# Patient Record
Sex: FEMALE | Race: WHITE | NOT HISPANIC OR LATINO | Employment: OTHER | URBAN - METROPOLITAN AREA
[De-identification: names, ages, dates, MRNs, and addresses within clinical notes are randomized per-mention and may not be internally consistent; named-entity substitution may affect disease eponyms.]

---

## 2017-01-13 ENCOUNTER — HOSPITAL ENCOUNTER (EMERGENCY)
Facility: HOSPITAL | Age: 67
Discharge: HOME | End: 2017-01-13
Attending: EMERGENCY MEDICINE
Payer: COMMERCIAL

## 2017-01-13 VITALS
HEART RATE: 64 BPM | BODY MASS INDEX: 24.5 KG/M2 | HEIGHT: 71 IN | RESPIRATION RATE: 18 BRPM | WEIGHT: 175 LBS | TEMPERATURE: 98 F | DIASTOLIC BLOOD PRESSURE: 73 MMHG | SYSTOLIC BLOOD PRESSURE: 129 MMHG | OXYGEN SATURATION: 97 %

## 2017-01-13 DIAGNOSIS — S09.90XA HEAD TRAUMA: ICD-10-CM
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
## 2017-01-13 PROCEDURE — 25000003 PHARM REV CODE 250: Performed by: PHYSICIAN ASSISTANT

## 2017-01-13 PROCEDURE — 12013 RPR F/E/E/N/L/M 2.6-5.0 CM: CPT

## 2017-01-13 PROCEDURE — 99284 EMERGENCY DEPT VISIT MOD MDM: CPT | Mod: 25

## 2017-01-13 RX ORDER — HYDROCODONE BITARTRATE AND ACETAMINOPHEN 5; 325 MG/1; MG/1
1 TABLET ORAL EVERY 4 HOURS PRN
Qty: 15 TABLET | Refills: 0 | Status: SHIPPED | OUTPATIENT
Start: 2017-01-13 | End: 2017-01-20 | Stop reason: ALTCHOICE

## 2017-01-13 RX ORDER — LIDOCAINE HYDROCHLORIDE 10 MG/ML
10 INJECTION INFILTRATION; PERINEURAL
Status: COMPLETED | OUTPATIENT
Start: 2017-01-13 | End: 2017-01-13

## 2017-01-13 RX ORDER — HYDROCODONE BITARTRATE AND ACETAMINOPHEN 5; 325 MG/1; MG/1
1 TABLET ORAL
Status: COMPLETED | OUTPATIENT
Start: 2017-01-13 | End: 2017-01-13

## 2017-01-13 RX ADMIN — HYDROCODONE BITARTRATE AND ACETAMINOPHEN 1 TABLET: 5; 325 TABLET ORAL at 11:01

## 2017-01-13 RX ADMIN — LIDOCAINE HYDROCHLORIDE 10 ML: 10 INJECTION, SOLUTION INFILTRATION; PERINEURAL at 11:01

## 2017-01-13 NOTE — ED NOTES
Pt states while taking out the trash and fell.  Pt states part of the trash can hit her forehead causing a lac.  Pt denies LOC.  Pt states she was seen per urgent care and was placed in c-collar and told to come to ED for possible CT scan.

## 2017-01-13 NOTE — ED PROVIDER NOTES
Encounter Date: 1/13/2017       History     Chief Complaint   Patient presents with    Fall     pt fell while taking out the trash this morning. Denies LOC. C/o pain to entire right side of her face. Presents with laceration to right forehead and to bridge of nose. Pt was seen at urgent care, and had c-collar placed. Laceration was not sutured. Was sent to the ER for CT scan     Review of patient's allergies indicates:  No Known Allergies  HPI Comments: Sunni Lowe, a 66 y.o. female that presents for laceration sustained over R eyebrow and right sided facial pain after a fall while trying to bring her garbage out this morning.  Was seen at an urgent care facility PTA and there was concern for a head/facial/neck fracture.  She was placed in a C-collar and referred to the ED.  Denies any LOC, confusion.  No medications given at urgent care.  Is UTD on tetanus.      Patient is a 66 y.o. female presenting with the following complaint: skin laceration. The history is provided by the patient.   Laceration    The incident occurred just prior to arrival. The laceration is located on the face. The laceration is 4 cm in size. The laceration mechanism was a a blunt object (edge of garbage can ). The pain is at a severity of 3/10. The pain has been constant since onset. Possible foreign bodies include plastic. Her tetanus status is UTD.     Past Medical History   Diagnosis Date    Allergic rhinitis     Asthma     Cortes's esophagus     Osteopenia     Varicose veins      sees Dr. Brigido Quinones     No past medical history pertinent negatives.  Past Surgical History   Procedure Laterality Date    Knee surgery Bilateral      meniscus repair    Peniculitis surgery       x 3    Breast biopsy Left      Family History   Problem Relation Age of Onset    Ovarian cancer Mother 68    Alzheimer's disease Mother      @ 62    Cancer Father 85     tongue    Diabetes Father     Alzheimer's disease Maternal Aunt      @ age 80      Social History   Substance Use Topics    Smoking status: Former Smoker    Smokeless tobacco: Former User    Alcohol use Yes     Review of Systems   Cardiovascular: Negative for chest pain.   Gastrointestinal: Negative for nausea and vomiting.   Musculoskeletal: Positive for neck pain.   Skin: Positive for wound.   Allergic/Immunologic: Negative for immunocompromised state.   Neurological: Negative for facial asymmetry, speech difficulty and weakness.   Hematological: Does not bruise/bleed easily.   Psychiatric/Behavioral: Negative for agitation and confusion.   All other systems reviewed and are negative.      Physical Exam   Initial Vitals   BP Pulse Resp Temp SpO2   01/13/17 1121 01/13/17 1121 01/13/17 1121 01/13/17 1121 01/13/17 1121   148/74 71 18 97.3 °F (36.3 °C) 100 %     Physical Exam    Nursing note and vitals reviewed.  Constitutional: She appears well-developed and well-nourished. No distress.   HENT:   Head: Normocephalic. Head is with laceration. Head is without Vasquez's sign and without contusion.       Right Ear: Tympanic membrane, external ear and ear canal normal.   Left Ear: Tympanic membrane, external ear and ear canal normal.   Nose: Nose lacerations present. No nasal deformity.       Mouth/Throat: Oropharynx is clear and moist.   4 cm linear laceration over R eyebrow   Eyes: Conjunctivae and EOM are normal.   Neck: Normal range of motion. No tracheal deviation present.   Cardiovascular: Normal rate and regular rhythm.   Pulmonary/Chest: Breath sounds normal. No respiratory distress.   Abdominal: Soft. Bowel sounds are normal. There is no tenderness. There is no rebound and no guarding.   Musculoskeletal: Normal range of motion. She exhibits no tenderness.   Neurological: She is alert and oriented to person, place, and time. She has normal strength. No cranial nerve deficit.   Skin: Skin is warm and dry. No rash noted. No erythema.   Psychiatric: She has a normal mood and affect. Thought  content normal.         ED Course   Lac Repair  Date/Time: 1/13/2017 1:06 PM  Performed by: GM MELENDEZ  Authorized by: BG LONGORIA   Consent Done: Yes  Patient identity confirmed: verbally with patient  Body area: head/neck  Location details: right eyelid  Laceration length: 4 cm  Foreign bodies: plastic  Tendon involvement: none  Nerve involvement: none  Vascular damage: no  Anesthesia: local infiltration    Anesthesia:  Anesthesia: local infiltration  Local Anesthetic: lidocaine 1% without epinephrine   Anesthetic total: 5 mL  Patient sedated: no  Irrigation solution: saline  Irrigation method: syringe  Amount of cleaning: standard  Debridement: none  Degree of undermining: none  Skin closure: 6-0 nylon  Number of sutures: 8  Technique: simple  Approximation: close  Approximation difficulty: simple  Dressing: non-stick sterile dressing  Patient tolerance: Patient tolerated the procedure well with no immediate complications        Labs Reviewed - No data to display     Imaging Results         CT Maxillofacial Without Contrast (Final result) Result time:  01/13/17 12:10:57    Final result by Lucho Dave MD (01/13/17 12:10:57)    Impression:      1.  No facial bone fracture.  2.  Sinus disease.          Electronically signed by: LUCHO DAVE MD  Date:     01/13/17  Time:    12:10     Narrative:    Facial bone CT    Technique: Helical CT scan of the facial bones performed without intravenous contrast.  2.5-mm contiguous axial sections with coronal and sagittal reformations provided for interpretation.    Comparison: None.    Findings:    Bony orbits, nasal bones, zygomatic arches, pterygoid plates, maxilla and mandible are intact.  No facial bone fracture.  Temporomandibular joints are aligned, noting degenerative changes.    There is patchy opacification of ethmoid air cells and mild mucoperiosteal thickening of the bilateral maxillary antra.  Visualized mastoid air cells are aerated.    Optic  globes, optic nerves, and extraocular muscles are unremarkable.  No infiltration of retrobulbar fat.    Salivary glands are unremarkable.    There is laceration over the right frontal bone.            CT Cervical Spine Without Contrast (Final result) Result time:  01/13/17 12:13:26    Final result by Yen Mcrae MD (01/13/17 12:13:26)    Impression:     There is no evidence acute bony injury of the      Electronically signed by: YEN MCRAE MD  Date:     01/13/17  Time:    12:13     Narrative:    CT cervical spine    History: Fall    There is no evidence fracture or malalignment.  There is no prevertebral soft tissue swelling.  The adjacent soft tissues are unremarkable.            CT Head Without Contrast (Final result) Result time:  01/13/17 12:08:24    Final result by Lucho Dave MD (01/13/17 12:08:24)    Impression:     No acute intracranial process.      Electronically signed by: LUCHO DAVE MD  Date:     01/13/17  Time:    12:08     Narrative:    Head CT    Technique: CT scan of the head was performed without contrast utilizing 5-mm contiguous axial sections.    Comparison: 9/3/15    Findings:   There is no intracranial hemorrhage, fluid collection, or mass effect.  Gray-white differentiation is preserved.  There is mucoperiosteal thickening in the left maxillary sinus and bilateral ethmoid air cells.  Mastoid air cells are aerated.                 Medical Decision Making:   Initial Assessment:   Sunni Lowe, a 66 y.o. Non-toxic/afebrile female that presents for laceration sustained over R eyebrow and right sided facial pain after a fall while trying to bring her garbage out this morning.  Was seen at an urgent care facility PTA and there was concern for a head/facial/neck fracture.  She was placed in a C-collar and referred to the ED.  Denies any LOC, confusion.  No medications given at urgent care.  Is UTD on tetanus.    Differential Diagnosis:   Laceration, fracture, contusion  Clinical  Tests:   Radiological Study: Ordered and Reviewed  ED Management:  Norco given with improvement of pain.  CT showed evidence of fracture or dislocation.  Laceration was repaired.  Patient tolerated procedure well.  Given instruction to f/u with PCP in 5 - 7 days for suture removal.  Given strict precautions for return to ED and verbalized understanding.    RX: Norco                   Attending Attestation:     Physician Attestation Statement for NP/PA:   I have conducted a face to face encounter with this patient in addition to the NP/PA, due to Medical Complexity    Other NP/PA Attestation Additions:    History of Present Illness: Agree; 65 y/o F presents to the ED c/o pain to right face, laceration to right forehead and abrasion to bridge of nose after tripping and falling while taking out the trash; Denies LOC, focal numbness or weakness, visual changes, N/V, CP, SOB, Abd pain   Physical Exam: Agree; Well developed, well nourished 65 y/o F in moderate discomfort; PERRL, EOMI, MMM; Large laceration to right forehead and abrasion to bridge of nose; No crepitus or deformity noted to facial bones; Lungs CTA B/L; CV NSR, 2+ radial pulse B/L; Abd S/NT/ND; MAEW   Medical Decision Making: Agree; Px tolerated lac repair well, CTs WNL, instructed on f/u and reasons to return to ED   Procedure Note: Agree; Laceration site irrigated thoroughly, laceration edges approximated and sutured, px tolerated procedure well                ED Course     Clinical Impression:   The primary encounter diagnosis was Laceration. Diagnoses of Head trauma and Fall were also pertinent to this visit.          Amanda Boyle PA-C  01/13/17 [id number]       Charlie Damon MD  01/17/17 3939

## 2017-01-13 NOTE — ED AVS SNAPSHOT
[facility] [address]  [place] [address]               Sunni Lowe   2017 11:24 AM   ED    Description:  Female : 1950   Department:  [facility]           Your Care was Coordinated By:     Provider Role From To    Charlie Damon MD Attending Provider 17 1142 --    Amanda Boyle PA-C Physician Assistant 17 1126 --      Reason for Visit     Fall           Diagnoses this Visit        Comments    Laceration    -  Primary     Head trauma         Fall           ED Disposition     None           To Do List           Follow-up Information     Follow up with Nelly Townsend NP.    Specialty:  Family Medicine    Why:  5 -7 days for suture removal     Contact information:    [address] [facility]  [address]  [place] [id number] [phone]         These Medications        Disp Refills Start End    hydrocodone-acetaminophen 5-325mg (NORCO) 5-325 mg per tablet 15 tablet 0 2017     Take 1 tablet by mouth every 4 (four) hours as needed for Pain. - Oral      [facility]     [facility] Nurse Care Line -  Assistance  Registered nurses in the [facility] provide clinical advisement, health education, appointment booking, and other advisory services.  Call for this free service at [phone].             Medications           Message regarding Medications     Verify the changes and/or additions to your medication regime listed below are the same as discussed with your clinician today.  If any of these changes or additions are incorrect, please notify your healthcare provider.        START taking these NEW medications        Refills    hydrocodone-acetaminophen 5-325mg (NORCO) 5-325 mg per tablet 0    Sig: Take 1 tablet by mouth every 4 (four) hours as needed for Pain.    Class: Print    Route: Oral      These medications were administered today        Dose Freq    lidocaine HCL 10 mg/ml (1%)  "injection 10 mL 10 mL ED 1 Time    Sig: 10 mLs by Infiltration route ED 1 Time.    Class: Normal    Route: Infiltration    Cosign for Ordering: Accepted by Cristopher Carlos MD on 1/13/2017 12:00 PM    hydrocodone-acetaminophen 5-325mg per tablet 1 tablet 1 tablet ED 1 Time    Sig: Take 1 tablet by mouth ED 1 Time.    Class: Normal    Route: Oral    Cosign for Ordering: Accepted by Cristopher Carlos MD on 1/13/2017 12:00 PM           Verify that the below list of medications is an accurate representation of the medications you are currently taking.  If none reported, the list may be blank. If incorrect, please contact your healthcare provider. Carry this list with you in case of emergency.           Current Medications     calcium carbonate (OS-ISABELLE) 600 mg (1,500 mg) Tab Take 1,200 mg by mouth once daily.    cetirizine (ZYRTEC) 10 MG tablet Take 10 mg by mouth once daily.    esomeprazole (NEXIUM) 40 MG capsule Take 1 capsule (40 mg total) by mouth once daily.    fish oil-omega-3 fatty acids 300-1,000 mg capsule Take 600 mg by mouth once daily.    fluticasone (FLOVENT HFA) 44 mcg/actuation inhaler Inhale 1 puff into the lungs 2 (two) times daily.    hydrocodone-acetaminophen 5-325mg (NORCO) 5-325 mg per tablet Take 1 tablet by mouth every 4 (four) hours as needed for Pain.    vitamin D 1000 units Tab Take 2,000 Units by mouth once daily.           Clinical Reference Information           Your Vitals Were     BP Pulse Temp Resp Height Weight    148/74 (BP Location: Right arm, Patient Position: Sitting) 71 97.3 °F (36.3 °C) (Oral) 18 5' 11" (1.803 m) 79.4 kg (175 lb)    SpO2 BMI             100% 24.41 kg/m2         Allergies as of 1/13/2017     No Known Allergies      Immunizations Administered on Date of Encounter - 1/13/2017     None      ED Micro, Lab, POCT     None      ED Imaging Orders     Start Ordered       Status Ordering Provider    01/13/17 1146 01/13/17 1145  CT Maxillofacial Without Contrast  1 time " imaging      Final result     01/13/17 1146 01/13/17 1145  CT Cervical Spine Without Contrast  1 time imaging      Final result     01/13/17 1145 01/13/17 1145  CT Head Without Contrast  1 time imaging      Final result     01/13/17 1140 01/13/17 1143    1 time imaging,   Status:  Canceled      Canceled       Discharge References/Attachments     CONCUSSION (ENGLISH)    LACERATION, FACE: SUTURE OR TAPE (ENGLISH)      [facility] Sign-Up     Activating your [facility] account is as easy as 1-2-3!     1) Visit [email or web address], select Sign Up Now, enter this activation code and your date of birth, then select Next.  [email or web address]  Expires: 2/27/2017  1:06 PM      2) Create a username and password to use when you visit [facility] in the future and select a security question in case you lose your password and select Next.    3) Enter your e-mail address and click Sign Up!    Additional Information  If you have questions, please e-mail [email or web address] or call [phone] to talk to our [facility] staff. Remember, [facility] is NOT to be used for urgent needs. For medical emergencies, dial 911.         Smoking Cessation     If you would like to quit smoking:   You may be eligible for free services if you are a [place] resident and started smoking cigarettes before September 1, 1988.  Call the Smoking Cessation Trust ([facility]) toll free at ([phone]) 139-0105 or (967) 704-3692.   Call 1-800-QUIT-NOW if you do not meet the above criteria.             [facility] complies with applicable Federal civil rights laws and does not discriminate on the basis of race, color, national origin, age, disability, or sex.        Language Assistance Services     ATTENTION: Language assistance services are available, free of charge. Please call [phone].      ATENCIÓN: Si habla español, tiene a lee disposición servicios gratuitos de asistencia lingüística. Llame al [phone].     CHÚ Ý: N?u b?n nói Ti?ng Vi?t, có  các d?ch v? h? tr? yessi ng? mi?n phí dành cho b?n. G?i s? [phone].

## 2017-01-17 ENCOUNTER — TELEPHONE (OUTPATIENT)
Dept: FAMILY MEDICINE | Facility: CLINIC | Age: 67
End: 2017-01-17

## 2017-01-17 NOTE — TELEPHONE ENCOUNTER
Called pt to change time of appointment which should be a 40 min, was schedule a 20 min, called  Pt no answer,ask pt to call office to see of 9 or 2 which is good.

## 2017-01-18 NOTE — TELEPHONE ENCOUNTER
----- Message from Eli Moise sent at 1/17/2017  4:59 PM CST -----  Contact: self/[phone]  Patient would like to take the 9:00 AM appointment on 1/20/17.  Please advise

## 2017-01-20 ENCOUNTER — OFFICE VISIT (OUTPATIENT)
Dept: FAMILY MEDICINE | Facility: CLINIC | Age: 67
End: 2017-01-20
Payer: COMMERCIAL

## 2017-01-20 VITALS
DIASTOLIC BLOOD PRESSURE: 74 MMHG | HEART RATE: 65 BPM | HEIGHT: 71 IN | TEMPERATURE: 97 F | BODY MASS INDEX: 24.41 KG/M2 | SYSTOLIC BLOOD PRESSURE: 114 MMHG | WEIGHT: 174.38 LBS | OXYGEN SATURATION: 99 %

## 2017-01-20 DIAGNOSIS — Z09 HOSPITAL DISCHARGE FOLLOW-UP: Primary | ICD-10-CM

## 2017-01-20 DIAGNOSIS — S01.111D EYEBROW LACERATION, RIGHT, SUBSEQUENT ENCOUNTER: ICD-10-CM

## 2017-01-20 DIAGNOSIS — Z48.02 VISIT FOR SUTURE REMOVAL: ICD-10-CM

## 2017-01-20 DIAGNOSIS — M54.9 UPPER BACK PAIN ON RIGHT SIDE: ICD-10-CM

## 2017-01-20 PROCEDURE — 99214 OFFICE O/P EST MOD 30 MIN: CPT | Mod: S$GLB,,, | Performed by: NURSE PRACTITIONER

## 2017-01-20 PROCEDURE — 1159F MED LIST DOCD IN RCRD: CPT | Mod: S$GLB,,, | Performed by: NURSE PRACTITIONER

## 2017-01-20 PROCEDURE — 99999 PR PBB SHADOW E&M-EST. PATIENT-LVL IV: CPT | Mod: PBBFAC,,, | Performed by: NURSE PRACTITIONER

## 2017-01-20 RX ORDER — SODIUM FLUORIDE 1.1 G/100G
GEL ORAL
Refills: 3 | COMMUNITY
Start: 2016-10-21 | End: 2017-10-02

## 2017-01-20 RX ORDER — NAPROXEN 500 MG/1
500 TABLET ORAL 2 TIMES DAILY
Qty: 60 TABLET | Refills: 0 | Status: SHIPPED | OUTPATIENT
Start: 2017-01-20 | End: 2017-04-07

## 2017-01-20 RX ORDER — METHOCARBAMOL 750 MG/1
750 TABLET, FILM COATED ORAL 4 TIMES DAILY PRN
Qty: 45 TABLET | Refills: 0 | Status: SHIPPED | OUTPATIENT
Start: 2017-01-20 | End: 2017-02-19

## 2017-01-20 NOTE — MR AVS SNAPSHOT
[address]  [place] [address]  Phone: [phone]  Fax: [phone]                  Sunni Lowe   2017 9:00 AM   Office Visit    Description:  Female : 1950   Provider:  Nelly Townsend NP   Department:  [facility]           Reason for Visit     Follow-up           Diagnoses this Visit        Comments    Hospital discharge follow-up    -  Primary     Eyebrow laceration, right, subsequent encounter         Visit for suture removal         Upper back pain on right side                To Do List           Future Appointments        Provider Department Dept Phone    2017 8:00 AM Chandan Middleton MD [facility] [phone]      Goals (5 Years of Data)     None      Follow-Up and Disposition     Return if symptoms worsen or fail to improve.       These Medications        Disp Refills Start End    naproxen (NAPROSYN) 500 MG tablet 60 tablet 0 2017     Take 1 tablet (500 mg total) by mouth 2 (two) times daily. - Oral    Pharmacy: [facility]/pharmacy #[id number] - [place] - [address] AT [facility] Ph #: [phone]       methocarbamol (ROBAXIN) 750 MG Tab 45 tablet 0 2017    Take 1 tablet (750 mg total) by mouth 4 (four) times daily as needed. - Oral    Pharmacy: [facility]/pharmacy #[id number] - [place] - [address] AT [facility] Ph #: [phone]         [facility]     [facility] Care [facility] -  Assistance  Registered nurses in the [facility] provide clinical advisement, health education, appointment booking, and other advisory services.  Call for this free service at [phone].             Medications           Message regarding Medications     Verify the changes and/or additions to your medication regime listed below are the same as discussed with your clinician today.  If any of these changes or additions are incorrect, please notify your  "healthcare provider.        START taking these NEW medications        Refills    naproxen (NAPROSYN) 500 MG tablet 0    Sig: Take 1 tablet (500 mg total) by mouth 2 (two) times daily.    Class: Normal    Route: Oral    methocarbamol (ROBAXIN) 750 MG Tab 0    Sig: Take 1 tablet (750 mg total) by mouth 4 (four) times daily as needed.    Class: Normal    Route: Oral      STOP taking these medications     hydrocodone-acetaminophen 5-325mg (NORCO) 5-325 mg per tablet Take 1 tablet by mouth every 4 (four) hours as needed for Pain.           Verify that the below list of medications is an accurate representation of the medications you are currently taking.  If none reported, the list may be blank. If incorrect, please contact your healthcare provider. Carry this list with you in case of emergency.           Current Medications     calcium carbonate (OS-ISABELLE) 600 mg (1,500 mg) Tab Take 1,200 mg by mouth once daily.    cetirizine (ZYRTEC) 10 MG tablet Take 10 mg by mouth once daily.    DENTAGEL 1.1 % Gel USE TO BRUSH TEETH TWICE A DAY (FOLLOW INSTRUCTIONS ON PACKAGE)    esomeprazole (NEXIUM) 40 MG capsule Take 1 capsule (40 mg total) by mouth once daily.    fish oil-omega-3 fatty acids 300-1,000 mg capsule Take 600 mg by mouth once daily.    fluticasone (FLOVENT HFA) 44 mcg/actuation inhaler Inhale 1 puff into the lungs 2 (two) times daily.    methocarbamol (ROBAXIN) 750 MG Tab Take 1 tablet (750 mg total) by mouth 4 (four) times daily as needed.    naproxen (NAPROSYN) 500 MG tablet Take 1 tablet (500 mg total) by mouth 2 (two) times daily.    vitamin D 1000 units Tab Take 2,000 Units by mouth once daily.           Clinical Reference Information           Vital Signs - Last Recorded  Most recent update: 1/20/2017  8:55 AM by Deepa Ryan MA    BP Pulse Temp Ht Wt SpO2    114/74 (BP Location: Right arm, Patient Position: Sitting, BP Method: Manual) 65 97.4 °F (36.3 °C) (Oral) 5' 11" (1.803 m) 79.1 kg (174 lb 6.1 oz) 99% "    BMI                24.32 kg/m2          Blood Pressure          Most Recent Value    BP  114/74      Allergies as of 1/20/2017     No Known Allergies      Immunizations Administered on Date of Encounter - 1/20/2017     None      [facility] Sign-Up     Activating your [facility] account is as easy as 1-2-3!     1) Visit [email or web address], select Sign Up Now, enter this activation code and your date of birth, then select Next.  [email or web address]  Expires: 2/27/2017  1:06 PM      2) Create a username and password to use when you visit [facility] in the future and select a security question in case you lose your password and select Next.    3) Enter your e-mail address and click Sign Up!    Additional Information  If you have questions, please e-mail [email or web address] or call [phone] to talk to our [facility] staff. Remember, [facility] is NOT to be used for urgent needs. For medical emergencies, dial 911.

## 2017-01-20 NOTE — LETTER
January 20, 2017    Sunni Lowe  [address] [facility] [address]         [address] [address]  Phone: [phone]  Fax: [phone] January 20, 2017     Patient: Sunni Lowe   YOB: 1950   Date of Visit: 1/20/2017       To Whom It May Concern:    It is my medical opinion that Sunni Lowe may return to work on 1/23/2017.    If you have any questions or concerns, please don't hesitate to call.    Sincerely,        Nelly Townsend, NP

## 2017-01-20 NOTE — PROGRESS NOTES
Subjective:       Patient ID: Sunni Lowe is a 66 y.o. female.    Chief Complaint: Follow-up (hospital )    HPI Comments: Patient is here today for Hospital Discharge follow up.    Patient presented to the ER on 1/13/2017 with a laceration sustained over the right eyebrow and right-sided facial pain after a fall while trying to bring her garbage out that morning. Laceration was repaired/sutured in the ER.  CT of Maxillofacial, Cervical and Head done - all with no acute abnormalities, no fractures present.  Patient was given Norco for pain medication and instructed to have sutures out in 5 to 7 days.  It has now been 7 days.  Sutures are intact - edges are wee-approximated and healing well.  Patient report the pain to face has improved and mostly resolved.  She does complain of pain to right upper back.  She reports she is no longer taking pain medication.  Just describes a muscle soreness to right upper back.          Previous Medications    CALCIUM CARBONATE (OS-ISABELLE) 600 MG (1,500 MG) TAB    Take 1,200 mg by mouth once daily.    CETIRIZINE (ZYRTEC) 10 MG TABLET    Take 10 mg by mouth once daily.    DENTAGEL 1.1 % GEL    USE TO BRUSH TEETH TWICE A DAY (FOLLOW INSTRUCTIONS ON PACKAGE)    ESOMEPRAZOLE (NEXIUM) 40 MG CAPSULE    Take 1 capsule (40 mg total) by mouth once daily.    FISH OIL-OMEGA-3 FATTY ACIDS 300-1,000 MG CAPSULE    Take 600 mg by mouth once daily.    FLUTICASONE (FLOVENT HFA) 44 MCG/ACTUATION INHALER    Inhale 1 puff into the lungs 2 (two) times daily.    VITAMIN D 1000 UNITS TAB    Take 2,000 Units by mouth once daily.       Past Medical History   Diagnosis Date    Allergic rhinitis     Asthma     Cortes's esophagus     Osteopenia     Varicose veins      sees Dr. Brigido Quinones       Past Surgical History   Procedure Laterality Date    Knee surgery Bilateral      meniscus repair    Peniculitis surgery       x 3    Breast biopsy Left        Family History   Problem Relation Age of Onset     Ovarian cancer Mother 68    Alzheimer's disease Mother      @ 62    Cancer Father 85     tongue    Diabetes Father     Alzheimer's disease Maternal Aunt      @ age 80    No Known Problems Sister     No Known Problems Brother     No Known Problems Sister        Social History     Social History    Marital status:      Spouse name: N/A    Number of children: N/A    Years of education: N/A     Social History Main Topics    Smoking status: Former Smoker    Smokeless tobacco: Former User    Alcohol use Yes    Drug use: No    Sexual activity: Not Currently     Other Topics Concern    None     Social History Narrative       Review of Systems   Constitutional: Negative for appetite change, chills, fatigue, fever and unexpected weight change.   HENT: Negative for congestion, ear pain, mouth sores, nosebleeds, postnasal drip, rhinorrhea, sinus pressure, sneezing, sore throat, trouble swallowing and voice change.    Eyes: Negative for photophobia, pain, discharge, redness, itching and visual disturbance.   Respiratory: Negative for cough, chest tightness and shortness of breath.    Cardiovascular: Negative for chest pain, palpitations and leg swelling.   Gastrointestinal: Negative for abdominal pain, blood in stool, constipation, diarrhea, nausea and vomiting.   Genitourinary: Negative for dysuria, frequency, hematuria and urgency.   Musculoskeletal: Positive for back pain. Negative for arthralgias, joint swelling and myalgias.   Skin: Negative for color change and rash.        Laceration over right eyebrow with sutures that need to be removed   Allergic/Immunologic: Negative for immunocompromised state.   Neurological: Negative for dizziness, seizures, syncope, weakness and headaches.   Hematological: Negative for adenopathy. Does not bruise/bleed easily.   Psychiatric/Behavioral: Negative for agitation, dysphoric mood, sleep disturbance and suicidal ideas. The patient is not nervous/anxious.       "    Objective:     Vitals:    01/20/17 0850   BP: 114/74   BP Location: Right arm   Patient Position: Sitting   BP Method: Manual   Pulse: 65   Temp: 97.4 °F (36.3 °C)   TempSrc: Oral   SpO2: 99%   Weight: 79.1 kg (174 lb 6.1 oz)   Height: 5' 11" (1.803 m)          Physical Exam   Constitutional: She is oriented to person, place, and time. She appears well-developed and well-nourished.   HENT:   Head: Normocephalic. Head is with contusion.       Right Ear: External ear normal.   Left Ear: External ear normal.   Nose: Nose normal.   Mouth/Throat: Oropharynx is clear and moist. No oropharyngeal exudate.   Laceration repair with 8 sutures intact.  Edges well-aproximated.  Minimal redness present and no swelling.  Bruising beneath the eye and at bridge of nose - see pictures below.   Eyes: EOM are normal. Pupils are equal, round, and reactive to light.   Neck: Normal range of motion. Neck supple. No tracheal deviation present. No thyromegaly present.   Cardiovascular: Normal rate, regular rhythm and normal heart sounds.    No murmur heard.  Pulmonary/Chest: Effort normal and breath sounds normal. No respiratory distress.   Abdominal: Soft. She exhibits no distension.   Musculoskeletal: Normal range of motion. She exhibits no edema.        Cervical back: She exhibits tenderness, pain and spasm. She exhibits no bony tenderness, no swelling and no laceration.        Back:    Lymphadenopathy:     She has no cervical adenopathy.   Neurological: She is alert and oriented to person, place, and time. No cranial nerve deficit. Coordination normal.   Skin: Skin is warm and dry.   Psychiatric: She has a normal mood and affect.       BEFORE AND AFTER SUTURE REMOVAL PICTURES:          Patient presents for suture removal. The wound is well healed without signs of infection.  The sutures are removed. Wound care and activity instructions given. Return prn.    Assessment:         ICD-10-CM ICD-9-CM   1. Hospital discharge follow-up Z09 " V67.59   2. Eyebrow laceration, right, subsequent encounter S01.111D V58.89     873.42   3. Visit for suture removal Z48.02 V58.32   4. Upper back pain on right side M54.9 724.5       Plan:       Hospital discharge follow-up    Eyebrow laceration, right, subsequent encounter    Visit for suture removal  Patient presents for suture removal. The wound is well healed without signs of infection.  The sutures are removed. Wound care and activity instructions given. Return prn.    Upper back pain on right side  -  Take Naprosyn twice daily.  Use Icy Hot rubs and moist heat.  May take Robaxin as needed for muscle relaxant.  -     naproxen (NAPROSYN) 500 MG tablet; Take 1 tablet (500 mg total) by mouth 2 (two) times daily.  Dispense: 60 tablet; Refill: 0  -     methocarbamol (ROBAXIN) 750 MG Tab; Take 1 tablet (750 mg total) by mouth 4 (four) times daily as needed.  Dispense: 45 tablet; Refill: 0    Return if symptoms worsen or fail to improve.     Patient's Medications   New Prescriptions    METHOCARBAMOL (ROBAXIN) 750 MG TAB    Take 1 tablet (750 mg total) by mouth 4 (four) times daily as needed.    NAPROXEN (NAPROSYN) 500 MG TABLET    Take 1 tablet (500 mg total) by mouth 2 (two) times daily.   Previous Medications    CALCIUM CARBONATE (OS-ISABELLE) 600 MG (1,500 MG) TAB    Take 1,200 mg by mouth once daily.    CETIRIZINE (ZYRTEC) 10 MG TABLET    Take 10 mg by mouth once daily.    DENTAGEL 1.1 % GEL    USE TO BRUSH TEETH TWICE A DAY (FOLLOW INSTRUCTIONS ON PACKAGE)    ESOMEPRAZOLE (NEXIUM) 40 MG CAPSULE    Take 1 capsule (40 mg total) by mouth once daily.    FISH OIL-OMEGA-3 FATTY ACIDS 300-1,000 MG CAPSULE    Take 600 mg by mouth once daily.    FLUTICASONE (FLOVENT HFA) 44 MCG/ACTUATION INHALER    Inhale 1 puff into the lungs 2 (two) times daily.    VITAMIN D 1000 UNITS TAB    Take 2,000 Units by mouth once daily.   Modified Medications    No medications on file   Discontinued Medications    HYDROCODONE-ACETAMINOPHEN 5-325MG  (NORCO) 5-325 MG PER TABLET    Take 1 tablet by mouth every 4 (four) hours as needed for Pain.

## 2017-01-27 ENCOUNTER — OFFICE VISIT (OUTPATIENT)
Dept: NEUROLOGY | Facility: CLINIC | Age: 67
End: 2017-01-27
Payer: COMMERCIAL

## 2017-01-27 VITALS
DIASTOLIC BLOOD PRESSURE: 76 MMHG | HEART RATE: 60 BPM | HEIGHT: 71 IN | WEIGHT: 174.38 LBS | BODY MASS INDEX: 24.41 KG/M2 | SYSTOLIC BLOOD PRESSURE: 108 MMHG

## 2017-01-27 DIAGNOSIS — R41.3 MEMORY CHANGES: Primary | ICD-10-CM

## 2017-01-27 PROCEDURE — 99213 OFFICE O/P EST LOW 20 MIN: CPT | Mod: S$GLB,,, | Performed by: PSYCHIATRY & NEUROLOGY

## 2017-01-27 PROCEDURE — 1159F MED LIST DOCD IN RCRD: CPT | Mod: S$GLB,,, | Performed by: PSYCHIATRY & NEUROLOGY

## 2017-01-27 PROCEDURE — 99999 PR PBB SHADOW E&M-EST. PATIENT-LVL III: CPT | Mod: PBBFAC,,, | Performed by: PSYCHIATRY & NEUROLOGY

## 2017-01-27 NOTE — MR AVS SNAPSHOT
[place] [facility]  [address] [place] [address] Ave  [place] [address]  Phone: [phone]  Fax: [phone]                  Sunni Lowe   2017 8:00 AM   Office Visit    Description:  Female : 1950   Provider:  Chandan Middleton MD   Department:  [facility]           Reason for Visit     Memory Loss                To Do List           Goals (5 Years of Data)     None      Follow-Up and Disposition     Return if symptoms worsen or fail to improve.      [facility]     [facility] -  Assistance  Registered nurses in the [facility] provide clinical advisement, health education, appointment booking, and other advisory services.  Call for this free service at [phone].             Medications           Message regarding Medications     Verify the changes and/or additions to your medication regime listed below are the same as discussed with your clinician today.  If any of these changes or additions are incorrect, please notify your healthcare provider.             Verify that the below list of medications is an accurate representation of the medications you are currently taking.  If none reported, the list may be blank. If incorrect, please contact your healthcare provider. Carry this list with you in case of emergency.           Current Medications     calcium carbonate (OS-ISABELLE) 600 mg (1,500 mg) Tab Take 1,200 mg by mouth once daily.    cetirizine (ZYRTEC) 10 MG tablet Take 10 mg by mouth once daily.    DENTAGEL 1.1 % Gel USE TO BRUSH TEETH TWICE A DAY (FOLLOW INSTRUCTIONS ON PACKAGE)    esomeprazole (NEXIUM) 40 MG capsule Take 1 capsule (40 mg total) by mouth once daily.    fish oil-omega-3 fatty acids 300-1,000 mg capsule Take 600 mg by mouth once daily.    fluticasone (FLOVENT HFA) 44 mcg/actuation inhaler Inhale 1 puff into the lungs 2 (two) times daily.    vitamin D 1000 units Tab Take 2,000 Units by mouth once daily.    methocarbamol  "(ROBAXIN) 750 MG Tab Take 1 tablet (750 mg total) by mouth 4 (four) times daily as needed.    naproxen (NAPROSYN) 500 MG tablet Take 1 tablet (500 mg total) by mouth 2 (two) times daily.           Clinical Reference Information           Vital Signs - Last Recorded  Most recent update: 1/27/2017  8:00 AM by Lobito Hodge MA    BP Pulse Ht Wt BMI    108/76 (BP Location: Left arm, Patient Position: Sitting, BP Method: Automatic) 60 5' 11" (1.803 m) 79.1 kg (174 lb 6.1 oz) 24.32 kg/m2      Blood Pressure          Most Recent Value    BP  108/76      Allergies as of 1/27/2017     No Known Allergies      Immunizations Administered on Date of Encounter - 1/27/2017     None      [facility] Sign-Up     Activating your [facility] account is as easy as 1-2-3!     1) Visit [email or web address], select Sign Up Now, enter this activation code and your date of birth, then select Next.  [email or web address]  Expires: 2/27/2017  1:06 PM      2) Create a username and password to use when you visit [facility] in the future and select a security question in case you lose your password and select Next.    3) Enter your e-mail address and click Sign Up!    Additional Information  If you have questions, please e-mail [email or web address] or call [phone] to talk to our [facility] staff. Remember, [facility] is NOT to be used for urgent needs. For medical emergencies, dial 911.         Instructions      Manage Stress with a Healthy Lifestyle  Managing stress is easier if you take good care of yourself. Make time for rest and recreation. Eat healthier meals. Take a walk now and then. And don't forget to treat yourself. A little down time can go a long way.    Get enough rest  When you dont get enough sleep, you may be too tired to cope with stress. Also, stress can prevent you from sleeping well or may keep you awake. If this happens to you, try reading or listening to soothing music before you go to sleep.  Make time for yourself  In todays " world, there is often too much to do in too little time. It may seem hard to make time for yourself. But try to spend just a few minutes each day doing something you enjoy. This can improve the quality of your life and your mental outlook. Also, youll be more productive when handling your day-to-day duties. And youll be in a better frame of mind to cope with stress.  Eat right  Its easy to react to stress by reaching for a bag of chips or a cup of coffee. This may give you a quick boost but may later drain your energy. To keep your energy level steady, eat healthy meals and snacks at home and at work. Try not to skip meals. Stick with a low-fat diet thats rich in whole grains and fresh fruits and vegetables.  Nourish your spirit  When life is hectic, its easy to forget what your values and goals are. To help prevent this from happening, find out what is most important in your life. Ask yourself, What would I miss most if I had to start a new life alone somewhere else? My work? My family or friends? Something I love doing? Then focus on embracing your values and what you want to achieve in your life.  Stay on the move  Exercise helps burn off the negative energy of stress. Doing something active that you enjoy also helps you get away from stressful situations. Try to walk, jog, skate, swim, dance, take a fitness class, or play a team sport on most days. Or, practice yoga or anastasia chi, which can help you relax.  Put some fun into your life  Some things you may enjoy doing are listed below. Check off the ones youll try. Then add your own.  · Try a new hobby  · Plan a fun trip  · Have lunch with a friend  · Learn a new sport or game  · Go see a movie  · Take a class on something you always wanted to learn   © 1859-9820 [employer]. [address] [place] [address], [place] [address]. All rights reserved. This information is not intended as a substitute for professional medical care. Always follow your  healthcare professional's instructions.

## 2017-01-27 NOTE — PROGRESS NOTES
"[facility] NEUROLOGY  [facility]    Date: January 27, 2017   Patient Name: Sunni Lowe   MRN: [id number]   PCP: Nelly Townsend  Referring Provider: Referral, Self    Assessment:      This is Sunni Lowe, 66 y.o. female with a history of subjective memory deficits who presents In follow-up today.  Patient has undergone routine screening exam for reversible causes of dementia as well as a formal neuropsychological testing battery which was within normal limits for age.  The patient is doing well with no further changes to suggest progression of her subjective memory deficits.  I obtained a baseline MOCA today with a score of 24/30.  Of note, the patient was noted to give up rapidly on tasks stating that she "hates test like this." Given lack of substantial progression, will continue to follow patient clinically.     Plan:      -- MOCA screen completed today 24/30  -- will continue to follow clinically      I spent a total of 25 minutes in face to face time with the patient, over half of which was spent on counseling and education about the patient's diagnosis and medications.     Chandan Middleton MD  [facility]   Department of Neurology    Patient note was created using Dragon Dictation.  Any errors in syntax or even information may not have been identified and edited on initial review prior to signing this note.  Subjective:        HPI:   Ms. Sunni Lowe is a 66 y.o. female who presents with a chief complaint of Subjective memory loss.  The patient was previously evaluated by Dr. Weeks and I have reviewed his notes regarding her initial presentation.  I have also reviewed the patient's report from her full neuropsychiatric testing which was ultimately within normal limits.  The patient states that she does not believe there has been any substantial change in her memory over the past year.  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  Similarly, she notes no deficits in her activities of daily living.  She does state that she occasionally has to collect her thoughts when trying to remember something on command, particularly if she has been engaged in a stressful activity such as managing multiple tasks at work.  She also recently confused two actresses as a play, which she found concerning. She denies any sleep disturbances, mood disturbances, or any other new neurologic deficit.  She has no other complaints today.    PAST MEDICAL HISTORY:  Past Medical History   Diagnosis Date    Allergic rhinitis     Asthma     Cortes's esophagus     Osteopenia     Varicose veins      sees Dr. Brigido Quniones     PAST SURGICAL HISTORY:  Past Surgical History   Procedure Laterality Date    Knee surgery Bilateral      meniscus repair    Peniculitis surgery       x 3    Breast biopsy Left      CURRENT MEDS:  Current Outpatient Prescriptions   Medication Sig Dispense Refill    calcium carbonate (OS-ISABELLE) 600 mg (1,500 mg) Tab Take 1,200 mg by mouth once daily.      cetirizine (ZYRTEC) 10 MG tablet Take 10 mg by mouth once daily.      DENTAGEL 1.1 % Gel USE TO BRUSH TEETH TWICE A DAY (FOLLOW INSTRUCTIONS ON PACKAGE)  3    esomeprazole (NEXIUM) 40 MG capsule Take 1 capsule (40 mg total) by mouth once daily. 90 capsule 3    fish oil-omega-3 fatty acids 300-1,000 mg capsule Take 600 mg by mouth once daily.      fluticasone (FLOVENT HFA) 44 mcg/actuation inhaler Inhale 1 puff into the lungs 2 (two) times daily.      vitamin D 1000 units Tab Take 2,000 Units by mouth once daily.      methocarbamol (ROBAXIN) 750 MG Tab Take 1 tablet (750 mg total) by mouth 4 (four) times daily as needed. 45 tablet 0    naproxen (NAPROSYN) 500 MG tablet Take 1 tablet (500 mg total) by mouth 2 (two) times daily. 60 tablet 0     Current Facility-Administered Medications   Medication Dose Route Frequency Provider Last Rate Last  "Dose    zoledronic acid-mannitol & water 5 mg/100 mL infusion 5 mg  5 mg Intravenous Once Joey Maldonado MD         ALLERGIES:  Review of patient's allergies indicates:  No Known Allergies    FAMILY HISTORY:  Family History   Problem Relation Age of Onset    Ovarian cancer Mother 68    Alzheimer's disease Mother      @ 62    Cancer Father 85     tongue    Diabetes Father     Alzheimer's disease Maternal Aunt      @ age 80    No Known Problems Sister     No Known Problems Brother     No Known Problems Sister        SOCIAL HISTORY:  Social History   Substance Use Topics    Smoking status: Former Smoker    Smokeless tobacco: Former User    Alcohol use Yes     Review of Systems:  12 review of systems is negative except for the symptoms mentioned in HPI.      Objective:     Vitals:    01/27/17 0758   BP: 108/76   BP Location: Left arm   Patient Position: Sitting   BP Method: Automatic   Pulse: 60   Weight: 79.1 kg (174 lb 6.1 oz)   Height: 5' 11" (1.803 m)     General: NAD, well nourished   Eyes: no tearing, discharge, no erythema   ENT: moist mucous membranes of the oral cavity, nares patent    Neck: Supple, full range of motion  Cardiovascular: Warm and well perfused, pulses equal and symmetrical  Lungs: Normal work of breathing, normal chest wall excursions  Skin: No rash, lesions, or breakdown on exposed skin  Psychiatry: Mood and affect are appropriate   Abdomen: soft, non tender, non distended  Extremeties: No cyanosis, clubbing or edema.    Neurological   MENTAL STATUS: Alert and oriented to person, place, and time. Attention and concentration within normal limits. Speech without dysarthria, able to name and repeat without difficulty. Recent and remote memory within normal limits   CRANIAL NERVES: Visual fields intact. PERRL. EOMI. Face symmetrical. Hearing grossly intact. Full shoulder shrug bilaterally. Tongue protrudes midline   SENSORY: Sensation is intact to light touch throughout.    MOTOR: " Normal bulk and tone.  5/5 deltoid, biceps, triceps, interosseous, hand  bilaterally. 5/5 iliopsoas, knee extension/flexion, foot dorsi/plantarflexion bilaterally.    REFLEXES: Symmetric and 2+ throughout.   CEREBELLAR/COORDINATION/GAIT: Gait steady with normal arm swing and stride length.  Normal rapid alternating movements.     MOCA Results:   Visuospatial/Executive: 5/5  Naming: 3/3  Attention: 5/6  Language: 3/3  Abstraction: 2/2  Delayed Recall: 0/5  Orientation: 6/6  Total:  24/30

## 2017-01-27 NOTE — PATIENT INSTRUCTIONS
Manage Stress with a Healthy Lifestyle  Managing stress is easier if you take good care of yourself. Make time for rest and recreation. Eat healthier meals. Take a walk now and then. And don't forget to treat yourself. A little down time can go a long way.    Get enough rest  When you dont get enough sleep, you may be too tired to cope with stress. Also, stress can prevent you from sleeping well or may keep you awake. If this happens to you, try reading or listening to soothing music before you go to sleep.  Make time for yourself  In todays world, there is often too much to do in too little time. It may seem hard to make time for yourself. But try to spend just a few minutes each day doing something you enjoy. This can improve the quality of your life and your mental outlook. Also, youll be more productive when handling your day-to-day duties. And youll be in a better frame of mind to cope with stress.  Eat right  Its easy to react to stress by reaching for a bag of chips or a cup of coffee. This may give you a quick boost but may later drain your energy. To keep your energy level steady, eat healthy meals and snacks at home and at work. Try not to skip meals. Stick with a low-fat diet thats rich in whole grains and fresh fruits and vegetables.  Nourish your spirit  When life is hectic, its easy to forget what your values and goals are. To help prevent this from happening, find out what is most important in your life. Ask yourself, What would I miss most if I had to start a new life alone somewhere else? My work? My family or friends? Something I love doing? Then focus on embracing your values and what you want to achieve in your life.  Stay on the move  Exercise helps burn off the negative energy of stress. Doing something active that you enjoy also helps you get away from stressful situations. Try to walk, jog, skate, swim, dance, take a fitness class, or play a team sport on most days. Or, practice  yoga or anastasia chi, which can help you relax.  Put some fun into your life  Some things you may enjoy doing are listed below. Check off the ones youll try. Then add your own.  · Try a new hobby  · Plan a fun trip  · Have lunch with a friend  · Learn a new sport or game  · Go see a movie  · Take a class on something you always wanted to learn   © 3792-8504 [employer]. [address] [place] [address], [place] [address]. All rights reserved. This information is not intended as a substitute for professional medical care. Always follow your healthcare professional's instructions.

## 2017-02-01 ENCOUNTER — TELEPHONE (OUTPATIENT)
Dept: FAMILY MEDICINE | Facility: CLINIC | Age: 67
End: 2017-02-01

## 2017-02-01 DIAGNOSIS — Z13.220 SCREENING CHOLESTEROL LEVEL: ICD-10-CM

## 2017-02-01 DIAGNOSIS — Z13.29 THYROID DISORDER SCREEN: ICD-10-CM

## 2017-02-01 DIAGNOSIS — Z13.0 SCREENING, ANEMIA, DEFICIENCY, IRON: Primary | ICD-10-CM

## 2017-02-01 DIAGNOSIS — Z13.1 DIABETES MELLITUS SCREENING: ICD-10-CM

## 2017-02-01 NOTE — TELEPHONE ENCOUNTER
----- Message from Katherine Hastings sent at 2/1/2017  3:05 PM CST -----  Contact: [phone]  Pt its requesting lab work schedule prior to her wellness on 02/17/17. Please advise

## 2017-02-02 NOTE — TELEPHONE ENCOUNTER
Wellness labs ordered - the hepatitis C screen was already in computer BUT make sure to link this order as well as wellness labs to lab order.

## 2017-02-07 ENCOUNTER — OFFICE VISIT (OUTPATIENT)
Dept: FAMILY MEDICINE | Facility: CLINIC | Age: 67
End: 2017-02-07
Payer: COMMERCIAL

## 2017-02-07 VITALS
BODY MASS INDEX: 24.45 KG/M2 | HEIGHT: 71 IN | SYSTOLIC BLOOD PRESSURE: 110 MMHG | WEIGHT: 174.63 LBS | HEART RATE: 65 BPM | TEMPERATURE: 98 F | OXYGEN SATURATION: 98 % | DIASTOLIC BLOOD PRESSURE: 72 MMHG

## 2017-02-07 DIAGNOSIS — M54.50 ACUTE MIDLINE LOW BACK PAIN WITHOUT SCIATICA: Primary | ICD-10-CM

## 2017-02-07 PROCEDURE — 99999 PR PBB SHADOW E&M-EST. PATIENT-LVL IV: CPT | Mod: PBBFAC,,, | Performed by: NURSE PRACTITIONER

## 2017-02-07 PROCEDURE — 99213 OFFICE O/P EST LOW 20 MIN: CPT | Mod: S$GLB,,, | Performed by: NURSE PRACTITIONER

## 2017-02-07 NOTE — PATIENT INSTRUCTIONS
Self-Care for Low Back Pain    Most people have low back pain now and then. In many cases, it isnt serious and self-care can help. Sometimes low back pain can be a sign of a bigger problem. Call your healthcare provider if your pain returns often or gets worse over time. For the long-term care of your back, get regular exercise, lose any excess weight and learn good posture.  Take a short rest  Lying down during the day may be beneficial for short periods of time if severe pain increases with sitting or standing. Long-term bed rest could be detrimental.  Reduce pain and swelling  Cold reduces swelling. Both cold and heat can reduce pain. Protect your skin by placing a towel between your body and the ice or heat source.  · For the first few days, apply an ice pack for 15 to 20 minutes .  · After the first few days, try heat for 15 minutes at a time to ease pain. Never sleep on a heating pad.  · Over-the-counter medicine can help control pain and swelling. Try aspirin or ibuprofen.  Exercise  Exercise can help your back heal. It also helps your back get stronger and more flexible, preventing any reinjury. Ask your healthcare provider about specific exercises for your back.  Use good posture to avoid reinjury  · When moving, bend at the hips and knees. Dont bend at the waist or twist around.  · When lifting, keep the object close to your body. Dont try to lift more than you can handle.  · When sitting, keep your lower back supported. Use a rolled-up towel as needed.  Seek immediate medical care if:  · Youre unable to stand or walk.  · You have a temperature over 100.4°F (38.0°C)  · You have frequent, painful, or bloody urination.  · You have severe abdominal pain.  · You have a sharp, stabbing pain.  · Your pain is constant.  · You have pain or numbness in your leg.  · You feel pain in a new area of your back.  · You notice that the pain isnt decreasing after more than a week.   Date Last Reviewed: 9/29/2015  ©  9428-4700 [employer]. [address] [place] [address], [place] [address]. All rights reserved. This information is not intended as a substitute for professional medical care. Always follow your healthcare professional's instructions.

## 2017-02-07 NOTE — PROGRESS NOTES
Subjective:       Patient ID: Sunni Lowe is a 66 y.o. female.    Chief Complaint: Back Pain (started sunday was siting at table and back started hurting.)    Back Pain   This is a new problem. Episode onset: started 2 days ago. The problem occurs constantly. The problem is unchanged. The pain is present in the lumbar spine. The quality of the pain is described as aching (constant soreness to middle lower back; intermittent sharp spasm). The pain does not radiate. Pain scale: 1/10 = very minimal when at rest; 3/10 with movement but when she has sharp spasm 7/10. The symptoms are aggravated by position, bending, sitting and twisting. Pertinent negatives include no bladder incontinence, bowel incontinence, dysuria, fever, leg pain, numbness, paresis, tingling or weakness. She has tried NSAIDs and muscle relaxant for the symptoms. The treatment provided mild relief.       Previous Medications    CALCIUM CARBONATE (OS-ISABELLE) 600 MG (1,500 MG) TAB    Take 1,200 mg by mouth once daily.    CETIRIZINE (ZYRTEC) 10 MG TABLET    Take 10 mg by mouth once daily.    DENTAGEL 1.1 % GEL    USE TO BRUSH TEETH TWICE A DAY (FOLLOW INSTRUCTIONS ON PACKAGE)    ESOMEPRAZOLE (NEXIUM) 40 MG CAPSULE    Take 1 capsule (40 mg total) by mouth once daily.    FISH OIL-OMEGA-3 FATTY ACIDS 300-1,000 MG CAPSULE    Take 600 mg by mouth once daily.    FLUTICASONE (FLOVENT HFA) 44 MCG/ACTUATION INHALER    Inhale 1 puff into the lungs 2 (two) times daily.    METHOCARBAMOL (ROBAXIN) 750 MG TAB    Take 1 tablet (750 mg total) by mouth 4 (four) times daily as needed.    NAPROXEN (NAPROSYN) 500 MG TABLET    Take 1 tablet (500 mg total) by mouth 2 (two) times daily.    VITAMIN D 1000 UNITS TAB    Take 2,000 Units by mouth once daily.       Past Medical History   Diagnosis Date    Allergic rhinitis     Asthma     Cortes's esophagus     Osteopenia     Varicose veins      sees Dr. Brigido Quinones       Past Surgical History   Procedure Laterality Date  "   Knee surgery Bilateral      meniscus repair    Peniculitis surgery       x 3    Breast biopsy Left        Family History   Problem Relation Age of Onset    Ovarian cancer Mother 68    Alzheimer's disease Mother      @ 62    Cancer Father 85     tongue    Diabetes Father     Alzheimer's disease Maternal Aunt      @ age 80    No Known Problems Sister     No Known Problems Brother     No Known Problems Sister        Social History     Social History    Marital status:      Spouse name: N/A    Number of children: N/A    Years of education: N/A     Social History Main Topics    Smoking status: Former Smoker    Smokeless tobacco: Former User    Alcohol use Yes    Drug use: No    Sexual activity: Not Currently     Other Topics Concern    None     Social History Narrative       Review of Systems   Constitutional: Negative for fever.   Gastrointestinal: Negative for bowel incontinence.   Genitourinary: Negative for bladder incontinence and dysuria.   Musculoskeletal: Positive for back pain.   Neurological: Negative for tingling, weakness and numbness.         Objective:     Vitals:    02/07/17 1440   BP: 110/72   BP Location: Left arm   Patient Position: Sitting   BP Method: Manual   Pulse: 65   Temp: 97.6 °F (36.4 °C)   TempSrc: Oral   SpO2: 98%   Weight: 79.2 kg (174 lb 9.7 oz)   Height: 5' 11" (1.803 m)          Physical Exam   Constitutional: She is oriented to person, place, and time. She appears well-developed and well-nourished.   HENT:   Head: Normocephalic and atraumatic.   Right Ear: External ear normal.   Left Ear: External ear normal.   Nose: Nose normal.   Mouth/Throat: Oropharynx is clear and moist. No oropharyngeal exudate.   Eyes: EOM are normal. Pupils are equal, round, and reactive to light.   Neck: Normal range of motion. Neck supple. No tracheal deviation present. No thyromegaly present.   Cardiovascular: Normal rate, regular rhythm and normal heart sounds.    No murmur " heard.  Pulmonary/Chest: Effort normal and breath sounds normal. No respiratory distress.   Abdominal: Soft. She exhibits no distension.   Musculoskeletal: Normal range of motion. She exhibits no edema.        Lumbar back: She exhibits pain and spasm.        Back:    + DTRs present.  Negative SLRs   Lymphadenopathy:     She has no cervical adenopathy.   Neurological: She is alert and oriented to person, place, and time. No cranial nerve deficit. Coordination normal.   Skin: Skin is warm and dry. No rash noted.   Psychiatric: She has a normal mood and affect.         Assessment:         ICD-10-CM ICD-9-CM   1. Acute midline low back pain without sciatica M54.5 724.2       Plan:       Acute midline low back pain without sciatica  -  Patient already has some Naproxen and Robaxin scheduled from some neck and shoulder pain earlier this month.  Advised patient to take the Naproxen twice daily scheduled for at least 2 weeks or once pain completely resolved - whichever is later.  Take the Robaxin up to 4 times daily for the acute sharp pains as needed.  Return to office if no improvement or worsening in 3 to 4 weeks.    Return if symptoms worsen or fail to improve.     Patient's Medications   New Prescriptions    No medications on file   Previous Medications    CALCIUM CARBONATE (OS-ISABELLE) 600 MG (1,500 MG) TAB    Take 1,200 mg by mouth once daily.    CETIRIZINE (ZYRTEC) 10 MG TABLET    Take 10 mg by mouth once daily.    DENTAGEL 1.1 % GEL    USE TO BRUSH TEETH TWICE A DAY (FOLLOW INSTRUCTIONS ON PACKAGE)    ESOMEPRAZOLE (NEXIUM) 40 MG CAPSULE    Take 1 capsule (40 mg total) by mouth once daily.    FISH OIL-OMEGA-3 FATTY ACIDS 300-1,000 MG CAPSULE    Take 600 mg by mouth once daily.    FLUTICASONE (FLOVENT HFA) 44 MCG/ACTUATION INHALER    Inhale 1 puff into the lungs 2 (two) times daily.    METHOCARBAMOL (ROBAXIN) 750 MG TAB    Take 1 tablet (750 mg total) by mouth 4 (four) times daily as needed.    NAPROXEN (NAPROSYN) 500 MG  TABLET    Take 1 tablet (500 mg total) by mouth 2 (two) times daily.    VITAMIN D 1000 UNITS TAB    Take 2,000 Units by mouth once daily.   Modified Medications    No medications on file   Discontinued Medications    No medications on file

## 2017-02-07 NOTE — MR AVS SNAPSHOT
[address]  [place] [address]  Phone: [phone]  Fax: [phone]                  Sunni Lowe   2017 2:40 PM   Office Visit    Description:  Female : 1950   Provider:  Nelly Townsend NP   Department:  [facility]           Reason for Visit     Back Pain           Diagnoses this Visit        Comments    Acute midline low back pain without sciatica    -  Primary            To Do List           Future Appointments        Provider Department Dept Phone    2017 8:20 AM Nelly Townsend NP [facility] [phone]      Goals (5 Years of Data)     None      Follow-Up and Disposition     Return if symptoms worsen or fail to improve.    Follow-up and Disposition History      [facility]     [facility] On [facility] Care [facility] -  Assistance  Registered nurses in the [facility] On Call Center provide clinical advisement, health education, appointment booking, and other advisory services.  Call for this free service at [phone].             Medications           Message regarding Medications     Verify the changes and/or additions to your medication regime listed below are the same as discussed with your clinician today.  If any of these changes or additions are incorrect, please notify your healthcare provider.             Verify that the below list of medications is an accurate representation of the medications you are currently taking.  If none reported, the list may be blank. If incorrect, please contact your healthcare provider. Carry this list with you in case of emergency.           Current Medications     calcium carbonate (OS-ISABELLE) 600 mg (1,500 mg) Tab Take 1,200 mg by mouth once daily.    cetirizine (ZYRTEC) 10 MG tablet Take 10 mg by mouth once daily.    DENTAGEL 1.1 % Gel USE TO BRUSH TEETH TWICE A DAY (FOLLOW INSTRUCTIONS ON PACKAGE)    esomeprazole (NEXIUM) 40 MG capsule Take 1 capsule (40 mg total) by mouth  "once daily.    fish oil-omega-3 fatty acids 300-1,000 mg capsule Take 600 mg by mouth once daily.    fluticasone (FLOVENT HFA) 44 mcg/actuation inhaler Inhale 1 puff into the lungs 2 (two) times daily.    methocarbamol (ROBAXIN) 750 MG Tab Take 1 tablet (750 mg total) by mouth 4 (four) times daily as needed.    naproxen (NAPROSYN) 500 MG tablet Take 1 tablet (500 mg total) by mouth 2 (two) times daily.    vitamin D 1000 units Tab Take 2,000 Units by mouth once daily.           Clinical Reference Information           Your Vitals Were     BP Pulse Temp Height Weight SpO2    110/72 (BP Location: Left arm, Patient Position: Sitting, BP Method: Manual) 65 97.6 °F (36.4 °C) (Oral) 5' 11" (1.803 m) 79.2 kg (174 lb 9.7 oz) 98%    BMI                24.35 kg/m2          Blood Pressure          Most Recent Value    BP  110/72      Allergies as of 2/7/2017     No Known Allergies      Immunizations Administered on Date of Encounter - 2/7/2017     None      [facility] Sign-Up     Activating your [facility] account is as easy as 1-2-3!     1) Visit [email or web address], select Sign Up Now, enter this activation code and your date of birth, then select Next.  [email or web address]  Expires: 2/27/2017  1:06 PM      2) Create a username and password to use when you visit [facility] in the future and select a security question in case you lose your password and select Next.    3) Enter your e-mail address and click Sign Up!    Additional Information  If you have questions, please e-mail [email or web address] or call [phone] to talk to our [facility] staff. Remember, [facility] is NOT to be used for urgent needs. For medical emergencies, dial 911.         Instructions      Self-Care for Low Back Pain    Most people have low back pain now and then. In many cases, it isnt serious and self-care can help. Sometimes low back pain can be a sign of a bigger problem. Call your healthcare provider if your pain returns often or gets worse over time. " For the long-term care of your back, get regular exercise, lose any excess weight and learn good posture.  Take a short rest  Lying down during the day may be beneficial for short periods of time if severe pain increases with sitting or standing. Long-term bed rest could be detrimental.  Reduce pain and swelling  Cold reduces swelling. Both cold and heat can reduce pain. Protect your skin by placing a towel between your body and the ice or heat source.  · For the first few days, apply an ice pack for 15 to 20 minutes .  · After the first few days, try heat for 15 minutes at a time to ease pain. Never sleep on a heating pad.  · Over-the-counter medicine can help control pain and swelling. Try aspirin or ibuprofen.  Exercise  Exercise can help your back heal. It also helps your back get stronger and more flexible, preventing any reinjury. Ask your healthcare provider about specific exercises for your back.  Use good posture to avoid reinjury  · When moving, bend at the hips and knees. Dont bend at the waist or twist around.  · When lifting, keep the object close to your body. Dont try to lift more than you can handle.  · When sitting, keep your lower back supported. Use a rolled-up towel as needed.  Seek immediate medical care if:  · Youre unable to stand or walk.  · You have a temperature over 100.4°F (38.0°C)  · You have frequent, painful, or bloody urination.  · You have severe abdominal pain.  · You have a sharp, stabbing pain.  · Your pain is constant.  · You have pain or numbness in your leg.  · You feel pain in a new area of your back.  · You notice that the pain isnt decreasing after more than a week.   Date Last Reviewed: 9/29/2015 © 2000-2016 [employer]. [address] [place] [address], [place] [address]. All rights reserved. This information is not intended as a substitute for professional medical care. Always follow your healthcare professional's instructions.             Language Assistance  Services     ATTENTION: Language assistance services are available, free of charge. Please call [phone].      ATENCIÓN: Si habla carlito, tiene a lee disposición servicios gratuitos de asistencia lingüística. Llame al [phone].     CHÚ Ý: N?u b?n nói Ti?ng Vi?t, có các d?ch v? h? tr? ngôn ng? mi?n phí dành cho b?n. G?i s? [phone].         [facility] complies with applicable Federal civil rights laws and does not discriminate on the basis of race, color, national origin, age, disability, or sex.

## 2017-02-17 ENCOUNTER — OFFICE VISIT (OUTPATIENT)
Dept: FAMILY MEDICINE | Facility: CLINIC | Age: 67
End: 2017-02-17
Payer: COMMERCIAL

## 2017-02-17 VITALS
BODY MASS INDEX: 25.63 KG/M2 | HEART RATE: 57 BPM | TEMPERATURE: 97 F | DIASTOLIC BLOOD PRESSURE: 80 MMHG | OXYGEN SATURATION: 98 % | HEIGHT: 69 IN | WEIGHT: 173.06 LBS | SYSTOLIC BLOOD PRESSURE: 120 MMHG

## 2017-02-17 DIAGNOSIS — Z23 NEED FOR SHINGLES VACCINE: ICD-10-CM

## 2017-02-17 DIAGNOSIS — Z00.00 ANNUAL PHYSICAL EXAM: Primary | ICD-10-CM

## 2017-02-17 PROCEDURE — 99396 PREV VISIT EST AGE 40-64: CPT | Mod: S$GLB,,, | Performed by: NURSE PRACTITIONER

## 2017-02-17 PROCEDURE — 99999 PR PBB SHADOW E&M-EST. PATIENT-LVL IV: CPT | Mod: PBBFAC,,, | Performed by: NURSE PRACTITIONER

## 2017-02-17 NOTE — MR AVS SNAPSHOT
[address]  [place] [address]  Phone: [phone]  Fax: [phone]                  Sunni Lowe   2017 8:20 AM   Office Visit    Description:  Female : 1950   Provider:  Nelly Townsend NP   Department:  [facility]           Reason for Visit     Annual Exam           Diagnoses this Visit        Comments    Annual physical exam    -  Primary     Need for shingles vaccine                To Do List           Goals (5 Years of Data)     None      Follow-Up and Disposition     Return in about 1 year (around 2018).      [facility] On Call     [facility] On Call Nurse Care Line -  Assistance  Registered nurses in the [facility] On Call Center provide clinical advisement, health education, appointment booking, and other advisory services.  Call for this free service at [phone].             Medications           Message regarding Medications     Verify the changes and/or additions to your medication regime listed below are the same as discussed with your clinician today.  If any of these changes or additions are incorrect, please notify your healthcare provider.             Verify that the below list of medications is an accurate representation of the medications you are currently taking.  If none reported, the list may be blank. If incorrect, please contact your healthcare provider. Carry this list with you in case of emergency.           Current Medications     calcium carbonate (OS-ISABELLE) 600 mg (1,500 mg) Tab Take 1,200 mg by mouth once daily.    cetirizine (ZYRTEC) 10 MG tablet Take 10 mg by mouth once daily.    DENTAGEL 1.1 % Gel USE TO BRUSH TEETH TWICE A DAY (FOLLOW INSTRUCTIONS ON PACKAGE)    esomeprazole (NEXIUM) 40 MG capsule Take 1 capsule (40 mg total) by mouth once daily.    fish oil-omega-3 fatty acids 300-1,000 mg capsule Take 600 mg by mouth once daily.    fluticasone (FLOVENT HFA) 44 mcg/actuation inhaler Inhale 1 puff into  "the lungs 2 (two) times daily.    methocarbamol (ROBAXIN) 750 MG Tab Take 1 tablet (750 mg total) by mouth 4 (four) times daily as needed.    naproxen (NAPROSYN) 500 MG tablet Take 1 tablet (500 mg total) by mouth 2 (two) times daily.    polycarbophil (FIBERCON) 625 mg tablet Take 625 mg by mouth once daily.    vitamin D 1000 units Tab Take 2,000 Units by mouth once daily.           Clinical Reference Information           Your Vitals Were     BP Pulse Temp Height Weight SpO2    120/80 (BP Location: Left arm, Patient Position: Sitting, BP Method: Manual) 57 97.4 °F (36.3 °C) (Oral) 5' 9" (1.753 m) 78.5 kg (173 lb 1 oz) 98%    BMI                25.56 kg/m2          Blood Pressure          Most Recent Value    BP  120/80      Allergies as of 2/17/2017     No Known Allergies      Immunizations Administered on Date of Encounter - 2/17/2017     None      [facility] Sign-Up     Activating your [facility] account is as easy as 1-2-3!     1) Visit [email or web address], select Sign Up Now, enter this activation code and your date of birth, then select Next.  [email or web address]  Expires: 2/27/2017  1:06 PM      2) Create a username and password to use when you visit [facility] in the future and select a security question in case you lose your password and select Next.    3) Enter your e-mail address and click Sign Up!    Additional Information  If you have questions, please e-mail [email or web address] or call [phone] to talk to our [facility] staff. Remember, [facility] is NOT to be used for urgent needs. For medical emergencies, dial 911.         Language Assistance Services     ATTENTION: Language assistance services are available, free of charge. Please call [phone].      ATENCIÓN: Si habla español, tiene a lee disposición servicios gratuitos de asistencia lingüística. Llame al [phone].     CHÚ Ý: N?u b?n nói Ti?ng Vi?t, có các d?ch v? h? tr? ngôn ng? mi?n phí dành cho b?n. G?i s? [phone].         [place] [facility] complies with applicable Federal civil rights laws and does not discriminate on the basis of race, color, national origin, age, disability, or sex.

## 2017-02-17 NOTE — PROGRESS NOTES
Subjective:       Patient ID: Sunni Lowe is a 66 y.o. female.    Chief Complaint: Annual Exam (wellness)    HPI Comments: Patient is a 66 year old white male here today for wellness exam with fasting lab results.    Patient voices no complaints.  See past medical, surgical and family history below.    Wellness labs:  All within an acceptable range - see below.    Health Maintenance:  -  Needs shingles vaccine  -  Up to date on all other health maintenance        Lab Visit on 02/10/2017   Component Date Value Ref Range Status    Hepatitis C Ab 02/10/2017 Negative   Final    WBC 02/10/2017 4.15  3.90 - 12.70 K/uL Final    RBC 02/10/2017 4.77  4.00 - 5.40 M/uL Final    Hemoglobin 02/10/2017 13.8  12.0 - 16.0 g/dL Final    Hematocrit 02/10/2017 41.4  37.0 - 48.5 % Final    MCV 02/10/2017 87  82 - 98 fL Final    MCH 02/10/2017 28.9  27.0 - 31.0 pg Final    MCHC 02/10/2017 33.3  32.0 - 36.0 % Final    RDW 02/10/2017 12.7  11.5 - 14.5 % Final    Platelets 02/10/2017 183  150 - 350 K/uL Final    MPV 02/10/2017 11.3  9.2 - 12.9 fL Final    Gran # 02/10/2017 2.4  1.8 - 7.7 K/uL Final    Lymph # 02/10/2017 1.1  1.0 - 4.8 K/uL Final    Mono # 02/10/2017 0.3  0.3 - 1.0 K/uL Final    Eos # 02/10/2017 0.4  0.0 - 0.5 K/uL Final    Baso # 02/10/2017 0.02  0.00 - 0.20 K/uL Final    Gran% 02/10/2017 56.6  38.0 - 73.0 % Final    Lymph% 02/10/2017 27.5  18.0 - 48.0 % Final    Mono% 02/10/2017 7.0  4.0 - 15.0 % Final    Eosinophil% 02/10/2017 8.4* 0.0 - 8.0 % Final    Basophil% 02/10/2017 0.5  0.0 - 1.9 % Final    Differential Method 02/10/2017 Automated   Final    Sodium 02/10/2017 143  136 - 145 mmol/L Final    Potassium 02/10/2017 5.0  3.5 - 5.1 mmol/L Final    Chloride 02/10/2017 102  95 - 110 mmol/L Final    CO2 02/10/2017 30* 23 - 29 mmol/L Final    Glucose 02/10/2017 94  70 - 110 mg/dL Final    BUN, Bld 02/10/2017 27* 7 - 17 mg/dL Final    Creatinine 02/10/2017 0.94  0.50 - 1.40 mg/dL Final     Calcium 02/10/2017 9.5  8.7 - 10.5 mg/dL Final    Total Protein 02/10/2017 7.3  6.0 - 8.4 g/dL Final    Albumin 02/10/2017 4.3  3.5 - 5.2 g/dL Final    Total Bilirubin 02/10/2017 0.8  0.1 - 1.0 mg/dL Final    Comment: For infants and newborns, interpretation of results should be based  on gestational age, weight and in agreement with clinical  observations.  Premature Infant recommended reference ranges:  Up to 24 hours.............<8.0 mg/dL  Up to 48 hours............<12.0 mg/dL  3-5 days..................<15.0 mg/dL  6-29 days.................<15.0 mg/dL      Alkaline Phosphatase 02/10/2017 52  38 - 126 U/L Final    AST 02/10/2017 20  15 - 46 U/L Final    ALT 02/10/2017 20  10 - 44 U/L Final    Anion Gap 02/10/2017 11  8 - 16 mmol/L Final    eGFR if African American 02/10/2017 >60.0  >60 mL/min/1.73 m^2 Final    eGFR if non African American 02/10/2017 >60.0  >60 mL/min/1.73 m^2 Final    Comment: Calculation used to obtain the estimated glomerular filtration  rate (eGFR) is the CKD-EPI equation. Since race is unknown   in our information system, the eGFR values for   -American and Non--American patients are given   for each creatinine result.      Cholesterol 02/10/2017 166  120 - 199 mg/dL Final    Comment: The National Cholesterol Education Program (NCEP) has set the  following guidelines (reference ranges) for Cholesterol:  Optimal.....................<200 mg/dL  Borderline High.............200-239 mg/dL  High........................> or = 240 mg/dL      Triglycerides 02/10/2017 75  30 - 150 mg/dL Final    Comment: The National Cholesterol Education Program (NCEP) has set the  following guidelines (reference values) for triglycerides:  Normal......................<150 mg/dL  Borderline High.............150-199 mg/dL  High........................200-499 mg/dL      HDL 02/10/2017 42  40 - 75 mg/dL Final    Comment: The National Cholesterol Education Program (NCEP) has set the  following  guidelines (reference values) for HDL Cholesterol:  Low...............<40 mg/dL  Optimal...........>60 mg/dL      LDL Cholesterol 02/10/2017 109.0  63.0 - 159.0 mg/dL Final    Comment: The National Cholesterol Education Program (NCEP) has set the  following guidelines (reference values) for LDL Cholesterol:  Optimal.......................<130 mg/dL  Borderline High...............130-159 mg/dL  High..........................160-189 mg/dL  Very High.....................>190 mg/dL      HDL/Chol Ratio 02/10/2017 25.3  20.0 - 50.0 % Final    Total Cholesterol/HDL Ratio 02/10/2017 4.0  2.0 - 5.0 Final    Non-HDL Cholesterol 02/10/2017 124  mg/dL Final    Comment: Risk category and Non-HDL cholesterol goals:  Coronary heart disease (CHD)or equivalent (10-year risk of CHD >20%):  Non-HDL cholesterol goal     <130 mg/dL  Two or more CHD risk factors and 10-year risk of CHD <= 20%:  Non-HDL cholesterol goal     <160 mg/dL  0 to 1 CHD risk factor:  Non-HDL cholesterol goal     <190 mg/dL      TSH 02/10/2017 1.910  0.400 - 4.000 uIU/mL Final    Comment: Warning:  Heterophilic antibodies in serum or plasma of   certain individuals are known to cause interference with   immunoassays. These antibodies may be present in blood samples   from individuals regularly exposed to animal or who have been   treated with animal products.          Previous Medications    CALCIUM CARBONATE (OS-ISABELLE) 600 MG (1,500 MG) TAB    Take 1,200 mg by mouth once daily.    CETIRIZINE (ZYRTEC) 10 MG TABLET    Take 10 mg by mouth once daily.    DENTAGEL 1.1 % GEL    USE TO BRUSH TEETH TWICE A DAY (FOLLOW INSTRUCTIONS ON PACKAGE)    ESOMEPRAZOLE (NEXIUM) 40 MG CAPSULE    Take 1 capsule (40 mg total) by mouth once daily.    FISH OIL-OMEGA-3 FATTY ACIDS 300-1,000 MG CAPSULE    Take 600 mg by mouth once daily.    FLUTICASONE (FLOVENT HFA) 44 MCG/ACTUATION INHALER    Inhale 1 puff into the lungs 2 (two) times daily.    METHOCARBAMOL (ROBAXIN) 750 MG TAB     Take 1 tablet (750 mg total) by mouth 4 (four) times daily as needed.    NAPROXEN (NAPROSYN) 500 MG TABLET    Take 1 tablet (500 mg total) by mouth 2 (two) times daily.    POLYCARBOPHIL (FIBERCON) 625 MG TABLET    Take 625 mg by mouth once daily.    VITAMIN D 1000 UNITS TAB    Take 2,000 Units by mouth once daily.       Past Medical History   Diagnosis Date    Allergic rhinitis     Asthma     Cortes's esophagus     Osteopenia     Varicose veins      sees Dr. Brigido Quinones       Past Surgical History   Procedure Laterality Date    Knee surgery Bilateral      meniscus repair    Peniculitis surgery       x 3    Breast biopsy Left     Colonoscopy  08/01/2014     Dr. Canas - repeat in 5 to 10 years       Family History   Problem Relation Age of Onset    Ovarian cancer Mother 68    Alzheimer's disease Mother      @ 62    Cancer Father 85     tongue    Diabetes Father     Alzheimer's disease Maternal Aunt      @ age 80    No Known Problems Sister     No Known Problems Brother     No Known Problems Sister        Social History     Social History    Marital status:      Spouse name: N/A    Number of children: N/A    Years of education: N/A     Social History Main Topics    Smoking status: Former Smoker    Smokeless tobacco: Former User    Alcohol use Yes    Drug use: No    Sexual activity: Not Currently     Other Topics Concern    None     Social History Narrative       Review of Systems   Constitutional: Negative for appetite change, chills, fatigue, fever and unexpected weight change.   HENT: Negative for congestion, ear pain, mouth sores, nosebleeds, postnasal drip, rhinorrhea, sinus pressure, sneezing, sore throat, trouble swallowing and voice change.    Eyes: Negative for photophobia, pain, discharge, redness, itching and visual disturbance.   Respiratory: Negative for cough, chest tightness and shortness of breath.    Cardiovascular: Negative for chest pain, palpitations and leg  "swelling.   Gastrointestinal: Negative for abdominal pain, blood in stool, constipation, diarrhea, nausea and vomiting.   Genitourinary: Negative for dysuria, frequency, hematuria and urgency.   Musculoskeletal: Negative for arthralgias, back pain, joint swelling and myalgias.   Skin: Negative for color change and rash.   Allergic/Immunologic: Negative for immunocompromised state.   Neurological: Negative for dizziness, seizures, syncope, weakness and headaches.   Hematological: Negative for adenopathy. Does not bruise/bleed easily.   Psychiatric/Behavioral: Negative for agitation, dysphoric mood, sleep disturbance and suicidal ideas. The patient is not nervous/anxious.          Objective:     Vitals:    02/17/17 0817   BP: 120/80   BP Location: Left arm   Patient Position: Sitting   BP Method: Manual   Pulse: (!) 57   Temp: 97.4 °F (36.3 °C)   TempSrc: Oral   SpO2: 98%   Weight: 78.5 kg (173 lb 1 oz)   Height: 5' 9" (1.753 m)          Physical Exam   Constitutional: She is oriented to person, place, and time. She appears well-developed and well-nourished. No distress.   Body mass index is 25.56 kg/(m^2).     HENT:   Head: Normocephalic and atraumatic.   Right Ear: External ear normal.   Left Ear: External ear normal.   Nose: Nose normal.   Mouth/Throat: Oropharynx is clear and moist. No oropharyngeal exudate.   Eyes: EOM are normal. Pupils are equal, round, and reactive to light.   Neck: Normal range of motion. Neck supple. No tracheal deviation present. No thyromegaly present.   Cardiovascular: Normal rate, regular rhythm and normal heart sounds.    No murmur heard.  Pulmonary/Chest: Effort normal and breath sounds normal. No respiratory distress.   Abdominal: Soft. Bowel sounds are normal. She exhibits no distension. There is no rebound and no guarding.   Musculoskeletal: Normal range of motion. She exhibits no edema.   Lymphadenopathy:     She has no cervical adenopathy.   Neurological: She is alert and oriented " to person, place, and time. No cranial nerve deficit. Coordination normal.   Skin: Skin is warm and dry. No rash noted. She is not diaphoretic.   Psychiatric: She has a normal mood and affect.         Assessment:         ICD-10-CM ICD-9-CM   1. Annual physical exam Z00.00 V70.0   2. Need for shingles vaccine Z23 V04.89       Plan:       Annual physical exam  -  Normal physical exam  -  Sent to pharmacy next door for shingles vaccine  Health Maintenance Summary        Zoster Vaccine Overdue 12/20/2012        Mammogram Next Due 4/19/2018         Done 4/19/2016 MAMMO DIGITAL DIAGNOSTIC BILAT WITH TOMOSYNTHESIS_CAD        Done 10/2/2015 MAMMO DIGITAL DIAGNOSTIC BILAT WITH CAD        Done 4/7/2015 MAMMO DIGITAL SCREENING BILAT WITH CAD       Pap Smear Next Due 4/4/2019         Done 4/4/2016 LIQUID-BASED PAP SMEAR, SCREENING        Done 4/1/2015 LIQUID-BASED PAP SMEAR, SCREENING (A)       Lipid Panel Next Due 2/10/2022         Done 2/10/2017 LIPID PANEL       Colonoscopy Next Due 8/1/2024         Done 8/1/2014 Dr. Canas - repeat in 10 years       TETANUS VACCINE Next Due 3/2/2026         Done 3/2/2016 Imm Admin: Tdap       Influenza Vaccine Completed         Done 11/10/2016 Imm Admin: influenza - Quadrivalent - PF (ADULT)        Done 11/28/2015 Imm Admin: influenza - Quadrivalent - PF (ADULT)        Done 10/23/2014 Imm Admin: Influenza       Hepatitis C Screening Completed         Done 2/10/2017 HEPATITIS C ANTIBODY               Need for shingles vaccine      Return in about 1 year (around 2/17/2018).     Patient's Medications   New Prescriptions    No medications on file   Previous Medications    CALCIUM CARBONATE (OS-ISABELLE) 600 MG (1,500 MG) TAB    Take 1,200 mg by mouth once daily.    CETIRIZINE (ZYRTEC) 10 MG TABLET    Take 10 mg by mouth once daily.    DENTAGEL 1.1 % GEL    USE TO BRUSH TEETH TWICE A DAY (FOLLOW INSTRUCTIONS ON PACKAGE)    ESOMEPRAZOLE (NEXIUM) 40 MG CAPSULE    Take 1 capsule (40 mg total) by  mouth once daily.    FISH OIL-OMEGA-3 FATTY ACIDS 300-1,000 MG CAPSULE    Take 600 mg by mouth once daily.    FLUTICASONE (FLOVENT HFA) 44 MCG/ACTUATION INHALER    Inhale 1 puff into the lungs 2 (two) times daily.    METHOCARBAMOL (ROBAXIN) 750 MG TAB    Take 1 tablet (750 mg total) by mouth 4 (four) times daily as needed.    NAPROXEN (NAPROSYN) 500 MG TABLET    Take 1 tablet (500 mg total) by mouth 2 (two) times daily.    POLYCARBOPHIL (FIBERCON) 625 MG TABLET    Take 625 mg by mouth once daily.    VITAMIN D 1000 UNITS TAB    Take 2,000 Units by mouth once daily.   Modified Medications    No medications on file   Discontinued Medications    No medications on file

## 2017-02-22 ENCOUNTER — TELEPHONE (OUTPATIENT)
Dept: OBSTETRICS AND GYNECOLOGY | Facility: CLINIC | Age: 67
End: 2017-02-22

## 2017-02-22 NOTE — TELEPHONE ENCOUNTER
----- Message from Eliazar Pitt sent at 2/20/2017  1:14 PM CST -----  Contact: self  Pt has to have an infusion every other year ( she can't take bonvia ) pt wants to know if it's time for her to have her infusion. Please call pt to discuss [phone].  She already has an apt to see Dr Maldonado on April 7th

## 2017-03-03 ENCOUNTER — TELEPHONE (OUTPATIENT)
Dept: OBSTETRICS AND GYNECOLOGY | Facility: CLINIC | Age: 67
End: 2017-03-03

## 2017-03-03 NOTE — TELEPHONE ENCOUNTER
Spoke to pt regarding treatment failure in the past for osteopenia to qualify for reclast. Pt states that she has taken boniva in the past but when diagnosed with Barritt's Esophagus and was put on Nexium for treatment, she was notified there is a contraindication with boniva and nexium and has been on reclast since.

## 2017-03-03 NOTE — TELEPHONE ENCOUNTER
----- Message from Poornima Ribeiro sent at 3/3/2017 11:53 AM CST -----  Contact: [phone]  Patient is returning your call

## 2017-04-07 ENCOUNTER — OFFICE VISIT (OUTPATIENT)
Dept: OBSTETRICS AND GYNECOLOGY | Facility: CLINIC | Age: 67
End: 2017-04-07
Payer: COMMERCIAL

## 2017-04-07 VITALS
SYSTOLIC BLOOD PRESSURE: 120 MMHG | HEIGHT: 71 IN | BODY MASS INDEX: 23.52 KG/M2 | DIASTOLIC BLOOD PRESSURE: 74 MMHG | WEIGHT: 168 LBS

## 2017-04-07 DIAGNOSIS — N60.19 FIBROCYSTIC BREAST CHANGES, UNSPECIFIED LATERALITY: ICD-10-CM

## 2017-04-07 DIAGNOSIS — Z01.419 WELL WOMAN EXAM WITH ROUTINE GYNECOLOGICAL EXAM: Primary | ICD-10-CM

## 2017-04-07 DIAGNOSIS — M85.88 OSTEOPENIA OF SPINE: ICD-10-CM

## 2017-04-07 DIAGNOSIS — Z87.898 HISTORY OF ABNORMAL MAMMOGRAM: ICD-10-CM

## 2017-04-07 PROCEDURE — 88175 CYTOPATH C/V AUTO FLUID REDO: CPT

## 2017-04-07 PROCEDURE — 99396 PREV VISIT EST AGE 40-64: CPT | Mod: S$GLB,,, | Performed by: OBSTETRICS & GYNECOLOGY

## 2017-04-07 PROCEDURE — 99999 PR PBB SHADOW E&M-EST. PATIENT-LVL III: CPT | Mod: PBBFAC,,, | Performed by: OBSTETRICS & GYNECOLOGY

## 2017-04-07 NOTE — PROGRESS NOTES
CC: Annual check-up    SUBJECTIVE:   66 y.o. female   for annual routine Pap and checkup. No LMP recorded. Patient is postmenopausal..  She has no unusual complaints.        Past Medical History:   Diagnosis Date    Allergic rhinitis     Asthma     Cortes's esophagus     Osteopenia     Varicose veins     sees Dr. Brigido Quinones     Past Surgical History:   Procedure Laterality Date    BREAST BIOPSY Left     COLONOSCOPY  2014    Dr. Canas - repeat in 5 to 10 years    KNEE SURGERY Bilateral     meniscus repair    peniculitis surgery      x 3     Social History     Social History    Marital status:      Spouse name: N/A    Number of children: N/A    Years of education: N/A     Occupational History    Not on file.     Social History Main Topics    Smoking status: Former Smoker    Smokeless tobacco: Former User    Alcohol use Yes    Drug use: No    Sexual activity: Not Currently     Other Topics Concern    Not on file     Social History Narrative     Family History   Problem Relation Age of Onset    Ovarian cancer Mother 68    Alzheimer's disease Mother      @ 62    Cancer Father 85     tongue    Diabetes Father     Alzheimer's disease Maternal Aunt      @ age 80    No Known Problems Sister     No Known Problems Brother     No Known Problems Sister      OB History    Para Term  AB SAB TAB Ectopic Multiple Living   2               # Outcome Date GA Lbr Silvestre/2nd Weight Sex Delivery Anes PTL Lv   2             1                      Current Outpatient Prescriptions   Medication Sig Dispense Refill    calcium carbonate (OS-ISABELLE) 600 mg (1,500 mg) Tab Take 1,200 mg by mouth once daily.      cetirizine (ZYRTEC) 10 MG tablet Take 10 mg by mouth once daily.      DENTAGEL 1.1 % Gel USE TO BRUSH TEETH TWICE A DAY (FOLLOW INSTRUCTIONS ON PACKAGE)  3    esomeprazole (NEXIUM) 40 MG capsule Take 1 capsule (40 mg total) by mouth once daily. 90 capsule 3     fish oil-omega-3 fatty acids 300-1,000 mg capsule Take 600 mg by mouth once daily.      fluticasone (FLOVENT HFA) 44 mcg/actuation inhaler Inhale 1 puff into the lungs 2 (two) times daily.      naproxen (NAPROSYN) 500 MG tablet Take 1 tablet (500 mg total) by mouth 2 (two) times daily. 60 tablet 0    polycarbophil (FIBERCON) 625 mg tablet Take 625 mg by mouth once daily.      vitamin D 1000 units Tab Take 2,000 Units by mouth once daily.       Current Facility-Administered Medications   Medication Dose Route Frequency Provider Last Rate Last Dose    zoledronic acid-mannitol & water 5 mg/100 mL infusion 5 mg  5 mg Intravenous Once Joye Maldonado MD         Allergies: Review of patient's allergies indicates no known allergies.     ROS:  Constitutional: no weight loss, weight gain, fever, fatigue  Eyes:  No vision changes, glasses/contacts  ENT/Mouth: No ulcers, sinus problems, ears ringing, headache  Cardiovascular: No inability to lie flat, chest pain, exercise intolerance, swelling, heart palpitations  Respiratory: No wheezing, coughing blood, shortness of breath, or cough  Gastrointestinal: No diarrhea, bloody stool, nausea/vomiting, constipation, gas, hemorrhoids  Genitourinary: No blood in urine, painful urination, urgency of urination, frequency of urination, incomplete emptying, incontinence, abnormal bleeding, painful periods, heavy periods, vaginal discharge, vaginal odor, painful intercourse, sexual problems, bleeding after intercourse.  Musculoskeletal: No muscle weakness  Skin/Breast: No painful breasts, nipple discharge, masses, rash, ulcers  Neurological: No passing out, seizures, numbness, headache  Endocrine: No diabetes, hypothyroid, hyperthyroid, hot flashes, hair loss, abnormal hair growth, ance  Psychiatric: No depression, crying  Hematologic: No bruises, bleeding, swollen lymph nodes, anemia.      OBJECTIVE:   The patient appears well, alert, oriented x 3, in no distress.  There were no  vitals taken for this visit.  NECK: no thyromegaly, trachea midline  SKIN: no acne, striae, hirsutism  BREAST EXAM: breasts appear normal, no suspicious masses, no skin or nipple changes or axillary nodes, symmetric fibrous changes in both upper outer quadrants  ABDOMEN: no hernias, masses, or hepatosplenomegaly  GENITALIA: normal external genitalia, no erythema, no discharge  URETHRA: normal urethra, normal urethral meatus  VAGINA: mucosal atrophy  CERVIX: no lesions or cervical motion tenderness  UTERUS: normal  ADNEXA: normal adnexa      ASSESSMENT:   well woman  1. Well woman exam with routine gynecological exam    2. Osteopenia of spine        PLAN:    Dx mammogram and u/s  pap smear  return annually or prn  Due for reclast will oder it and infuse when ready

## 2017-04-13 ENCOUNTER — TELEPHONE (OUTPATIENT)
Dept: OBSTETRICS AND GYNECOLOGY | Facility: CLINIC | Age: 67
End: 2017-04-13

## 2017-04-13 RX ORDER — ZOLEDRONIC ACID 5 MG/100ML
5 INJECTION, SOLUTION INTRAVENOUS ONCE
Qty: 100 ML | Refills: 0 | Status: SHIPPED | OUTPATIENT
Start: 2017-04-13 | End: 2017-04-13

## 2017-05-26 ENCOUNTER — PATIENT MESSAGE (OUTPATIENT)
Dept: OBSTETRICS AND GYNECOLOGY | Facility: CLINIC | Age: 67
End: 2017-05-26

## 2017-06-13 ENCOUNTER — TELEPHONE (OUTPATIENT)
Dept: OBSTETRICS AND GYNECOLOGY | Facility: CLINIC | Age: 67
End: 2017-06-13

## 2017-06-14 ENCOUNTER — TELEPHONE (OUTPATIENT)
Dept: INFUSION THERAPY | Facility: HOSPITAL | Age: 67
End: 2017-06-14

## 2017-06-19 ENCOUNTER — TELEPHONE (OUTPATIENT)
Dept: OBSTETRICS AND GYNECOLOGY | Facility: CLINIC | Age: 67
End: 2017-06-19

## 2017-06-19 DIAGNOSIS — Z51.81 VISIT FOR MONITORING RECLAST THERAPY: Primary | ICD-10-CM

## 2017-06-19 DIAGNOSIS — Z79.83 VISIT FOR MONITORING RECLAST THERAPY: Primary | ICD-10-CM

## 2017-06-19 RX ORDER — SODIUM CHLORIDE 0.9 % (FLUSH) 0.9 %
10 SYRINGE (ML) INJECTION
Status: CANCELLED | OUTPATIENT
Start: 2017-07-03

## 2017-06-19 RX ORDER — HEPARIN 100 UNIT/ML
500 SYRINGE INTRAVENOUS
Status: CANCELLED | OUTPATIENT
Start: 2017-07-03

## 2017-06-19 RX ORDER — ZOLEDRONIC ACID 5 MG/100ML
5 INJECTION, SOLUTION INTRAVENOUS
Status: CANCELLED | OUTPATIENT
Start: 2017-07-03

## 2017-06-19 RX ORDER — ESOMEPRAZOLE MAGNESIUM 40 MG/1
CAPSULE, DELAYED RELEASE ORAL
Refills: 3 | COMMUNITY
Start: 2017-05-17 | End: 2018-08-29

## 2017-07-14 ENCOUNTER — INFUSION (OUTPATIENT)
Dept: INFUSION THERAPY | Facility: HOSPITAL | Age: 67
End: 2017-07-14
Attending: NURSE PRACTITIONER
Payer: COMMERCIAL

## 2017-07-14 VITALS
TEMPERATURE: 98 F | HEIGHT: 71 IN | DIASTOLIC BLOOD PRESSURE: 58 MMHG | BODY MASS INDEX: 24.08 KG/M2 | WEIGHT: 172 LBS | SYSTOLIC BLOOD PRESSURE: 127 MMHG | HEART RATE: 62 BPM | RESPIRATION RATE: 18 BRPM

## 2017-07-14 DIAGNOSIS — M85.88 OSTEOPENIA OF SPINE: Primary | ICD-10-CM

## 2017-07-14 PROCEDURE — 96365 THER/PROPH/DIAG IV INF INIT: CPT

## 2017-07-14 PROCEDURE — 25000003 PHARM REV CODE 250: Performed by: OBSTETRICS & GYNECOLOGY

## 2017-07-14 PROCEDURE — 63600175 PHARM REV CODE 636 W HCPCS: Performed by: OBSTETRICS & GYNECOLOGY

## 2017-07-14 RX ORDER — ZOLEDRONIC ACID 5 MG/100ML
5 INJECTION, SOLUTION INTRAVENOUS
Status: COMPLETED | OUTPATIENT
Start: 2017-07-14 | End: 2017-07-14

## 2017-07-14 RX ORDER — SODIUM CHLORIDE 0.9 % (FLUSH) 0.9 %
10 SYRINGE (ML) INJECTION
Status: CANCELLED | OUTPATIENT
Start: 2017-07-14

## 2017-07-14 RX ORDER — HEPARIN 100 UNIT/ML
500 SYRINGE INTRAVENOUS
Status: CANCELLED | OUTPATIENT
Start: 2017-07-14

## 2017-07-14 RX ORDER — SODIUM CHLORIDE 0.9 % (FLUSH) 0.9 %
10 SYRINGE (ML) INJECTION
Status: DISCONTINUED | OUTPATIENT
Start: 2017-07-14 | End: 2017-07-14 | Stop reason: HOSPADM

## 2017-07-14 RX ORDER — ZOLEDRONIC ACID 5 MG/100ML
5 INJECTION, SOLUTION INTRAVENOUS
Status: CANCELLED | OUTPATIENT
Start: 2017-07-14

## 2017-07-14 RX ADMIN — ZOLEDRONIC ACID 5 MG: 5 INJECTION, SOLUTION INTRAVENOUS at 11:07

## 2017-07-14 RX ADMIN — SODIUM CHLORIDE: 0.9 INJECTION, SOLUTION INTRAVENOUS at 11:07

## 2017-07-14 NOTE — LETTER
July 14, 2017    Sunni Lowe  1950              [facility]  [address]  [address]  [place]  [address]  Phone: [phone]  Fax: [phone]   Dear Dr. Maldonado,    Thank you for referring your patient Sunni Lowe to the [facility]. She was here on 7/14/2017 and received Reclast. Ms. Lowe tolerated the treatment without difficulty.    It is our pleasure to take care of your patients. If you have any questions, please contact us.        Sincerely,    [facility]

## 2017-07-14 NOTE — PLAN OF CARE
Problem: Patient Care Overview  Goal: Plan of Care Review  Outcome: Ongoing (interventions implemented as appropriate)  Oriented to Infusion Center and to Reclast protocol.. Instructed pt to continue her Ca and Vit D as prescribed and to increase ehr PO fluids over next 48 hours. Verbalized understanding. Pt has received Reclast before with no adverse effects noted.

## 2017-07-14 NOTE — NURSING
Pt tolerated infusion well. No adverse reaction noted. Pt education reinforced on _Reclast__ , side effects, what to expect, and when to call _[facility]_. Pt verbalized understanding. I reviewed pt calendar w/ pt and understanding verbalized. IV flushed w/ NS and D/C per protocol.

## 2017-10-02 ENCOUNTER — OFFICE VISIT (OUTPATIENT)
Dept: FAMILY MEDICINE | Facility: CLINIC | Age: 67
End: 2017-10-02
Payer: COMMERCIAL

## 2017-10-02 VITALS
RESPIRATION RATE: 16 BRPM | WEIGHT: 168.81 LBS | HEART RATE: 60 BPM | BODY MASS INDEX: 23.63 KG/M2 | OXYGEN SATURATION: 98 % | SYSTOLIC BLOOD PRESSURE: 124 MMHG | HEIGHT: 71 IN | DIASTOLIC BLOOD PRESSURE: 78 MMHG

## 2017-10-02 DIAGNOSIS — R19.8 ABNORMAL BOWEL MOVEMENT: ICD-10-CM

## 2017-10-02 DIAGNOSIS — J30.89 CHRONIC NONSEASONAL ALLERGIC RHINITIS DUE TO OTHER ALLERGEN: Primary | ICD-10-CM

## 2017-10-02 DIAGNOSIS — J45.20 MILD INTERMITTENT ASTHMA, UNSPECIFIED WHETHER COMPLICATED: ICD-10-CM

## 2017-10-02 PROCEDURE — 99999 PR PBB SHADOW E&M-EST. PATIENT-LVL III: CPT | Mod: PBBFAC,,, | Performed by: FAMILY MEDICINE

## 2017-10-02 PROCEDURE — 99213 OFFICE O/P EST LOW 20 MIN: CPT | Mod: S$GLB,,, | Performed by: FAMILY MEDICINE

## 2017-10-02 PROCEDURE — 3008F BODY MASS INDEX DOCD: CPT | Mod: S$GLB,,, | Performed by: FAMILY MEDICINE

## 2017-10-02 RX ORDER — LEVOCETIRIZINE DIHYDROCHLORIDE 5 MG/1
5 TABLET, FILM COATED ORAL NIGHTLY
Qty: 30 TABLET | Refills: 5 | Status: SHIPPED | OUTPATIENT
Start: 2017-10-02 | End: 2023-02-16

## 2017-10-02 RX ORDER — ALBUTEROL SULFATE 90 UG/1
2 AEROSOL, METERED RESPIRATORY (INHALATION) EVERY 6 HOURS PRN
Qty: 18 G | Refills: 0 | Status: SHIPPED | OUTPATIENT
Start: 2017-10-02 | End: 2017-11-24 | Stop reason: SDUPTHER

## 2017-10-02 RX ORDER — MONTELUKAST SODIUM 10 MG/1
10 TABLET ORAL NIGHTLY
Qty: 30 TABLET | Refills: 5 | Status: SHIPPED | OUTPATIENT
Start: 2017-10-02 | End: 2017-11-01

## 2017-10-02 NOTE — PROGRESS NOTES
"Subjective:       Patient ID: Sunni Lowe is a 66 y.o. female.    Chief Complaint: Establish Care and Cough    HPI 66 year old female here to establish care. She has chronic allergic rhinitis with asthma. She states she has productive cough in the morning with clear/white sputum. She has SOB and chest tightness. Her symptoms have been present on and off for four months.she has a post nasal drip and allergic rhinitis. She was diagnosed in the late 1970s. She was evaluated by an Allergist and used to take allergy "shots".   Patient is followed by  for annual pap/mammograms.   Patient states she has had "flare ups" of abdominal cramping and loose stools for approximately one year. She is unsure if there is relation to any specific food product. She states these happen 1-2 times monthly. She was supposed to f/u with GI but states her appointment was cancelled.   Patient has had colonoscopies done by . She states results were normal.   Patient exercises at the gym and power walks. Patient states she eats a well balanced diet.   Review of Systems   Constitutional: Negative for chills and fever.   HENT: Positive for postnasal drip and rhinorrhea. Negative for ear pain and sore throat.    Respiratory: Positive for cough, shortness of breath and wheezing.    Cardiovascular: Negative for chest pain.   Gastrointestinal: Negative for abdominal pain, diarrhea, nausea and vomiting.   Musculoskeletal: Negative for myalgias.   Skin: Negative for rash.   Allergic/Immunologic: Positive for environmental allergies.   Neurological: Positive for headaches.       Objective:      Vitals:    10/02/17 1110   BP: 124/78   Pulse: 60   Resp: 16     Physical Exam   Constitutional: She is oriented to person, place, and time. She appears well-developed and well-nourished. No distress.   HENT:   Mouth/Throat: Oropharynx is clear and moist. No oropharyngeal exudate.   Eyes: EOM are normal. Right eye exhibits no " discharge. Left eye exhibits no discharge.   Neck: Normal range of motion.   Cardiovascular: Normal rate and regular rhythm.    Pulmonary/Chest: Effort normal. She has no wheezes.   Abdominal: Soft. There is no tenderness. There is no guarding.   Lymphadenopathy:     She has no cervical adenopathy.   Neurological: She is alert and oriented to person, place, and time.   Skin: She is not diaphoretic.   Psychiatric: She has a normal mood and affect. Her behavior is normal. Judgment and thought content normal.   Vitals reviewed.      Assessment:       1. Chronic nonseasonal allergic rhinitis due to other allergen    2. Mild intermittent asthma, unspecified whether complicated        Plan:         1. Chronic allergic rhinits with asthma symptoms: will start singulair, daily xyzal and albuterol prn. Patient to message in a few weeks if there are no relief in symptoms. She takes daily flonase  2. Dyspepsia, abnormal bowel movements: I have advised on possible dairy relation and to try one week without dairy products. She will keep me updated in about 4 weeks when she returns from her cruise. She was advised on pepto bismol and imodium prn in the meantime. If symptoms do not improve I will refer to GI.   3. Screening: mammogram/pap utd, per . Colonoscopy done at the age of 62 via . Will request records at next visit. Immunizations: flu shot to be done today at [facility]    Chronic nonseasonal allergic rhinitis due to other allergen    Mild intermittent asthma, unspecified whether complicated    Other orders  -     albuterol 90 mcg/actuation inhaler; Inhale 2 puffs into the lungs every 6 (six) hours as needed for Wheezing. Rescue  Dispense: 18 g; Refill: 0  -     montelukast (SINGULAIR) 10 mg tablet; Take 1 tablet (10 mg total) by mouth every evening.  Dispense: 30 tablet; Refill: 5  -     levocetirizine (XYZAL) 5 MG tablet; Take 1 tablet (5 mg total) by mouth every evening.  Dispense: 30 tablet;  Refill: 5      Return if symptoms worsen or fail to improve.

## 2017-10-31 ENCOUNTER — OFFICE VISIT (OUTPATIENT)
Dept: FAMILY MEDICINE | Facility: CLINIC | Age: 67
End: 2017-10-31
Payer: COMMERCIAL

## 2017-10-31 ENCOUNTER — PATIENT MESSAGE (OUTPATIENT)
Dept: FAMILY MEDICINE | Facility: CLINIC | Age: 67
End: 2017-10-31

## 2017-10-31 VITALS
SYSTOLIC BLOOD PRESSURE: 124 MMHG | DIASTOLIC BLOOD PRESSURE: 78 MMHG | BODY MASS INDEX: 23.85 KG/M2 | WEIGHT: 170.38 LBS | OXYGEN SATURATION: 95 % | HEART RATE: 65 BPM | HEIGHT: 71 IN

## 2017-10-31 DIAGNOSIS — J45.21 MILD INTERMITTENT ASTHMA WITH ACUTE EXACERBATION: Primary | ICD-10-CM

## 2017-10-31 PROCEDURE — 99214 OFFICE O/P EST MOD 30 MIN: CPT | Mod: S$GLB,,, | Performed by: FAMILY MEDICINE

## 2017-10-31 PROCEDURE — 99999 PR PBB SHADOW E&M-EST. PATIENT-LVL III: CPT | Mod: PBBFAC,,, | Performed by: FAMILY MEDICINE

## 2017-10-31 RX ORDER — PREDNISONE 20 MG/1
20 TABLET ORAL DAILY
Qty: 5 TABLET | Refills: 0 | Status: SHIPPED | OUTPATIENT
Start: 2017-10-31 | End: 2017-11-05

## 2017-10-31 RX ORDER — FLUTICASONE FUROATE AND VILANTEROL 200; 25 UG/1; UG/1
1 POWDER RESPIRATORY (INHALATION) DAILY
Qty: 30 EACH | Refills: 1 | Status: SHIPPED | OUTPATIENT
Start: 2017-10-31 | End: 2017-11-24 | Stop reason: SDUPTHER

## 2017-10-31 RX ORDER — BENZONATATE 200 MG/1
200 CAPSULE ORAL 3 TIMES DAILY PRN
Qty: 30 CAPSULE | Refills: 0 | Status: SHIPPED | OUTPATIENT
Start: 2017-10-31 | End: 2017-11-10

## 2017-10-31 NOTE — PROGRESS NOTES
Subjective:       Patient ID: Sunni Lowe is a 66 y.o. female.    Chief Complaint: Cough and Chest Congestion    HPI 66 year old female here for persistent cough and chest congestion. She was diagnosed with asthma seven years ago.she has tried advair and albuterol in the past but recently her medications have been outdated. Patient had seen me three weeks ago and was given albuterol, xyzal, and singulair. She states she feels her chest has gotten less tight and wheezing has improved. Her cough is productive with clear sputum. She has not had fevers.  Review of Systems   Constitutional: Negative for chills and fever.   HENT: Positive for postnasal drip and sore throat. Negative for ear pain and rhinorrhea.    Respiratory: Positive for cough, shortness of breath and wheezing.    Cardiovascular: Negative for chest pain.   Gastrointestinal: Negative for abdominal pain, nausea and vomiting.   Musculoskeletal: Negative for myalgias.   Skin: Negative for rash.   Allergic/Immunologic: Positive for environmental allergies.   Neurological: Positive for headaches.       Objective:      Vitals:    10/31/17 0821   BP: 124/78   Pulse: 65     Physical Exam   Constitutional: She is oriented to person, place, and time. She appears well-developed and well-nourished. No distress.   HENT:   Mouth/Throat: Oropharynx is clear and moist. No oropharyngeal exudate.   Eyes: EOM are normal. Right eye exhibits no discharge. Left eye exhibits no discharge.   Neck: Normal range of motion.   Cardiovascular: Normal rate and regular rhythm.    Pulmonary/Chest: Effort normal. She has wheezes.   Expiratory wheezing in all lung fields.   Lymphadenopathy:     She has no cervical adenopathy.   Neurological: She is alert and oriented to person, place, and time.   Skin: She is not diaphoretic.   Psychiatric: She has a normal mood and affect. Her behavior is normal. Judgment and thought content normal.   Vitals reviewed.      Assessment:       1. Mild  intermittent asthma with acute exacerbation        Plan:         1. Mild asthma with exacerbation: start prednisone, breo and tessalon prn. Patient advised to use albuterol prn. If no relief in on week, patient advised on making appointment.     Mild intermittent asthma with acute exacerbation    Other orders  -     fluticasone-vilanterol (BREO) 200-25 mcg/dose DsDv diskus inhaler; Inhale 1 puff into the lungs once daily. Controller  Dispense: 30 each; Refill: 1  -     benzonatate (TESSALON) 200 MG capsule; Take 1 capsule (200 mg total) by mouth 3 (three) times daily as needed.  Dispense: 30 capsule; Refill: 0  -     predniSONE (DELTASONE) 20 MG tablet; Take 1 tablet (20 mg total) by mouth once daily.  Dispense: 5 tablet; Refill: 0      Return in about 4 weeks (around 11/28/2017).

## 2017-11-14 ENCOUNTER — TELEPHONE (OUTPATIENT)
Dept: NEUROLOGY | Facility: CLINIC | Age: 67
End: 2017-11-14

## 2017-11-14 NOTE — TELEPHONE ENCOUNTER
I called the patient and left a voice mail for a returned call to reschedule the appointment for Friday.

## 2017-11-24 ENCOUNTER — OFFICE VISIT (OUTPATIENT)
Dept: FAMILY MEDICINE | Facility: CLINIC | Age: 67
End: 2017-11-24
Payer: COMMERCIAL

## 2017-11-24 VITALS
HEART RATE: 82 BPM | BODY MASS INDEX: 23.91 KG/M2 | DIASTOLIC BLOOD PRESSURE: 80 MMHG | WEIGHT: 170.81 LBS | HEIGHT: 71 IN | SYSTOLIC BLOOD PRESSURE: 116 MMHG | OXYGEN SATURATION: 95 %

## 2017-11-24 DIAGNOSIS — J45.20 MILD INTERMITTENT ASTHMA WITHOUT COMPLICATION: Primary | ICD-10-CM

## 2017-11-24 PROCEDURE — 99999 PR PBB SHADOW E&M-EST. PATIENT-LVL III: CPT | Mod: PBBFAC,,, | Performed by: FAMILY MEDICINE

## 2017-11-24 PROCEDURE — 99213 OFFICE O/P EST LOW 20 MIN: CPT | Mod: S$GLB,,, | Performed by: FAMILY MEDICINE

## 2017-11-24 RX ORDER — FLUTICASONE FUROATE AND VILANTEROL 200; 25 UG/1; UG/1
1 POWDER RESPIRATORY (INHALATION) DAILY
Qty: 30 EACH | Refills: 3 | Status: SHIPPED | OUTPATIENT
Start: 2017-11-24 | End: 2018-01-02 | Stop reason: SDUPTHER

## 2017-11-24 RX ORDER — IBUPROFEN 800 MG/1
TABLET ORAL
COMMUNITY
Start: 2017-11-20 | End: 2018-04-12

## 2017-11-24 RX ORDER — ALBUTEROL SULFATE 90 UG/1
2 AEROSOL, METERED RESPIRATORY (INHALATION) EVERY 6 HOURS PRN
Qty: 18 G | Refills: 3 | Status: SHIPPED | OUTPATIENT
Start: 2017-11-24 | End: 2019-07-18

## 2017-11-24 NOTE — PROGRESS NOTES
Subjective:       Patient ID: Sunni Lowe is a 66 y.o. female.    Chief Complaint: Follow-up    HPI 66 year old female here for follow up since initiating breo. Patient states her breathing has improved significantly. She is tolerating daily breo. She has not used albuterol.     Review of Systems   Constitutional: Negative for activity change and unexpected weight change.   HENT: Negative for hearing loss, rhinorrhea and trouble swallowing.    Eyes: Negative for discharge and visual disturbance.   Respiratory: Negative for chest tightness, shortness of breath and wheezing.    Cardiovascular: Negative for chest pain and palpitations.   Gastrointestinal: Negative for blood in stool, constipation, diarrhea and vomiting.   Endocrine: Negative for polydipsia and polyuria.   Genitourinary: Negative for difficulty urinating, dysuria, hematuria and menstrual problem.   Musculoskeletal: Negative for arthralgias, joint swelling and neck pain.   Neurological: Negative for weakness and headaches.   Psychiatric/Behavioral: Negative for confusion and dysphoric mood.       Objective:      Vitals:    11/24/17 0752   BP: 116/80   Pulse: 82     Physical Exam   Constitutional: She is oriented to person, place, and time. She appears well-developed and well-nourished. No distress.   Eyes: EOM are normal. Right eye exhibits no discharge. Left eye exhibits no discharge.   Cardiovascular: Normal rate, regular rhythm and normal heart sounds.    No murmur heard.  Pulmonary/Chest: Effort normal. No respiratory distress. She has no wheezes. She has no rales.   Neurological: She is alert and oriented to person, place, and time.   Skin: She is not diaphoretic.   Psychiatric: She has a normal mood and affect. Her behavior is normal. Judgment and thought content normal.   Vitals reviewed.      Assessment:       1. Mild intermittent asthma without complication        Plan:         1. Mild intermittent asthma: continue daily breo. She was  advised she can stop breo for a couple days to see if wheezing returns. She was advised to keep albuterol on her at all times.   rtc if symptoms worsen or re-occur.     Mild intermittent asthma without complication    Other orders  -     fluticasone-vilanterol (BREO) 200-25 mcg/dose DsDv diskus inhaler; Inhale 1 puff into the lungs once daily. Controller  Dispense: 30 each; Refill: 3  -     albuterol 90 mcg/actuation inhaler; Inhale 2 puffs into the lungs every 6 (six) hours as needed for Wheezing. Rescue  Dispense: 18 g; Refill: 3      Return if symptoms worsen or fail to improve.

## 2017-12-22 ENCOUNTER — OFFICE VISIT (OUTPATIENT)
Dept: OBSTETRICS AND GYNECOLOGY | Facility: CLINIC | Age: 67
End: 2017-12-22
Payer: COMMERCIAL

## 2017-12-22 VITALS
WEIGHT: 170.63 LBS | BODY MASS INDEX: 23.89 KG/M2 | DIASTOLIC BLOOD PRESSURE: 66 MMHG | SYSTOLIC BLOOD PRESSURE: 104 MMHG | HEIGHT: 71 IN

## 2017-12-22 DIAGNOSIS — N60.19 FIBROCYSTIC BREAST CHANGES, UNSPECIFIED LATERALITY: Primary | ICD-10-CM

## 2017-12-22 DIAGNOSIS — Z87.898 HISTORY OF ABNORMAL MAMMOGRAM: ICD-10-CM

## 2017-12-22 PROCEDURE — 99999 PR PBB SHADOW E&M-EST. PATIENT-LVL II: CPT | Mod: PBBFAC,,, | Performed by: OBSTETRICS & GYNECOLOGY

## 2017-12-22 PROCEDURE — 99213 OFFICE O/P EST LOW 20 MIN: CPT | Mod: S$GLB,,, | Performed by: OBSTETRICS & GYNECOLOGY

## 2017-12-22 RX ORDER — MONTELUKAST SODIUM 10 MG/1
TABLET ORAL
COMMUNITY
Start: 2017-11-27 | End: 2018-10-16

## 2017-12-22 NOTE — PROGRESS NOTES
CC:breast soreness  Chief Complaint   Patient presents with    Breast Pain       HPI:    67 y.o.   OB History      Para Term  AB Living    2 2 2     2    SAB TAB Ectopic Multiple Live Births                     Complaining of: above for a few weeks, hasn't felt any new lumps, has h/o FBC breast changes. soreness around lat left areola      (Not in a hospital admission)    Review of patient's allergies indicates:  No Known Allergies     Past Medical History:   Diagnosis Date    Allergic rhinitis     Asthma     Cortes's esophagus     Osteopenia     Varicose veins     sees Dr. Brigido Quinones     Past Surgical History:   Procedure Laterality Date    BREAST BIOPSY Left     COLONOSCOPY  2014    Dr. Canas - repeat in 5 to 10 years    KNEE SURGERY Bilateral     meniscus repair    peniculitis surgery      x 3     Family History   Problem Relation Age of Onset    Ovarian cancer Mother 68    Alzheimer's disease Mother      @ 62    Cancer Father 85     tongue    Diabetes Father     Alzheimer's disease Maternal Aunt      @ age 80    No Known Problems Sister     No Known Problems Brother     No Known Problems Sister      Social History   Substance Use Topics    Smoking status: Former Smoker     Packs/day: 1.00     Years: 10.00     Quit date:     Smokeless tobacco: Former User    Alcohol use Yes      Comment: one glass of wine daily     ROS:  GENERAL: Feeling well overall. Denies fever or chills.   SKIN: Denies rash or lesions.   HEAD: Denies head injury or headache.   NODES: Denies enlarged lymph nodes.   CHEST: Denies chest pain or shortness of breath.   CARDIOVASCULAR: Denies palpitations or left sided chest pain.    ABDOMEN: Denies diarrhea, nausea, vomiting or rectal bleeding.   URINARY: No dysuria, hematuria, or burning on urination.  REPRODUCTIVE: See HPI.   BREASTS: Denies pain, lumps, or nipple discharge.   HEMATOLOGIC: No easy bruisability or excessive bleeding.  "  MUSCULOSKELETAL: Denies joint pain or swelling.   NEUROLOGIC: Denies syncope or weakness.   PSYCHIATRIC: Denies depression, anxiety or mood swings.      PE: /66   Ht 5' 11" (1.803 m)   Wt 77.4 kg (170 lb 10.2 oz)   BMI 23.80 kg/m²      APPEARANCE: Well nourished, well developed, in no acute distress.  SKIN: Normal skin turgor, no lesions.  NECK: Neck symmetric without masses or thyromegaly.  NODES: No inguinal, cervical, axillary or femoral lymph node enlargement.  CARDIOVASCULAR: Normal S1, S2. No rubs, murmurs or gallops.  NEUROLOGIC: Normal mood and affect. No depression or anxiety.   ABDOMEN: Soft. No tenderness or masses. No hepatosplenomegaly. No hernias.  RESPIRATORY: Normal respiratory effort with no retractions or use of accessory muscles.  BREASTS: Symmetrical, no skin changes or visible lesions. No , nipple discharge, or adenopathy bilaterally. Has Fibrocystic changes throuout left braes and less on rt side, no suspicious masses    The present examination has been compared to prior imaging studies dated  04/28/2016 and 04/19/2016.    BILATERAL MAMMOGRAM FINDINGS:  The breast tissue is heterogeneously dense.  This may lower the sensitivity of  mammography.    No significant abnormalities are seen.    These images  were processed to produce digital images analyzed for potential  abnormalities.   Impression       There is no mammographic evidence of malignancy.    Mammogram in 1 year is recommended.    ACR BI-RADS Category 1: Negative      ABDIAS REICH M.D.  05/12/2017       ASSESSMENT/ PLAN    Sunni was seen today for breast pain.    Diagnoses and all orders for this visit:    Fibrocystic breast changes, unspecified laterality    History of abnormal mammogram      Cont SBE  Offered dx mmg and u/s  Pt elects to monitor and do scheduled mmg in April  Will call for problems  EPO johnson Maldonado MD  "

## 2018-01-02 RX ORDER — FLUTICASONE FUROATE AND VILANTEROL 200; 25 UG/1; UG/1
1 POWDER RESPIRATORY (INHALATION) DAILY
Qty: 180 EACH | Refills: 0 | Status: SHIPPED | OUTPATIENT
Start: 2018-01-02 | End: 2018-01-15 | Stop reason: SDUPTHER

## 2018-01-02 NOTE — TELEPHONE ENCOUNTER
----- Message from Lisa Hurd sent at 1/2/2018  8:20 AM CST -----  Contact: [phone]/ self   Dr. Morrell wrote a rx for fluticasone-vilanterol (BREO) 200-25 mcg/dose DsDv diskus inhaler for this patient. A 30 day supply was sent it but patient needs a 90 day supply. Please advise.

## 2018-01-05 ENCOUNTER — OFFICE VISIT (OUTPATIENT)
Dept: NEUROLOGY | Facility: CLINIC | Age: 68
End: 2018-01-05
Payer: COMMERCIAL

## 2018-01-05 VITALS
BODY MASS INDEX: 24.23 KG/M2 | HEART RATE: 67 BPM | SYSTOLIC BLOOD PRESSURE: 124 MMHG | WEIGHT: 173.06 LBS | DIASTOLIC BLOOD PRESSURE: 72 MMHG | HEIGHT: 71 IN

## 2018-01-05 DIAGNOSIS — R41.89 SUBJECTIVE MEMORY COMPLAINTS: ICD-10-CM

## 2018-01-05 PROCEDURE — 99214 OFFICE O/P EST MOD 30 MIN: CPT | Mod: S$GLB,,, | Performed by: PSYCHIATRY & NEUROLOGY

## 2018-01-05 PROCEDURE — 99999 PR PBB SHADOW E&M-EST. PATIENT-LVL IV: CPT | Mod: PBBFAC,,, | Performed by: PSYCHIATRY & NEUROLOGY

## 2018-01-05 NOTE — PROGRESS NOTES
[facility] NEUROLOGY  [facility] [place]    Date: January 5, 2018   Patient Name: Sunni Lowe   MRN: [id number]   PCP: Nelly Townsend  Referring Provider: Self, Aaareferral    Assessment:      This is Sunni Lowe, 67 y.o. female with a history of subjective memory deficits who presents in follow-up.  The patient's MOCA remained stable at 24/30.  Her therapy at this time, given mild nature of patient's complaints.  We will continue to follow clinically.     Plan:             Problem List Items Addressed This Visit        Neuro    Subjective memory complaints    Overview     MOCA 1/5/18 24/30             I spent a total of 30 minutes in face to face time with the patient, over half of which was spent on counseling and education about the patient's diagnosis and medications.     Chandan Middleton MD  [facility]   Department of Neurology    Patient note was created using Dragon Dictation.  Any errors in syntax or even information may not have been identified and edited on initial review prior to signing this note.  Subjective:        HPI:   Ms. Sunni Lowe is a 67 y.o. female who presents with a chief complaint of subjective memory loss.  Since the patient's last visit, she reports that she has been doing well.  She states that her  occasionally gets frustrated with her when she intermittently does not remember thinking.  She said is made worse by stress or fatigue.  She states that sometimes he accuses her of not remembering things.  She states that he is not being clear about what he is asking her.  She denies any difficulty completing her activities of daily living, managing her household.  She denies any dangerous behaviors.  She has no new complaints today.    PAST MEDICAL HISTORY:  Past Medical History:   Diagnosis Date    Allergic rhinitis     Asthma     Cortes's esophagus     Osteopenia     Varicose veins     sees Dr. Brigido Quinones     PAST SURGICAL  HISTORY:  Past Surgical History:   Procedure Laterality Date    BREAST BIOPSY Left     COLONOSCOPY  08/01/2014    Dr. Canas - repeat in 5 to 10 years    KNEE SURGERY Bilateral     meniscus repair    peniculitis surgery      x 3     CURRENT MEDS:  Current Outpatient Prescriptions   Medication Sig Dispense Refill    albuterol 90 mcg/actuation inhaler Inhale 2 puffs into the lungs every 6 (six) hours as needed for Wheezing. Rescue 18 g 3    calcium carbonate (OS-ISABELLE) 600 mg (1,500 mg) Tab Take 1,200 mg by mouth once daily.      CHROM HERMAN/BRINDAL BERRY (GARCINIA CAMBOGIA ORAL) Take by mouth.      esomeprazole (NEXIUM) 40 MG capsule TAKE 1 CAPSULE (40 MG TOTAL) BY MOUTH ONCE DAILY.  3    fish oil-omega-3 fatty acids 300-1,000 mg capsule Take 600 mg by mouth once daily.      fluticasone-vilanterol (BREO) 200-25 mcg/dose DsDv diskus inhaler Inhale 1 puff into the lungs once daily. Controller 180 each 0    CYANOCOBALAMIN, VITAMIN B-12, (VITAMIN B-12 ORAL) Take by mouth.      ibuprofen (ADVIL,MOTRIN) 800 MG tablet       levocetirizine (XYZAL) 5 MG tablet Take 1 tablet (5 mg total) by mouth every evening. 30 tablet 5    montelukast (SINGULAIR) 10 mg tablet       polycarbophil (FIBERCON) 625 mg tablet Take 625 mg by mouth once daily.      vitamin D 1000 units Tab Take 2,000 Units by mouth once daily.       No current facility-administered medications for this visit.      ALLERGIES:  Review of patient's allergies indicates:  No Known Allergies    FAMILY HISTORY:  Family History   Problem Relation Age of Onset    Ovarian cancer Mother 68    Alzheimer's disease Mother      @ 62    Cancer Father 85     tongue    Diabetes Father     Alzheimer's disease Maternal Aunt      @ age 80    No Known Problems Sister     No Known Problems Brother     No Known Problems Sister        SOCIAL HISTORY:  Social History   Substance Use Topics    Smoking status: Former Smoker     Packs/day: 1.00     Years: 10.00      "Quit date: 1981    Smokeless tobacco: Former User    Alcohol use Yes      Comment: one glass of wine daily     Review of Systems:  12 review of systems is negative except for the symptoms mentioned in HPI.      Objective:     Vitals:    01/05/18 0805   BP: 124/72   Pulse: 67   Weight: 78.5 kg (173 lb 1 oz)   Height: 5' 11" (1.803 m)     General: NAD, well nourished   Eyes: no tearing, discharge, no erythema   ENT: moist mucous membranes of the oral cavity, nares patent    Neck: Supple, full range of motion  Cardiovascular: Warm and well perfused, pulses equal and symmetrical  Lungs: Normal work of breathing, normal chest wall excursions  Skin: No rash, lesions, or breakdown on exposed skin  Psychiatry: Mood and affect are appropriate   Abdomen: soft, non tender, non distended  Extremeties: No cyanosis, clubbing or edema.    Neurological   MENTAL STATUS: Alert and oriented to person, place, and time. Attention and concentration within normal limits. Speech without dysarthria, able to name and repeat without difficulty. Recent and remote memory within normal limits   CRANIAL NERVES: Visual fields intact. PERRL. EOMI. Face symmetrical. Hearing grossly intact. Full shoulder shrug bilaterally. Tongue protrudes midline   SENSORY: Sensation is intact to light touch throughout.    MOTOR: Normal bulk and tone.  5/5 deltoid, biceps, triceps, interosseous, hand  bilaterally. 5/5 iliopsoas, knee extension/flexion, foot dorsi/plantarflexion bilaterally.    REFLEXES: Symmetric and 2+ throughout.   CEREBELLAR/COORDINATION/GAIT: Gait steady with normal arm swing and stride length.  Normal rapid alternating movements.     MOCA Results 01/05/2018 :   Visuospatial/Executive: 5/5  Naming: 3/3  Attention: 5/6  Language: 2/3  Abstraction: 2/2  Delayed Recall: 1/5  Orientation: 6/6  Total:  24/30    "

## 2018-01-05 NOTE — PATIENT INSTRUCTIONS
Manage Stress with a Healthy Lifestyle  Managing stress is easier if you take good care of yourself. Make time for rest and recreation. Eat healthier meals. Take a walk now and then. And dont forget to treat yourself. A little down time can go a long way.    Get enough rest  When you dont get enough sleep, you may be too tired to cope with stress. Also, stress can prevent you from sleeping well or may keep you awake. If this happens to you, try reading or listening to soothing music before you go to sleep.  Make time for yourself  In todays world, there is often too much to do in too little time. It may seem hard to make time for yourself. But try to spend just a few minutes each day doing something you enjoy. This can improve the quality of your life and your mental outlook. Also, youll be more productive when handling your day-to-day duties. And youll be in a better frame of mind to cope with stress.  Eat right  Its easy to react to stress by reaching for a bag of chips or a cup of coffee. This may give you a quick boost but may later drain your energy. To keep your energy level steady, eat healthy meals and snacks at home and at work. Try not to skip meals. Stick with a low-fat diet thats rich in whole grains and fresh fruits and vegetables.  Nourish your spirit  When life is hectic, its easy to forget what your values and goals are. To help prevent this from happening, find out what is most important in your life. Ask yourself, What would I miss most if I had to start a new life alone somewhere else? My work? My family or friends? Something I love doing? Then focus on embracing your values and what you want to achieve in your life.  Stay on the move  Exercise helps burn off the negative energy of stress. Doing something active that you enjoy also helps you get away from stressful situations. Try to walk, jog, skate, swim, dance, take a fitness class, or play a team sport on most days. Or practice  yoga or anastasia chi, which can help you relax.  Put some fun into your life  Some things you may enjoy doing may be listed below. If not, try some of these. Then add your own.  · Go see a movie  · Have lunch with a friend  · Learn a new sport or game  · Plan a fun trip  · Take a class on something you always wanted to learn  · Try a new hobby   Date Last Reviewed: 1/18/2017  © 2258-4365 [employer]. [address] [place] [address], [place] [address]. All rights reserved. This information is not intended as a substitute for professional medical care. Always follow your healthcare professional's instructions.

## 2018-01-16 RX ORDER — FLUTICASONE FUROATE AND VILANTEROL 200; 25 UG/1; UG/1
1 POWDER RESPIRATORY (INHALATION) DAILY
Qty: 180 EACH | Refills: 0 | Status: SHIPPED | OUTPATIENT
Start: 2018-01-16 | End: 2019-03-13 | Stop reason: SDUPTHER

## 2018-04-12 ENCOUNTER — OFFICE VISIT (OUTPATIENT)
Dept: OBSTETRICS AND GYNECOLOGY | Facility: CLINIC | Age: 68
End: 2018-04-12
Payer: MEDICARE

## 2018-04-12 VITALS
HEIGHT: 71 IN | WEIGHT: 172.19 LBS | BODY MASS INDEX: 24.1 KG/M2 | DIASTOLIC BLOOD PRESSURE: 62 MMHG | SYSTOLIC BLOOD PRESSURE: 118 MMHG

## 2018-04-12 DIAGNOSIS — Z12.31 VISIT FOR SCREENING MAMMOGRAM: ICD-10-CM

## 2018-04-12 DIAGNOSIS — Z01.419 WELL WOMAN EXAM WITH ROUTINE GYNECOLOGICAL EXAM: Primary | ICD-10-CM

## 2018-04-12 DIAGNOSIS — Z12.4 ROUTINE PAPANICOLAOU SMEAR: ICD-10-CM

## 2018-04-12 PROCEDURE — 99999 PR PBB SHADOW E&M-EST. PATIENT-LVL III: CPT | Mod: PBBFAC,,, | Performed by: OBSTETRICS & GYNECOLOGY

## 2018-04-12 PROCEDURE — G0101 CA SCREEN;PELVIC/BREAST EXAM: HCPCS | Mod: S$GLB,,, | Performed by: OBSTETRICS & GYNECOLOGY

## 2018-04-12 PROCEDURE — 88175 CYTOPATH C/V AUTO FLUID REDO: CPT

## 2018-04-12 RX ORDER — CLOTRIMAZOLE AND BETAMETHASONE DIPROPIONATE 10; .64 MG/G; MG/G
CREAM TOPICAL 2 TIMES DAILY
Qty: 15 G | Refills: 3 | Status: SHIPPED | OUTPATIENT
Start: 2018-04-12 | End: 2018-04-19

## 2018-04-12 NOTE — PROGRESS NOTES
CC: Annual check-up    SUBJECTIVE:   67 y.o. female   for annual routine Pap and checkup. No LMP recorded. Patient is postmenopausal..  She has no unusual complaints.        Past Medical History:   Diagnosis Date    Allergic rhinitis     Asthma     Cortes's esophagus     Osteopenia     Varicose veins     sees Dr. Brigido Quinones     Past Surgical History:   Procedure Laterality Date    BREAST BIOPSY Left     COLONOSCOPY  2014    Dr. Canas - repeat in 5 to 10 years    KNEE SURGERY Bilateral     meniscus repair    peniculitis surgery      x 3     Social History     Social History    Marital status:      Spouse name: N/A    Number of children: 2    Years of education: N/A     Occupational History    Not on file.     Social History Main Topics    Smoking status: Former Smoker     Packs/day: 1.00     Years: 10.00     Quit date:     Smokeless tobacco: Former User    Alcohol use Yes      Comment: one glass of wine daily    Drug use: No    Sexual activity: Not Currently     Partners: Male      Comment:      Other Topics Concern    Not on file     Social History Narrative    No narrative on file     Family History   Problem Relation Age of Onset    Ovarian cancer Mother 68    Alzheimer's disease Mother      @ 62    Cancer Father 85     tongue    Diabetes Father     Alzheimer's disease Maternal Aunt      @ age 80    No Known Problems Sister     No Known Problems Brother     No Known Problems Sister      OB History    Para Term  AB Living   2 2 2     2   SAB TAB Ectopic Multiple Live Births                  # Outcome Date GA Lbr Silvestre/2nd Weight Sex Delivery Anes PTL Lv   2 Term      Vag-Spont      1 Term      Vag-Spont               Current Outpatient Prescriptions   Medication Sig Dispense Refill    calcium carbonate (OS-ISABELLE) 600 mg (1,500 mg) Tab Take 1,200 mg by mouth once daily.      esomeprazole (NEXIUM) 40 MG capsule TAKE 1 CAPSULE (40 MG  TOTAL) BY MOUTH ONCE DAILY.  3    fish oil-omega-3 fatty acids 300-1,000 mg capsule Take 600 mg by mouth once daily.      fluticasone-vilanterol (BREO) 200-25 mcg/dose DsDv diskus inhaler Inhale 1 puff into the lungs once daily. Controller 180 each 0    levocetirizine (XYZAL) 5 MG tablet Take 1 tablet (5 mg total) by mouth every evening. 30 tablet 5    montelukast (SINGULAIR) 10 mg tablet       polycarbophil (FIBERCON) 625 mg tablet Take 625 mg by mouth once daily.      vitamin D 1000 units Tab Take 2,000 Units by mouth once daily.      albuterol 90 mcg/actuation inhaler Inhale 2 puffs into the lungs every 6 (six) hours as needed for Wheezing. Rescue 18 g 3    CHROM HERMAN/BRINDAL BERRY (GARCINIA CAMBOGIA ORAL) Take by mouth.      CYANOCOBALAMIN, VITAMIN B-12, (VITAMIN B-12 ORAL) Take by mouth.       No current facility-administered medications for this visit.      Allergies: Patient has no known allergies.     ROS:  Constitutional: no weight loss, weight gain, fever, fatigue  Eyes:  No vision changes, glasses/contacts  ENT/Mouth: No ulcers, sinus problems, ears ringing, headache  Cardiovascular: No inability to lie flat, chest pain, exercise intolerance, swelling, heart palpitations  Respiratory: No wheezing, coughing blood, shortness of breath, or cough  Gastrointestinal: No diarrhea, bloody stool, nausea/vomiting, constipation, gas, hemorrhoids  Genitourinary: No blood in urine, painful urination, urgency of urination, frequency of urination, incomplete emptying, incontinence, abnormal bleeding, painful periods, heavy periods, vaginal discharge, vaginal odor, painful intercourse, sexual problems, bleeding after intercourse.  Musculoskeletal: No muscle weakness  Skin/Breast: No painful breasts, nipple discharge, masses, rash, ulcers  Neurological: No passing out, seizures, numbness, headache  Endocrine: No diabetes, hypothyroid, hyperthyroid, hot flashes, hair loss, abnormal hair growth, ance  Psychiatric:  "No depression, crying  Hematologic: No bruises, bleeding, swollen lymph nodes, anemia.      OBJECTIVE:   The patient appears well, alert, oriented x 3, in no distress.  /62   Ht 5' 11" (1.803 m)   Wt 78.1 kg (172 lb 2.9 oz)   BMI 24.01 kg/m²   NECK: no thyromegaly, trachea midline  SKIN: no acne, striae, hirsutism, heat rash btw breasts  BREAST EXAM: breasts appear normal, no suspicious masses, no skin or nipple changes or axillary nodes, symmetric fibrous changes in both upper outer quadrants  ABDOMEN: no hernias, masses, or hepatosplenomegaly  GENITALIA: normal external genitalia, no erythema, no discharge  URETHRA: normal urethra, normal urethral meatus  VAGINA: mucosal atrophy  CERVIX: no lesions or cervical motion tenderness  UTERUS: normal  ADNEXA: normal adnexa      ASSESSMENT:   well woman  1. Well woman exam with routine gynecological exam    2. Routine Papanicolaou smear    3. Visit for screening mammogram        PLAN:   mammogram  pap smear  return annually or prn  Orders Placed This Encounter    Mammo Digital Screening Bilat with Tomosynthesis CAD    Liquid-based pap smear, screening     "

## 2018-05-15 ENCOUNTER — HOSPITAL ENCOUNTER (OUTPATIENT)
Dept: RADIOLOGY | Facility: HOSPITAL | Age: 68
Discharge: HOME | End: 2018-05-15
Attending: OBSTETRICS & GYNECOLOGY
Payer: MEDICARE

## 2018-05-15 VITALS — WEIGHT: 172 LBS | HEIGHT: 71 IN | BODY MASS INDEX: 24.08 KG/M2

## 2018-05-15 DIAGNOSIS — Z12.31 VISIT FOR SCREENING MAMMOGRAM: ICD-10-CM

## 2018-05-15 PROCEDURE — 77067 SCR MAMMO BI INCL CAD: CPT | Mod: TC,PO

## 2018-05-25 ENCOUNTER — PATIENT MESSAGE (OUTPATIENT)
Dept: ORTHOPEDICS | Facility: CLINIC | Age: 68
End: 2018-05-25

## 2018-05-30 ENCOUNTER — HOSPITAL ENCOUNTER (OUTPATIENT)
Dept: RADIOLOGY | Facility: HOSPITAL | Age: 68
Discharge: HOME | End: 2018-05-30
Attending: ORTHOPAEDIC SURGERY
Payer: MEDICARE

## 2018-05-30 ENCOUNTER — OFFICE VISIT (OUTPATIENT)
Dept: ORTHOPEDICS | Facility: CLINIC | Age: 68
End: 2018-05-30
Payer: MEDICARE

## 2018-05-30 DIAGNOSIS — M25.561 RIGHT KNEE PAIN, UNSPECIFIED CHRONICITY: Primary | ICD-10-CM

## 2018-05-30 DIAGNOSIS — M25.561 RIGHT KNEE PAIN, UNSPECIFIED CHRONICITY: ICD-10-CM

## 2018-05-30 DIAGNOSIS — M17.11 PRIMARY OSTEOARTHRITIS OF RIGHT KNEE: Primary | ICD-10-CM

## 2018-05-30 PROCEDURE — 99202 OFFICE O/P NEW SF 15 MIN: CPT | Mod: S$GLB,,, | Performed by: ORTHOPAEDIC SURGERY

## 2018-05-30 PROCEDURE — 73560 X-RAY EXAM OF KNEE 1 OR 2: CPT | Mod: 26,XS,LT, | Performed by: STUDENT IN AN ORGANIZED HEALTH CARE EDUCATION/TRAINING PROGRAM

## 2018-05-30 PROCEDURE — 99999 PR PBB SHADOW E&M-EST. PATIENT-LVL II: CPT | Mod: PBBFAC,,, | Performed by: ORTHOPAEDIC SURGERY

## 2018-05-30 PROCEDURE — 73560 X-RAY EXAM OF KNEE 1 OR 2: CPT | Mod: TC,FY,LT

## 2018-05-30 PROCEDURE — 73562 X-RAY EXAM OF KNEE 3: CPT | Mod: 26,RT,, | Performed by: STUDENT IN AN ORGANIZED HEALTH CARE EDUCATION/TRAINING PROGRAM

## 2018-05-30 PROCEDURE — 73562 X-RAY EXAM OF KNEE 3: CPT | Mod: TC,FY,RT

## 2018-05-30 RX ORDER — NAPROXEN 500 MG/1
500 TABLET ORAL 2 TIMES DAILY WITH MEALS
Qty: 60 TABLET | Refills: 1 | Status: SHIPPED | OUTPATIENT
Start: 2018-05-30 | End: 2018-10-16

## 2018-05-30 NOTE — PROGRESS NOTES
Subjective:      Patient ID: Sunni Lowe is a 67 y.o. female.    Chief Complaint: Pain of the Right Knee    HPI     They have experienced problems with their right knee over the past 1 year. The patient denies relevant history of injury/aggravation.  The patient reports having an arthroscopic partial meniscectomy years ago for degenerative tear. Pain is located medially Associated symptoms include NA.  Symptoms are aggravated by exercise. They have been treated with NA.   Symptoms have recently stayed the same. Ambulation reportedly has not been impaired. Self care ADLs are not painful.     Review of Systems   Constitution: Negative for fever and weight loss.   HENT: Negative for congestion.    Eyes: Negative for visual disturbance.   Cardiovascular: Negative for chest pain.   Respiratory: Negative for shortness of breath.    Hematologic/Lymphatic: Negative for bleeding problem. Does not bruise/bleed easily.   Skin: Negative for poor wound healing.   Gastrointestinal: Negative for abdominal pain.   Genitourinary: Negative for dysuria.   Neurological: Negative for seizures.   Psychiatric/Behavioral: Negative for altered mental status.   Allergic/Immunologic: Negative for persistent infections.         Objective:            Ortho/SPM Exam    Right Knee    There were no vitals filed for this visit.    The patient is not in acute distress.   Body habitus is normal.   The patient walks without a limp.  Resisted SLR negative.   The skin over the knee is intact.  Knee effusion 0  Tendernes is located absent  Range of motion- Flexion 145 deg, Extension 0 deg,   Ligament exam:   MCL trace   Lachman intact              Post sag intact    LCL intact  Patellar apprehension negative.  Popliteal cyst present  Patellar crepitation absent.  Flexion/pinch negative.  Pulses DP present, PT present.  Motor normal 5/5 strength in all tested muscle groups.   Sensory normal.    Left Knee      There were no vitals filed for this  visit.    Radiographic impression today's radiographs of the right knee show mild joint space narrowing and small osteophytes          Assessment:       No diagnosis found.       The patient's functional impairment and objective changes are mild  Plan:       There are no diagnoses linked to this encounter.  I explained my diagnostic impression and the reasoning behind it in detail, using layman's terms.  Models and/or pictures were used to help in the explanation.    Naprosyn with usual precautions    Modification of exercise explained    Icing after exercise recommended

## 2018-07-11 ENCOUNTER — TELEPHONE (OUTPATIENT)
Dept: GASTROENTEROLOGY | Facility: CLINIC | Age: 68
End: 2018-07-11

## 2018-07-11 NOTE — TELEPHONE ENCOUNTER
----- Message from Albania Martinez sent at 7/11/2018  3:16 PM CDT -----  Contact: [phone]/self  Patient would like a refill of esomeprazole (NEXIUM) 40 MG capsule   sent to [facility] in [place].  Please advise.

## 2018-08-20 ENCOUNTER — PATIENT MESSAGE (OUTPATIENT)
Dept: GASTROENTEROLOGY | Facility: CLINIC | Age: 68
End: 2018-08-20

## 2018-08-22 ENCOUNTER — PATIENT MESSAGE (OUTPATIENT)
Dept: GASTROENTEROLOGY | Facility: CLINIC | Age: 68
End: 2018-08-22

## 2018-08-29 ENCOUNTER — OFFICE VISIT (OUTPATIENT)
Dept: GASTROENTEROLOGY | Facility: CLINIC | Age: 68
End: 2018-08-29
Payer: MEDICARE

## 2018-08-29 VITALS
HEIGHT: 71 IN | BODY MASS INDEX: 24.22 KG/M2 | HEART RATE: 60 BPM | WEIGHT: 173 LBS | DIASTOLIC BLOOD PRESSURE: 71 MMHG | SYSTOLIC BLOOD PRESSURE: 118 MMHG

## 2018-08-29 DIAGNOSIS — K22.70 BARRETT'S ESOPHAGUS WITHOUT DYSPLASIA: Primary | ICD-10-CM

## 2018-08-29 PROCEDURE — 99999 PR PBB SHADOW E&M-EST. PATIENT-LVL III: CPT | Mod: PBBFAC,,, | Performed by: INTERNAL MEDICINE

## 2018-08-29 PROCEDURE — 99213 OFFICE O/P EST LOW 20 MIN: CPT | Mod: S$GLB,,, | Performed by: INTERNAL MEDICINE

## 2018-08-29 RX ORDER — HYDROGEN PEROXIDE 3 %
20 SOLUTION, NON-ORAL MISCELLANEOUS
Qty: 30 CAPSULE | Refills: 11 | Status: SHIPPED | OUTPATIENT
Start: 2018-08-29 | End: 2019-09-12 | Stop reason: SDUPTHER

## 2018-08-29 NOTE — PATIENT INSTRUCTIONS
EGD Prep Instructions    [facility]  [address] in [facility] Office [phone]  Endoscopy Lab [phone]    You are scheduled for an EGD with Dr. Huber on TBD at [facility].  You will enter through the [facility] Entrance and check in at Same Day Surgery.    Nothing to eat of drink after midnight before the procedure.  You MAY brush your teeth.    You MAY take your blood pressure, heart, and seizure medication on the morning of the procedure, with a SIP of water.  Hold ALL other medications until after the procedure.    If you are on blood thinners THAT YOU HAVE BEEN INSTRUCTED TO HOLD BY YOUR DOCTOR FOR THIS PROCEDURE, then do NOT take this the morning of your EGD.  Do NOT stop these medications on your own, they must be approved to be held by your doctor.  Your EGD can NOT be done if you are on these medications.  Examples of blood thinners include: Coumadin, Aggrenox, Plavix, Pradaxa, Reapro, Pletal, Xarelto, Ticagrelor, Brilinta, Eliquis, and high dose aspirin (325 mg).  You do not have to stop baby aspirin 81 mg.    You will receive a call 2-3 days before your EGD to tell you the time to arrive.  If you have not received a call by the day before your procedure, call the Endoscopy Lab at [phone].

## 2018-08-29 NOTE — PROGRESS NOTES
Subjective:       Patient ID: Sunni Lowe is a 67 y.o. female.    Chief Complaint: Cortes's    66 yo F here for f/u Cortes's esophagus.  She denies complaints.  She received letter in mail stating she was due for Cortes's surveillance EGD.  She was previously seen by Dr. Canas.  In 2009, [place] epigastric [place], had bx that showed inflammation and intestinal metaplasia therefore dx with Cortes's.  Second EGD with 1 cm of Cortes's and bx negative.  She takes the Nexium 3-6 times per week with good results.  She has osteopenia.      Review of Systems   Constitutional: Negative for appetite change.   Respiratory: Negative for chest tightness, shortness of breath and wheezing.    Cardiovascular: Negative for chest pain, palpitations and leg swelling.       Objective:      Physical Exam   Constitutional: She is oriented to person, place, and time. She appears well-developed and well-nourished.   Cardiovascular: Normal rate and regular rhythm.   Pulmonary/Chest: Effort normal and breath sounds normal.   Abdominal: Soft. Bowel sounds are normal. She exhibits no distension and no mass. There is no tenderness. There is no rebound and no guarding.   Musculoskeletal: Normal range of motion. She exhibits no edema.   Neurological: She is alert and oriented to person, place, and time.   Psychiatric: She has a normal mood and affect. Her behavior is normal. Judgment and thought content normal.       Assessment:       1. 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  Dispense: 30 capsule; Refill: 11  -     Stop Nexium 40 mg.  Pt needs to be on lowest dose possible due to osteopenia.  Proven risks of long term PPI use, including pneumonia, c.diff infection, and bone loss, as well as theoretical risks including dementia and CKD, were  discussed with the patient at length and the patient understands risks and benefits of therapy.  Option of tapering/weaning PPI away was also discussed, including the need for possible long term therapy to treat symptoms if they recur after cessation of medication, as well as to mitigate the risk of developing complications of reflux such as Cortes's esophagus and/or esophageal cancer.  Patient understands the risks and benefits of treatment with drug versus cessation.  Daily supplementation with MVI with calcium and vitamin D were recommended as was follow up with PCP for bone scan when appropriate.  Labs including B12, folate, CMP, CBC, calcium, and magnesium should be checked at least annually.  -     Case request GI: EGD (ESOPHAGOGASTRODUODENOSCOPY), pt will call to schedule later in the year due to her daughter having a baby in the near future.  -     Given that there is a discrepancy between the 2 EGDs, it is possible that she does not have Cortes's and that the first biopsy was taken from the cardia.  Further recs after repeat EGD complete.

## 2018-09-28 ENCOUNTER — PATIENT MESSAGE (OUTPATIENT)
Dept: GASTROENTEROLOGY | Facility: CLINIC | Age: 68
End: 2018-09-28

## 2018-10-26 ENCOUNTER — PATIENT MESSAGE (OUTPATIENT)
Dept: GASTROENTEROLOGY | Facility: CLINIC | Age: 68
End: 2018-10-26

## 2018-10-29 ENCOUNTER — PATIENT MESSAGE (OUTPATIENT)
Dept: GASTROENTEROLOGY | Facility: CLINIC | Age: 68
End: 2018-10-29

## 2019-01-28 ENCOUNTER — OFFICE VISIT (OUTPATIENT)
Dept: FAMILY MEDICINE | Facility: CLINIC | Age: 69
End: 2019-01-28
Payer: MEDICARE

## 2019-01-28 VITALS
TEMPERATURE: 98 F | RESPIRATION RATE: 16 BRPM | WEIGHT: 158.31 LBS | HEIGHT: 71 IN | DIASTOLIC BLOOD PRESSURE: 76 MMHG | OXYGEN SATURATION: 96 % | HEART RATE: 72 BPM | BODY MASS INDEX: 22.16 KG/M2 | SYSTOLIC BLOOD PRESSURE: 118 MMHG

## 2019-01-28 DIAGNOSIS — J45.20 MILD INTERMITTENT ASTHMA WITHOUT COMPLICATION: ICD-10-CM

## 2019-01-28 DIAGNOSIS — Z13.6 ENCOUNTER FOR SCREENING FOR CARDIOVASCULAR DISORDERS: ICD-10-CM

## 2019-01-28 DIAGNOSIS — J30.89 NON-SEASONAL ALLERGIC RHINITIS DUE TO OTHER ALLERGIC TRIGGER: ICD-10-CM

## 2019-01-28 DIAGNOSIS — R05.9 COUGH: Primary | ICD-10-CM

## 2019-01-28 DIAGNOSIS — K21.9 GASTROESOPHAGEAL REFLUX DISEASE, ESOPHAGITIS PRESENCE NOT SPECIFIED: ICD-10-CM

## 2019-01-28 PROCEDURE — 99214 OFFICE O/P EST MOD 30 MIN: CPT | Mod: 25,S$GLB,, | Performed by: INTERNAL MEDICINE

## 2019-01-28 PROCEDURE — 96372 THER/PROPH/DIAG INJ SC/IM: CPT | Mod: S$GLB,,, | Performed by: INTERNAL MEDICINE

## 2019-01-28 PROCEDURE — 99999 PR PBB SHADOW E&M-EST. PATIENT-LVL IV: CPT | Mod: PBBFAC,,, | Performed by: INTERNAL MEDICINE

## 2019-01-28 PROCEDURE — 96372 PR INJECTION,THERAP/PROPH/DIAG2ST, IM OR SUBCUT: ICD-10-PCS | Mod: S$GLB,,, | Performed by: INTERNAL MEDICINE

## 2019-01-28 PROCEDURE — 1101F PT FALLS ASSESS-DOCD LE1/YR: CPT | Mod: CPTII,S$GLB,, | Performed by: INTERNAL MEDICINE

## 2019-01-28 PROCEDURE — 99999 PR PBB SHADOW E&M-EST. PATIENT-LVL IV: ICD-10-PCS | Mod: PBBFAC,,, | Performed by: INTERNAL MEDICINE

## 2019-01-28 PROCEDURE — 1101F PR PT FALLS ASSESS DOC 0-1 FALLS W/OUT INJ PAST YR: ICD-10-PCS | Mod: CPTII,S$GLB,, | Performed by: INTERNAL MEDICINE

## 2019-01-28 PROCEDURE — 99214 PR OFFICE/OUTPT VISIT, EST, LEVL IV, 30-39 MIN: ICD-10-PCS | Mod: 25,S$GLB,, | Performed by: INTERNAL MEDICINE

## 2019-01-28 RX ORDER — TRIAMCINOLONE ACETONIDE 40 MG/ML
40 INJECTION, SUSPENSION INTRA-ARTICULAR; INTRAMUSCULAR
Status: COMPLETED | OUTPATIENT
Start: 2019-01-28 | End: 2019-01-28

## 2019-01-28 RX ADMIN — TRIAMCINOLONE ACETONIDE 40 MG: 40 INJECTION, SUSPENSION INTRA-ARTICULAR; INTRAMUSCULAR at 01:01

## 2019-01-29 NOTE — PROGRESS NOTES
NEW PATIENT VISIT INTERNAL MEDICINE    Patient Active Problem List   Diagnosis    Varicose veins    Allergic rhinitis    Asthma    Cortes's esophagus    Osteopenia    Subjective memory complaints    Abnormal bowel movement    Gastroesophageal reflux disease    BMI 22.0-22.9, adult       CC:   Chief Complaint   Patient presents with    \[facility] [place] [facility]\"" Care       HPI: Sunni Lowe   is a 68 y.o. female   I have reviewed the PMH,  and FH for this patient. This is a new patient to me. She is a former patient of Dr Morrell. She has been coughing for about a month. She thought it was allergies, but it never got better. She has some mucus . It was dark, but now it is light yellow. She is not having fever. She has a prescription for breo, but she has been using it as needed. She rarely uses the albuterol. We talked about the risks of using breo as needed.     She also has a history of Martin's esophagus. She has had EGD by Dr Huber and she did biopsies. She recommended that the patient stop her nexium. She says that she does not appear to have Martin's esophagus. The pathology report showed reactive changes consistent with GERD.       Cough   This is a recurrent problem. The current episode started 1 to 4 weeks ago. The problem has been waxing and waning. The problem occurs hourly. The cough is non-productive. Associated symptoms include headaches, nasal congestion, postnasal drip, rhinorrhea, a sore throat and wheezing. Pertinent negatives include no chest pain, chills, ear congestion, ear pain, eye redness, fever, heartburn, hemoptysis, myalgias, rash, shortness of breath, sweats or weight loss. The symptoms are aggravated by other. She has tried OTC cough suppressant for the symptoms. The treatment provided mild relief. Her past medical history is significant for asthma, bronchitis and pneumonia.         ROS: Review of Systems   Constitutional: Negative for appetite change, chills, fatigue, fever,  unexpected weight change and weight loss.   HENT: Positive for postnasal drip, rhinorrhea and sore throat. Negative for congestion, ear discharge, ear pain, mouth sores, sinus pressure and sinus pain.    Eyes: Negative for discharge, redness, itching and visual disturbance.   Respiratory: Positive for cough and wheezing. Negative for hemoptysis, chest tightness and shortness of breath.    Cardiovascular: Negative for chest pain, palpitations and leg swelling.   Gastrointestinal: Negative for abdominal pain, blood in stool, constipation, diarrhea, heartburn, nausea and vomiting.   Endocrine: Negative for cold intolerance, heat intolerance and polyuria.   Genitourinary: Negative for difficulty urinating, dysuria, flank pain, frequency, hematuria, pelvic pain, urgency, vaginal bleeding and vaginal discharge.   Musculoskeletal: Negative for arthralgias, back pain, joint swelling, myalgias, neck pain and neck stiffness.   Skin: Negative for rash and wound.   Neurological: Positive for headaches. Negative for dizziness, tremors, syncope, speech difficulty, weakness, light-headedness and numbness.   Hematological: Negative for adenopathy. Does not bruise/bleed easily.   Psychiatric/Behavioral: Negative for agitation, decreased concentration, dysphoric mood and sleep disturbance. The patient is not nervous/anxious.        PMHX:   Past Medical History:   Diagnosis Date    Allergic rhinitis     Asthma     Cortes's esophagus     Fibrocystic breast     Osteopenia     Varicose veins     sees Dr. Brigido Quinones       PSHX:   Past Surgical History:   Procedure Laterality Date    BREAST BIOPSY Left     COLONOSCOPY  08/01/2014    Dr. Canas - repeat in 5 to 10 years    EGD (ESOPHAGOGASTRODUODENOSCOPY) N/A 10/19/2018    [facility]    KNEE SURGERY Bilateral     meniscus repair    peniculitis surgery      x 3       FAMHX:   Family History   Problem Relation Age of Onset    Ovarian  cancer Mother 68    Alzheimer's disease Mother         @ 62    Cancer Father 85        tongue, and Lung    Diabetes Father     Alzheimer's disease Maternal Aunt         @ age 80    No Known Problems Sister     No Known Problems Brother     No Known Problems Sister        SOCHX:   Social History     Socioeconomic History    Marital status:      Spouse name: None    Number of children: 2    Years of education: None    Highest education level: None   Social Needs    Financial resource strain: None    Food insecurity - worry: None    Food insecurity - inability: None    Transportation needs - medical: None    Transportation needs - non-medical: None   Occupational History    None   Tobacco Use    Smoking status: Former Smoker     Packs/day: 1.00     Years: 10.00     Pack years: 10.00     Last attempt to quit:      Years since quittin.0    Smokeless tobacco: Never Used   Substance and Sexual Activity    Alcohol use: Yes     Comment: one glass of wine daily    Drug use: No    Sexual activity: Not Currently     Partners: Male     Comment:    Other Topics Concern    None   Social History Narrative    None       ALLERGIES: Review of patient's allergies indicates:  No Known Allergies    MEDS:   Current Outpatient Medications:     albuterol 90 mcg/actuation inhaler, Inhale 2 puffs into the lungs every 6 (six) hours as needed for Wheezing. Rescue, Disp: 18 g, Rfl: 3    calcium carbonate (OS-ISABELLE) 600 mg (1,500 mg) Tab, Take 1,200 mg by mouth once daily., Disp: , Rfl:     coenzyme Q10 (CO Q-10) 10 mg capsule, Take 10 mg by mouth once daily., Disp: , Rfl:     CYANOCOBALAMIN, VITAMIN B-12, (VITAMIN B-12 ORAL), Take by mouth., Disp: , Rfl:     esomeprazole (NEXIUM) 20 MG capsule, Take 1 capsule (20 mg total) by mouth before breakfast., Disp: 30 capsule, Rfl: 11    fish oil-omega-3 fatty acids 300-1,000 mg capsule, Take 600 mg by mouth once daily., Disp: , Rfl:      "fluticasone-vilanterol (BREO) 200-25 mcg/dose DsDv diskus inhaler, Inhale 1 puff into the lungs once daily. Controller (Patient taking differently: Inhale 1 puff into the lungs as needed. Controller), Disp: 180 each, Rfl: 0    FLUZONE HIGH-DOSE 2018-19, PF, 180 mcg/0.5 mL vaccine, , Disp: , Rfl:     levocetirizine (XYZAL) 5 MG tablet, Take 1 tablet (5 mg total) by mouth every evening., Disp: 30 tablet, Rfl: 5    vitamin D 1000 units Tab, Take 2,000 Units by mouth once daily., Disp: , Rfl:   No current facility-administered medications for this visit.     Facility-Administered Medications Ordered in Other Visits:     0.9%  NaCl infusion, , Intravenous, Continuous, Cassie Huber MD, Stopped at 10/19/18 0839    sodium chloride 0.9% flush 3 mL, 3 mL, Intravenous, PRN, Cassie Huber MD    OBJECTIVE:   Vitals:    01/28/19 1315   BP: 118/76   BP Location: Left arm   Patient Position: Sitting   BP Method: X-Large (Manual)   Pulse: 72   Resp: 16   Temp: 97.6 °F (36.4 °C)   TempSrc: Oral   SpO2: 96%   Weight: 71.8 kg (158 lb 4.8 oz)   Height: 5' 11" (1.803 m)     Body mass index is 22.08 kg/m².    Physical Exam   Constitutional: She appears well-developed and well-nourished.   HENT:   Head: Normocephalic and atraumatic.   Right Ear: External ear normal. Tympanic membrane is not erythematous. No middle ear effusion.   Left Ear: External ear normal. Tympanic membrane is not erythematous.  No middle ear effusion.   Mouth/Throat: No posterior oropharyngeal edema or posterior oropharyngeal erythema. No tonsillar exudate.   Eyes: Conjunctivae and EOM are normal. Pupils are equal, round, and reactive to light.   Neck: No thyromegaly present.   Cardiovascular: Normal rate, regular rhythm, normal heart sounds and intact distal pulses.   No murmur heard.  Pulmonary/Chest: Effort normal and breath sounds normal. She has no wheezes.   Abdominal: She exhibits no distension. There is no tenderness.   Musculoskeletal: She " exhibits no edema or tenderness.   Lymphadenopathy:     She has no cervical adenopathy.   Neurological: She displays normal reflexes. No cranial nerve deficit.   Skin: Skin is warm and dry. No rash noted.   Psychiatric: She has a normal mood and affect. Her behavior is normal.         Depression Patient Health Questionnaire 1/28/2019 1/28/2019   In the last two weeks how often have you had little interest or pleasure in doing things 0 0   In the last two weeks how often have you felt down, depressed or hopeless 0 0   PHQ-2 Total Score 0 0       PERTINENT RESULTS:   None    ASSESSMENT:  Problem List Items Addressed This Visit        Pulmonary    Asthma - I encouraged her to use the Breo daily and the albuterol as needed for rescue.        Endocrine    BMI 22.0-22.9, adult - she is a little thin.        GI    Gastroesophageal reflux disease - we will leave her off nexium for now because she is not having symptoms except for cough. If cough does not resolve, try taking the nexium again.     Relevant Orders    Comprehensive metabolic panel    Lipid panel       Other    Allergic rhinitis - continue xyzal. Chronic.       Other Visit Diagnoses     Cough    -  Primary - bronchitis with asthma, versus GERD. Check CBC. Treat with a steroid shot.     Relevant Orders    CBC auto differential    Encounter for screening for cardiovascular disorders     - We will check lipids.     Relevant Orders    Lipid panel          PLAN:   Orders Placed This Encounter    CBC auto differential    Comprehensive metabolic panel    Lipid panel    triamcinolone acetonide injection 40 mg           Follow-up with me in 1 month.

## 2019-02-08 ENCOUNTER — OFFICE VISIT (OUTPATIENT)
Dept: NEUROLOGY | Facility: CLINIC | Age: 69
End: 2019-02-08
Payer: MEDICARE

## 2019-02-08 VITALS
DIASTOLIC BLOOD PRESSURE: 65 MMHG | WEIGHT: 153.25 LBS | SYSTOLIC BLOOD PRESSURE: 106 MMHG | HEIGHT: 71 IN | HEART RATE: 80 BPM | BODY MASS INDEX: 21.45 KG/M2

## 2019-02-08 DIAGNOSIS — R41.89 SUBJECTIVE MEMORY COMPLAINTS: ICD-10-CM

## 2019-02-08 PROCEDURE — 1101F PT FALLS ASSESS-DOCD LE1/YR: CPT | Mod: CPTII,S$GLB,, | Performed by: PSYCHIATRY & NEUROLOGY

## 2019-02-08 PROCEDURE — 99999 PR PBB SHADOW E&M-EST. PATIENT-LVL III: ICD-10-PCS | Mod: PBBFAC,,, | Performed by: PSYCHIATRY & NEUROLOGY

## 2019-02-08 PROCEDURE — 1101F PR PT FALLS ASSESS DOC 0-1 FALLS W/OUT INJ PAST YR: ICD-10-PCS | Mod: CPTII,S$GLB,, | Performed by: PSYCHIATRY & NEUROLOGY

## 2019-02-08 PROCEDURE — 99214 OFFICE O/P EST MOD 30 MIN: CPT | Mod: S$GLB,,, | Performed by: PSYCHIATRY & NEUROLOGY

## 2019-02-08 PROCEDURE — 99214 PR OFFICE/OUTPT VISIT, EST, LEVL IV, 30-39 MIN: ICD-10-PCS | Mod: S$GLB,,, | Performed by: PSYCHIATRY & NEUROLOGY

## 2019-02-08 PROCEDURE — 99999 PR PBB SHADOW E&M-EST. PATIENT-LVL III: CPT | Mod: PBBFAC,,, | Performed by: PSYCHIATRY & NEUROLOGY

## 2019-02-08 NOTE — PROGRESS NOTES
[facility] NEUROLOGY  [facility] [place]    Date: February 8, 2019   Patient Name: Sunni Lowe   MRN: [id number]   PCP: Yaneli Slaughter  Referring Provider: No ref. provider found    Assessment:      This is Sunni Lowe, 68 y.o. female with a history of subjective memory deficits who presents in follow-up.  The patient reports substantial subjective improvement following jail and reduction in her overall stress levels.  Discussed neuroprotective activities with patient at length.  Patient may follow up as needed.     Plan:         Problem List Items Addressed This Visit        Neuro    Subjective memory complaints    Overview     MOCA 1/5/18 24/30         Current Assessment & Plan     -- patient may follow clinically as needed             I spent a total of 26 minutes in face to face time with the patient, over half of which was spent on counseling and education about the patient's diagnosis and medications.     Chandan Middleton MD  [facility]   Department of Neurology    Patient note was created using Dragon Dictation.  Any errors in syntax or even information may not have been identified and edited on initial review prior to signing this note.  Subjective:        HPI:   Ms. Sunni Lowe is a 68 y.o. female presenting in follow-up for subjective memory complaints.  The patient reports that she is doing extremely well since her last visit.  She is very upbeat stating that she has retired since her last visit.  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  She has very limited stress and has become active in her grandson's grandparents club at school, the [employer], and baby-sitting for her grandchildren, and working out at the [employer]. she states that the only time she notes mild short-term memory lapses she is when she is tired though states that this is much less  frequent noting that she now sleeps better given her lower stress levels.    PAST MEDICAL HISTORY:  Past Medical History:   Diagnosis Date    Allergic rhinitis     Asthma     Cortes's esophagus     Fibrocystic breast     Osteopenia     Varicose veins     sees Dr. Brigido Quinnoes     PAST SURGICAL HISTORY:  Past Surgical History:   Procedure Laterality Date    BREAST BIOPSY Left     COLONOSCOPY  08/01/2014    Dr. Canas - repeat in 5 to 10 years    EGD (ESOPHAGOGASTRODUODENOSCOPY) N/A 10/19/2018    [facility] ENDO    KNEE SURGERY Bilateral     meniscus repair    peniculitis surgery      x 3     CURRENT MEDS:  Current Outpatient Medications   Medication Sig Dispense Refill    calcium carbonate (OS-ISABELLE) 600 mg (1,500 mg) Tab Take 1,200 mg by mouth once daily.      coenzyme Q10 (CO Q-10) 10 mg capsule Take 10 mg by mouth once daily.      CYANOCOBALAMIN, VITAMIN B-12, (VITAMIN B-12 ORAL) Take by mouth.      esomeprazole (NEXIUM) 20 MG capsule Take 1 capsule (20 mg total) by mouth before breakfast. 30 capsule 11    fish oil-omega-3 fatty acids 300-1,000 mg capsule Take 600 mg by mouth once daily.      fluticasone-vilanterol (BREO) 200-25 mcg/dose DsDv diskus inhaler Inhale 1 puff into the lungs once daily. Controller (Patient taking differently: Inhale 1 puff into the lungs as needed. Controller) 180 each 0    FLUZONE HIGH-DOSE 2018-19, PF, 180 mcg/0.5 mL vaccine       vitamin D 1000 units Tab Take 2,000 Units by mouth once daily.      albuterol 90 mcg/actuation inhaler Inhale 2 puffs into the lungs every 6 (six) hours as needed for Wheezing. Rescue 18 g 3    levocetirizine (XYZAL) 5 MG tablet Take 1 tablet (5 mg total) by mouth every evening. 30 tablet 5     No current facility-administered medications for this visit.      Facility-Administered Medications Ordered in Other Visits   Medication Dose Route Frequency Provider Last Rate Last Dose    0.9%  NaCl infusion  "  Intravenous Continuous Cassie Huber MD   Stopped at 10/19/18 0839    sodium chloride 0.9% flush 3 mL  3 mL Intravenous PRN Cassie Huber MD         ALLERGIES:  Review of patient's allergies indicates:  No Known Allergies    FAMILY HISTORY:  Family History   Problem Relation Age of Onset    Ovarian cancer Mother 68    Alzheimer's disease Mother         @ 62    Cancer Father 85        tongue, and Lung    Diabetes Father     Alzheimer's disease Maternal Aunt         @ age 80    No Known Problems Sister     No Known Problems Brother     No Known Problems Sister        SOCIAL HISTORY:  Social History     Tobacco Use    Smoking status: Former Smoker     Packs/day: 1.00     Years: 10.00     Pack years: 10.00     Last attempt to quit:      Years since quittin.1    Smokeless tobacco: Never Used   Substance Use Topics    Alcohol use: Yes     Comment: one glass of wine daily    Drug use: No     Review of Systems:  12 review of systems is negative except for the symptoms mentioned in HPI.      Objective:     Vitals:    19 1043   BP: 106/65   Pulse: 80   Weight: 69.5 kg (153 lb 3.5 oz)   Height: 5' 11" (1.803 m)     General: NAD, well nourished   Eyes: no tearing, discharge, no erythema   ENT: moist mucous membranes of the oral cavity, nares patent    Neck: Supple, full range of motion  Cardiovascular: Warm and well perfused, pulses equal and symmetrical  Lungs: Normal work of breathing, normal chest wall excursions  Skin: No rash, lesions, or breakdown on exposed skin  Psychiatry: Mood and affect are appropriate   Abdomen: soft, non tender, non distended  Extremeties: No cyanosis, clubbing or edema.    Neurological   MENTAL STATUS: Alert and oriented to person, place, and time. Attention and concentration within normal limits. Speech without dysarthria, able to name and repeat without difficulty. Recent and remote memory within normal limits   CRANIAL NERVES: Visual fields intact. " PERRL. EOMI. Face symmetrical. Hearing grossly intact. Full shoulder shrug bilaterally. Tongue protrudes midline   SENSORY: Sensation is intact to light touch throughout.    MOTOR: Normal bulk and tone.  5/5 deltoid, biceps, triceps, interosseous, hand  bilaterally. 5/5 iliopsoas, knee extension/flexion, foot dorsi/plantarflexion bilaterally.    REFLEXES: Symmetric and 2+ throughout.   CEREBELLAR/COORDINATION/GAIT: Gait steady with normal arm swing and stride length.      MOCA Results 01/05/2018 :   Visuospatial/Executive: 5/5  Naming: 3/3  Attention: 5/6  Language: 2/3  Abstraction: 2/2  Delayed Recall: 1/5  Orientation: 6/6  Total:  24/30

## 2019-03-13 ENCOUNTER — PATIENT MESSAGE (OUTPATIENT)
Dept: FAMILY MEDICINE | Facility: CLINIC | Age: 69
End: 2019-03-13

## 2019-03-13 RX ORDER — FLUTICASONE FUROATE AND VILANTEROL 200; 25 UG/1; UG/1
1 POWDER RESPIRATORY (INHALATION) DAILY
Qty: 180 EACH | Refills: 1 | Status: SHIPPED | OUTPATIENT
Start: 2019-03-13 | End: 2019-03-14 | Stop reason: SDUPTHER

## 2019-03-14 RX ORDER — FLUTICASONE FUROATE AND VILANTEROL 200; 25 UG/1; UG/1
1 POWDER RESPIRATORY (INHALATION) DAILY
Qty: 180 EACH | Refills: 1 | Status: SHIPPED | OUTPATIENT
Start: 2019-03-14 | End: 2019-03-15 | Stop reason: SDUPTHER

## 2019-03-15 ENCOUNTER — PATIENT MESSAGE (OUTPATIENT)
Dept: FAMILY MEDICINE | Facility: CLINIC | Age: 69
End: 2019-03-15

## 2019-03-15 RX ORDER — FLUTICASONE FUROATE AND VILANTEROL 200; 25 UG/1; UG/1
1 POWDER RESPIRATORY (INHALATION) DAILY
Qty: 180 EACH | Refills: 1 | Status: SHIPPED | OUTPATIENT
Start: 2019-03-15 | End: 2020-04-16

## 2019-04-26 ENCOUNTER — OFFICE VISIT (OUTPATIENT)
Dept: OBSTETRICS AND GYNECOLOGY | Facility: CLINIC | Age: 69
End: 2019-04-26
Payer: MEDICARE

## 2019-04-26 VITALS
WEIGHT: 152.25 LBS | DIASTOLIC BLOOD PRESSURE: 66 MMHG | SYSTOLIC BLOOD PRESSURE: 118 MMHG | BODY MASS INDEX: 21.23 KG/M2

## 2019-04-26 DIAGNOSIS — Z12.31 ENCOUNTER FOR SCREENING MAMMOGRAM FOR MALIGNANT NEOPLASM OF BREAST: ICD-10-CM

## 2019-04-26 DIAGNOSIS — Z01.419 WELL WOMAN EXAM WITH ROUTINE GYNECOLOGICAL EXAM: ICD-10-CM

## 2019-04-26 DIAGNOSIS — M85.88 OSTEOPENIA OF LUMBAR SPINE: Primary | ICD-10-CM

## 2019-04-26 PROCEDURE — G0101 CA SCREEN;PELVIC/BREAST EXAM: HCPCS | Mod: S$GLB,,, | Performed by: OBSTETRICS & GYNECOLOGY

## 2019-04-26 PROCEDURE — 99999 PR PBB SHADOW E&M-EST. PATIENT-LVL III: ICD-10-PCS | Mod: PBBFAC,,, | Performed by: OBSTETRICS & GYNECOLOGY

## 2019-04-26 PROCEDURE — 88175 CYTOPATH C/V AUTO FLUID REDO: CPT

## 2019-04-26 PROCEDURE — 99999 PR PBB SHADOW E&M-EST. PATIENT-LVL III: CPT | Mod: PBBFAC,,, | Performed by: OBSTETRICS & GYNECOLOGY

## 2019-04-26 PROCEDURE — G0101 PR CA SCREEN;PELVIC/BREAST EXAM: ICD-10-PCS | Mod: S$GLB,,, | Performed by: OBSTETRICS & GYNECOLOGY

## 2019-04-26 NOTE — PROGRESS NOTES
CC: Annual check-up    SUBJECTIVE:   68 y.o. female   for annual routine Pap and checkup. No LMP recorded. Patient is postmenopausal..  She has no unusual complaints.        Past Medical History:   Diagnosis Date    Allergic rhinitis     Asthma     Cortes's esophagus     Fibrocystic breast     Osteopenia     Varicose veins     sees Dr. Brigido Quinones     Past Surgical History:   Procedure Laterality Date    BREAST BIOPSY Left     COLONOSCOPY  2014    Dr. Canas - repeat in 5 to 10 years    EGD (ESOPHAGOGASTRODUODENOSCOPY) N/A 10/19/2018    Performed by Cassie Huber MD at [facility]    KNEE SURGERY Bilateral     meniscus repair    peniculitis surgery      x 3     Social History     Socioeconomic History    Marital status:      Spouse name: Not on file    Number of children: 2    Years of education: Not on file    Highest education level: Not on file   Occupational History    Not on file   Social Needs    Financial resource strain: Not on file    Food insecurity:     Worry: Not on file     Inability: Not on file    Transportation needs:     Medical: Not on file     Non-medical: Not on file   Tobacco Use    Smoking status: Former Smoker     Packs/day: 1.00     Years: 10.00     Pack years: 10.00     Last attempt to quit: 1981     Years since quittin.3    Smokeless tobacco: Never Used   Substance and Sexual Activity    Alcohol use: Yes     Comment: one glass of wine daily    Drug use: No    Sexual activity: Not Currently     Partners: Male     Comment:    Lifestyle    Physical activity:     Days per week: Not on file     Minutes per session: Not on file    Stress: Not on file   Relationships    Social connections:     Talks on phone: Not on file     Gets together: Not on file     Attends [employer] service: Not on file     Active member of club or organization: Not on file     Attends meetings of clubs or organizations: Not on file     Relationship  status: Not on file   Other Topics Concern    Not on file   Social History Narrative    Not on file     Family History   Problem Relation Age of Onset    Ovarian cancer Mother 68    Alzheimer's disease Mother         @ 62    Cancer Father 85        tongue, and Lung    Diabetes Father     Alzheimer's disease Maternal Aunt         @ age 80    No Known Problems Sister     No Known Problems Brother     No Known Problems Sister      OB History    Para Term  AB Living   2 2 2     2   SAB TAB Ectopic Multiple Live Births           2      # Outcome Date GA Lbr Silvestre/2nd Weight Sex Delivery Anes PTL Lv   2 Term     F Vag-Spont   HAYDEE   1 Term     M Vag-Spont   HAYDEE         Current Outpatient Medications   Medication Sig Dispense Refill    calcium carbonate (OS-ISABELLE) 600 mg (1,500 mg) Tab Take 1,200 mg by mouth once daily.      cholecalciferol, vitamin D3, (VITAMIN D3) 10,000 unit Cap Take 10,000 Units by mouth once daily.       coenzyme Q10 (CO Q-10) 10 mg capsule Take 10 mg by mouth once daily.      CYANOCOBALAMIN, VITAMIN B-12, (VITAMIN B-12 ORAL) Take by mouth.      esomeprazole (NEXIUM) 20 MG capsule Take 1 capsule (20 mg total) by mouth before breakfast. 30 capsule 11    fish oil-omega-3 fatty acids 300-1,000 mg capsule Take 600 mg by mouth once daily.      fluticasone-vilanterol (BREO) 200-25 mcg/dose DsDv diskus inhaler Inhale 1 puff into the lungs once daily. Controller 180 each 1    FLUZONE HIGH-DOSE -, PF, 180 mcg/0.5 mL vaccine       levocetirizine (XYZAL) 5 MG tablet Take 1 tablet (5 mg total) by mouth every evening. 30 tablet 5    UNABLE TO FIND medication name:Lectin Shield      albuterol 90 mcg/actuation inhaler Inhale 2 puffs into the lungs every 6 (six) hours as needed for Wheezing. Rescue 18 g 3     No current facility-administered medications for this visit.      Facility-Administered Medications Ordered in Other Visits   Medication Dose Route Frequency Provider  Last Rate Last Dose    0.9%  NaCl infusion   Intravenous Continuous Cassie Huber MD   Stopped at 10/19/18 0839    sodium chloride 0.9% flush 3 mL  3 mL Intravenous PRN Cassie Huber MD         Allergies: Patient has no known allergies.     ROS:  Constitutional: no weight loss, weight gain, fever, fatigue  Eyes:  No vision changes, glasses/contacts  ENT/Mouth: No ulcers, sinus problems, ears ringing, headache  Cardiovascular: No inability to lie flat, chest pain, exercise intolerance, swelling, heart palpitations  Respiratory: No wheezing, coughing blood, shortness of breath, or cough  Gastrointestinal: No diarrhea, bloody stool, nausea/vomiting, constipation, gas, hemorrhoids  Genitourinary: No blood in urine, painful urination, urgency of urination, frequency of urination, incomplete emptying, incontinence, abnormal bleeding, painful periods, heavy periods, vaginal discharge, vaginal odor, painful intercourse, sexual problems, bleeding after intercourse.  Musculoskeletal: No muscle weakness  Skin/Breast: No painful breasts, nipple discharge, masses, rash, ulcers  Neurological: No passing out, seizures, numbness, headache  Endocrine: No diabetes, hypothyroid, hyperthyroid, hot flashes, hair loss, abnormal hair growth, ance  Psychiatric: No depression, crying  Hematologic: No bruises, bleeding, swollen lymph nodes, anemia.      OBJECTIVE:   The patient appears well, alert, oriented x 3, in no distress.  /66   Wt 69 kg (152 lb 3.6 oz)   BMI 21.23 kg/m²   NECK: no thyromegaly, trachea midline  SKIN: no acne, striae, hirsutism  BREAST EXAM: breasts appear normal, no suspicious masses, no skin or nipple changes or axillary nodes, symmetric fibrous changes in both upper outer quadrants  ABDOMEN: no hernias, masses, or hepatosplenomegaly  GENITALIA: normal external genitalia, no erythema, no discharge  URETHRA: normal urethra, normal urethral meatus  VAGINA: mucosal atrophy  CERVIX: no lesions or  cervical motion tenderness  UTERUS: normal  ADNEXA: normal adnexa      ASSESSMENT:   well woman  1. Osteopenia of lumbar spine    2. Well woman exam with routine gynecological exam    3. Encounter for screening mammogram for malignant neoplasm of breast         PLAN:   mammogram  pap smear  return annually or prn  Orders Placed This Encounter    DXA Bone Density Spine And Hip    Mammo Digital Screening Bilat w/ Yovani    Liquid-based pap smear, screening

## 2019-07-18 ENCOUNTER — OFFICE VISIT (OUTPATIENT)
Dept: FAMILY MEDICINE | Facility: CLINIC | Age: 69
End: 2019-07-18
Payer: MEDICARE

## 2019-07-18 VITALS
BODY MASS INDEX: 21.18 KG/M2 | HEART RATE: 76 BPM | WEIGHT: 151.31 LBS | HEIGHT: 71 IN | OXYGEN SATURATION: 98 % | TEMPERATURE: 98 F | DIASTOLIC BLOOD PRESSURE: 72 MMHG | SYSTOLIC BLOOD PRESSURE: 110 MMHG

## 2019-07-18 DIAGNOSIS — K21.9 GASTROESOPHAGEAL REFLUX DISEASE, ESOPHAGITIS PRESENCE NOT SPECIFIED: ICD-10-CM

## 2019-07-18 DIAGNOSIS — J30.1 NON-SEASONAL ALLERGIC RHINITIS DUE TO POLLEN: ICD-10-CM

## 2019-07-18 DIAGNOSIS — M85.88 OSTEOPENIA OF SPINE: Primary | ICD-10-CM

## 2019-07-18 DIAGNOSIS — B35.1 ONYCHOMYCOSIS: ICD-10-CM

## 2019-07-18 DIAGNOSIS — J45.40 MODERATE PERSISTENT ASTHMA WITHOUT COMPLICATION: ICD-10-CM

## 2019-07-18 DIAGNOSIS — T14.8XXA BRUISING: ICD-10-CM

## 2019-07-18 PROCEDURE — 99999 PR PBB SHADOW E&M-EST. PATIENT-LVL IV: ICD-10-PCS | Mod: PBBFAC,,, | Performed by: INTERNAL MEDICINE

## 2019-07-18 PROCEDURE — 1101F PR PT FALLS ASSESS DOC 0-1 FALLS W/OUT INJ PAST YR: ICD-10-PCS | Mod: CPTII,S$GLB,, | Performed by: INTERNAL MEDICINE

## 2019-07-18 PROCEDURE — 99214 OFFICE O/P EST MOD 30 MIN: CPT | Mod: S$GLB,,, | Performed by: INTERNAL MEDICINE

## 2019-07-18 PROCEDURE — 99999 PR PBB SHADOW E&M-EST. PATIENT-LVL IV: CPT | Mod: PBBFAC,,, | Performed by: INTERNAL MEDICINE

## 2019-07-18 PROCEDURE — 99214 PR OFFICE/OUTPT VISIT, EST, LEVL IV, 30-39 MIN: ICD-10-PCS | Mod: S$GLB,,, | Performed by: INTERNAL MEDICINE

## 2019-07-18 PROCEDURE — 1101F PT FALLS ASSESS-DOCD LE1/YR: CPT | Mod: CPTII,S$GLB,, | Performed by: INTERNAL MEDICINE

## 2019-07-18 RX ORDER — HEPARIN 100 UNIT/ML
500 SYRINGE INTRAVENOUS
OUTPATIENT
Start: 2019-07-18

## 2019-07-18 RX ORDER — ZOLEDRONIC ACID 5 MG/100ML
5 INJECTION, SOLUTION INTRAVENOUS
Status: CANCELLED | OUTPATIENT
Start: 2019-07-18

## 2019-07-18 RX ORDER — ACETAMINOPHEN 500 MG
500 TABLET ORAL
Status: CANCELLED | OUTPATIENT
Start: 2019-07-18

## 2019-07-18 RX ORDER — TERBINAFINE HYDROCHLORIDE 250 MG/1
250 TABLET ORAL DAILY
Qty: 90 TABLET | Refills: 1 | Status: SHIPPED | OUTPATIENT
Start: 2019-07-18 | End: 2019-10-21 | Stop reason: SDUPTHER

## 2019-07-18 RX ORDER — SODIUM CHLORIDE 0.9 % (FLUSH) 0.9 %
10 SYRINGE (ML) INJECTION
Status: CANCELLED | OUTPATIENT
Start: 2019-07-18

## 2019-07-18 RX ORDER — HEPARIN 100 UNIT/ML
500 SYRINGE INTRAVENOUS
Status: CANCELLED | OUTPATIENT
Start: 2019-07-18

## 2019-07-18 NOTE — PATIENT INSTRUCTIONS
We have reviewed your prescription medications. I have ordered your reclast infusion. Someone should be contacting you from infusion center to schedule. I have ordered terbinafine for toenail fungus. Please get labs checked in about a month. Continue breo and nexium and xyzal. Your mammogram and dexa have been done. T-score was -1.1. BP looked good.

## 2019-07-23 NOTE — PROGRESS NOTES
Subjective:       Patient ID: Sunni Lowe is a 68 y.o. female.    Chief Complaint: Nail Problem and Shoulder Pain     I have reviewed the PMH,  and  for this patient. He  has a past medical history of Allergic rhinitis, Asthma, Cortes's esophagus, Fibrocystic breast, Osteopenia, and Varicose veins.   The patient presents today for follow-up of chronic conditions. She has been using breo daily and it is working great. Sh eis not having cough or shortness of breath. She has also been taking the nexium. She has a history of osteopenia and she has been taking reclast. She needs her reclast ordered. She is concerned about the age spots on her arms. She has mild toenail fungus on both her big toes. She also has pain in her right shoulder. She had her mammogram in May this year.     Active Ambulatory Problems     Diagnosis Date Noted    Varicose veins     Allergic rhinitis     Asthma     Cortes's esophagus     Osteopenia     Subjective memory complaints 08/21/2015    Abnormal bowel movement 10/02/2017    Gastroesophageal reflux disease 01/28/2019    BMI 22.0-22.9, adult 01/28/2019    Onychomycosis 07/18/2019     Resolved Ambulatory Problems     Diagnosis Date Noted    Osteoporosis 12/02/2014     Past Medical History:   Diagnosis Date    Allergic rhinitis     Asthma     Cortes's esophagus     Fibrocystic breast     Osteopenia     Varicose veins          MEDICATIONS:  Current Outpatient Medications:     calcium carbonate (OS-ISABELLE) 600 mg (1,500 mg) Tab, Take 1,200 mg by mouth once daily., Disp: , Rfl:     cholecalciferol, vitamin D3, (VITAMIN D3) 10,000 unit Cap, Take 10,000 Units by mouth once daily. , Disp: , Rfl:     coenzyme Q10 (CO Q-10) 10 mg capsule, Take 10 mg by mouth once daily., Disp: , Rfl:     CYANOCOBALAMIN, VITAMIN B-12, (VITAMIN B-12 ORAL), Take by mouth., Disp: , Rfl:     esomeprazole (NEXIUM) 20 MG capsule, Take 1 capsule (20 mg total) by mouth before breakfast., Disp: 30  capsule, Rfl: 11    fish oil-omega-3 fatty acids 300-1,000 mg capsule, Take 600 mg by mouth once daily., Disp: , Rfl:     fluticasone-vilanterol (BREO) 200-25 mcg/dose DsDv diskus inhaler, Inhale 1 puff into the lungs once daily. Controller, Disp: 180 each, Rfl: 1    UNABLE TO FIND, medication name:Rajiv Shield, Disp: , Rfl:     levocetirizine (XYZAL) 5 MG tablet, Take 1 tablet (5 mg total) by mouth every evening., Disp: 30 tablet, Rfl: 5    terbinafine HCl (LAMISIL) 250 mg tablet, Take 1 tablet (250 mg total) by mouth once daily., Disp: 90 tablet, Rfl: 1  No current facility-administered medications for this visit.     Facility-Administered Medications Ordered in Other Visits:     0.9%  NaCl infusion, , Intravenous, Continuous, Cassie Huber MD, Stopped at 10/19/18 0839    sodium chloride 0.9% flush 3 mL, 3 mL, Intravenous, PRN, Cassie Huber MD      HEALTH MAINTENANCE:   Health Maintenance   Topic Date Due    Influenza Vaccine  08/01/2019    Pneumococcal Vaccine (65+ Low/Medium Risk) (2 of 2 - PPSV23) 04/29/2020    Mammogram  05/20/2021    Pap Smear  04/26/2022    DEXA SCAN  05/21/2022    Lipid Panel  01/30/2024    Colonoscopy  08/01/2024    TETANUS VACCINE  03/02/2026    Hepatitis C Screening  Completed       Review of Systems   Constitutional: Negative for activity change, chills, fatigue, fever and unexpected weight change.   HENT: Positive for hearing loss. Negative for congestion, ear discharge, ear pain, rhinorrhea, sore throat and trouble swallowing.    Eyes: Negative for discharge, redness, itching and visual disturbance.   Respiratory: Negative for cough, chest tightness, shortness of breath and wheezing.    Cardiovascular: Negative for chest pain, palpitations and leg swelling.   Gastrointestinal: Positive for constipation and diarrhea. Negative for abdominal pain, blood in stool, nausea and vomiting.   Endocrine: Negative for cold intolerance, heat intolerance, polydipsia  and polyuria.   Genitourinary: Negative for difficulty urinating, dysuria, flank pain, frequency, hematuria and menstrual problem.   Musculoskeletal: Positive for neck pain. Negative for arthralgias, back pain, joint swelling and myalgias.   Skin: Negative for color change and rash.   Neurological: Negative for dizziness, tremors, weakness, numbness and headaches.   Psychiatric/Behavioral: Negative for confusion, dysphoric mood and sleep disturbance. The patient is not nervous/anxious.        Objective:          A1C:      CBC:  Recent Labs   Lab 02/10/17  0616 01/30/19  0737   WBC 4.15 5.31   RBC 4.77 4.93   Hemoglobin 13.8 14.5   Hematocrit 41.4 44.0   Platelets 183 210   Mean Corpuscular Volume 87 89   Mean Corpuscular Hemoglobin 28.9 29.4   Mean Corpuscular Hemoglobin Conc 33.3 33.0     CMP:  Recent Labs   Lab 02/10/17  0616 06/30/17  1147 01/30/19  0737   Glucose 94 90 96   Calcium 9.5 9.3 9.7   Albumin 4.3 4.1 4.7   Total Protein 7.3 7.0 7.9   Sodium 143 141 141   Potassium 5.0 4.3 4.3   CO2 30 H 28 28   Chloride 102 102 104   BUN, Bld 27 H 20 H 18 H   Creatinine 0.94 0.89 0.90   Alkaline Phosphatase 52 53 46   ALT 20 29 15   AST 20 21 22   Total Bilirubin 0.8 0.7 0.7     LIPIDS:  Recent Labs   Lab 02/10/17  0616 01/30/19  0737   TSH 1.910  --    HDL 42 61   Cholesterol 166 196   Triglycerides 75 58   LDL Cholesterol 109.0 123.4   Hdl/Cholesterol Ratio 25.3 31.1   Non-HDL Cholesterol 124 135   Total Cholesterol/HDL Ratio 4.0 3.2     TSH:  Recent Labs   Lab 02/10/17  0616   TSH 1.910           Physical Exam   Constitutional: She appears well-developed and well-nourished. She does not have a sickly appearance.   HENT:   Head: Normocephalic and atraumatic.   Right Ear: External ear normal. Tympanic membrane is not erythematous. No middle ear effusion.   Left Ear: External ear normal. Tympanic membrane is not erythematous.  No middle ear effusion.   Nose: No rhinorrhea. Right sinus exhibits no maxillary sinus  tenderness and no frontal sinus tenderness. Left sinus exhibits no maxillary sinus tenderness and no frontal sinus tenderness.   Mouth/Throat: No posterior oropharyngeal edema or posterior oropharyngeal erythema. No tonsillar exudate.   Eyes: Pupils are equal, round, and reactive to light. Conjunctivae and EOM are normal. Right eye exhibits no discharge. Left eye exhibits no discharge. Right conjunctiva is not injected. Left conjunctiva is not injected.   Neck: No thyromegaly present.   Cardiovascular: Normal rate, regular rhythm, normal heart sounds and intact distal pulses.   No murmur heard.  Pulmonary/Chest: Effort normal and breath sounds normal. She has no wheezes. She has no rhonchi. She has no rales.   Abdominal: Bowel sounds are normal. She exhibits no distension. There is no tenderness.   Musculoskeletal: She exhibits no edema or tenderness.   Lymphadenopathy:     She has no cervical adenopathy.   Neurological: She displays normal reflexes. No cranial nerve deficit.   Skin: Skin is warm and dry. No lesion and no rash noted.   Psychiatric: She has a normal mood and affect. Her behavior is normal. Her mood appears not anxious. Her speech is not rapid and/or pressured. She is not agitated and not aggressive. She does not exhibit a depressed mood.             Assessment and Plan:     Problem List Items Addressed This Visit        Derm    Onychomycosis- begin terbinafine and recheck lft'sin 1 month       Pulmonary    Asthma - continue breo.        GI    Gastroesophageal reflux disease - continue nexium.     Relevant Orders    Comprehensive metabolic panel       Orthopedic    Osteopenia - Primary - reclast ordered.        Other    Allergic rhinitis - continue xyzal.       Other Visit Diagnoses     Bruising    - we will check cbc.     Relevant Orders    CBC auto differential          Orders Placed This Encounter    Comprehensive metabolic panel    CBC auto differential    terbinafine HCl (LAMISIL) 250 mg  tablet         Follow-up with me in 6 months.

## 2019-08-14 ENCOUNTER — TELEPHONE (OUTPATIENT)
Dept: FAMILY MEDICINE | Facility: CLINIC | Age: 69
End: 2019-08-14

## 2019-08-14 ENCOUNTER — PATIENT MESSAGE (OUTPATIENT)
Dept: FAMILY MEDICINE | Facility: CLINIC | Age: 69
End: 2019-08-14

## 2019-08-14 RX ORDER — CICLOPIROX 80 MG/ML
SOLUTION TOPICAL NIGHTLY
Qty: 1 BOTTLE | Refills: 1 | Status: SHIPPED | OUTPATIENT
Start: 2019-08-14 | End: 2019-11-05

## 2019-08-14 NOTE — TELEPHONE ENCOUNTER
Alternative for terbinafine HCL requested, not covered by insurance. Insurance only pays for 90 tabs per year.

## 2019-09-12 ENCOUNTER — PATIENT MESSAGE (OUTPATIENT)
Dept: FAMILY MEDICINE | Facility: CLINIC | Age: 69
End: 2019-09-12

## 2019-09-12 DIAGNOSIS — K22.70 BARRETT'S ESOPHAGUS WITHOUT DYSPLASIA: ICD-10-CM

## 2019-09-12 RX ORDER — HYDROGEN PEROXIDE 3 %
20 SOLUTION, NON-ORAL MISCELLANEOUS
Qty: 90 CAPSULE | Refills: 3 | Status: SHIPPED | OUTPATIENT
Start: 2019-09-12 | End: 2020-12-21

## 2019-09-12 RX ORDER — HYDROGEN PEROXIDE 3 %
20 SOLUTION, NON-ORAL MISCELLANEOUS
Qty: 30 CAPSULE | Refills: 4 | Status: SHIPPED | OUTPATIENT
Start: 2019-09-12 | End: 2019-11-18

## 2019-10-21 RX ORDER — TERBINAFINE HYDROCHLORIDE 250 MG/1
250 TABLET ORAL DAILY
Qty: 90 TABLET | Refills: 0 | Status: SHIPPED | OUTPATIENT
Start: 2019-10-21 | End: 2019-11-18

## 2019-10-24 ENCOUNTER — OFFICE VISIT (OUTPATIENT)
Dept: OBSTETRICS AND GYNECOLOGY | Facility: CLINIC | Age: 69
End: 2019-10-24
Payer: MEDICARE

## 2019-10-24 ENCOUNTER — PATIENT MESSAGE (OUTPATIENT)
Dept: FAMILY MEDICINE | Facility: CLINIC | Age: 69
End: 2019-10-24

## 2019-10-24 VITALS
BODY MASS INDEX: 21.37 KG/M2 | DIASTOLIC BLOOD PRESSURE: 82 MMHG | WEIGHT: 153.19 LBS | SYSTOLIC BLOOD PRESSURE: 106 MMHG

## 2019-10-24 DIAGNOSIS — R10.2 PELVIC PAIN: Primary | ICD-10-CM

## 2019-10-24 PROCEDURE — 99213 PR OFFICE/OUTPT VISIT, EST, LEVL III, 20-29 MIN: ICD-10-PCS | Mod: S$GLB,,, | Performed by: OBSTETRICS & GYNECOLOGY

## 2019-10-24 PROCEDURE — 1100F PR PT FALLS ASSESS DOC 2+ FALLS/FALL W/INJURY/YR: ICD-10-PCS | Mod: CPTII,S$GLB,, | Performed by: OBSTETRICS & GYNECOLOGY

## 2019-10-24 PROCEDURE — 99999 PR PBB SHADOW E&M-EST. PATIENT-LVL III: CPT | Mod: PBBFAC,,, | Performed by: OBSTETRICS & GYNECOLOGY

## 2019-10-24 PROCEDURE — 99999 PR PBB SHADOW E&M-EST. PATIENT-LVL III: ICD-10-PCS | Mod: PBBFAC,,, | Performed by: OBSTETRICS & GYNECOLOGY

## 2019-10-24 PROCEDURE — 3288F FALL RISK ASSESSMENT DOCD: CPT | Mod: CPTII,S$GLB,, | Performed by: OBSTETRICS & GYNECOLOGY

## 2019-10-24 PROCEDURE — 99213 OFFICE O/P EST LOW 20 MIN: CPT | Mod: S$GLB,,, | Performed by: OBSTETRICS & GYNECOLOGY

## 2019-10-24 PROCEDURE — 3288F PR FALLS RISK ASSESSMENT DOCUMENTED: ICD-10-PCS | Mod: CPTII,S$GLB,, | Performed by: OBSTETRICS & GYNECOLOGY

## 2019-10-24 PROCEDURE — 1100F PTFALLS ASSESS-DOCD GE2>/YR: CPT | Mod: CPTII,S$GLB,, | Performed by: OBSTETRICS & GYNECOLOGY

## 2019-10-24 NOTE — PROGRESS NOTES
CC:  Chief Complaint   Patient presents with    Pelvic Pain       HPI:    68 y.o. OB History        2    Para   2    Term   2            AB        Living   2       SAB        TAB        Ectopic        Multiple        Live Births   2               Complaining of: rlq discomfort, anxious about ov ca, had a pulled muscle in that area 2 months ago which got better      (Not in a hospital admission)    Review of patient's allergies indicates:  No Known Allergies     Past Medical History:   Diagnosis Date    Allergic rhinitis     Asthma     Cortes's esophagus     Fibrocystic breast     Osteopenia     Varicose veins     sees Dr. Brigido Quinones     Past Surgical History:   Procedure Laterality Date    BREAST BIOPSY Left     COLONOSCOPY  2014    Dr. Canas - repeat in 5 to 10 years    ESOPHAGOGASTRODUODENOSCOPY N/A 10/19/2018    Procedure: EGD (ESOPHAGOGASTRODUODENOSCOPY);  Surgeon: Cassie Huber MD;  [facility];  Service: Endoscopy;  Laterality: N/A;    KNEE SURGERY Bilateral     meniscus repair    peniculitis surgery      x 3     Family History   Problem Relation Age of Onset    Ovarian cancer Mother 68    Alzheimer's disease Mother         @ 62    Cancer Father 85        tongue, and Lung    Diabetes Father     Alzheimer's disease Maternal Aunt         @ age 80    No Known Problems Sister     No Known Problems Brother     No Known Problems Sister      Social History     Tobacco Use    Smoking status: Former Smoker     Packs/day: 1.00     Years: 10.00     Pack years: 10.00     Last attempt to quit:      Years since quittin.8    Smokeless tobacco: Never Used   Substance Use Topics    Alcohol use: Yes     Frequency: 4 or more times a week     Drinks per session: 1 or 2     Binge frequency: Never     Comment: one glass of wine daily    Drug use: No     ROS:  GENERAL: Feeling well overall. Denies fever or chills.   SKIN: Denies rash or lesions.   HEAD:  Denies head injury or headache.   NODES: Denies enlarged lymph nodes.   CHEST: Denies chest pain or shortness of breath.   CARDIOVASCULAR: Denies palpitations or left sided chest pain.    ABDOMEN: Denies diarrhea, nausea, vomiting or rectal bleeding.   URINARY: No dysuria, hematuria, or burning on urination.  REPRODUCTIVE: See HPI.   BREASTS: Denies pain, lumps, or nipple discharge.   HEMATOLOGIC: No easy bruisability or excessive bleeding.   MUSCULOSKELETAL: Denies joint pain or swelling.   NEUROLOGIC: Denies syncope or weakness.   PSYCHIATRIC: Denies depression, anxiety or mood swings.      PE: /82   Wt 69.5 kg (153 lb 3.2 oz)   BMI 21.37 kg/m²      APPEARANCE: Well nourished, well developed, in no acute distress.  SKIN: Normal skin turgor, no lesions.  NECK: Neck symmetric without masses or thyromegaly.  NODES: No inguinal, cervical, axillary or femoral lymph node enlargement.  CARDIOVASCULAR: Normal S1, S2. No rubs, murmurs or gallops.  NEUROLOGIC: Normal mood and affect. No depression or anxiety.   ABDOMEN: Soft. No tenderness or masses. No hepatosplenomegaly. No hernias.  RESPIRATORY: Normal respiratory effort with no retractions or use of accessory muscles.  BREASTS: Symmetrical, no skin changes or visible lesions. No palpable masses, nipple discharge, or adenopathy bilaterally.   BLADDER: Non-tender  GENITALIA: normal external genitalia, no erythema, no discharge  URETHRA: normal urethra, normal urethral meatus  VAGINA: Normal  CERVIX: no lesions or cervical motion tenderness  UTERUS: normal  ADNEXA: normal adnexa and no mass, fullness, tenderness    ASSESSMENT/ PLAN    Sunni was seen today for pelvic pain.    Diagnoses and all orders for this visit:    Pelvic pain  -     US Pelvis Comp with Transvag NON-OB (xpd; Future      Pt reassured      Joey Maldonado MD

## 2019-10-30 ENCOUNTER — PATIENT MESSAGE (OUTPATIENT)
Dept: FAMILY MEDICINE | Facility: CLINIC | Age: 69
End: 2019-10-30

## 2019-10-31 ENCOUNTER — TELEPHONE (OUTPATIENT)
Dept: FAMILY MEDICINE | Facility: CLINIC | Age: 69
End: 2019-10-31

## 2019-10-31 DIAGNOSIS — M79.676 PAIN OF TOE, UNSPECIFIED LATERALITY: Primary | ICD-10-CM

## 2019-10-31 NOTE — TELEPHONE ENCOUNTER
----- Message from Raina Ryan sent at 10/31/2019 10:57 AM CDT -----  Can you please place referral in system for patient to get an appointment with Podiatry.

## 2019-11-01 ENCOUNTER — TELEPHONE (OUTPATIENT)
Dept: FAMILY MEDICINE | Facility: CLINIC | Age: 69
End: 2019-11-01

## 2019-11-01 NOTE — TELEPHONE ENCOUNTER
----- Message from Marva Valles sent at 11/1/2019  1:27 PM CDT -----  Contact: Self [phone]  Patient Returning Your Phone Call

## 2019-11-05 ENCOUNTER — OFFICE VISIT (OUTPATIENT)
Dept: PODIATRY | Facility: CLINIC | Age: 69
End: 2019-11-05
Payer: MEDICARE

## 2019-11-05 VITALS
SYSTOLIC BLOOD PRESSURE: 119 MMHG | HEART RATE: 63 BPM | HEIGHT: 71 IN | DIASTOLIC BLOOD PRESSURE: 67 MMHG | WEIGHT: 152.5 LBS | BODY MASS INDEX: 21.35 KG/M2 | OXYGEN SATURATION: 95 %

## 2019-11-05 DIAGNOSIS — B35.1 ONYCHOMYCOSIS DUE TO DERMATOPHYTE: Primary | ICD-10-CM

## 2019-11-05 DIAGNOSIS — L60.1 ONYCHOLYSIS: ICD-10-CM

## 2019-11-05 PROCEDURE — 99203 PR OFFICE/OUTPT VISIT, NEW, LEVL III, 30-44 MIN: ICD-10-PCS | Mod: S$GLB,,, | Performed by: PODIATRIST

## 2019-11-05 PROCEDURE — 1101F PR PT FALLS ASSESS DOC 0-1 FALLS W/OUT INJ PAST YR: ICD-10-PCS | Mod: CPTII,S$GLB,, | Performed by: PODIATRIST

## 2019-11-05 PROCEDURE — 99999 PR PBB SHADOW E&M-EST. PATIENT-LVL III: ICD-10-PCS | Mod: PBBFAC,,, | Performed by: PODIATRIST

## 2019-11-05 PROCEDURE — 99999 PR PBB SHADOW E&M-EST. PATIENT-LVL III: CPT | Mod: PBBFAC,,, | Performed by: PODIATRIST

## 2019-11-05 PROCEDURE — 1101F PT FALLS ASSESS-DOCD LE1/YR: CPT | Mod: CPTII,S$GLB,, | Performed by: PODIATRIST

## 2019-11-05 PROCEDURE — 99203 OFFICE O/P NEW LOW 30 MIN: CPT | Mod: S$GLB,,, | Performed by: PODIATRIST

## 2019-11-05 RX ORDER — TRIAMCINOLONE ACETONIDE 1 MG/G
1 CREAM TOPICAL DAILY PRN
Refills: 1 | COMMUNITY
Start: 2019-09-12 | End: 2020-02-18

## 2019-11-05 RX ORDER — CICLOPIROX 80 MG/ML
SOLUTION TOPICAL NIGHTLY
Qty: 6.6 ML | Refills: 3 | Status: SHIPPED | OUTPATIENT
Start: 2019-11-05 | End: 2019-11-18

## 2019-11-05 NOTE — LETTER
November 5, 2019      Yaneli Slaughter MD  [address]  [address]   [place] [address]           [facility]  [address]  [facility] [address]  Phone: [phone]  Fax: [phone]          Patient: Sunni Lowe   MR Number: [id number]   YOB: 1950   Date of Visit: 11/5/2019       Dear Dr. Yaneli Slaughter:    Thank you for referring Sunni Lowe to me for evaluation. Attached you will find relevant portions of my assessment and plan of care.    If you have questions, please do not hesitate to call me. I look forward to following Sunni Lowe along with you.    Sincerely,    Olga Lidia Robins, GOPI    Enclosure  CC:  No Recipients    If you would like to receive this communication electronically, please contact [email or web address] or [phone] to request more information on [employer] Link access.    For providers and/or their staff who would like to refer a patient to [facility], please contact us through our one-stop-shop provider referral line, [facility], at [phone].    If you feel you have received this communication in error or would no longer like to receive these types of communications, please e-mail [email or web address]

## 2019-11-05 NOTE — PROGRESS NOTES
Subjective:      Patient ID: Sunni Lowe is a 68 y.o. female.    Chief Complaint: Nail Problem (pt c/o having a problem with both toenails; very sensitive to touch, second toenail is growing under rt one)    68 y.o. female presenting with bilateral hallux nail pain. Known to Dr. Slaughter who prescribed Lamisil.  Patient has been taking Lamisil for 3 months.  Tells me discoloration of the nail somewhat resolved.  She is not currently taking Lamisil.  Complains of pain of the right hallux nail with partial detachment.  Ambulating in open toe shoe.  Denies trauma.  Enquiring different treatment options for discoloration of the nail.       Review of Systems   Constitution: Negative for chills, decreased appetite, fever and malaise/fatigue.   HENT: Negative for congestion, ear discharge and sore throat.    Eyes: Negative for discharge and pain.   Cardiovascular: Negative for chest pain, claudication and leg swelling.   Respiratory: Negative for cough and shortness of breath.    Skin: Positive for color change. Negative for nail changes and rash.   Musculoskeletal: Negative for arthritis, joint pain, joint swelling and muscle weakness.        Bilateral hallux nail pain   Gastrointestinal: Negative for bloating, abdominal pain, diarrhea, nausea and vomiting.   Genitourinary: Negative for flank pain and hematuria.   Neurological: Negative for headaches, numbness and weakness.   Psychiatric/Behavioral: Negative for altered mental status.             Past Medical History:   Diagnosis Date    Allergic rhinitis     Asthma     Cortes's esophagus     Fibrocystic breast     Osteopenia     Varicose veins     sees Dr. Brigido Quinones       Past Surgical History:   Procedure Laterality Date    BREAST BIOPSY Left     COLONOSCOPY  08/01/2014    Dr. Canas - repeat in 5 to 10 years    ESOPHAGOGASTRODUODENOSCOPY N/A 10/19/2018    Procedure: EGD (ESOPHAGOGASTRODUODENOSCOPY);  Surgeon: Cassie Huber MD;  Location: [facility]  ENDO;  Service: Endoscopy;  Laterality: N/A;    KNEE SURGERY Bilateral     meniscus repair    peniculitis surgery      x 3       Family History   Problem Relation Age of Onset    Ovarian cancer Mother 68    Alzheimer's disease Mother         @ 62    Cancer Father 85        tongue, and Lung    Diabetes Father     Alzheimer's disease Maternal Aunt         @ age 80    No Known Problems Sister     No Known Problems Brother     No Known Problems Sister        Social History     Socioeconomic History    Marital status:      Spouse name: Not on file    Number of children: 2    Years of education: Not on file    Highest education level: Not on file   Occupational History    Not on file   Social Needs    Financial resource strain: Not hard at all    Food insecurity:     Worry: Never true     Inability: Never true    Transportation needs:     Medical: No     Non-medical: No   Tobacco Use    Smoking status: Former Smoker     Packs/day: 1.00     Years: 10.00     Pack years: 10.00     Last attempt to quit:      Years since quittin.8    Smokeless tobacco: Never Used   Substance and Sexual Activity    Alcohol use: Yes     Frequency: 4 or more times a week     Drinks per session: 1 or 2     Binge frequency: Never     Comment: one glass of wine daily    Drug use: No    Sexual activity: Not Currently     Partners: Male     Comment:    Lifestyle    Physical activity:     Days per week: 4 days     Minutes per session: 60 min    Stress: Only a little   Relationships    Social connections:     Talks on phone: More than three times a week     Gets together: Twice a week     Attends [employer] service: Not on file     Active member of club or organization: Yes     Attends meetings of clubs or organizations: More than 4 times per year     Relationship status:    Other Topics Concern    Not on file   Social History Narrative    Not on file       Current Outpatient Medications  "  Medication Sig Dispense Refill    calcium carbonate (OS-ISABELLE) 600 mg (1,500 mg) Tab Take 1,200 mg by mouth once daily.      cholecalciferol, vitamin D3, (VITAMIN D3) 10,000 unit Cap Take 10,000 Units by mouth once daily.       CYANOCOBALAMIN, VITAMIN B-12, (VITAMIN B-12 ORAL) Take by mouth.      esomeprazole (NEXIUM) 20 MG capsule TAKE 1 CAPSULE (20 MG TOTAL) BY MOUTH BEFORE BREAKFAST. 90 capsule 3    fish oil-omega-3 fatty acids 300-1,000 mg capsule Take 600 mg by mouth once daily.      fluticasone-vilanterol (BREO) 200-25 mcg/dose DsDv diskus inhaler Inhale 1 puff into the lungs once daily. Controller 180 each 1    triamcinolone acetonide 0.1% (KENALOG) 0.1 % cream Apply 1 application topically daily as needed.  1    ciclopirox (PENLAC) 8 % Soln Apply topically nightly. 6.6 mL 3    coenzyme Q10 (CO Q-10) 10 mg capsule Take 10 mg by mouth once daily.      esomeprazole (NEXIUM) 20 MG capsule Take 1 capsule (20 mg total) by mouth before breakfast. (Patient not taking: Reported on 11/5/2019) 30 capsule 4    levocetirizine (XYZAL) 5 MG tablet Take 1 tablet (5 mg total) by mouth every evening. 30 tablet 5    terbinafine HCl (LAMISIL) 250 mg tablet TAKE 1 TABLET (250 MG TOTAL) BY MOUTH ONCE DAILY. (Patient not taking: Reported on 11/5/2019) 90 tablet 0     No current facility-administered medications for this visit.      Facility-Administered Medications Ordered in Other Visits   Medication Dose Route Frequency Provider Last Rate Last Dose    0.9%  NaCl infusion   Intravenous Continuous Cassie Huber MD   Stopped at 10/19/18 0839    sodium chloride 0.9% flush 3 mL  3 mL Intravenous PRN Cassie Huber MD           Review of patient's allergies indicates:  No Known Allergies    Vitals:    11/05/19 1001   BP: 119/67   Pulse: 63   SpO2: 95%   Weight: 69.2 kg (152 lb 8 oz)   Height: 5' 11" (1.803 m)   PainSc: 0-No pain       Objective:      Physical Exam   Constitutional: She is oriented to person, " place, and time. She appears well-developed and well-nourished. No distress.   HENT:   Nose: Nose normal.   Eyes: Conjunctivae are normal.   Neck: Normal range of motion.   Pulmonary/Chest: Effort normal. She exhibits no tenderness.   Abdominal: There is no tenderness.   Neurological: She is alert and oriented to person, place, and time.   Psychiatric: She has a normal mood and affect. Her behavior is normal.       Vascular: Distal DP/PT pulses palpable 2/4. CRT < 3 sec to tips of toes. No vericosities noted to LEs. Hair growth present LE, warm to touch LE, No edema noted to LE.    Dermatologic: No open lesions, lacerations or wounds. Interdigital spaces clean, dry and intact.   R hallux: partially detached nail plate from the nail bed with discoloration and thickened, no erythema, no rubor, no signs of infection/paronychia.  L hallux: discolored and thickened hallux nail.     Musculoskeletal: MMT 5/5 in DF/PF/Inv/Ev resistance with no reproduction of pain in any direction. Passive range of motion of ankle and pedal joints is painless. No calf tenderness LE, Compartments soft/compressible.     Neurological: Light touch, proprioception, and sharp/dull sensation are all intact. Protective threshold with the [place]ienstein monofilament is intact. Vibratory sensation intact.         Assessment:       Encounter Diagnoses   Name Primary?    Onychomycosis due to dermatophyte Yes    Onycholysis          Plan:       Sunni was seen today for nail problem.    Diagnoses and all orders for this visit:    Onychomycosis due to dermatophyte    Onycholysis    Other orders  -     ciclopirox (PENLAC) 8 % Soln; Apply topically nightly.      I counseled the patient on her conditions, their implications and medical management.    68 y.o. female with onychomycosis, onycholysis of R hallux    -rx. penlac  -right hallux nail plate was taken out completely. Pt tolerated well.   -Instructed patient on the importance of keeping feet dry.  Patient instructed to use absorbent cotton socks and change them if they become sweaty; or wear an open-toe shoe or sandal. Wash the feet at least once a day with soap and water. Patient instructed to use lysol or over-the-counter antifungal powders or sprays to shoes daily and allow them to air dry, switching shoes from every other day would be optimal. Patient is to avoid barefoot walking in high-risk environments (public showers, gyms and locker rooms) may prevent future infections.   -The nature of the condition, options for management, as well as potential risks and complications were discussed in detail with patient. Patient was amenable to my recommendations and left my office fully informed and will follow up as instructed or sooner if necessary.    -f/u prn      Note dictated with voice recognition software, please excuse any grammatical errors.

## 2019-11-15 ENCOUNTER — TELEPHONE (OUTPATIENT)
Dept: ORTHOPEDICS | Facility: CLINIC | Age: 69
End: 2019-11-15

## 2019-11-15 DIAGNOSIS — M25.562 LEFT KNEE PAIN, UNSPECIFIED CHRONICITY: Primary | ICD-10-CM

## 2019-11-18 ENCOUNTER — OFFICE VISIT (OUTPATIENT)
Dept: ORTHOPEDICS | Facility: CLINIC | Age: 69
End: 2019-11-18
Payer: MEDICARE

## 2019-11-18 ENCOUNTER — PATIENT MESSAGE (OUTPATIENT)
Dept: ORTHOPEDICS | Facility: CLINIC | Age: 69
End: 2019-11-18

## 2019-11-18 VITALS — WEIGHT: 152 LBS | HEIGHT: 71 IN | BODY MASS INDEX: 21.28 KG/M2

## 2019-11-18 DIAGNOSIS — M17.12 PRIMARY OSTEOARTHRITIS OF LEFT KNEE: Primary | ICD-10-CM

## 2019-11-18 PROCEDURE — 1101F PT FALLS ASSESS-DOCD LE1/YR: CPT | Mod: CPTII,S$GLB,, | Performed by: ORTHOPAEDIC SURGERY

## 2019-11-18 PROCEDURE — 20610 PR DRAIN/INJECT LARGE JOINT/BURSA: ICD-10-PCS | Mod: LT,S$GLB,, | Performed by: ORTHOPAEDIC SURGERY

## 2019-11-18 PROCEDURE — 99213 OFFICE O/P EST LOW 20 MIN: CPT | Mod: 25,S$GLB,, | Performed by: ORTHOPAEDIC SURGERY

## 2019-11-18 PROCEDURE — 99999 PR PBB SHADOW E&M-EST. PATIENT-LVL III: ICD-10-PCS | Mod: PBBFAC,,, | Performed by: ORTHOPAEDIC SURGERY

## 2019-11-18 PROCEDURE — 20610 DRAIN/INJ JOINT/BURSA W/O US: CPT | Mod: LT,S$GLB,, | Performed by: ORTHOPAEDIC SURGERY

## 2019-11-18 PROCEDURE — 1101F PR PT FALLS ASSESS DOC 0-1 FALLS W/OUT INJ PAST YR: ICD-10-PCS | Mod: CPTII,S$GLB,, | Performed by: ORTHOPAEDIC SURGERY

## 2019-11-18 PROCEDURE — 99999 PR PBB SHADOW E&M-EST. PATIENT-LVL III: CPT | Mod: PBBFAC,,, | Performed by: ORTHOPAEDIC SURGERY

## 2019-11-18 PROCEDURE — 99213 PR OFFICE/OUTPT VISIT, EST, LEVL III, 20-29 MIN: ICD-10-PCS | Mod: 25,S$GLB,, | Performed by: ORTHOPAEDIC SURGERY

## 2019-11-18 RX ORDER — TRIAMCINOLONE ACETONIDE 40 MG/ML
40 INJECTION, SUSPENSION INTRA-ARTICULAR; INTRAMUSCULAR
Status: COMPLETED | OUTPATIENT
Start: 2019-11-18 | End: 2019-11-18

## 2019-11-18 RX ORDER — NAPROXEN 500 MG/1
500 TABLET ORAL 2 TIMES DAILY WITH MEALS
Qty: 60 TABLET | Refills: 1 | Status: SHIPPED | OUTPATIENT
Start: 2019-11-18 | End: 2020-01-20

## 2019-11-18 RX ADMIN — TRIAMCINOLONE ACETONIDE 40 MG: 40 INJECTION, SUSPENSION INTRA-ARTICULAR; INTRAMUSCULAR at 02:11

## 2019-11-18 NOTE — PROGRESS NOTES
Subjective:      Patient ID: Sunni Lowe is a 68 y.o. female.    Chief Complaint: Pain of the Left Knee    HPI    Follow-up for osteoarthritis.  Patient indicates he has recent worsening of her left knee. She has pain in exercise class with lateral movement and gelling.  Walking is not been limited.  Aleve has been minimally beneficial.          Review of Systems   Constitution: Negative for fever and weight loss.   HENT: Negative for congestion.    Eyes: Negative for visual disturbance.   Cardiovascular: Negative for chest pain.   Respiratory: Negative for shortness of breath.    Hematologic/Lymphatic: Negative for bleeding problem. Does not bruise/bleed easily.   Skin: Negative for poor wound healing.   Musculoskeletal: Positive for joint pain.   Gastrointestinal: Negative for abdominal pain.   Genitourinary: Negative for dysuria.   Neurological: Negative for seizures.   Psychiatric/Behavioral: Negative for altered mental status.   Allergic/Immunologic: Negative for persistent infections.         Objective:      Ortho/SPM Exam      Left knee    The patient is not in acute distress.   Body habitus is normal.   Sclera appear normal  No respiratory distress  The patient walks without a limp.  Resisted SLR negative.   The skin over the knee is intact.  Knee effusion 1+  Tendernes is located medially  Range of motion- Flexion full, Extension full.   Ligament exam:   MCL 2+ laxity   Lachman intact              Post sag intact    LCL intact  Patellar apprehension negative.  Popliteal cyst present  Patellar crepitation absent.  Flexion/pinch negative.  Pulses DP present, PT present.  Motor normal 5/5 strength in all tested muscle groups.   Sensory normal.    I reviewed the relevant radiographic images for the patient's condition:  Left knee films show complete loss of medial joint space on flexion view with osteophytes and secondary degenerative changes. Right knee noted to have mild degenerative changes           Assessment:       Encounter Diagnosis   Name Primary?    Primary osteoarthritis of left knee Yes        The condition is structurally advanced.  Patient's level of symptomatology is lower than 1 would predict based on her objective findings.          Plan:       Sunni was seen today for pain.    Diagnoses and all orders for this visit:    Primary osteoarthritis of left knee          I explained my diagnostic impression and the reasoning behind it in detail, using layman's terms.  Models and/or pictures were used to help in the explanation.    Injection recommended and consent given    Naprosyn    I explained to the patient that I am providing a prescription for anti-inflammatory medication.  I warned the patient that it should not be taken with other anti-inflammatory medications including over-the-counter products containing ibuprofen and naproxen.  I advised them to consult their primary physician or pharmacist if there is any question about this in the future.  I discussed the possible side effects including cardiovascular issues, renal issues and gastrointestinal problems.  I advised the patient to discontinue the medication should they develop persistent symptoms of dyspepsia.    I also explained that should they elect to continue this medication long-term, that is beyond the prescription that I provide at this time, they should contact their primary physician for refills and appropriate monitoring.    Home exercise program and exercise modification explained in detail    I explained the potential role of surgery in the treatment of this condition to the patient.  They understand that if nonsurgical measures do not adequately control symptoms, surgery will be considered in the future.

## 2019-11-29 ENCOUNTER — OFFICE VISIT (OUTPATIENT)
Dept: FAMILY MEDICINE | Facility: CLINIC | Age: 69
End: 2019-11-29
Payer: MEDICARE

## 2019-11-29 VITALS
WEIGHT: 150.81 LBS | HEART RATE: 74 BPM | TEMPERATURE: 98 F | SYSTOLIC BLOOD PRESSURE: 116 MMHG | BODY MASS INDEX: 21.11 KG/M2 | OXYGEN SATURATION: 96 % | DIASTOLIC BLOOD PRESSURE: 82 MMHG | HEIGHT: 71 IN

## 2019-11-29 DIAGNOSIS — R63.4 WEIGHT LOSS: ICD-10-CM

## 2019-11-29 DIAGNOSIS — R23.3 EASY BRUISING: ICD-10-CM

## 2019-11-29 DIAGNOSIS — R00.2 PALPITATIONS: Primary | ICD-10-CM

## 2019-11-29 DIAGNOSIS — F43.0 ACUTE STRESS REACTION: ICD-10-CM

## 2019-11-29 PROCEDURE — 93005 ELECTROCARDIOGRAM TRACING: CPT | Mod: S$GLB,,, | Performed by: INTERNAL MEDICINE

## 2019-11-29 PROCEDURE — 93010 EKG 12-LEAD: ICD-10-PCS | Mod: S$GLB,,, | Performed by: INTERNAL MEDICINE

## 2019-11-29 PROCEDURE — 99999 PR PBB SHADOW E&M-EST. PATIENT-LVL IV: ICD-10-PCS | Mod: PBBFAC,,, | Performed by: INTERNAL MEDICINE

## 2019-11-29 PROCEDURE — 99214 PR OFFICE/OUTPT VISIT, EST, LEVL IV, 30-39 MIN: ICD-10-PCS | Mod: S$GLB,,, | Performed by: INTERNAL MEDICINE

## 2019-11-29 PROCEDURE — 99214 OFFICE O/P EST MOD 30 MIN: CPT | Mod: S$GLB,,, | Performed by: INTERNAL MEDICINE

## 2019-11-29 PROCEDURE — 1101F PT FALLS ASSESS-DOCD LE1/YR: CPT | Mod: CPTII,S$GLB,, | Performed by: INTERNAL MEDICINE

## 2019-11-29 PROCEDURE — 1101F PR PT FALLS ASSESS DOC 0-1 FALLS W/OUT INJ PAST YR: ICD-10-PCS | Mod: CPTII,S$GLB,, | Performed by: INTERNAL MEDICINE

## 2019-11-29 PROCEDURE — 93005 EKG 12-LEAD: ICD-10-PCS | Mod: S$GLB,,, | Performed by: INTERNAL MEDICINE

## 2019-11-29 PROCEDURE — 1126F PR PAIN SEVERITY QUANTIFIED, NO PAIN PRESENT: ICD-10-PCS | Mod: S$GLB,,, | Performed by: INTERNAL MEDICINE

## 2019-11-29 PROCEDURE — 93010 ELECTROCARDIOGRAM REPORT: CPT | Mod: S$GLB,,, | Performed by: INTERNAL MEDICINE

## 2019-11-29 PROCEDURE — 1159F MED LIST DOCD IN RCRD: CPT | Mod: S$GLB,,, | Performed by: INTERNAL MEDICINE

## 2019-11-29 PROCEDURE — 1159F PR MEDICATION LIST DOCUMENTED IN MEDICAL RECORD: ICD-10-PCS | Mod: S$GLB,,, | Performed by: INTERNAL MEDICINE

## 2019-11-29 PROCEDURE — 1126F AMNT PAIN NOTED NONE PRSNT: CPT | Mod: S$GLB,,, | Performed by: INTERNAL MEDICINE

## 2019-11-29 PROCEDURE — 99999 PR PBB SHADOW E&M-EST. PATIENT-LVL IV: CPT | Mod: PBBFAC,,, | Performed by: INTERNAL MEDICINE

## 2019-11-29 RX ORDER — CITALOPRAM 10 MG/1
10 TABLET ORAL DAILY
Qty: 30 TABLET | Refills: 3 | Status: SHIPPED | OUTPATIENT
Start: 2019-11-29 | End: 2020-04-13

## 2019-11-29 NOTE — PATIENT INSTRUCTIONS
We have reviewed your prescription medications.   We will update your flu shot today.   EKG looks ok.   We will try celexa 10 mg a day.  We will check labs.

## 2019-12-04 NOTE — PROGRESS NOTES
"Subjective:       Patient ID: Sunni Lowe is a 68 y.o. female.    Chief Complaint: Palpitations and Anxiety     I have reviewed the PMH, SH and FH for this patient    She  has a past medical history of Allergic rhinitis, Asthma, Cortes's esophagus, Fibrocystic breast, Osteopenia, and Varicose veins.     The patient presents today for follow-up of chronic conditions. She has been feeling nervous and having palpitations. She has been having some shortness of breath on exertion. She has had 2 episodes of pain yesterday. She has been "feeling her heart beat", especially at night. Her body feels shaky inside. She is going on a cruise out of [place] for 9 days. They leave 12/13. She does yoga at the library in [place]. She has been experiencing stress in her relationship. She has been  to her  for 11 years , but suddenly, everything he does irritates her. She feel sthat they have grown apart and they live on different schedules. She has been helping her daughter with her kids. She spends a lot of time with the grandkids.     Active Ambulatory Problems     Diagnosis Date Noted    Varicose veins     Allergic rhinitis     Asthma     Cortes's esophagus     Osteopenia     Subjective memory complaints 08/21/2015    Abnormal bowel movement 10/02/2017    Gastroesophageal reflux disease 01/28/2019    BMI 22.0-22.9, adult 01/28/2019    Onychomycosis 07/18/2019     Resolved Ambulatory Problems     Diagnosis Date Noted    Osteoporosis 12/02/2014     Past Medical History:   Diagnosis Date    Fibrocystic breast          MEDICATIONS:  Current Outpatient Medications:     calcium carbonate (OS-ISABELLE) 600 mg (1,500 mg) Tab, Take 1,200 mg by mouth once daily., Disp: , Rfl:     cholecalciferol, vitamin D3, (VITAMIN D3) 10,000 unit Cap, Take 10,000 Units by mouth once daily. , Disp: , Rfl:     CYANOCOBALAMIN, VITAMIN B-12, (VITAMIN B-12 ORAL), Take by mouth., Disp: , Rfl:     esomeprazole (NEXIUM) 20 MG " capsule, TAKE 1 CAPSULE (20 MG TOTAL) BY MOUTH BEFORE BREAKFAST., Disp: 90 capsule, Rfl: 3    fish oil-omega-3 fatty acids 300-1,000 mg capsule, Take 600 mg by mouth once daily., Disp: , Rfl:     fluticasone-vilanterol (BREO) 200-25 mcg/dose DsDv diskus inhaler, Inhale 1 puff into the lungs once daily. Controller, Disp: 180 each, Rfl: 1    glucosam/msm/chond/bosw/hyalur (GLUC-MSM-SHASHA-ANTONIETA-HYAL ORAL), , Disp: , Rfl:     naproxen (NAPROSYN) 500 MG tablet, Take 1 tablet (500 mg total) by mouth 2 (two) times daily with meals., Disp: 60 tablet, Rfl: 1    triamcinolone acetonide 0.1% (KENALOG) 0.1 % cream, Apply 1 application topically daily as needed., Disp: , Rfl: 1    citalopram (CELEXA) 10 MG tablet, Take 1 tablet (10 mg total) by mouth once daily., Disp: 30 tablet, Rfl: 3    levocetirizine (XYZAL) 5 MG tablet, Take 1 tablet (5 mg total) by mouth every evening., Disp: 30 tablet, Rfl: 5  No current facility-administered medications for this visit.     Facility-Administered Medications Ordered in Other Visits:     0.9%  NaCl infusion, , Intravenous, Continuous, Cassie Huber MD, Stopped at 10/19/18 0839    sodium chloride 0.9% flush 3 mL, 3 mL, Intravenous, PRN, Cassie Huber MD      HEALTH MAINTENANCE:   Health Maintenance   Topic Date Due    Pneumococcal Vaccine (65+ Low/Medium Risk) (2 of 2 - PPSV23) 04/29/2020    Mammogram  05/20/2021    Pap Smear  04/26/2022    DEXA SCAN  05/21/2022    Lipid Panel  01/30/2024    Colonoscopy  08/01/2024    TETANUS VACCINE  03/02/2026    Hepatitis C Screening  Completed       Review of Systems   Constitutional: Positive for unexpected weight change. Negative for activity change, chills, fatigue and fever.   HENT: Positive for rhinorrhea. Negative for congestion, ear discharge, ear pain, hearing loss, sore throat and trouble swallowing.    Eyes: Negative for discharge, redness, itching and visual disturbance.   Respiratory: Positive for chest tightness.  Negative for cough, shortness of breath and wheezing.    Cardiovascular: Positive for chest pain and palpitations. Negative for leg swelling.   Gastrointestinal: Negative for abdominal pain, blood in stool, constipation, diarrhea, nausea and vomiting.   Endocrine: Negative for cold intolerance, heat intolerance, polydipsia and polyuria.   Genitourinary: Negative for difficulty urinating, dysuria, flank pain, frequency, hematuria and menstrual problem.   Musculoskeletal: Positive for arthralgias. Negative for back pain, joint swelling, myalgias and neck pain.   Skin: Negative for color change and rash.   Neurological: Negative for dizziness, tremors, weakness, numbness and headaches.   Psychiatric/Behavioral: Positive for dysphoric mood. Negative for confusion and sleep disturbance. The patient is not nervous/anxious.        Objective:          A1C:      CBC:  Recent Labs   Lab 02/10/17  0616 01/30/19  0737 08/19/19  0645 11/29/19  1603   WBC 4.15 5.31 5.91 6.26   RBC 4.77 4.93 4.81 4.52   Hemoglobin 13.8 14.5 14.0 13.4   Hematocrit 41.4 44.0 43.2 40.6   Platelets 183 210 180 195   Mean Corpuscular Volume 87 89 90 90   Mean Corpuscular Hemoglobin 28.9 29.4 29.1 29.6   Mean Corpuscular Hemoglobin Conc 33.3 33.0 32.4 33.0     CMP:  Recent Labs   Lab 02/10/17  0616 06/30/17  1147 01/30/19  0737 08/19/19  0645 11/29/19  1603   Glucose 94 90 96 119 H 112 H   Calcium 9.5 9.3 9.7 9.1 9.7   Albumin 4.3 4.1 4.7 4.5 4.4   Total Protein 7.3 7.0 7.9 7.4 7.4   Sodium 143 141 141 142 143   Potassium 5.0 4.3 4.3 4.1 4.3   CO2 30 H 28 28 30 H 30 H   Chloride 102 102 104 104 104   BUN, Bld 27 H 20 H 18 H 14 19 H   Creatinine 0.94 0.89 0.90 0.77 0.98   Alkaline Phosphatase 52 53 46 55 50   ALT 20 29 15 17 20   AST 20 21 22 20 26   Total Bilirubin 0.8 0.7 0.7 0.9 0.4     LIPIDS:  Recent Labs   Lab 02/10/17  0616 01/30/19  0737 11/29/19  1603   TSH 1.910  --  1.310   HDL 42 61  --    Cholesterol 166 196  --    Triglycerides 75 58  --     LDL Cholesterol 109.0 123.4  --    Hdl/Cholesterol Ratio 25.3 31.1  --    Non-HDL Cholesterol 124 135  --    Total Cholesterol/HDL Ratio 4.0 3.2  --      TSH:  Recent Labs   Lab 02/10/17  0616 11/29/19  1603   TSH 1.910 1.310           Physical Exam   Constitutional: She appears well-developed and well-nourished. She does not have a sickly appearance.   HENT:   Head: Normocephalic and atraumatic.   Right Ear: External ear normal. Tympanic membrane is not erythematous. No middle ear effusion.   Left Ear: External ear normal. Tympanic membrane is not erythematous.  No middle ear effusion.   Nose: No rhinorrhea. Right sinus exhibits no maxillary sinus tenderness and no frontal sinus tenderness. Left sinus exhibits no maxillary sinus tenderness and no frontal sinus tenderness.   Mouth/Throat: No posterior oropharyngeal edema or posterior oropharyngeal erythema. No tonsillar exudate.   Eyes: Pupils are equal, round, and reactive to light. Conjunctivae and EOM are normal. Right eye exhibits no discharge. Left eye exhibits no discharge. Right conjunctiva is not injected. Left conjunctiva is not injected.   Neck: No thyromegaly present.   Cardiovascular: Normal rate, regular rhythm, normal heart sounds and intact distal pulses.   No murmur heard.  Pulmonary/Chest: Effort normal and breath sounds normal. She has no wheezes. She has no rhonchi. She has no rales.   Abdominal: Bowel sounds are normal. She exhibits no distension. There is no tenderness.   Musculoskeletal: She exhibits no edema or tenderness.   Lymphadenopathy:     She has no cervical adenopathy.   Neurological: She displays normal reflexes. No cranial nerve deficit.   Skin: Skin is warm and dry. No lesion and no rash noted.   Psychiatric: She has a normal mood and affect. Her behavior is normal. Her mood appears not anxious. Her speech is not rapid and/or pressured. She is not agitated and not aggressive. She does not exhibit a depressed mood.              Assessment and Plan:     Problem List Items Addressed This Visit     None      Visit Diagnoses     Palpitations    -  Primary - we will check labs. EKG shows possible left atrial enlargement.     Relevant Orders    IN OFFICE EKG 12-LEAD (to Muse) (Completed)    Comprehensive metabolic panel (Completed)    TSH (Completed)    Weight loss     - check labs. Probably because she is busy with the grandkids.       Easy bruising    - check labs. Probably just do to thinning skin.     Relevant Orders    CBC auto differential (Completed)    Acute stress reaction     - try celexa to help with anxiety.           Orders Placed This Encounter    CBC auto differential    Comprehensive metabolic panel    TSH    IN OFFICE EKG 12-LEAD (to Muse)    citalopram (CELEXA) 10 MG tablet         Follow-up with me in 6 weeks.       Samy Slaughter MD,  FACP  Internal Medicine

## 2019-12-20 ENCOUNTER — TELEPHONE (OUTPATIENT)
Dept: ORTHOPEDICS | Facility: CLINIC | Age: 69
End: 2019-12-20

## 2019-12-20 DIAGNOSIS — M79.642 LEFT HAND PAIN: Primary | ICD-10-CM

## 2019-12-23 ENCOUNTER — OFFICE VISIT (OUTPATIENT)
Dept: FAMILY MEDICINE | Facility: CLINIC | Age: 69
End: 2019-12-23
Payer: MEDICARE

## 2019-12-23 VITALS
TEMPERATURE: 98 F | OXYGEN SATURATION: 96 % | SYSTOLIC BLOOD PRESSURE: 118 MMHG | DIASTOLIC BLOOD PRESSURE: 80 MMHG | HEART RATE: 71 BPM | BODY MASS INDEX: 20.98 KG/M2 | HEIGHT: 71 IN | WEIGHT: 149.88 LBS

## 2019-12-23 DIAGNOSIS — J01.10 ACUTE FRONTAL SINUSITIS, RECURRENCE NOT SPECIFIED: Primary | ICD-10-CM

## 2019-12-23 DIAGNOSIS — R63.4 WEIGHT LOSS: ICD-10-CM

## 2019-12-23 DIAGNOSIS — J01.00 ACUTE MAXILLARY SINUSITIS, RECURRENCE NOT SPECIFIED: ICD-10-CM

## 2019-12-23 DIAGNOSIS — J45.40 MODERATE PERSISTENT ASTHMA WITHOUT COMPLICATION: ICD-10-CM

## 2019-12-23 DIAGNOSIS — F43.0 ACUTE STRESS REACTION: ICD-10-CM

## 2019-12-23 PROCEDURE — 1159F PR MEDICATION LIST DOCUMENTED IN MEDICAL RECORD: ICD-10-PCS | Mod: S$GLB,,, | Performed by: INTERNAL MEDICINE

## 2019-12-23 PROCEDURE — 99214 PR OFFICE/OUTPT VISIT, EST, LEVL IV, 30-39 MIN: ICD-10-PCS | Mod: S$GLB,,, | Performed by: INTERNAL MEDICINE

## 2019-12-23 PROCEDURE — 1126F AMNT PAIN NOTED NONE PRSNT: CPT | Mod: S$GLB,,, | Performed by: INTERNAL MEDICINE

## 2019-12-23 PROCEDURE — 99999 PR PBB SHADOW E&M-EST. PATIENT-LVL III: ICD-10-PCS | Mod: PBBFAC,,, | Performed by: INTERNAL MEDICINE

## 2019-12-23 PROCEDURE — 1101F PR PT FALLS ASSESS DOC 0-1 FALLS W/OUT INJ PAST YR: ICD-10-PCS | Mod: CPTII,S$GLB,, | Performed by: INTERNAL MEDICINE

## 2019-12-23 PROCEDURE — 99214 OFFICE O/P EST MOD 30 MIN: CPT | Mod: S$GLB,,, | Performed by: INTERNAL MEDICINE

## 2019-12-23 PROCEDURE — 99999 PR PBB SHADOW E&M-EST. PATIENT-LVL III: CPT | Mod: PBBFAC,,, | Performed by: INTERNAL MEDICINE

## 2019-12-23 PROCEDURE — 1101F PT FALLS ASSESS-DOCD LE1/YR: CPT | Mod: CPTII,S$GLB,, | Performed by: INTERNAL MEDICINE

## 2019-12-23 PROCEDURE — 1126F PR PAIN SEVERITY QUANTIFIED, NO PAIN PRESENT: ICD-10-PCS | Mod: S$GLB,,, | Performed by: INTERNAL MEDICINE

## 2019-12-23 PROCEDURE — 1159F MED LIST DOCD IN RCRD: CPT | Mod: S$GLB,,, | Performed by: INTERNAL MEDICINE

## 2019-12-23 RX ORDER — CEFDINIR 300 MG/1
300 CAPSULE ORAL 2 TIMES DAILY
Qty: 20 CAPSULE | Refills: 0 | Status: SHIPPED | OUTPATIENT
Start: 2019-12-23 | End: 2020-01-02

## 2019-12-23 NOTE — PATIENT INSTRUCTIONS
We have reviewed your prescription medications.   You already had your flu shot this year.   We will treat your sinus infection with cefdinir.   You do not have to start the celexa if you feel like things are better.     Have a Robyn Wilsno!

## 2019-12-23 NOTE — PROGRESS NOTES
Subjective:       Patient ID: Sunni Lowe is a 69 y.o. female.    Chief Complaint: No chief complaint on file.     I have reviewed the PMH,  and  for this patient    She  has a past medical history of Allergic rhinitis, Asthma, Cortes's esophagus, Fibrocystic breast, Osteopenia, and Varicose veins.    She has just gotten back from a 9 day cruise that she took with her .  She thinks that she has a sinus infection.  She reports that her symptoms started while she was on the cruise.  The last day of the cruise, she did not feel like doing anything and had to stay in her room.  Her illness started with a sore throat about 4 days ago.  Since then she has been having headaches and achiness in her face and green mucus.  She also has a mild cough.  She has been taking Mucinex and it helped some.  She has not had fever.    The last time she was here, she was having some dizziness and palpitations.  We thought it might have been due to stress.  She had been taking care of her grandchildren every day and did not have time for herself for her .  I prescribed Celexa which she has not started yet.  She reports that she does feel better after being with her  on the cruise, but she is still having symptoms especially at night.  She reports that when she gets up to go to the restroom she feels short of breath when she comes back.  She does have a history of asthma.  She takes Breo daily.  She has been taking her Breo in the morning so maybe she needs to take it at night.  She had blood work done in November which all looked good.  Her blood counts and blood chemistries were normal.  Her liver tests were also normal.  She has had her flu shot this year.  She is also concerned about weight loss.  Her thyroid tests were normal in November.  She has always been a heavier person and has had to watch her weight.  Lately, she does not seem to have any trouble losing weight.  She is up-to-date on her  mammogram, Pap smear, and colonoscopy.  We will keep a watch on this for now.    Active Ambulatory Problems     Diagnosis Date Noted    Varicose veins     Allergic rhinitis     Asthma     Cortes's esophagus     Osteopenia     Subjective memory complaints 08/21/2015    Abnormal bowel movement 10/02/2017    Gastroesophageal reflux disease 01/28/2019    BMI 22.0-22.9, adult 01/28/2019    Onychomycosis 07/18/2019     Resolved Ambulatory Problems     Diagnosis Date Noted    Osteoporosis 12/02/2014     Past Medical History:   Diagnosis Date    Fibrocystic breast          MEDICATIONS:  Current Outpatient Medications:     calcium carbonate (OS-ISABELLE) 600 mg (1,500 mg) Tab, Take 1,200 mg by mouth once daily., Disp: , Rfl:     cholecalciferol, vitamin D3, (VITAMIN D3) 10,000 unit Cap, Take 10,000 Units by mouth once daily. , Disp: , Rfl:     CYANOCOBALAMIN, VITAMIN B-12, (VITAMIN B-12 ORAL), Take by mouth., Disp: , Rfl:     esomeprazole (NEXIUM) 20 MG capsule, TAKE 1 CAPSULE (20 MG TOTAL) BY MOUTH BEFORE BREAKFAST., Disp: 90 capsule, Rfl: 3    fish oil-omega-3 fatty acids 300-1,000 mg capsule, Take 600 mg by mouth once daily., Disp: , Rfl:     fluticasone-vilanterol (BREO) 200-25 mcg/dose DsDv diskus inhaler, Inhale 1 puff into the lungs once daily. Controller, Disp: 180 each, Rfl: 1    glucosam/msm/chond/bosw/hyalur (GLUC-MSM-SHASHA-ANTONIETA-HYAL ORAL), , Disp: , Rfl:     naproxen (NAPROSYN) 500 MG tablet, Take 1 tablet (500 mg total) by mouth 2 (two) times daily with meals., Disp: 60 tablet, Rfl: 1    triamcinolone acetonide 0.1% (KENALOG) 0.1 % cream, Apply 1 application topically daily as needed., Disp: , Rfl: 1    cefdinir (OMNICEF) 300 MG capsule, Take 1 capsule (300 mg total) by mouth 2 (two) times daily. for 10 days, Disp: 20 capsule, Rfl: 0    citalopram (CELEXA) 10 MG tablet, Take 1 tablet (10 mg total) by mouth once daily. (Patient not taking: Reported on 12/23/2019), Disp: 30 tablet, Rfl: 3     levocetirizine (XYZAL) 5 MG tablet, Take 1 tablet (5 mg total) by mouth every evening., Disp: 30 tablet, Rfl: 5  No current facility-administered medications for this visit.     Facility-Administered Medications Ordered in Other Visits:     0.9%  NaCl infusion, , Intravenous, Continuous, Cassie Huber MD, Stopped at 10/19/18 0839    sodium chloride 0.9% flush 3 mL, 3 mL, Intravenous, PRN, Cassie Huber MD      HEALTH MAINTENANCE:   Health Maintenance   Topic Date Due    Pneumococcal Vaccine (65+ Low/Medium Risk) (2 of 2 - PPSV23) 04/29/2020    Mammogram  05/20/2021    Pap Smear  04/26/2022    DEXA SCAN  05/21/2022    Lipid Panel  01/30/2024    Colonoscopy  08/01/2024    TETANUS VACCINE  03/02/2026    Hepatitis C Screening  Completed       Review of Systems   Constitutional: Negative for chills, fatigue and fever.   HENT: Positive for rhinorrhea and sore throat. Negative for congestion, ear discharge and ear pain.    Eyes: Negative for discharge, redness and itching.   Respiratory: Positive for shortness of breath. Negative for cough, chest tightness and wheezing.    Cardiovascular: Negative for chest pain, palpitations and leg swelling.   Gastrointestinal: Negative for abdominal pain, constipation, diarrhea, nausea and vomiting.   Endocrine: Negative for cold intolerance and heat intolerance.   Genitourinary: Negative for dysuria, flank pain, frequency and hematuria.   Musculoskeletal: Negative for arthralgias, back pain, joint swelling and myalgias.   Skin: Negative for color change and rash.   Neurological: Positive for headaches. Negative for dizziness, tremors and numbness.   Psychiatric/Behavioral: Negative for dysphoric mood and sleep disturbance. The patient is not nervous/anxious.        Objective:          A1C:      CBC:  Recent Labs   Lab 02/10/17  0616 01/30/19  0737 08/19/19  0645 11/29/19  1603   WBC 4.15 5.31 5.91 6.26   RBC 4.77 4.93 4.81 4.52   Hemoglobin 13.8 14.5 14.0 13.4    Hematocrit 41.4 44.0 43.2 40.6   Platelets 183 210 180 195   Mean Corpuscular Volume 87 89 90 90   Mean Corpuscular Hemoglobin 28.9 29.4 29.1 29.6   Mean Corpuscular Hemoglobin Conc 33.3 33.0 32.4 33.0     CMP:  Recent Labs   Lab 02/10/17  0616 06/30/17  1147 01/30/19  0737 08/19/19  0645 11/29/19  1603   Glucose 94 90 96 119 H 112 H   Calcium 9.5 9.3 9.7 9.1 9.7   Albumin 4.3 4.1 4.7 4.5 4.4   Total Protein 7.3 7.0 7.9 7.4 7.4   Sodium 143 141 141 142 143   Potassium 5.0 4.3 4.3 4.1 4.3   CO2 30 H 28 28 30 H 30 H   Chloride 102 102 104 104 104   BUN, Bld 27 H 20 H 18 H 14 19 H   Creatinine 0.94 0.89 0.90 0.77 0.98   Alkaline Phosphatase 52 53 46 55 50   ALT 20 29 15 17 20   AST 20 21 22 20 26   Total Bilirubin 0.8 0.7 0.7 0.9 0.4     LIPIDS:  Recent Labs   Lab 02/10/17  0616 01/30/19  0737 11/29/19  1603   TSH 1.910  --  1.310   HDL 42 61  --    Cholesterol 166 196  --    Triglycerides 75 58  --    LDL Cholesterol 109.0 123.4  --    Hdl/Cholesterol Ratio 25.3 31.1  --    Non-HDL Cholesterol 124 135  --    Total Cholesterol/HDL Ratio 4.0 3.2  --      TSH:  Recent Labs   Lab 02/10/17  0616 11/29/19  1603   TSH 1.910 1.310           Physical Exam   Constitutional: She appears well-developed and well-nourished. She does not have a sickly appearance.   HENT:   Head: Normocephalic and atraumatic.   Right Ear: External ear normal. Tympanic membrane is not erythematous. No middle ear effusion.   Left Ear: External ear normal. Tympanic membrane is not erythematous.  No middle ear effusion.   Nose: No rhinorrhea. Right sinus exhibits maxillary sinus tenderness and frontal sinus tenderness. Left sinus exhibits no maxillary sinus tenderness and no frontal sinus tenderness.   Mouth/Throat: No posterior oropharyngeal edema or posterior oropharyngeal erythema. No tonsillar exudate.   Eyes: Pupils are equal, round, and reactive to light. Conjunctivae and EOM are normal. Right eye exhibits no discharge. Left eye exhibits no  discharge. Right conjunctiva is not injected. Left conjunctiva is not injected.   Neck: No thyromegaly present.   Cardiovascular: Normal rate, regular rhythm, normal heart sounds and intact distal pulses.   No murmur heard.  Pulmonary/Chest: Effort normal and breath sounds normal. She has no wheezes. She has no rhonchi. She has no rales.   Abdominal: Bowel sounds are normal. She exhibits no distension. There is no tenderness.   Musculoskeletal: She exhibits no edema or tenderness.   Lymphadenopathy:     She has cervical adenopathy.   Neurological: She displays normal reflexes. No cranial nerve deficit.   Skin: Skin is warm and dry. No lesion and no rash noted.   Psychiatric: She has a normal mood and affect. Her behavior is normal. Her mood appears not anxious. Her speech is not rapid and/or pressured. She is not agitated and not aggressive. She does not exhibit a depressed mood.             Assessment and Plan:     Problem List Items Addressed This Visit        Pulmonary    Asthma - continue Breo.  Try taking it at night.  We will recheck in a month or so.      Other Visit Diagnoses     Acute frontal sinusitis, recurrence not specified    -  Primary - treat with cefdinir.  Continue Mucinex.      Acute maxillary sinusitis, recurrence not specified    - treat with cefdinir.  Continue Mucinex.      Acute stress reaction    - this may get better as her son-in-law is taking more responsibility for the kids.  She can decide whether not to take the Celexa.  I would like to recheck her in a month.      Weight loss    - she is up-to-date on cancer screenings.  Her thyroid test was normal.  I feel like this may be due to stress.  We will recheck her in about a month.          Orders Placed This Encounter    cefdinir (OMNICEF) 300 MG capsule         Follow-up with me in  1month.       Samy Slaughter MD,  FACP  Internal Medicine

## 2020-01-02 ENCOUNTER — OFFICE VISIT (OUTPATIENT)
Dept: ORTHOPEDICS | Facility: CLINIC | Age: 70
End: 2020-01-02
Payer: MEDICARE

## 2020-01-02 VITALS — WEIGHT: 149.94 LBS | BODY MASS INDEX: 20.99 KG/M2 | HEIGHT: 71 IN

## 2020-01-02 DIAGNOSIS — M79.642 LEFT HAND PAIN: Primary | ICD-10-CM

## 2020-01-02 PROCEDURE — 1125F AMNT PAIN NOTED PAIN PRSNT: CPT | Mod: S$GLB,,, | Performed by: ORTHOPAEDIC SURGERY

## 2020-01-02 PROCEDURE — 1125F PR PAIN SEVERITY QUANTIFIED, PAIN PRESENT: ICD-10-PCS | Mod: S$GLB,,, | Performed by: ORTHOPAEDIC SURGERY

## 2020-01-02 PROCEDURE — 99203 OFFICE O/P NEW LOW 30 MIN: CPT | Mod: S$GLB,,, | Performed by: ORTHOPAEDIC SURGERY

## 2020-01-02 PROCEDURE — 1101F PR PT FALLS ASSESS DOC 0-1 FALLS W/OUT INJ PAST YR: ICD-10-PCS | Mod: CPTII,S$GLB,, | Performed by: ORTHOPAEDIC SURGERY

## 2020-01-02 PROCEDURE — 1159F MED LIST DOCD IN RCRD: CPT | Mod: S$GLB,,, | Performed by: ORTHOPAEDIC SURGERY

## 2020-01-02 PROCEDURE — 99999 PR PBB SHADOW E&M-EST. PATIENT-LVL III: ICD-10-PCS | Mod: PBBFAC,,, | Performed by: ORTHOPAEDIC SURGERY

## 2020-01-02 PROCEDURE — 99999 PR PBB SHADOW E&M-EST. PATIENT-LVL III: CPT | Mod: PBBFAC,,, | Performed by: ORTHOPAEDIC SURGERY

## 2020-01-02 PROCEDURE — 99203 PR OFFICE/OUTPT VISIT, NEW, LEVL III, 30-44 MIN: ICD-10-PCS | Mod: S$GLB,,, | Performed by: ORTHOPAEDIC SURGERY

## 2020-01-02 PROCEDURE — 1101F PT FALLS ASSESS-DOCD LE1/YR: CPT | Mod: CPTII,S$GLB,, | Performed by: ORTHOPAEDIC SURGERY

## 2020-01-02 PROCEDURE — 1159F PR MEDICATION LIST DOCUMENTED IN MEDICAL RECORD: ICD-10-PCS | Mod: S$GLB,,, | Performed by: ORTHOPAEDIC SURGERY

## 2020-01-02 NOTE — PROGRESS NOTES
[facility]  History & Physical  Orthopedics    SUBJECTIVE:      Chief Complaint:  Left thumb pain    Referring Provider: Self, Aaareferral     History of Present Illness:  Patient is a 69 y.o. right hand dominant female who presents today with complaints of left thumb pain. The patient notes that beginning approximately 9 months ago she became essentially primary caregiver for a  grandchild.  Around that time she had onset of left radial sided thumb and wrist pain which was significant.  She is now no longer taking primary care of her grandchild and her pain has now been resolving and subsiding substantially.  She is also taking naproxen for knee osteoarthritis which has also helped her thumb symptoms as well. She rates her pain 7/10 at its worst but on average is far less than that.  Of note she reports history of bilateral wrist fractures and a left thumb what sounds like Maldonado fracture as a child, none of which required surgical intervention. She presents today for initial evaluation and has no complaints of numbness tingling or other issues.     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    Onset of symptoms/DOI was 9 months ago.    Symptoms are aggravated by activity and movement.    Symptoms are alleviated by rest, medication and Activity modifications.    Symptoms consist of pain and swelling.    The patient rates their pain as a 7/10.    Attempted treatment(s) and/or interventions include rest, activity modification and anti-inflammatory medications.     The patient denies any fevers, chills, N/V, D/C and presents for evaluation.       Past Medical History:   Diagnosis Date    Allergic rhinitis     Asthma     Cortes's esophagus     Fibrocystic breast     Osteopenia     Varicose veins     sees Dr. Brigido Quinones     Past Surgical History:   Procedure Laterality Date    BREAST BIOPSY Left     COLONOSCOPY  2014    Dr. Canas - repeat in 5 to  10 years    ESOPHAGOGASTRODUODENOSCOPY N/A 10/19/2018    Procedure: EGD (ESOPHAGOGASTRODUODENOSCOPY);  Surgeon: Cassie Huber MD;  Location: [facility];  Service: Endoscopy;  Laterality: N/A;    KNEE SURGERY Bilateral     meniscus repair    peniculitis surgery      x 3     Review of patient's allergies indicates:  No Known Allergies  Social History     Social History Narrative    Not on file     Family History   Problem Relation Age of Onset    Ovarian cancer Mother 68    Alzheimer's disease Mother         @ 62    Cancer Father 85        tongue, and Lung    Diabetes Father     Alzheimer's disease Maternal Aunt         @ age 80    No Known Problems Sister     No Known Problems Brother     No Known Problems Sister          Current Outpatient Medications:     calcium carbonate (OS-ISABELLE) 600 mg (1,500 mg) Tab, Take 1,200 mg by mouth once daily., Disp: , Rfl:     cefdinir (OMNICEF) 300 MG capsule, Take 1 capsule (300 mg total) by mouth 2 (two) times daily. for 10 days, Disp: 20 capsule, Rfl: 0    cholecalciferol, vitamin D3, (VITAMIN D3) 10,000 unit Cap, Take 10,000 Units by mouth once daily. , Disp: , Rfl:     citalopram (CELEXA) 10 MG tablet, Take 1 tablet (10 mg total) by mouth once daily. (Patient not taking: Reported on 12/23/2019), Disp: 30 tablet, Rfl: 3    CYANOCOBALAMIN, VITAMIN B-12, (VITAMIN B-12 ORAL), Take by mouth., Disp: , Rfl:     esomeprazole (NEXIUM) 20 MG capsule, TAKE 1 CAPSULE (20 MG TOTAL) BY MOUTH BEFORE BREAKFAST., Disp: 90 capsule, Rfl: 3    fish oil-omega-3 fatty acids 300-1,000 mg capsule, Take 600 mg by mouth once daily., Disp: , Rfl:     fluticasone-vilanterol (BREO) 200-25 mcg/dose DsDv diskus inhaler, Inhale 1 puff into the lungs once daily. Controller, Disp: 180 each, Rfl: 1    glucosam/msm/chond/bosw/hyalur (GLUC-MSM-SHASHA-ANTONIETA-HYAL ORAL), , Disp: , Rfl:     levocetirizine (XYZAL) 5 MG tablet, Take 1 tablet (5 mg total) by mouth every evening., Disp: 30 tablet,  Rfl: 5    naproxen (NAPROSYN) 500 MG tablet, Take 1 tablet (500 mg total) by mouth 2 (two) times daily with meals., Disp: 60 tablet, Rfl: 1    triamcinolone acetonide 0.1% (KENALOG) 0.1 % cream, Apply 1 application topically daily as needed., Disp: , Rfl: 1  No current facility-administered medications for this visit.     Facility-Administered Medications Ordered in Other Visits:     0.9%  NaCl infusion, , Intravenous, Continuous, Cassie Huber MD, Stopped at 10/19/18 0839    sodium chloride 0.9% flush 3 mL, 3 mL, Intravenous, PRN, Cassie Huber MD      Review of Systems:  Constitutional: no fever or chills  Eyes: no visual changes  ENT: no nasal congestion or sore throat  Respiratory: no cough or shortness of breath  Cardiovascular: no chest pain  Gastrointestinal: no nausea or vomiting, tolerating diet  Musculoskeletal: pain    OBJECTIVE:      Vital Signs (Most Recent):  There were no vitals filed for this visit.  There is no height or weight on file to calculate BMI.      Physical Exam:  Constitutional: The patient appears well-developed and well-nourished. No distress.   Head: Normocephalic and atraumatic.   Nose: Nose normal.   Eyes: Conjunctivae and EOM are normal.   Neck: No tracheal deviation present.   Cardiovascular: Normal rate and intact distal pulses.    Pulmonary/Chest: Effort normal. No respiratory distress.   Abdominal: There is no guarding.   Neurological: The patient is alert.   Psychiatric: The patient has a normal mood and affect.     Left Hand/Wrist Examination:    Observation/Inspection:  Swelling  none    Deformity  none  Discoloration  none     Scars   none    Atrophy  none    HAND/WRIST EXAMINATION:  Finkelstein's Test   mildly positive  WHAT Test    mildly positive  Snuff box tenderness   Neg  Han's Test    Neg  Hook of Hamate Tenderness  Neg  CMC grind    moderately positive  Circumduction test   moderately positive    Neurovascular Exam:  Digits WWP, brisk CR < 3s  throughout  NVI motor/LTS to M/R/U nerves, radial pulse 2+  Tinel's Test - Carpal Tunnel  Neg  Tinel's Test - Cubital Tunnel  Neg  Phalen's Test    Neg  Median Nerve Compression Test Neg    ROM hand/wrist/elbow full, painless    Diagnostic Results:     Xray -  x-rays of the patient's left hand demonstrate moderate degenerative changes at the left thumb CMC joint with what appears to be a chronic Maldonado fracture fragment with no acute fractures or dislocations  EMG - none    ASSESSMENT/PLAN:      69 y.o. yo female with left thumb de Quervain tenosynovitis and left thumb CMC arthritis  Plan:  Treatment options discussed.  The patient notes that with activity modifications, namely decreasing her caretaking of a grandchild, her symptoms have largely resolved and improved substantially.  We discussed activity modifications, NSAIDs, occupational therapy, bracing, injections, and surgical options.  She would like to continue with activity modifications as well as a trial of a thumb spica brace which we will provide her today.  She is to continue her naproxen which she may also supplement with Tylenol arthritis as needed.  Should her thumb pain return, worsen, persist, or fail to improve I would like to re-evaluate her.  She may return any time.          Noe Chauhan M.D.     Please be aware that this note has been generated with the assistance of [employer] voice-to-text.  Please excuse any spelling or grammatical errors.

## 2020-01-03 ENCOUNTER — PATIENT OUTREACH (OUTPATIENT)
Dept: ADMINISTRATIVE | Facility: HOSPITAL | Age: 70
End: 2020-01-03

## 2020-01-18 DIAGNOSIS — M17.12 PRIMARY OSTEOARTHRITIS OF LEFT KNEE: ICD-10-CM

## 2020-01-20 ENCOUNTER — OFFICE VISIT (OUTPATIENT)
Dept: FAMILY MEDICINE | Facility: CLINIC | Age: 70
End: 2020-01-20
Payer: MEDICARE

## 2020-01-20 VITALS
DIASTOLIC BLOOD PRESSURE: 64 MMHG | BODY MASS INDEX: 21.65 KG/M2 | SYSTOLIC BLOOD PRESSURE: 98 MMHG | OXYGEN SATURATION: 100 % | WEIGHT: 154.63 LBS | TEMPERATURE: 98 F | RESPIRATION RATE: 18 BRPM | HEART RATE: 65 BPM | HEIGHT: 71 IN

## 2020-01-20 DIAGNOSIS — J45.40 MODERATE PERSISTENT ASTHMA WITHOUT COMPLICATION: ICD-10-CM

## 2020-01-20 DIAGNOSIS — K22.70 BARRETT'S ESOPHAGUS WITHOUT DYSPLASIA: ICD-10-CM

## 2020-01-20 DIAGNOSIS — F43.0 ACUTE STRESS REACTION: Primary | ICD-10-CM

## 2020-01-20 DIAGNOSIS — R00.2 PALPITATIONS: ICD-10-CM

## 2020-01-20 PROCEDURE — 99213 OFFICE O/P EST LOW 20 MIN: CPT | Mod: S$GLB,,, | Performed by: INTERNAL MEDICINE

## 2020-01-20 PROCEDURE — 1101F PR PT FALLS ASSESS DOC 0-1 FALLS W/OUT INJ PAST YR: ICD-10-PCS | Mod: CPTII,S$GLB,, | Performed by: INTERNAL MEDICINE

## 2020-01-20 PROCEDURE — 99213 PR OFFICE/OUTPT VISIT, EST, LEVL III, 20-29 MIN: ICD-10-PCS | Mod: S$GLB,,, | Performed by: INTERNAL MEDICINE

## 2020-01-20 PROCEDURE — 99999 PR PBB SHADOW E&M-EST. PATIENT-LVL IV: ICD-10-PCS | Mod: PBBFAC,,, | Performed by: INTERNAL MEDICINE

## 2020-01-20 PROCEDURE — 1126F AMNT PAIN NOTED NONE PRSNT: CPT | Mod: S$GLB,,, | Performed by: INTERNAL MEDICINE

## 2020-01-20 PROCEDURE — 1101F PT FALLS ASSESS-DOCD LE1/YR: CPT | Mod: CPTII,S$GLB,, | Performed by: INTERNAL MEDICINE

## 2020-01-20 PROCEDURE — 1159F MED LIST DOCD IN RCRD: CPT | Mod: S$GLB,,, | Performed by: INTERNAL MEDICINE

## 2020-01-20 PROCEDURE — 99999 PR PBB SHADOW E&M-EST. PATIENT-LVL IV: CPT | Mod: PBBFAC,,, | Performed by: INTERNAL MEDICINE

## 2020-01-20 PROCEDURE — 1159F PR MEDICATION LIST DOCUMENTED IN MEDICAL RECORD: ICD-10-PCS | Mod: S$GLB,,, | Performed by: INTERNAL MEDICINE

## 2020-01-20 PROCEDURE — 1126F PR PAIN SEVERITY QUANTIFIED, NO PAIN PRESENT: ICD-10-PCS | Mod: S$GLB,,, | Performed by: INTERNAL MEDICINE

## 2020-01-20 RX ORDER — NAPROXEN 500 MG/1
TABLET ORAL
Qty: 60 TABLET | Refills: 1 | Status: SHIPPED | OUTPATIENT
Start: 2020-01-20 | End: 2020-07-09

## 2020-01-20 NOTE — PROGRESS NOTES
Subjective:       Patient ID: Sunni Lowe is a 69 y.o. female.    Chief Complaint: Follow-up     I have reviewed the PMH,  and  for this patient    She  has a past medical history of Allergic rhinitis, Asthma, Cortes's esophagus, Fibrocystic breast, Osteopenia, and Varicose veins.     The patient presents today for follow-up of chronic conditions.  She had a good time over the holidays.  She went on a cruise with her  and it went well.  She and her  have just celebrated their 12 anniversary this weekend.  She was impressed with a card that her  made for her.  Her son-in-law has changed to schedule so that he can watch the children a little bit more often and this is helping with some of her stress.  However, she did have the grandchildren Friday and today.  She has been taking Celexa as prescribed and she thinks that it may be helping.  She has only had 2 episodes of palpitations that she was here last and they were lot better than they had been in the past.  She had blood work done in November which was normal.  She thinks that she would like to continue the Celexa for now.    Active Ambulatory Problems     Diagnosis Date Noted    Varicose veins     Allergic rhinitis     Asthma     Cortes's esophagus     Osteopenia     Subjective memory complaints 08/21/2015    Abnormal bowel movement 10/02/2017    Gastroesophageal reflux disease 01/28/2019    BMI 22.0-22.9, adult 01/28/2019    Onychomycosis 07/18/2019     Resolved Ambulatory Problems     Diagnosis Date Noted    Osteoporosis 12/02/2014     Past Medical History:   Diagnosis Date    Fibrocystic breast          MEDICATIONS:  Current Outpatient Medications:     calcium carbonate (OS-ISABLELE) 600 mg (1,500 mg) Tab, Take 1,200 mg by mouth once daily., Disp: , Rfl:     cholecalciferol, vitamin D3, (VITAMIN D3) 10,000 unit Cap, Take 10,000 Units by mouth once daily. , Disp: , Rfl:     citalopram (CELEXA) 10 MG tablet, Take 1  tablet (10 mg total) by mouth once daily., Disp: 30 tablet, Rfl: 3    CYANOCOBALAMIN, VITAMIN B-12, (VITAMIN B-12 ORAL), Take by mouth., Disp: , Rfl:     esomeprazole (NEXIUM) 20 MG capsule, TAKE 1 CAPSULE (20 MG TOTAL) BY MOUTH BEFORE BREAKFAST., Disp: 90 capsule, Rfl: 3    fish oil-omega-3 fatty acids 300-1,000 mg capsule, Take 600 mg by mouth once daily., Disp: , Rfl:     fluticasone-vilanterol (BREO) 200-25 mcg/dose DsDv diskus inhaler, Inhale 1 puff into the lungs once daily. Controller, Disp: 180 each, Rfl: 1    glucosam/msm/chond/bosw/hyalur (GLUC-MSM-SHASHA-ANTONIETA-HYAL ORAL), , Disp: , Rfl:     naproxen (NAPROSYN) 500 MG tablet, TAKE 1 TABLET BY MOUTH TWICE A DAY WITH MEALS, Disp: 60 tablet, Rfl: 1    levocetirizine (XYZAL) 5 MG tablet, Take 1 tablet (5 mg total) by mouth every evening., Disp: 30 tablet, Rfl: 5    triamcinolone acetonide 0.1% (KENALOG) 0.1 % cream, Apply 1 application topically daily as needed., Disp: , Rfl: 1  No current facility-administered medications for this visit.     Facility-Administered Medications Ordered in Other Visits:     0.9%  NaCl infusion, , Intravenous, Continuous, Cassie Huber MD, Stopped at 10/19/18 0839    sodium chloride 0.9% flush 3 mL, 3 mL, Intravenous, PRN, Cassie Huber MD      HEALTH MAINTENANCE:   Health Maintenance   Topic Date Due    Pneumococcal Vaccine (65+ Low/Medium Risk) (2 of 2 - PPSV23) 04/29/2020    Mammogram  05/20/2021    Pap Smear  04/26/2022    DEXA SCAN  05/21/2022    Lipid Panel  01/30/2024    Colonoscopy  08/01/2024    TETANUS VACCINE  03/02/2026    Hepatitis C Screening  Completed       Review of Systems   Constitutional: Negative for activity change, chills, fatigue, fever and unexpected weight change.   HENT: Positive for rhinorrhea. Negative for congestion, ear discharge, ear pain, hearing loss, sore throat and trouble swallowing.    Eyes: Negative for discharge, redness, itching and visual disturbance.    Respiratory: Negative for cough, chest tightness, shortness of breath and wheezing.    Cardiovascular: Positive for palpitations. Negative for chest pain and leg swelling.   Gastrointestinal: Negative for abdominal pain, blood in stool, constipation, diarrhea, nausea and vomiting.   Endocrine: Negative for cold intolerance, heat intolerance, polydipsia and polyuria.   Genitourinary: Negative for difficulty urinating, dysuria, flank pain, frequency, hematuria and menstrual problem.   Musculoskeletal: Positive for arthralgias. Negative for back pain, joint swelling, myalgias and neck pain.   Skin: Negative for color change and rash.   Neurological: Negative for dizziness, tremors, weakness, numbness and headaches.   Psychiatric/Behavioral: Negative for confusion, dysphoric mood and sleep disturbance. The patient is not nervous/anxious.        Objective:          A1C:      CBC:  Recent Labs   Lab 02/10/17  0616 01/30/19  0737 08/19/19  0645 11/29/19  1603   WBC 4.15 5.31 5.91 6.26   RBC 4.77 4.93 4.81 4.52   Hemoglobin 13.8 14.5 14.0 13.4   Hematocrit 41.4 44.0 43.2 40.6   Platelets 183 210 180 195   Mean Corpuscular Volume 87 89 90 90   Mean Corpuscular Hemoglobin 28.9 29.4 29.1 29.6   Mean Corpuscular Hemoglobin Conc 33.3 33.0 32.4 33.0     CMP:  Recent Labs   Lab 02/10/17  0616 06/30/17  1147 01/30/19  0737 08/19/19  0645 11/29/19  1603   Glucose 94 90 96 119 H 112 H   Calcium 9.5 9.3 9.7 9.1 9.7   Albumin 4.3 4.1 4.7 4.5 4.4   Total Protein 7.3 7.0 7.9 7.4 7.4   Sodium 143 141 141 142 143   Potassium 5.0 4.3 4.3 4.1 4.3   CO2 30 H 28 28 30 H 30 H   Chloride 102 102 104 104 104   BUN, Bld 27 H 20 H 18 H 14 19 H   Creatinine 0.94 0.89 0.90 0.77 0.98   Alkaline Phosphatase 52 53 46 55 50   ALT 20 29 15 17 20   AST 20 21 22 20 26   Total Bilirubin 0.8 0.7 0.7 0.9 0.4     LIPIDS:  Recent Labs   Lab 02/10/17  0616 01/30/19  0737 11/29/19  1603   TSH 1.910  --  1.310   HDL 42 61  --    Cholesterol 166 196  --     Triglycerides 75 58  --    LDL Cholesterol 109.0 123.4  --    Hdl/Cholesterol Ratio 25.3 31.1  --    Non-HDL Cholesterol 124 135  --    Total Cholesterol/HDL Ratio 4.0 3.2  --      TSH:  Recent Labs   Lab 02/10/17  0616 11/29/19  1603   TSH 1.910 1.310           Physical Exam   Constitutional: She appears well-developed and well-nourished. She does not have a sickly appearance.   HENT:   Head: Normocephalic and atraumatic.   Right Ear: External ear normal. Tympanic membrane is not erythematous. No middle ear effusion.   Left Ear: External ear normal. Tympanic membrane is not erythematous.  No middle ear effusion.   Nose: No rhinorrhea. Right sinus exhibits no maxillary sinus tenderness and no frontal sinus tenderness. Left sinus exhibits no maxillary sinus tenderness and no frontal sinus tenderness.   Mouth/Throat: No posterior oropharyngeal edema or posterior oropharyngeal erythema. No tonsillar exudate.   Eyes: Pupils are equal, round, and reactive to light. Conjunctivae and EOM are normal. Right eye exhibits no discharge. Left eye exhibits no discharge. Right conjunctiva is not injected. Left conjunctiva is not injected.   Neck: No thyromegaly present.   Cardiovascular: Normal rate, regular rhythm, normal heart sounds and intact distal pulses.   No murmur heard.  Pulmonary/Chest: Effort normal and breath sounds normal. She has no wheezes. She has no rhonchi. She has no rales.   Abdominal: Bowel sounds are normal. She exhibits no distension. There is no tenderness.   Musculoskeletal: She exhibits no edema or tenderness.   Lymphadenopathy:     She has no cervical adenopathy.   Neurological: She displays normal reflexes. No cranial nerve deficit.   Skin: Skin is warm and dry. No lesion and no rash noted.   Psychiatric: She has a normal mood and affect. Her behavior is normal. Her mood appears not anxious. Her speech is not rapid and/or pressured. She is not agitated and not aggressive. She does not exhibit a  depressed mood.             Assessment and Plan:     Problem List Items Addressed This Visit        Pulmonary    Asthma- stable.  She uses Breo.       GI    Cortes's esophagus- stable.  She is on Nexium.      Other Visit Diagnoses     Acute stress reaction    -  Primary- she is trying to manage the stress in her life a little better.  She is taking the Celexa and she thinks that helps.      Palpitations    -her palpitations have decreased since starting Celexa.  Continue it.                 Follow-up with me in  3 months.       Samy Slaughter MD,  FACP  Internal Medicine

## 2020-01-20 NOTE — PATIENT INSTRUCTIONS
We have reviewed your prescription medications.   You seem to be doing well on the citalopram. Palpitations have decreased. Continue it for now.   We will recheck you in about 3 months.

## 2020-01-20 NOTE — TELEPHONE ENCOUNTER
Please inform the patient that I am refilling the medication(s), as they requested. However, if they desire to remain on it beyond the present prescription, they should contact their primary physician.

## 2020-02-18 ENCOUNTER — OFFICE VISIT (OUTPATIENT)
Dept: ORTHOPEDICS | Facility: CLINIC | Age: 70
End: 2020-02-18
Payer: MEDICARE

## 2020-02-18 VITALS — HEIGHT: 71 IN | WEIGHT: 154 LBS | BODY MASS INDEX: 21.56 KG/M2

## 2020-02-18 DIAGNOSIS — M17.12 PRIMARY OSTEOARTHRITIS OF LEFT KNEE: Primary | ICD-10-CM

## 2020-02-18 PROCEDURE — 1159F MED LIST DOCD IN RCRD: CPT | Mod: S$GLB,,, | Performed by: ORTHOPAEDIC SURGERY

## 2020-02-18 PROCEDURE — 99999 PR PBB SHADOW E&M-EST. PATIENT-LVL III: ICD-10-PCS | Mod: PBBFAC,,, | Performed by: ORTHOPAEDIC SURGERY

## 2020-02-18 PROCEDURE — 99213 PR OFFICE/OUTPT VISIT, EST, LEVL III, 20-29 MIN: ICD-10-PCS | Mod: S$GLB,,, | Performed by: ORTHOPAEDIC SURGERY

## 2020-02-18 PROCEDURE — 1101F PT FALLS ASSESS-DOCD LE1/YR: CPT | Mod: CPTII,S$GLB,, | Performed by: ORTHOPAEDIC SURGERY

## 2020-02-18 PROCEDURE — 1126F AMNT PAIN NOTED NONE PRSNT: CPT | Mod: S$GLB,,, | Performed by: ORTHOPAEDIC SURGERY

## 2020-02-18 PROCEDURE — 99213 OFFICE O/P EST LOW 20 MIN: CPT | Mod: S$GLB,,, | Performed by: ORTHOPAEDIC SURGERY

## 2020-02-18 PROCEDURE — 1159F PR MEDICATION LIST DOCUMENTED IN MEDICAL RECORD: ICD-10-PCS | Mod: S$GLB,,, | Performed by: ORTHOPAEDIC SURGERY

## 2020-02-18 PROCEDURE — 1126F PR PAIN SEVERITY QUANTIFIED, NO PAIN PRESENT: ICD-10-PCS | Mod: S$GLB,,, | Performed by: ORTHOPAEDIC SURGERY

## 2020-02-18 PROCEDURE — 99999 PR PBB SHADOW E&M-EST. PATIENT-LVL III: CPT | Mod: PBBFAC,,, | Performed by: ORTHOPAEDIC SURGERY

## 2020-02-18 PROCEDURE — 1101F PR PT FALLS ASSESS DOC 0-1 FALLS W/OUT INJ PAST YR: ICD-10-PCS | Mod: CPTII,S$GLB,, | Performed by: ORTHOPAEDIC SURGERY

## 2020-02-18 NOTE — PROGRESS NOTES
Subjective:      Patient ID: Sunni Lowe is a 69 y.o. female.    Chief Complaint: Follow-up of the Left Knee and Follow-up of the Right Knee    HPI    Follow-up for osteoarthritis the left knee. The patient has been managed with alteration of exercise program, Naprosyn and injection. She feels much better at this point. She has mild discomfort that is intermittent and does not limit her ADLs.          Review of Systems   Constitution: Negative for fever and weight loss.   HENT: Negative for congestion.    Eyes: Negative for visual disturbance.   Cardiovascular: Negative for chest pain.   Respiratory: Negative for shortness of breath.    Hematologic/Lymphatic: Negative for bleeding problem. Does not bruise/bleed easily.   Skin: Negative for poor wound healing.   Gastrointestinal: Negative for abdominal pain.   Genitourinary: Negative for dysuria.   Neurological: Negative for seizures.   Psychiatric/Behavioral: Negative for altered mental status.   Allergic/Immunologic: Negative for persistent infections.         Objective:      Ortho/SPM Exam    Left knee     The patient is not in acute distress.   Body habitus is normal.   Sclera appear normal  No respiratory distress  The patient walks without a limp.  Resisted SLR negative.   The skin over the knee is intact.  Knee effusion 1+  Tendernes is located medially  Range of motion- Flexion full, Extension full.   Ligament exam:              MCL 2+ laxity              Lachman intact              Post sag intact              LCL intact  Patellar apprehension negative.  Popliteal cyst present  Patellar crepitation absent.  Flexion/pinch negative.  Pulses DP present, PT present.  Motor normal 5/5 strength in all tested muscle groups.   Sensory normal.              Assessment:       No diagnosis found.     The patient has fairly advanced structural degeneration, but she is well compensated at this point.          Plan:       There are no diagnoses linked to this  encounter.      I explained my diagnostic impression and the reasoning behind it in detail, using layman's terms.  Models and/or pictures were used to help in the explanation.    Considering the patient's subjective status I recommend continuation of present plan.    Injection in April before planned is travel overseas is recommended.    I explained the potential role of surgery in the treatment of this condition to the patient.  They understand that if nonsurgical measures do not adequately control symptoms, surgery will be considered in the future.

## 2020-04-13 ENCOUNTER — PATIENT MESSAGE (OUTPATIENT)
Dept: ADMINISTRATIVE | Facility: HOSPITAL | Age: 70
End: 2020-04-13

## 2020-04-13 RX ORDER — CITALOPRAM 10 MG/1
TABLET ORAL
Qty: 90 TABLET | Refills: 1 | Status: SHIPPED | OUTPATIENT
Start: 2020-04-13 | End: 2020-05-07

## 2020-04-16 RX ORDER — FLUTICASONE FUROATE AND VILANTEROL 200; 25 UG/1; UG/1
1 POWDER RESPIRATORY (INHALATION) DAILY
Qty: 30 EACH | Refills: 2 | Status: SHIPPED | OUTPATIENT
Start: 2020-04-16 | End: 2020-07-13

## 2020-05-07 RX ORDER — CITALOPRAM 10 MG/1
TABLET ORAL
Qty: 90 TABLET | Refills: 1 | Status: SHIPPED | OUTPATIENT
Start: 2020-05-07 | End: 2021-04-27

## 2020-05-27 ENCOUNTER — PATIENT MESSAGE (OUTPATIENT)
Dept: FAMILY MEDICINE | Facility: CLINIC | Age: 70
End: 2020-05-27

## 2020-05-27 NOTE — TELEPHONE ENCOUNTER
I spoke with patient, rescheduled her for 8/27/2020 to see Dr. Slaughter for follow up visit. Vf/ma

## 2020-06-04 ENCOUNTER — PATIENT MESSAGE (OUTPATIENT)
Dept: OBSTETRICS AND GYNECOLOGY | Facility: CLINIC | Age: 70
End: 2020-06-04

## 2020-06-04 DIAGNOSIS — Z12.31 ENCOUNTER FOR SCREENING MAMMOGRAM FOR MALIGNANT NEOPLASM OF BREAST: ICD-10-CM

## 2020-06-04 DIAGNOSIS — Z12.39 BREAST CANCER SCREENING: Primary | ICD-10-CM

## 2020-07-07 ENCOUNTER — PATIENT OUTREACH (OUTPATIENT)
Dept: ADMINISTRATIVE | Facility: OTHER | Age: 70
End: 2020-07-07

## 2020-07-07 NOTE — PROGRESS NOTES
Requested updates within Care Everywhere.  Patient's chart was reviewed for overdue CLAUDINE topics.  Orders placed:  Tasked appts:  Labs Linked:

## 2020-07-09 ENCOUNTER — OFFICE VISIT (OUTPATIENT)
Dept: OBSTETRICS AND GYNECOLOGY | Facility: CLINIC | Age: 70
End: 2020-07-09
Payer: MEDICARE

## 2020-07-09 VITALS — WEIGHT: 150.63 LBS | SYSTOLIC BLOOD PRESSURE: 110 MMHG | DIASTOLIC BLOOD PRESSURE: 64 MMHG | BODY MASS INDEX: 21 KG/M2

## 2020-07-09 DIAGNOSIS — Z01.419 WELL WOMAN EXAM WITH ROUTINE GYNECOLOGICAL EXAM: Primary | ICD-10-CM

## 2020-07-09 PROCEDURE — 99999 PR PBB SHADOW E&M-EST. PATIENT-LVL III: CPT | Mod: PBBFAC,,, | Performed by: OBSTETRICS & GYNECOLOGY

## 2020-07-09 PROCEDURE — G0101 CA SCREEN;PELVIC/BREAST EXAM: HCPCS | Mod: S$GLB,,, | Performed by: OBSTETRICS & GYNECOLOGY

## 2020-07-09 PROCEDURE — 99999 PR PBB SHADOW E&M-EST. PATIENT-LVL III: ICD-10-PCS | Mod: PBBFAC,,, | Performed by: OBSTETRICS & GYNECOLOGY

## 2020-07-09 PROCEDURE — G0101 PR CA SCREEN;PELVIC/BREAST EXAM: ICD-10-PCS | Mod: S$GLB,,, | Performed by: OBSTETRICS & GYNECOLOGY

## 2020-07-09 PROCEDURE — 88175 CYTOPATH C/V AUTO FLUID REDO: CPT

## 2020-07-09 NOTE — PROGRESS NOTES
CC: Annual check-up    SUBJECTIVE:   69 y.o. female   for annual routine Pap and checkup. No LMP recorded. Patient is postmenopausal..  She has no unusual complaints.        Past Medical History:   Diagnosis Date    Allergic rhinitis     Asthma     Cortes's esophagus     Fibrocystic breast     Osteopenia     Varicose veins     sees Dr. Brigido Quinones     Past Surgical History:   Procedure Laterality Date    BREAST BIOPSY Left     COLONOSCOPY  2014    Dr. Canas - repeat in 5 to 10 years    ESOPHAGOGASTRODUODENOSCOPY N/A 10/19/2018    Procedure: EGD (ESOPHAGOGASTRODUODENOSCOPY);  Surgeon: Cassie Huber MD;  Location: [facility];  Service: Endoscopy;  Laterality: N/A;    KNEE SURGERY Bilateral     meniscus repair    peniculitis surgery      x 3     Social History     Socioeconomic History    Marital status:      Spouse name: Not on file    Number of children: 2    Years of education: Not on file    Highest education level: Not on file   Occupational History    Not on file   Social Needs    Financial resource strain: Not hard at all    Food insecurity     Worry: Never true     Inability: Never true    Transportation needs     Medical: No     Non-medical: No   Tobacco Use    Smoking status: Former Smoker     Packs/day: 1.00     Years: 10.00     Pack years: 10.00     Quit date:      Years since quittin.5    Smokeless tobacco: Never Used   Substance and Sexual Activity    Alcohol use: Yes     Frequency: 4 or more times a week     Drinks per session: 1 or 2     Binge frequency: Never     Comment: one glass of wine daily    Drug use: No    Sexual activity: Not Currently     Partners: Male     Comment:    Lifestyle    Physical activity     Days per week: 4 days     Minutes per session: 60 min    Stress: Only a little   Relationships    Social connections     Talks on phone: More than three times a week     Gets together: Twice a week     Attends  Yazidism service: Not on file     Active member of club or organization: Yes     Attends meetings of clubs or organizations: More than 4 times per year     Relationship status:    Other Topics Concern    Not on file   Social History Narrative    Not on file     Family History   Problem Relation Age of Onset    Ovarian cancer Mother 68    Alzheimer's disease Mother         @ 62    Cancer Father 85        tongue, and Lung    Diabetes Father     Alzheimer's disease Maternal Aunt         @ age 80    No Known Problems Sister     No Known Problems Brother     No Known Problems Sister      OB History    Para Term  AB Living   2 2 2     2   SAB TAB Ectopic Multiple Live Births           2      # Outcome Date GA Lbr Silvestre/2nd Weight Sex Delivery Anes PTL Lv   2 Term     F Vag-Spont   HAYDEE   1 Term     M Vag-Spont   HAYDEE         Current Outpatient Medications   Medication Sig Dispense Refill    calcium carbonate (OS-ISABELLE) 600 mg (1,500 mg) Tab Take 1,200 mg by mouth once daily.      cholecalciferol, vitamin D3, (VITAMIN D3) 10,000 unit Cap Take 10,000 Units by mouth once daily.       citalopram (CELEXA) 10 MG tablet TAKE 1 TABLET BY MOUTH EVERY DAY 90 tablet 1    CYANOCOBALAMIN, VITAMIN B-12, (VITAMIN B-12 ORAL) Take by mouth.      esomeprazole (NEXIUM) 20 MG capsule TAKE 1 CAPSULE (20 MG TOTAL) BY MOUTH BEFORE BREAKFAST. 90 capsule 3    fish oil-omega-3 fatty acids 300-1,000 mg capsule Take 600 mg by mouth once daily.      fluticasone furoate-vilanteroL (BREO ELLIPTA) 200-25 mcg/dose DsDv diskus inhaler Inhale 1 puff into the lungs once daily. 30 each 2    levocetirizine (XYZAL) 5 MG tablet Take 1 tablet (5 mg total) by mouth every evening. 30 tablet 5     No current facility-administered medications for this visit.      Facility-Administered Medications Ordered in Other Visits   Medication Dose Route Frequency Provider Last Rate Last Dose    0.9%  NaCl infusion   Intravenous  Continuous Cassie Huber MD   Stopped at 10/19/18 0839    sodium chloride 0.9% flush 3 mL  3 mL Intravenous PRN Cassie Huber MD         Allergies: Patient has no known allergies.     ROS:  Constitutional: no weight loss, weight gain, fever, fatigue  Eyes:  No vision changes, glasses/contacts  ENT/Mouth: No ulcers, sinus problems, ears ringing, headache  Cardiovascular: No inability to lie flat, chest pain, exercise intolerance, swelling, heart palpitations  Respiratory: No wheezing, coughing blood, shortness of breath, or cough  Gastrointestinal: No diarrhea, bloody stool, nausea/vomiting, constipation, gas, hemorrhoids  Genitourinary: No blood in urine, painful urination, urgency of urination, frequency of urination, incomplete emptying, incontinence, abnormal bleeding, painful periods, heavy periods, vaginal discharge, vaginal odor, painful intercourse, sexual problems, bleeding after intercourse.  Musculoskeletal: No muscle weakness  Skin/Breast: No painful breasts, nipple discharge, masses, rash, ulcers  Neurological: No passing out, seizures, numbness, headache  Endocrine: No diabetes, hypothyroid, hyperthyroid, hot flashes, hair loss, abnormal hair growth, ance  Psychiatric: No depression, crying  Hematologic: No bruises, bleeding, swollen lymph nodes, anemia.      OBJECTIVE:   The patient appears well, alert, oriented x 3, in no distress.  /64   Wt 68.3 kg (150 lb 9.6 oz)   BMI 21.00 kg/m²   NECK: no thyromegaly, trachea midline  SKIN: no acne, striae, hirsutism  BREAST EXAM: breasts appear normal, no suspicious masses, no skin or nipple changes or axillary nodes, symmetric fibrous changes in both upper outer quadrants  ABDOMEN: no hernias, masses, or hepatosplenomegaly  GENITALIA: normal external genitalia, no erythema, no discharge  URETHRA: normal urethra, normal urethral meatus  VAGINA: mucosal atrophy  CERVIX: no lesions or cervical motion tenderness  UTERUS: normal  ADNEXA: normal  adnexa and no mass, fullness, tenderness      ASSESSMENT:   well woman  1. Well woman exam with routine gynecological exam        PLAN:   Mammogram-done  pap smear  return annually or prn

## 2020-07-17 LAB
FINAL PATHOLOGIC DIAGNOSIS: NORMAL
Lab: NORMAL

## 2020-08-14 ENCOUNTER — OFFICE VISIT (OUTPATIENT)
Dept: FAMILY MEDICINE | Facility: CLINIC | Age: 70
End: 2020-08-14
Payer: MEDICARE

## 2020-08-14 VITALS
HEIGHT: 71 IN | WEIGHT: 150.19 LBS | BODY MASS INDEX: 21.02 KG/M2 | OXYGEN SATURATION: 96 % | DIASTOLIC BLOOD PRESSURE: 72 MMHG | HEART RATE: 59 BPM | TEMPERATURE: 98 F | SYSTOLIC BLOOD PRESSURE: 102 MMHG

## 2020-08-14 DIAGNOSIS — R00.2 PALPITATIONS: ICD-10-CM

## 2020-08-14 DIAGNOSIS — J45.40 MODERATE PERSISTENT ASTHMA WITHOUT COMPLICATION: Primary | ICD-10-CM

## 2020-08-14 DIAGNOSIS — K22.70 BARRETT'S ESOPHAGUS WITHOUT DYSPLASIA: ICD-10-CM

## 2020-08-14 DIAGNOSIS — Z23 NEED FOR VACCINATION AGAINST STREPTOCOCCUS PNEUMONIAE: ICD-10-CM

## 2020-08-14 DIAGNOSIS — F43.0 ACUTE STRESS REACTION: ICD-10-CM

## 2020-08-14 PROCEDURE — 1126F PR PAIN SEVERITY QUANTIFIED, NO PAIN PRESENT: ICD-10-PCS | Mod: S$GLB,,, | Performed by: INTERNAL MEDICINE

## 2020-08-14 PROCEDURE — 90732 PPSV23 VACC 2 YRS+ SUBQ/IM: CPT | Mod: S$GLB,,, | Performed by: INTERNAL MEDICINE

## 2020-08-14 PROCEDURE — G0009 ADMIN PNEUMOCOCCAL VACCINE: HCPCS | Mod: S$GLB,,, | Performed by: INTERNAL MEDICINE

## 2020-08-14 PROCEDURE — 99214 OFFICE O/P EST MOD 30 MIN: CPT | Mod: 25,S$GLB,, | Performed by: INTERNAL MEDICINE

## 2020-08-14 PROCEDURE — G0009 PNEUMOCOCCAL POLYSACCHARIDE VACCINE 23-VALENT =>2YO SQ IM: ICD-10-PCS | Mod: S$GLB,,, | Performed by: INTERNAL MEDICINE

## 2020-08-14 PROCEDURE — 99999 PR PBB SHADOW E&M-EST. PATIENT-LVL V: CPT | Mod: PBBFAC,,, | Performed by: INTERNAL MEDICINE

## 2020-08-14 PROCEDURE — 99999 PR PBB SHADOW E&M-EST. PATIENT-LVL V: ICD-10-PCS | Mod: PBBFAC,,, | Performed by: INTERNAL MEDICINE

## 2020-08-14 PROCEDURE — 1159F PR MEDICATION LIST DOCUMENTED IN MEDICAL RECORD: ICD-10-PCS | Mod: S$GLB,,, | Performed by: INTERNAL MEDICINE

## 2020-08-14 PROCEDURE — 99214 PR OFFICE/OUTPT VISIT, EST, LEVL IV, 30-39 MIN: ICD-10-PCS | Mod: 25,S$GLB,, | Performed by: INTERNAL MEDICINE

## 2020-08-14 PROCEDURE — 1101F PR PT FALLS ASSESS DOC 0-1 FALLS W/OUT INJ PAST YR: ICD-10-PCS | Mod: CPTII,S$GLB,, | Performed by: INTERNAL MEDICINE

## 2020-08-14 PROCEDURE — 3008F BODY MASS INDEX DOCD: CPT | Mod: CPTII,S$GLB,, | Performed by: INTERNAL MEDICINE

## 2020-08-14 PROCEDURE — 1101F PT FALLS ASSESS-DOCD LE1/YR: CPT | Mod: CPTII,S$GLB,, | Performed by: INTERNAL MEDICINE

## 2020-08-14 PROCEDURE — 1159F MED LIST DOCD IN RCRD: CPT | Mod: S$GLB,,, | Performed by: INTERNAL MEDICINE

## 2020-08-14 PROCEDURE — 1126F AMNT PAIN NOTED NONE PRSNT: CPT | Mod: S$GLB,,, | Performed by: INTERNAL MEDICINE

## 2020-08-14 PROCEDURE — 3008F PR BODY MASS INDEX (BMI) DOCUMENTED: ICD-10-PCS | Mod: CPTII,S$GLB,, | Performed by: INTERNAL MEDICINE

## 2020-08-14 PROCEDURE — 90732 PNEUMOCOCCAL POLYSACCHARIDE VACCINE 23-VALENT =>2YO SQ IM: ICD-10-PCS | Mod: S$GLB,,, | Performed by: INTERNAL MEDICINE

## 2020-08-14 NOTE — PATIENT INSTRUCTIONS
We have reviewed your prescription medications.   I am glad that you are doing well.  Let me know if you need refills.  Schedule a physical with one of our other doctors before the end of the year.

## 2020-08-16 NOTE — PROGRESS NOTES
Subjective:       Patient ID: Sunni Lowe is a 69 y.o. female.    Chief Complaint: Follow-up     I have reviewed the PMH,  and  for this patient    She  has a past medical history of Allergic rhinitis, Asthma, Cortes's esophagus, Fibrocystic breast, Osteopenia, and Varicose veins.     The patient presents today for follow-up of chronic conditions.  The patient has been doing well.  She has been staying home as much as possible to avoid exposure of COVID.  She states that she is still taking Celexa.  She has not been having palpitations since she started taking the Celexa.  She and her  are getting along very well now.  Her breathing feels normal.  Her last blood work was in November.  Her blood counts and blood chemistries were normal.  She is due for pneumonia shot.    The patient denies chest pain, shortness of breath, nausea, or dizziness.     Active Ambulatory Problems     Diagnosis Date Noted    Varicose veins     Allergic rhinitis     Asthma     Cortes's esophagus     Osteopenia     Subjective memory complaints 08/21/2015    Abnormal bowel movement 10/02/2017    Gastroesophageal reflux disease 01/28/2019    BMI 22.0-22.9, adult 01/28/2019    Onychomycosis 07/18/2019     Resolved Ambulatory Problems     Diagnosis Date Noted    Osteoporosis 12/02/2014     Past Medical History:   Diagnosis Date    Fibrocystic breast          MEDICATIONS:  Current Outpatient Medications:     BREO ELLIPTA 200-25 mcg/dose DsDv diskus inhaler, INHALE 1 PUFF INTO THE LUNGS ONCE DAILY., Disp: 60 each, Rfl: 2    calcium carbonate (OS-ISABELLE) 600 mg (1,500 mg) Tab, Take 1,200 mg by mouth once daily., Disp: , Rfl:     cholecalciferol, vitamin D3, (VITAMIN D3) 10,000 unit Cap, Take 10,000 Units by mouth once daily. , Disp: , Rfl:     citalopram (CELEXA) 10 MG tablet, TAKE 1 TABLET BY MOUTH EVERY DAY, Disp: 90 tablet, Rfl: 1    collagen, bovine, 100 % Powd, , Disp: , Rfl:     CYANOCOBALAMIN, VITAMIN B-12,  (VITAMIN B-12 ORAL), Take by mouth., Disp: , Rfl:     esomeprazole (NEXIUM) 20 MG capsule, TAKE 1 CAPSULE (20 MG TOTAL) BY MOUTH BEFORE BREAKFAST., Disp: 90 capsule, Rfl: 3    fish oil-omega-3 fatty acids 300-1,000 mg capsule, Take 600 mg by mouth once daily., Disp: , Rfl:     zoledronic acid/mannitol-water (RECLAST IV), Every other year, Disp: , Rfl:     levocetirizine (XYZAL) 5 MG tablet, Take 1 tablet (5 mg total) by mouth every evening., Disp: 30 tablet, Rfl: 5  No current facility-administered medications for this visit.     Facility-Administered Medications Ordered in Other Visits:     0.9%  NaCl infusion, , Intravenous, Continuous, Cassie Huber MD, Stopped at 10/19/18 0839    sodium chloride 0.9% flush 3 mL, 3 mL, Intravenous, PRN, Cassie Huber MD      HEALTH MAINTENANCE:   Health Maintenance   Topic Date Due    DEXA SCAN  05/21/2022    Mammogram  06/15/2022    Pap Smear  07/09/2023    Lipid Panel  01/30/2024    TETANUS VACCINE  03/02/2026    Hepatitis C Screening  Completed    Pneumococcal Vaccine (65+ Low/Medium Risk)  Completed       Review of Systems   Constitutional: Negative for activity change, chills, fatigue, fever and unexpected weight change.   HENT: Positive for hearing loss and rhinorrhea. Negative for congestion, ear discharge, ear pain, sore throat and trouble swallowing.    Eyes: Positive for visual disturbance. Negative for discharge, redness and itching.   Respiratory: Negative for cough, chest tightness, shortness of breath and wheezing.    Cardiovascular: Negative for chest pain, palpitations and leg swelling.   Gastrointestinal: Positive for constipation. Negative for abdominal pain, blood in stool, diarrhea, nausea and vomiting.   Endocrine: Negative for cold intolerance, heat intolerance, polydipsia and polyuria.   Genitourinary: Negative for difficulty urinating, dysuria, flank pain, frequency, hematuria and menstrual problem.   Musculoskeletal: Negative for  arthralgias, back pain, joint swelling, myalgias and neck pain.   Skin: Negative for color change and rash.   Neurological: Negative for dizziness, tremors, weakness, numbness and headaches.   Psychiatric/Behavioral: Negative for confusion, dysphoric mood and sleep disturbance. The patient is not nervous/anxious.        Objective:          A1C:      CBC:  Recent Labs   Lab 01/30/19  0737 08/19/19  0645 11/29/19  1603   WBC 5.31 5.91 6.26   RBC 4.93 4.81 4.52   Hemoglobin 14.5 14.0 13.4   Hematocrit 44.0 43.2 40.6   Platelets 210 180 195   Mean Corpuscular Volume 89 90 90   Mean Corpuscular Hemoglobin 29.4 29.1 29.6   Mean Corpuscular Hemoglobin Conc 33.0 32.4 33.0     CMP:  Recent Labs   Lab 01/30/19  0737 08/19/19  0645 11/29/19  1603   Glucose 96 119 H 112 H   Calcium 9.7 9.1 9.7   Albumin 4.7 4.5 4.4   Total Protein 7.9 7.4 7.4   Sodium 141 142 143   Potassium 4.3 4.1 4.3   CO2 28 30 H 30 H   Chloride 104 104 104   BUN, Bld 18 H 14 19 H   Creatinine 0.90 0.77 0.98   Alkaline Phosphatase 46 55 50   ALT 15 17 20   AST 22 20 26   Total Bilirubin 0.7 0.9 0.4     LIPIDS:  Recent Labs   Lab 01/30/19  0737 11/29/19  1603   TSH  --  1.310   HDL 61  --    Cholesterol 196  --    Triglycerides 58  --    LDL Cholesterol 123.4  --    Hdl/Cholesterol Ratio 31.1  --    Non-HDL Cholesterol 135  --    Total Cholesterol/HDL Ratio 3.2  --      TSH:  Recent Labs   Lab 11/29/19  1603   TSH 1.310           Physical Exam  Constitutional:       Appearance: She is well-developed.   HENT:      Head: Normocephalic and atraumatic.      Right Ear: External ear normal. No middle ear effusion. Tympanic membrane is not erythematous.      Left Ear: External ear normal.  No middle ear effusion. Tympanic membrane is not erythematous.      Nose: No rhinorrhea.      Right Sinus: No maxillary sinus tenderness or frontal sinus tenderness.      Left Sinus: No maxillary sinus tenderness or frontal sinus tenderness.      Mouth/Throat:      Pharynx: No  posterior oropharyngeal erythema.      Tonsils: No tonsillar exudate.   Eyes:      General:         Right eye: No discharge.         Left eye: No discharge.      Conjunctiva/sclera: Conjunctivae normal.      Right eye: Right conjunctiva is not injected.      Left eye: Left conjunctiva is not injected.      Pupils: Pupils are equal, round, and reactive to light.   Neck:      Thyroid: No thyromegaly.   Cardiovascular:      Rate and Rhythm: Normal rate and regular rhythm.      Heart sounds: Normal heart sounds. No murmur.   Pulmonary:      Effort: Pulmonary effort is normal.      Breath sounds: Normal breath sounds. No wheezing, rhonchi or rales.   Abdominal:      General: Bowel sounds are normal. There is no distension.      Tenderness: There is no abdominal tenderness.   Musculoskeletal:         General: No tenderness.   Lymphadenopathy:      Cervical: No cervical adenopathy.   Skin:     General: Skin is warm and dry.      Findings: No lesion or rash.   Neurological:      Cranial Nerves: No cranial nerve deficit.      Deep Tendon Reflexes: Reflexes normal.   Psychiatric:         Mood and Affect: Mood is not anxious or depressed.         Speech: Speech is not rapid and pressured.         Behavior: Behavior normal. Behavior is not agitated or aggressive.               Assessment and Plan:     Problem List Items Addressed This Visit        Pulmonary    Asthma - Primary - continue Breo.  It is stable.  Use albuterol for rescue.       GI    Cortes's esophagus - continue Nexium.  Follow-up with GI as recommended.      Other Visit Diagnoses     Acute stress reaction     - continue Celexa.  It seems to be working.  She and her  are getting along well.      Palpitations    - stable.  No symptoms since starting Celexa.      Need for vaccination against Streptococcus pneumoniae    - we will update Pneumovax shot.          Orders Placed This Encounter    (In Office Administered) Pneumococcal Polysaccharide Vaccine (23  [place]) (SQ/IM)         Follow-up in 4 months..       Samy Slaughter MD,  FACP  Internal Medicine

## 2020-10-26 ENCOUNTER — OFFICE VISIT (OUTPATIENT)
Dept: FAMILY MEDICINE | Facility: CLINIC | Age: 70
End: 2020-10-26
Payer: MEDICARE

## 2020-10-26 VITALS
SYSTOLIC BLOOD PRESSURE: 124 MMHG | HEIGHT: 71 IN | HEART RATE: 54 BPM | WEIGHT: 153.44 LBS | DIASTOLIC BLOOD PRESSURE: 84 MMHG | OXYGEN SATURATION: 99 % | TEMPERATURE: 97 F | BODY MASS INDEX: 21.48 KG/M2 | RESPIRATION RATE: 18 BRPM

## 2020-10-26 DIAGNOSIS — K21.9 GASTROESOPHAGEAL REFLUX DISEASE, UNSPECIFIED WHETHER ESOPHAGITIS PRESENT: ICD-10-CM

## 2020-10-26 DIAGNOSIS — M85.88 OSTEOPENIA OF SPINE: ICD-10-CM

## 2020-10-26 DIAGNOSIS — J45.40 MODERATE PERSISTENT ASTHMA WITHOUT COMPLICATION: Primary | ICD-10-CM

## 2020-10-26 PROCEDURE — 99999 PR PBB SHADOW E&M-EST. PATIENT-LVL IV: CPT | Mod: PBBFAC,,, | Performed by: INTERNAL MEDICINE

## 2020-10-26 PROCEDURE — 3008F PR BODY MASS INDEX (BMI) DOCUMENTED: ICD-10-PCS | Mod: CPTII,S$GLB,, | Performed by: INTERNAL MEDICINE

## 2020-10-26 PROCEDURE — 99214 PR OFFICE/OUTPT VISIT, EST, LEVL IV, 30-39 MIN: ICD-10-PCS | Mod: S$GLB,,, | Performed by: INTERNAL MEDICINE

## 2020-10-26 PROCEDURE — 99214 OFFICE O/P EST MOD 30 MIN: CPT | Mod: S$GLB,,, | Performed by: INTERNAL MEDICINE

## 2020-10-26 PROCEDURE — 1101F PR PT FALLS ASSESS DOC 0-1 FALLS W/OUT INJ PAST YR: ICD-10-PCS | Mod: CPTII,S$GLB,, | Performed by: INTERNAL MEDICINE

## 2020-10-26 PROCEDURE — 3008F BODY MASS INDEX DOCD: CPT | Mod: CPTII,S$GLB,, | Performed by: INTERNAL MEDICINE

## 2020-10-26 PROCEDURE — 1101F PT FALLS ASSESS-DOCD LE1/YR: CPT | Mod: CPTII,S$GLB,, | Performed by: INTERNAL MEDICINE

## 2020-10-26 PROCEDURE — 99999 PR PBB SHADOW E&M-EST. PATIENT-LVL IV: ICD-10-PCS | Mod: PBBFAC,,, | Performed by: INTERNAL MEDICINE

## 2020-10-26 PROCEDURE — 1126F AMNT PAIN NOTED NONE PRSNT: CPT | Mod: S$GLB,,, | Performed by: INTERNAL MEDICINE

## 2020-10-26 PROCEDURE — 1126F PR PAIN SEVERITY QUANTIFIED, NO PAIN PRESENT: ICD-10-PCS | Mod: S$GLB,,, | Performed by: INTERNAL MEDICINE

## 2020-10-26 PROCEDURE — 1159F PR MEDICATION LIST DOCUMENTED IN MEDICAL RECORD: ICD-10-PCS | Mod: S$GLB,,, | Performed by: INTERNAL MEDICINE

## 2020-10-26 PROCEDURE — 1159F MED LIST DOCD IN RCRD: CPT | Mod: S$GLB,,, | Performed by: INTERNAL MEDICINE

## 2020-10-26 RX ORDER — FLUTICASONE PROPIONATE AND SALMETEROL 250; 50 UG/1; UG/1
1 POWDER RESPIRATORY (INHALATION) 2 TIMES DAILY
Qty: 60 EACH | Refills: 5 | Status: SHIPPED | OUTPATIENT
Start: 2020-10-26 | End: 2021-04-27 | Stop reason: SDUPTHER

## 2020-10-26 RX ORDER — FLUTICASONE FUROATE AND VILANTEROL TRIFENATATE 200; 25 UG/1; UG/1
1 POWDER RESPIRATORY (INHALATION) DAILY
Qty: 60 EACH | Refills: 3 | Status: SHIPPED | OUTPATIENT
Start: 2020-10-26 | End: 2020-10-26

## 2020-10-26 NOTE — PROGRESS NOTES
[facility] Note    Chief Complaint      Chief Complaint   Patient presents with    Establish Care     MED REFILLS        History of Present Illness      Sunni Lowe is a 69 y.o. female who presents today for   Chief Complaint   Patient presents with    Establish Care     MED REFILLS    .  Patient comes to appointment here for establish visit with me .  She is currently compliant with all meds . Is getting regular exercise at [employer] , walking and cycling . She is due for labs next month . All vaccines are uptodate . She is uptodate with pelvic /pap / mammogram with dr fraser .     Problem List Items Addressed This Visit        Pulmonary    Asthma - Primary    Overview     Will change to generic advair secondary to cost . If she has decline in condition she will call back with update             GI    Gastroesophageal reflux disease    Overview     Using nexium cont current is working well             Orthopedic    Osteopenia    Overview     Stable                  Past Medical History:  Past Medical History:   Diagnosis Date    Allergic rhinitis     Asthma     Cortes's esophagus     Fibrocystic breast     Osteopenia     Varicose veins     sees Dr. Brigido Quinones       Past Surgical History:  Past Surgical History:   Procedure Laterality Date    BREAST BIOPSY Left     COLONOSCOPY  08/01/2014    Dr. Canas - repeat in 5 to 10 years    ESOPHAGOGASTRODUODENOSCOPY N/A 10/19/2018    Procedure: EGD (ESOPHAGOGASTRODUODENOSCOPY);  Surgeon: Cassie Huber MD;  Location: [facility];  Service: Endoscopy;  Laterality: N/A;    KNEE SURGERY Bilateral     meniscus repair    peniculitis surgery      x 3       Family History:  family history includes Alzheimer's disease in her maternal aunt and mother; Cancer (age of onset: 85) in her father; Diabetes in her father; No Known Problems in her brother, sister, and sister; Ovarian cancer (age of onset: 68) in her  mother.    Social History:  Social History     Socioeconomic History    Marital status:      Spouse name: Not on file    Number of children: 2    Years of education: Not on file    Highest education level: Not on file   Occupational History    Not on file   Social Needs    Financial resource strain: Not hard at all    Food insecurity     Worry: Never true     Inability: Never true    Transportation needs     Medical: No     Non-medical: No   Tobacco Use    Smoking status: Former Smoker     Packs/day: 1.00     Years: 10.00     Pack years: 10.00     Quit date:      Years since quittin.8    Smokeless tobacco: Never Used   Substance and Sexual Activity    Alcohol use: Yes     Frequency: 4 or more times a week     Drinks per session: 1 or 2     Binge frequency: Never     Comment: one glass of wine daily    Drug use: No    Sexual activity: Not Currently     Partners: Male     Comment:    Lifestyle    Physical activity     Days per week: 3 days     Minutes per session: 60 min    Stress: Not at all   Relationships    Social connections     Talks on phone: More than three times a week     Gets together: Once a week     Attends [employer]oration[employer] service: Not on file     Active member of club or organization: Yes     Attends meetings of clubs or organizations: More than 4 times per year     Relationship status:    Other Topics Concern    Not on file   Social History Narrative    Not on file       Review of Systems:   Review of Systems   Constitutional: Negative for fever and weight loss.   HENT: Negative for congestion, hearing loss and sore throat.    Eyes: Negative for blurred vision.   Respiratory: Negative for cough, shortness of breath and wheezing.    Cardiovascular: Negative for chest pain, palpitations, claudication and leg swelling.   Gastrointestinal: Negative for abdominal pain, constipation, diarrhea and heartburn.   Genitourinary: Negative for dysuria.   Musculoskeletal:  Negative for back pain and myalgias.   Skin: Negative for rash.   Neurological: Negative for focal weakness and headaches.   Psychiatric/Behavioral: Negative for depression, memory loss and suicidal ideas. The patient is not nervous/anxious.          Medications:  Outpatient Encounter Medications as of 10/26/2020   Medication Sig Dispense Refill    calcium carbonate (OS-ISABELLE) 600 mg (1,500 mg) Tab Take 1,200 mg by mouth once daily.      cholecalciferol, vitamin D3, (VITAMIN D3) 10,000 unit Cap Take 10,000 Units by mouth once daily.       citalopram (CELEXA) 10 MG tablet TAKE 1 TABLET BY MOUTH EVERY DAY 90 tablet 1    collagen, bovine, 100 % Powd       CYANOCOBALAMIN, VITAMIN B-12, (VITAMIN B-12 ORAL) Take by mouth.      esomeprazole (NEXIUM) 20 MG capsule TAKE 1 CAPSULE (20 MG TOTAL) BY MOUTH BEFORE BREAKFAST. 90 capsule 3    fish oil-omega-3 fatty acids 300-1,000 mg capsule Take 600 mg by mouth once daily.      levocetirizine (XYZAL) 5 MG tablet Take 1 tablet (5 mg total) by mouth every evening. 30 tablet 5    zoledronic acid/mannitol-water (RECLAST IV) Every other year      [DISCONTINUED] BREO ELLIPTA 200-25 mcg/dose DsDv diskus inhaler INHALE 1 PUFF INTO THE LUNGS ONCE DAILY. 60 each 2    [DISCONTINUED] fluticasone furoate-vilanteroL (BREO ELLIPTA) 200-25 mcg/dose DsDv diskus inhaler Inhale 1 puff into the lungs once daily. Controller 60 each 3    fluticasone-salmeterol diskus inhaler 250-50 mcg Inhale 1 puff into the lungs 2 (two) times daily. Controller 60 each 5     Facility-Administered Encounter Medications as of 10/26/2020   Medication Dose Route Frequency Provider Last Rate Last Dose    0.9%  NaCl infusion   Intravenous Continuous Cassie Huber MD   Stopped at 10/19/18 0839    sodium chloride 0.9% flush 3 mL  3 mL Intravenous PRN Cassie Huber MD            Allergies:  Review of patient's allergies indicates:  No Known Allergies      Physical Exam      Vitals:    10/26/20 1343    BP: 124/84   Pulse: (!) 54   Resp: 18   Temp: 97 °F (36.1 °C)      Body mass index is 21.4 kg/m².    Physical Exam  Constitutional:       Appearance: She is well-developed.   Eyes:      Pupils: Pupils are equal, round, and reactive to light.   Neck:      Musculoskeletal: Normal range of motion.      Thyroid: No thyromegaly.   Cardiovascular:      Rate and Rhythm: Normal rate.      Heart sounds: Normal heart sounds. No murmur. No friction rub. No gallop.    Pulmonary:      Effort: No respiratory distress.      Breath sounds: Normal breath sounds. No wheezing.   Abdominal:      General: Bowel sounds are normal.      Palpations: Abdomen is soft.   Musculoskeletal: Normal range of motion.   Lymphadenopathy:      Cervical: No cervical adenopathy.   Skin:     General: Skin is warm.      Findings: No rash.   Neurological:      Mental Status: She is alert and oriented to person, place, and time.      Cranial Nerves: No cranial nerve deficit.   Psychiatric:         Behavior: Behavior normal.          Laboratory:  CBC:  No results for input(s): WBC, RBC, HGB, HCT, PLT, MCV, MCH, MCHC in the last 2160 hours.  CMP:  No results for input(s): GLU, CALCIUM, ALBUMIN, PROT, NA, K, CO2, CL, BUN, ALKPHOS, ALT, AST, BILITOT in the last 2160 hours.    Invalid input(s): CREATININ  URINALYSIS:  No results for input(s): COLORU, CLARITYU, SPECGRAV, PHUR, PROTEINUA, GLUCOSEU, BILIRUBINCON, BLOODU, WBCU, RBCU, BACTERIA, MUCUS, NITRITE, LEUKOCYTESUR, UROBILINOGEN, HYALINECASTS in the last 2160 hours.   LIPIDS:  No results for input(s): TSH, HDL, CHOL, TRIG, LDLCALC, CHOLHDL, NONHDLCHOL, TOTALCHOLEST in the last 2160 hours.  TSH:  No results for input(s): TSH in the last 2160 hours.  A1C:  No results for input(s): HGBA1C in the last 2160 hours.    Radiology:        Assessment:     Sunni Lowe is a 69 y.o.female with:    Moderate persistent asthma without complication  -     Discontinue: fluticasone furoate-vilanteroL (BREO ELLIPTA)  200-25 mcg/dose DsDv diskus inhaler; Inhale 1 puff into the lungs once daily. Controller  Dispense: 60 each; Refill: 3  -     fluticasone-salmeterol diskus inhaler 250-50 mcg; Inhale 1 puff into the lungs 2 (two) times daily. Controller  Dispense: 60 each; Refill: 5    Osteopenia of spine    Gastroesophageal reflux disease, unspecified whether esophagitis present          Plan:     Problem List Items Addressed This Visit        Pulmonary    Asthma - Primary    Overview     Will change to generic advair secondary to cost . If she has decline in condition she will call back with update             GI    Gastroesophageal reflux disease    Overview     Using nexium cont current is working well             Orthopedic    Osteopenia    Overview     Stable                As above, continue current medications and maintain follow up with specialists.  Return to clinic in 6 months.      Frederick W Dantagnan [facility]

## 2020-11-25 ENCOUNTER — OFFICE VISIT (OUTPATIENT)
Dept: URGENT CARE | Facility: CLINIC | Age: 70
End: 2020-11-25
Payer: MEDICARE

## 2020-11-25 VITALS
DIASTOLIC BLOOD PRESSURE: 68 MMHG | HEART RATE: 81 BPM | OXYGEN SATURATION: 98 % | BODY MASS INDEX: 21 KG/M2 | TEMPERATURE: 100 F | RESPIRATION RATE: 18 BRPM | WEIGHT: 150 LBS | HEIGHT: 71 IN | SYSTOLIC BLOOD PRESSURE: 130 MMHG

## 2020-11-25 DIAGNOSIS — U07.1 COVID-19: Primary | ICD-10-CM

## 2020-11-25 DIAGNOSIS — R05.9 COUGH: ICD-10-CM

## 2020-11-25 DIAGNOSIS — R35.0 URINARY FREQUENCY: ICD-10-CM

## 2020-11-25 DIAGNOSIS — U07.1 COVID-19 VIRUS DETECTED: ICD-10-CM

## 2020-11-25 LAB
BILIRUB UR QL STRIP: NEGATIVE
CTP QC/QA: YES
GLUCOSE UR QL STRIP: NEGATIVE
KETONES UR QL STRIP: POSITIVE
LEUKOCYTE ESTERASE UR QL STRIP: NEGATIVE
PH, POC UA: 8
POC BLOOD, URINE: NEGATIVE
POC NITRATES, URINE: NEGATIVE
PROT UR QL STRIP: NEGATIVE
SARS-COV-2 RDRP RESP QL NAA+PROBE: POSITIVE
SP GR UR STRIP: 1 (ref 1–1.03)
UROBILINOGEN UR STRIP-ACNC: NORMAL (ref 0.1–1.1)

## 2020-11-25 PROCEDURE — U0002 COVID-19 LAB TEST NON-CDC: HCPCS | Mod: QW,S$GLB,, | Performed by: PHYSICIAN ASSISTANT

## 2020-11-25 PROCEDURE — 99214 PR OFFICE/OUTPT VISIT, EST, LEVL IV, 30-39 MIN: ICD-10-PCS | Mod: 25,S$GLB,CS, | Performed by: PHYSICIAN ASSISTANT

## 2020-11-25 PROCEDURE — 3008F PR BODY MASS INDEX (BMI) DOCUMENTED: ICD-10-PCS | Mod: CPTII,S$GLB,, | Performed by: PHYSICIAN ASSISTANT

## 2020-11-25 PROCEDURE — 99214 OFFICE O/P EST MOD 30 MIN: CPT | Mod: 25,S$GLB,CS, | Performed by: PHYSICIAN ASSISTANT

## 2020-11-25 PROCEDURE — 81003 POCT URINALYSIS, DIPSTICK, AUTOMATED, W/O SCOPE: ICD-10-PCS | Mod: QW,S$GLB,, | Performed by: PHYSICIAN ASSISTANT

## 2020-11-25 PROCEDURE — 81003 URINALYSIS AUTO W/O SCOPE: CPT | Mod: QW,S$GLB,, | Performed by: PHYSICIAN ASSISTANT

## 2020-11-25 PROCEDURE — U0002: ICD-10-PCS | Mod: QW,S$GLB,, | Performed by: PHYSICIAN ASSISTANT

## 2020-11-25 PROCEDURE — 3008F BODY MASS INDEX DOCD: CPT | Mod: CPTII,S$GLB,, | Performed by: PHYSICIAN ASSISTANT

## 2020-11-25 NOTE — PATIENT INSTRUCTIONS
You have tested positive for COVID-19 today.  Per the CDC, you are now in a 10 day isolation.         This isolation starts from the day you first developed symptoms, not the day of your positive test. For example, if your symptoms began on a Monday, and you waited until Friday of the same week to get tested, and it was positive, your 10 day isolation begins from that Monday, not the Friday you tested positive.         However, if you are asymptomatic (a person who does not have any symptoms), and received a COVID-19 test that was positive, your 10 day isolation begins on the day you tested positive.  This is regardless if you were exposed to a known positive days earlier.  So for example, if you test positive as an asymptomatic on day 7 from exposure, you have now extended your 14 day quarantine to a 17 day quarantine.         Also, per the CDC guidelines, once your 10 days have passed, and you have not had fever greater than 100.4F in the last 24 hours without taking any fever reducers such as Tylenol (Acetaminophen) or Motrin (Ibuprofen), you may return to your normal activities including social distancing, wearing masks, and frequent handwashing - YOU DO NOT NEED ANOTHER TEST, OR TO TEST NEGATIVE, IN ORDER TO END YOUR QUARANTINE!      Instructions for Patients with Confirmed or Suspected COVID-19    If you are awaiting your test result, you will either be called or it will be released to the patient portal.  If you have any questions about your test, please visit [email or web address] or call our COVID-19 information line at [phone].      Instructions for non-hospitalized or discharged patients with confirmed or suspected COVID-19:       Stay home except to get medical care.    Separate yourself from other people and animals in your home.    Call ahead before visiting your doctor.    Wear a face mask.    Cover your coughs and sneezes.    Clean your hands often.    Avoid sharing personal  household items.    Clean all high-touch surfaces every day.    Monitor your symptoms. Seek prompt medical attention if your illness is worsening (e.g., difficulty breathing). Before seeking care, call your healthcare provider.    If you have a medical emergency and must call 911, notify the dispatcher that you have or are being evaluated for COVID-19. If possible, put on a face mask before emergency medical services arrive.    Use the following symptom-based strategy to return to normal activity following a suspected or confirmed case of COVID-19. Continue isolation until:   o At least 3 days (72 hours) have passed since recovery defined as resolution of fever without the use of fever-reducing medications and improvement in respiratory symptoms (e.g. cough, shortness of breath), and   o At least 10 days have passed since the first positive test.       As one of the next steps, you will receive a call or text from the [place] Department of Health ([facility]) COVID-19 Tracing Team. See the contact information below so you know not to ignore the health departments call. It is important that you contact them back immediately so they can help.     Contact Tracer Number:  [phone]  Caller ID for most carriers: [facility]    What is contact tracing?   Contact tracing is a process that helps identify everyone who has been in close contact with an infected person. Contact tracers let those people know they may have been exposed and guide them on next steps. Confidentiality is important for everyone; no one will be told who may have exposed them to the virus.   Your involvement is important. The more we know about where and how this virus is spreading, the better chance we have at stopping it from spreading further.  What does exposure mean?   Exposure means you have been within 6 feet for more than 15 minutes with a person who has or had COVID-19.  What kind of questions do the contact tracers ask?   A  contact tracer will confirm your basic contact information including name, address, phone number, and next of kin, as well as asking about any symptoms you may have had. Theyll also ask you how you think you may have gotten sick, such as places where you may have been exposed to the virus, and people you were with. Those names will never be shared with anyone outside of that call, and will only be used to help trace and stop the spread of the virus.   I have privacy concerns. How will the state use my information?   Your privacy about your health is important. All calls are completed using call centers that use the appropriate health privacy protection measures (HIPAA compliance), meaning that your patient information is safe. No one will ever ask you any questions related to immigration status. Your health comes first.   Do I have to participate?   You do not have to participate, but we strongly encourage you to. Contact tracing can help us catch and control new outbreaks as theyre developing to keep your friends and family safe.   What if I dont hear from anyone?   If you dont receive a call within 24 hours, you can call the number above right away to inquire about your status. That line is open from 8:00 am - 8:00 p.m., 7 days a week.  Contact tracing saves lives! Together, we have the power to beat this virus and keep our loved ones and neighbors safe.       Instructions for household members, intimate partners and caregivers in a non-healthcare setting of a patient with confirmed or suspected COVID-19:         Close contacts should monitor their health and call their healthcare provider right away if they develop symptoms suggestive of COVID-19 (e.g., fever, cough, shortness of breath).    Stay home except to get medical care. Separate yourself from other people and animals in the home.   Monitor the patients symptoms. If the patient is getting sicker, call his or her healthcare provider. If the  patient has a medical emergency and you need to call 911, notify the dispatch personnel that the patient has or is being evaluated for COVID-19.    Wear a facemask when around other people such as sharing a room or vehicle and before entering a healthcare provider's office.   Cover coughs and sneezes with a tissue. Throw used tissues in a lined trash can immediately and wash hands.   Clean hands often with soap and water for at least 20 seconds or with an alcohol-based hand , rubbing hands together until they feel dry. Avoid touching your eyes, nose, and mouth with unwashed hands.   Clean all high-touch; surfaces every day, including counters, tabletops, doorknobs, bathroom fixtures, toilets, phones, keyboards, tablets, bedside tables, etc. Use a household cleaning spray or wipe according to label instructions.   Avoid sharing personal household items such as dishes, drinking glasses, cups, towels, bedding, etc. After these items are used, they should be washed thoroughly with soap and water.   Continue isolation until:   At least 3 days (72 hours) have passed since recovery defined as resolution of fever without the use of fever-reducing medications and improvement in respiratory symptoms (e.g. cough, shortness of breath), and    At least 10 days have passed since the patients first positive test.    https://www.cdc.gov/coronavirus/2019-ncov/your-health/index.htm

## 2020-11-25 NOTE — PROGRESS NOTES
"Subjective:       Patient ID: Sunni Lowe is a 69 y.o. female.    Vitals:  height is 5' 11" (1.803 m) and weight is 68 kg (150 lb). Her temporal temperature is 99.5 °F (37.5 °C). Her blood pressure is 130/68 and her pulse is 81. Her respiration is 18 and oxygen saturation is 98%.     Chief Complaint: Cough    Pt c/o dry cough, headache, congestion and some episodes of diarrhea a few days ago. Patient also reports lower back pain that radiates to the front of her lower abdomen.    Cough  This is a new problem. The current episode started in the past 7 days (3 days ago ). The problem has been unchanged. The problem occurs hourly. The cough is non-productive. Associated symptoms include headaches, myalgias and nasal congestion. Pertinent negatives include no chest pain, chills, fever, rash, sore throat or shortness of breath. Nothing aggravates the symptoms. Treatments tried: otc zinc  The treatment provided mild relief. Her past medical history is significant for asthma. There is no history of emphysema or pneumonia.       Constitution: Negative for chills, fatigue and fever.   HENT: Positive for congestion. Negative for sinus pain, sinus pressure and sore throat.    Neck: Negative for painful lymph nodes.   Cardiovascular: Negative for chest pain and leg swelling.   Eyes: Negative for double vision and blurred vision.   Respiratory: Positive for cough. Negative for shortness of breath.    Gastrointestinal: Positive for diarrhea. Negative for nausea and vomiting.   Genitourinary: Positive for pelvic pain. Negative for dysuria, frequency, urgency and history of kidney stones.   Musculoskeletal: Positive for back pain and muscle ache. Negative for joint pain, joint swelling and muscle cramps.   Skin: Negative for color change, pale, rash, erythema and bruising.   Allergic/Immunologic: Negative for seasonal allergies.   Neurological: Positive for headaches. Negative for dizziness, history of vertigo, " light-headedness and passing out.   Hematologic/Lymphatic: Negative for swollen lymph nodes.   Psychiatric/Behavioral: Negative for nervous/anxious, sleep disturbance and depression. The patient is not nervous/anxious.        Objective:      Physical Exam   Constitutional: She is oriented to person, place, and time. She appears well-developed.   HENT:   Head: Normocephalic and atraumatic. Head is without abrasion, without contusion and without laceration.   Ears:   Right Ear: External ear normal.   Left Ear: External ear normal.   Nose: Nose normal.   Eyes: Pupils are equal, round, and reactive to light. Conjunctivae, EOM and lids are normal.   Neck: Trachea normal, full passive range of motion without pain and phonation normal. Neck supple.   Cardiovascular: Normal rate, regular rhythm and normal heart sounds.   Pulmonary/Chest: Effort normal and breath sounds normal. No stridor. No respiratory distress. She has no decreased breath sounds. She has no wheezes. She has no rhonchi. She has no rales.   Musculoskeletal: Normal range of motion.   Neurological: She is alert and oriented to person, place, and time.      Comments: CN's grossly intact   Skin: Skin is warm, dry, intact and no rash. Capillary refill takes less than 2 seconds. abrasion, burn, bruising, erythema and ecchymosisPsychiatric: Her speech is normal and behavior is normal. Judgment and thought content normal.   Nursing note and vitals reviewed.          Results for orders placed or performed in visit on 11/25/20   POCT COVID-19 Rapid Screening   Result Value Ref Range    POC Rapid COVID Positive (A) Negative     Acceptable Yes    POCT Urinalysis, Dipstick, Automated, W/O Scope   Result Value Ref Range    POC Blood, Urine Negative Negative    POC Bilirubin, Urine Negative Negative    POC Urobilinogen, Urine normal 0.1 - 1.1    POC Ketones, Urine Positive (A) Negative    POC Protein, Urine Negative Negative    POC Nitrates, Urine Negative  Negative    POC Glucose, Urine Negative Negative    pH, UA 8.0     POC Specific Gravity, Urine 1.005 1.003 - 1.029    POC Leukocytes, Urine Negative Negative     Results reviewed with pt.  Assessment:       1. COVID-19    2. Cough    3. Urinary frequency        Plan:         COVID-19  -     Ambulatory referral/consult to EUA Infusion    Cough  -     POCT COVID-19 Rapid Screening    Urinary frequency  -     POCT Urinalysis, Dipstick, Automated, W/O Scope           Bamlanivimab FDA EUA Verbal Consent  The patient and/or parent/caregiver has been provided with the Bamlanivimab Fact Sheet for Patients and Parents/Caregivers and has been counseled that the FDA has authorized the emergency use of bamlanivimab, which is not an FDA approved drug. The significant known and potential risks and benefits are unknown, and there are limited alternatives available. The patient or parent/caregiver has been given the option to accept or refuse and has verbally agreed to receive bamlanivimab.    Patient Instructions   You have tested positive for COVID-19 today.  Per the CDC, you are now in a 10 day isolation.         This isolation starts from the day you first developed symptoms, not the day of your positive test. For example, if your symptoms began on a Monday, and you waited until Friday of the same week to get tested, and it was positive, your 10 day isolation begins from that Monday, not the Friday you tested positive.         However, if you are asymptomatic (a person who does not have any symptoms), and received a COVID-19 test that was positive, your 10 day isolation begins on the day you tested positive.  This is regardless if you were exposed to a known positive days earlier.  So for example, if you test positive as an asymptomatic on day 7 from exposure, you have now extended your 14 day quarantine to a 17 day quarantine.         Also, per the CDC guidelines, once your 10 days have passed, and you have not had fever  greater than 100.4F in the last 24 hours without taking any fever reducers such as Tylenol (Acetaminophen) or Motrin (Ibuprofen), you may return to your normal activities including social distancing, wearing masks, and frequent handwashing - YOU DO NOT NEED ANOTHER TEST, OR TO TEST NEGATIVE, IN ORDER TO END YOUR QUARANTINE!      Instructions for Patients with Confirmed or Suspected COVID-19    If you are awaiting your test result, you will either be called or it will be released to the patient portal.  If you have any questions about your test, please visit [email or web address] or call our COVID-19 information line at [phone].      Instructions for non-hospitalized or discharged patients with confirmed or suspected COVID-19:       Stay home except to get medical care.    Separate yourself from other people and animals in your home.    Call ahead before visiting your doctor.    Wear a face mask.    Cover your coughs and sneezes.    Clean your hands often.    Avoid sharing personal household items.    Clean all high-touch surfaces every day.    Monitor your symptoms. Seek prompt medical attention if your illness is worsening (e.g., difficulty breathing). Before seeking care, call your healthcare provider.    If you have a medical emergency and must call 911, notify the dispatcher that you have or are being evaluated for COVID-19. If possible, put on a face mask before emergency medical services arrive.    Use the following symptom-based strategy to return to normal activity following a suspected or confirmed case of COVID-19. Continue isolation until:   o At least 3 days (72 hours) have passed since recovery defined as resolution of fever without the use of fever-reducing medications and improvement in respiratory symptoms (e.g. cough, shortness of breath), and   o At least 10 days have passed since the first positive test.       As one of the next steps, you will receive a call or text from  the [place] Department of Health ([facility]) COVID-19 Tracing Team. See the contact information below so you know not to ignore the health departments call. It is important that you contact them back immediately so they can help.     Contact Tracer Number:  [phone]  Caller ID for most carriers: [place] Dept Health    What is contact tracing?   Contact tracing is a process that helps identify everyone who has been in close contact with an infected person. Contact tracers let those people know they may have been exposed and guide them on next steps. Confidentiality is important for everyone; no one will be told who may have exposed them to the virus.   Your involvement is important. The more we know about where and how this virus is spreading, the better chance we have at stopping it from spreading further.  What does exposure mean?   Exposure means you have been within 6 feet for more than 15 minutes with a person who has or had COVID-19.  What kind of questions do the contact tracers ask?   A contact tracer will confirm your basic contact information including name, address, phone number, and next of kin, as well as asking about any symptoms you may have had. Theyll also ask you how you think you may have gotten sick, such as places where you may have been exposed to the virus, and people you were with. Those names will never be shared with anyone outside of that call, and will only be used to help trace and stop the spread of the virus.   I have privacy concerns. How will the state use my information?   Your privacy about your health is important. All calls are completed using call centers that use the appropriate health privacy protection measures (HIPAA compliance), meaning that your patient information is safe. No one will ever ask you any questions related to immigration status. Your health comes first.   Do I have to participate?   You do not have to participate, but we strongly encourage you to. Contact  tracing can help us catch and control new outbreaks as theyre developing to keep your friends and family safe.   What if I dont hear from anyone?   If you dont receive a call within 24 hours, you can call the number above right away to inquire about your status. That line is open from 8:00 am - 8:00 p.m., 7 days a week.  Contact tracing saves lives! Together, we have the power to beat this virus and keep our loved ones and neighbors safe.       Instructions for household members, intimate partners and caregivers in a non-healthcare setting of a patient with confirmed or suspected COVID-19:         Close contacts should monitor their health and call their healthcare provider right away if they develop symptoms suggestive of COVID-19 (e.g., fever, cough, shortness of breath).    Stay home except to get medical care. Separate yourself from other people and animals in the home.   Monitor the patients symptoms. If the patient is getting sicker, call his or her healthcare provider. If the patient has a medical emergency and you need to call 911, notify the dispatch personnel that the patient has or is being evaluated for COVID-19.    Wear a facemask when around other people such as sharing a room or vehicle and before entering a healthcare provider's office.   Cover coughs and sneezes with a tissue. Throw used tissues in a lined trash can immediately and wash hands.   Clean hands often with soap and water for at least 20 seconds or with an alcohol-based hand , rubbing hands together until they feel dry. Avoid touching your eyes, nose, and mouth with unwashed hands.   Clean all high-touch; surfaces every day, including counters, tabletops, doorknobs, bathroom fixtures, toilets, phones, keyboards, tablets, bedside tables, etc. Use a household cleaning spray or wipe according to label instructions.   Avoid sharing personal household items such as dishes, drinking glasses, cups, towels, bedding, etc.  After these items are used, they should be washed thoroughly with soap and water.   Continue isolation until:   At least 3 days (72 hours) have passed since recovery defined as resolution of fever without the use of fever-reducing medications and improvement in respiratory symptoms (e.g. cough, shortness of breath), and    At least 10 days have passed since the patients first positive test.    https://www.cdc.gov/coronavirus/2019-ncov/your-health/index.htm

## 2020-11-28 ENCOUNTER — TELEPHONE (OUTPATIENT)
Dept: URGENT CARE | Facility: CLINIC | Age: 70
End: 2020-11-28

## 2020-11-28 NOTE — TELEPHONE ENCOUNTER
Patient Call DOS 11/28/2020- No answer, Left a message for patient to give a call back if she has any concerns or questions for us concerning her health after her last visit with us.

## 2020-11-30 ENCOUNTER — INFUSION (OUTPATIENT)
Dept: INFECTIOUS DISEASES | Facility: HOSPITAL | Age: 70
End: 2020-11-30
Attending: INTERNAL MEDICINE
Payer: MEDICARE

## 2020-11-30 VITALS
HEIGHT: 71 IN | SYSTOLIC BLOOD PRESSURE: 122 MMHG | DIASTOLIC BLOOD PRESSURE: 73 MMHG | HEART RATE: 55 BPM | OXYGEN SATURATION: 100 % | RESPIRATION RATE: 18 BRPM | TEMPERATURE: 99 F | BODY MASS INDEX: 21 KG/M2 | WEIGHT: 150 LBS

## 2020-11-30 DIAGNOSIS — U07.1 COVID-19: Primary | ICD-10-CM

## 2020-11-30 PROCEDURE — 63600175 PHARM REV CODE 636 W HCPCS: Performed by: INTERNAL MEDICINE

## 2020-11-30 PROCEDURE — 25000003 PHARM REV CODE 250: Performed by: INTERNAL MEDICINE

## 2020-11-30 PROCEDURE — M0239 BAMLANIVIMAB-XXXX INFUSION: HCPCS | Performed by: INTERNAL MEDICINE

## 2020-11-30 RX ORDER — ALBUTEROL SULFATE 90 UG/1
2 AEROSOL, METERED RESPIRATORY (INHALATION)
Status: DISCONTINUED | OUTPATIENT
Start: 2020-11-30 | End: 2022-02-14

## 2020-11-30 RX ORDER — DIPHENHYDRAMINE HYDROCHLORIDE 50 MG/ML
25 INJECTION INTRAMUSCULAR; INTRAVENOUS
Status: DISCONTINUED | OUTPATIENT
Start: 2020-11-30 | End: 2022-02-14

## 2020-11-30 RX ORDER — ONDANSETRON 4 MG/1
4 TABLET, ORALLY DISINTEGRATING ORAL
Status: DISCONTINUED | OUTPATIENT
Start: 2020-11-30 | End: 2022-02-14

## 2020-11-30 RX ORDER — ACETAMINOPHEN 325 MG/1
650 TABLET ORAL
Status: DISCONTINUED | OUTPATIENT
Start: 2020-11-30 | End: 2022-02-14

## 2020-11-30 RX ORDER — EPINEPHRINE 0.3 MG/.3ML
0.3 INJECTION SUBCUTANEOUS
Status: DISCONTINUED | OUTPATIENT
Start: 2020-11-30 | End: 2022-02-14

## 2020-11-30 RX ORDER — SODIUM CHLORIDE 0.9 % (FLUSH) 0.9 %
10 SYRINGE (ML) INJECTION
Status: DISCONTINUED | OUTPATIENT
Start: 2020-11-30 | End: 2022-02-14

## 2020-11-30 RX ADMIN — SODIUM CHLORIDE 700 MG: 0.9 INJECTION, SOLUTION INTRAVENOUS at 12:11

## 2020-11-30 NOTE — PROGRESS NOTES
Patient arrives for bamlanivimab infusion    Symptoms - Chills and headache with lower back pain since 11.25.2020 - denies fevers - was tested to protect family during Thanksgiving    Patient received infusion according to FDA recommendations and [facility] SOP without complications noted and left with mask in place and drug fact sheet provided

## 2020-12-02 ENCOUNTER — NURSE TRIAGE (OUTPATIENT)
Dept: ADMINISTRATIVE | Facility: CLINIC | Age: 70
End: 2020-12-02

## 2020-12-02 NOTE — TELEPHONE ENCOUNTER
Wife reports that she recently had Covid and received EUA infusion treatment. She is inquiring about EUA infusion for her . Reports that the infusion needs to be administered within a certain timeframe of developing symptoms. All questions and concerns were addressed. Advised the wife to f/u with the prescribing physician the patient's PCP is no longer with [facility]. Advised the patient to call back with any further questions or concerns.     Reason for Disposition   Health Information question, no triage required and triager able to answer question    Additional Information   Negative: [1] Caller is not with the adult (patient) AND [2] reporting urgent symptoms   Negative: Lab result questions   Negative: Medication questions   Negative: Caller can't be reached by phone   Negative: Caller has already spoken to PCP or another triager   Negative: RN needs further essential information from caller in order to complete triage   Negative: Requesting regular office appointment   Negative: [1] Caller requesting NON-URGENT health information AND [2] PCP's office is the best resource    Protocols used: INFORMATION ONLY CALL-A-

## 2020-12-16 ENCOUNTER — OFFICE VISIT (OUTPATIENT)
Dept: FAMILY MEDICINE | Facility: CLINIC | Age: 70
End: 2020-12-16
Payer: MEDICARE

## 2020-12-16 VITALS
BODY MASS INDEX: 21.45 KG/M2 | DIASTOLIC BLOOD PRESSURE: 80 MMHG | SYSTOLIC BLOOD PRESSURE: 110 MMHG | RESPIRATION RATE: 18 BRPM | TEMPERATURE: 97 F | OXYGEN SATURATION: 99 % | WEIGHT: 153.25 LBS | HEART RATE: 69 BPM | HEIGHT: 71 IN

## 2020-12-16 DIAGNOSIS — K21.9 GASTROESOPHAGEAL REFLUX DISEASE, UNSPECIFIED WHETHER ESOPHAGITIS PRESENT: ICD-10-CM

## 2020-12-16 DIAGNOSIS — Z86.16 HISTORY OF 2019 NOVEL CORONAVIRUS DISEASE (COVID-19): Primary | ICD-10-CM

## 2020-12-16 PROCEDURE — 99213 OFFICE O/P EST LOW 20 MIN: CPT | Mod: S$GLB,,, | Performed by: INTERNAL MEDICINE

## 2020-12-16 PROCEDURE — 1159F PR MEDICATION LIST DOCUMENTED IN MEDICAL RECORD: ICD-10-PCS | Mod: S$GLB,,, | Performed by: INTERNAL MEDICINE

## 2020-12-16 PROCEDURE — 3288F FALL RISK ASSESSMENT DOCD: CPT | Mod: CPTII,S$GLB,, | Performed by: INTERNAL MEDICINE

## 2020-12-16 PROCEDURE — 1126F AMNT PAIN NOTED NONE PRSNT: CPT | Mod: S$GLB,,, | Performed by: INTERNAL MEDICINE

## 2020-12-16 PROCEDURE — 1159F MED LIST DOCD IN RCRD: CPT | Mod: S$GLB,,, | Performed by: INTERNAL MEDICINE

## 2020-12-16 PROCEDURE — 3008F BODY MASS INDEX DOCD: CPT | Mod: CPTII,S$GLB,, | Performed by: INTERNAL MEDICINE

## 2020-12-16 PROCEDURE — 3288F PR FALLS RISK ASSESSMENT DOCUMENTED: ICD-10-PCS | Mod: CPTII,S$GLB,, | Performed by: INTERNAL MEDICINE

## 2020-12-16 PROCEDURE — 3008F PR BODY MASS INDEX (BMI) DOCUMENTED: ICD-10-PCS | Mod: CPTII,S$GLB,, | Performed by: INTERNAL MEDICINE

## 2020-12-16 PROCEDURE — 99999 PR PBB SHADOW E&M-EST. PATIENT-LVL IV: CPT | Mod: PBBFAC,,, | Performed by: INTERNAL MEDICINE

## 2020-12-16 PROCEDURE — 99213 PR OFFICE/OUTPT VISIT, EST, LEVL III, 20-29 MIN: ICD-10-PCS | Mod: S$GLB,,, | Performed by: INTERNAL MEDICINE

## 2020-12-16 PROCEDURE — 1101F PR PT FALLS ASSESS DOC 0-1 FALLS W/OUT INJ PAST YR: ICD-10-PCS | Mod: CPTII,S$GLB,, | Performed by: INTERNAL MEDICINE

## 2020-12-16 PROCEDURE — 1101F PT FALLS ASSESS-DOCD LE1/YR: CPT | Mod: CPTII,S$GLB,, | Performed by: INTERNAL MEDICINE

## 2020-12-16 PROCEDURE — 99999 PR PBB SHADOW E&M-EST. PATIENT-LVL IV: ICD-10-PCS | Mod: PBBFAC,,, | Performed by: INTERNAL MEDICINE

## 2020-12-16 PROCEDURE — 1126F PR PAIN SEVERITY QUANTIFIED, NO PAIN PRESENT: ICD-10-PCS | Mod: S$GLB,,, | Performed by: INTERNAL MEDICINE

## 2020-12-16 NOTE — PROGRESS NOTES
[facility] Note    Chief Complaint      Chief Complaint   Patient presents with    Follow-up     after covid        History of Present Illness      Sunni Lowe is a 69 y.o. female who presents today for   Chief Complaint   Patient presents with    Follow-up     after covid    .  Patient comes to appointment here for f/u covid infection was diagnosed 11/25/2020 . Started with back ache then chills , fever early , fatigue  uneventful course . Is currently feeling great is back to normal . Still with some occasional  back ache .     Problem List Items Addressed This Visit        GI    Gastroesophageal reflux disease    Overview     Using nexium cont current is working well             Other    History of 2019 novel coronavirus disease (COVID-19) - Primary    Overview     Resolved                  Past Medical History:  Past Medical History:   Diagnosis Date    Allergic rhinitis     Asthma     Cortes's esophagus     Fibrocystic breast     Osteopenia     Varicose veins     sees Dr. Brigido Quinones       Past Surgical History:  Past Surgical History:   Procedure Laterality Date    BREAST BIOPSY Left     COLONOSCOPY  08/01/2014    Dr. Canas - repeat in 5 to 10 years    ESOPHAGOGASTRODUODENOSCOPY N/A 10/19/2018    Procedure: EGD (ESOPHAGOGASTRODUODENOSCOPY);  Surgeon: Cassie Huber MD;  [facility];  Service: Endoscopy;  Laterality: N/A;    KNEE SURGERY Bilateral     meniscus repair    peniculitis surgery      x 3       Family History:  family history includes Alzheimer's disease in her maternal aunt and mother; Cancer (age of onset: 85) in her father; Diabetes in her father; No Known Problems in her brother, sister, and sister; Ovarian cancer (age of onset: 68) in her mother.    Social History:  Social History     Socioeconomic History    Marital status:      Spouse name: Not on file    Number of children: 2    Years of education: Not on file     Highest education level: Not on file   Occupational History    Not on file   Social Needs    Financial resource strain: Not hard at all    Food insecurity     Worry: Never true     Inability: Never true    Transportation needs     Medical: No     Non-medical: No   Tobacco Use    Smoking status: Former Smoker     Packs/day: 1.00     Years: 10.00     Pack years: 10.00     Quit date:      Years since quittin.9    Smokeless tobacco: Never Used   Substance and Sexual Activity    Alcohol use: Yes     Frequency: 4 or more times a week     Drinks per session: 1 or 2     Binge frequency: Never     Comment: one glass of wine daily    Drug use: No    Sexual activity: Not Currently     Partners: Male     Comment:    Lifestyle    Physical activity     Days per week: 3 days     Minutes per session: 60 min    Stress: Not at all   Relationships    Social connections     Talks on phone: More than three times a week     Gets together: Once a week     Attends Christianity service: Not on file     Active member of club or organization: Yes     Attends meetings of clubs or organizations: More than 4 times per year     Relationship status:    Other Topics Concern    Not on file   Social History Narrative    Not on file       Review of Systems:   Review of Systems   Constitutional: Negative for fever and weight loss.   Cardiovascular: Negative for chest pain.   Gastrointestinal: Negative for abdominal pain.   Genitourinary: Negative for dysuria and hematuria.   Musculoskeletal: Positive for back pain.   Neurological: Positive for headaches. Negative for tingling and weakness.         Medications:  Outpatient Encounter Medications as of 2020   Medication Sig Dispense Refill    calcium carbonate (OS-ISABELLE) 600 mg (1,500 mg) Tab Take 1,200 mg by mouth once daily.      cholecalciferol, vitamin D3, (VITAMIN D3) 10,000 unit Cap Take 10,000 Units by mouth once daily.       citalopram (CELEXA) 10 MG tablet  TAKE 1 TABLET BY MOUTH EVERY DAY 90 tablet 1    collagen, bovine, 100 % Powd       CYANOCOBALAMIN, VITAMIN B-12, (VITAMIN B-12 ORAL) Take by mouth.      esomeprazole (NEXIUM) 20 MG capsule TAKE 1 CAPSULE (20 MG TOTAL) BY MOUTH BEFORE BREAKFAST. 90 capsule 3    fish oil-omega-3 fatty acids 300-1,000 mg capsule Take 600 mg by mouth once daily.      fluticasone-salmeterol diskus inhaler 250-50 mcg Inhale 1 puff into the lungs 2 (two) times daily. Controller 60 each 5    levocetirizine (XYZAL) 5 MG tablet Take 1 tablet (5 mg total) by mouth every evening. 30 tablet 5    zoledronic acid/mannitol-water (RECLAST IV) Every other year       Facility-Administered Encounter Medications as of 12/16/2020   Medication Dose Route Frequency Provider Last Rate Last Dose    0.9%  NaCl infusion   Intravenous Continuous Cassie Huber MD   Stopped at 10/19/18 0839    acetaminophen tablet 650 mg  650 mg Oral PRN Garland Garber MD        albuterol inhaler 2 puff  2 puff Inhalation PRN Garland Garber MD        diphenhydrAMINE injection 25 mg  25 mg Intravenous PRN Garland Garber MD        EPINEPHrine (EPIPEN) 0.3 mg/0.3 mL pen injection 0.3 mg  0.3 mg Intramuscular PRN Garland Garber MD        methylPREDNISolone sodium succinate injection 40 mg  40 mg Intravenous PRN Garland Garber MD        ondansetron disintegrating tablet 4 mg  4 mg Oral PRN Garland Garber MD        sodium chloride 0.9% 500 mL flush bag   Intravenous PRN Garland Garber MD        sodium chloride 0.9% flush 10 mL  10 mL Intravenous PRN Garland Garber MD        sodium chloride 0.9% flush 3 mL  3 mL Intravenous PRN Cassie Huber MD            Allergies:  Review of patient's allergies indicates:  No Known Allergies      Physical Exam      Vitals:    12/16/20 0830   BP: 110/80   Pulse: 69   Resp: 18   Temp: 96.9 °F (36.1 °C)      Body mass index is 21.37 kg/m².    Physical Exam  Constitutional:       Appearance: She  is well-developed.   Eyes:      Pupils: Pupils are equal, round, and reactive to light.   Neck:      Musculoskeletal: Normal range of motion.      Thyroid: No thyromegaly.   Cardiovascular:      Rate and Rhythm: Normal rate.      Heart sounds: Normal heart sounds. No murmur. No friction rub. No gallop.    Pulmonary:      Breath sounds: Normal breath sounds.   Abdominal:      General: Bowel sounds are normal.      Palpations: Abdomen is soft.   Musculoskeletal: Normal range of motion.   Lymphadenopathy:      Cervical: No cervical adenopathy.   Skin:     General: Skin is warm.      Findings: No rash.   Neurological:      Mental Status: She is alert and oriented to person, place, and time.      Cranial Nerves: No cranial nerve deficit.   Psychiatric:         Behavior: Behavior normal.          Laboratory:  CBC:  No results for input(s): WBC, RBC, HGB, HCT, PLT, MCV, MCH, MCHC in the last 2160 hours.  CMP:  No results for input(s): GLU, CALCIUM, ALBUMIN, PROT, NA, K, CO2, CL, BUN, ALKPHOS, ALT, AST, BILITOT in the last 2160 hours.    Invalid input(s): CREATININ  URINALYSIS:  Recent Labs   Lab Result Units 11/25/20  1401   pH, UA  8.0      LIPIDS:  No results for input(s): TSH, HDL, CHOL, TRIG, LDLCALC, CHOLHDL, NONHDLCHOL, TOTALCHOLEST in the last 2160 hours.  TSH:  No results for input(s): TSH in the last 2160 hours.  A1C:  No results for input(s): HGBA1C in the last 2160 hours.    Radiology:        Assessment:     Sunni Lowe is a 69 y.o.female with:    History of 2019 novel coronavirus disease (COVID-19)    Gastroesophageal reflux disease, unspecified whether esophagitis present          Plan:     Problem List Items Addressed This Visit        GI    Gastroesophageal reflux disease    Overview     Using nexium cont current is working well             Other    History of 2019 novel coronavirus disease (COVID-19) - Primary    Overview     Resolved                As above, continue current medications and maintain  follow up with specialists.  Return to clinic in 6  months.      [facility]er[facility] W Dantagnan [facility] [place]                  Answers for HPI/ROS submitted by the patient on 12/16/2020   Back pain  Chronicity: new  Onset: more than 1 month ago  Frequency: intermittently  Progression since onset: gradually improving  Pain location: lumbar spine  Pain quality: aching  Radiates to: does not radiate  Pain - numeric: 2/10  Pain is: the same all the time  Aggravated by: position  Stiffness is present: all day  bladder incontinence: No  bowel incontinence: Yes  leg pain: No  numbness: No  paresis: No  paresthesias: No  pelvic pain: No  perianal numbness: No  genital pain: No  Pain severity: mild  Treatments tried: NSAIDs  Improvement on treatment: significant

## 2020-12-21 DIAGNOSIS — K22.70 BARRETT'S ESOPHAGUS WITHOUT DYSPLASIA: ICD-10-CM

## 2020-12-21 RX ORDER — HYDROGEN PEROXIDE 3 %
20 SOLUTION, NON-ORAL MISCELLANEOUS
Qty: 90 CAPSULE | Refills: 0 | Status: SHIPPED | OUTPATIENT
Start: 2020-12-21 | End: 2021-04-27 | Stop reason: SDUPTHER

## 2021-03-10 ENCOUNTER — PATIENT MESSAGE (OUTPATIENT)
Dept: ORTHOPEDICS | Facility: CLINIC | Age: 71
End: 2021-03-10

## 2021-03-10 ENCOUNTER — OFFICE VISIT (OUTPATIENT)
Dept: PODIATRY | Facility: CLINIC | Age: 71
End: 2021-03-10
Payer: MEDICARE

## 2021-03-10 VITALS
RESPIRATION RATE: 16 BRPM | BODY MASS INDEX: 21.52 KG/M2 | HEIGHT: 71 IN | OXYGEN SATURATION: 98 % | DIASTOLIC BLOOD PRESSURE: 82 MMHG | SYSTOLIC BLOOD PRESSURE: 124 MMHG | HEART RATE: 78 BPM | WEIGHT: 153.69 LBS

## 2021-03-10 DIAGNOSIS — L60.0 INGROWN NAIL: Primary | ICD-10-CM

## 2021-03-10 PROCEDURE — 11750 EXCISION NAIL&NAIL MATRIX: CPT | Mod: 51,TA,S$GLB, | Performed by: PODIATRIST

## 2021-03-10 PROCEDURE — 3008F BODY MASS INDEX DOCD: CPT | Mod: CPTII,S$GLB,, | Performed by: PODIATRIST

## 2021-03-10 PROCEDURE — 99213 PR OFFICE/OUTPT VISIT, EST, LEVL III, 20-29 MIN: ICD-10-PCS | Mod: 25,S$GLB,, | Performed by: PODIATRIST

## 2021-03-10 PROCEDURE — 11750 NAIL REMOVAL: ICD-10-PCS | Mod: T5,S$GLB,, | Performed by: PODIATRIST

## 2021-03-10 PROCEDURE — 3288F FALL RISK ASSESSMENT DOCD: CPT | Mod: CPTII,S$GLB,, | Performed by: PODIATRIST

## 2021-03-10 PROCEDURE — 3008F PR BODY MASS INDEX (BMI) DOCUMENTED: ICD-10-PCS | Mod: CPTII,S$GLB,, | Performed by: PODIATRIST

## 2021-03-10 PROCEDURE — 1125F AMNT PAIN NOTED PAIN PRSNT: CPT | Mod: S$GLB,,, | Performed by: PODIATRIST

## 2021-03-10 PROCEDURE — 1101F PT FALLS ASSESS-DOCD LE1/YR: CPT | Mod: CPTII,S$GLB,, | Performed by: PODIATRIST

## 2021-03-10 PROCEDURE — 3288F PR FALLS RISK ASSESSMENT DOCUMENTED: ICD-10-PCS | Mod: CPTII,S$GLB,, | Performed by: PODIATRIST

## 2021-03-10 PROCEDURE — 99999 PR PBB SHADOW E&M-EST. PATIENT-LVL IV: ICD-10-PCS | Mod: PBBFAC,,, | Performed by: PODIATRIST

## 2021-03-10 PROCEDURE — 99213 OFFICE O/P EST LOW 20 MIN: CPT | Mod: 25,S$GLB,, | Performed by: PODIATRIST

## 2021-03-10 PROCEDURE — 1101F PR PT FALLS ASSESS DOC 0-1 FALLS W/OUT INJ PAST YR: ICD-10-PCS | Mod: CPTII,S$GLB,, | Performed by: PODIATRIST

## 2021-03-10 PROCEDURE — 1125F PR PAIN SEVERITY QUANTIFIED, PAIN PRESENT: ICD-10-PCS | Mod: S$GLB,,, | Performed by: PODIATRIST

## 2021-03-10 PROCEDURE — 1159F MED LIST DOCD IN RCRD: CPT | Mod: S$GLB,,, | Performed by: PODIATRIST

## 2021-03-10 PROCEDURE — 1159F PR MEDICATION LIST DOCUMENTED IN MEDICAL RECORD: ICD-10-PCS | Mod: S$GLB,,, | Performed by: PODIATRIST

## 2021-03-10 PROCEDURE — 99999 PR PBB SHADOW E&M-EST. PATIENT-LVL IV: CPT | Mod: PBBFAC,,, | Performed by: PODIATRIST

## 2021-03-11 ENCOUNTER — PATIENT MESSAGE (OUTPATIENT)
Dept: PODIATRY | Facility: CLINIC | Age: 71
End: 2021-03-11

## 2021-03-24 ENCOUNTER — PATIENT MESSAGE (OUTPATIENT)
Dept: PODIATRY | Facility: CLINIC | Age: 71
End: 2021-03-24

## 2021-03-24 ENCOUNTER — PATIENT MESSAGE (OUTPATIENT)
Dept: ORTHOPEDICS | Facility: CLINIC | Age: 71
End: 2021-03-24

## 2021-03-24 DIAGNOSIS — M25.561 PAIN IN BOTH KNEES, UNSPECIFIED CHRONICITY: Primary | ICD-10-CM

## 2021-03-24 DIAGNOSIS — M25.562 PAIN IN BOTH KNEES, UNSPECIFIED CHRONICITY: Primary | ICD-10-CM

## 2021-04-13 ENCOUNTER — OFFICE VISIT (OUTPATIENT)
Dept: ORTHOPEDICS | Facility: CLINIC | Age: 71
End: 2021-04-13
Payer: MEDICARE

## 2021-04-13 VITALS — HEIGHT: 71 IN | BODY MASS INDEX: 21.52 KG/M2 | WEIGHT: 153.69 LBS

## 2021-04-13 DIAGNOSIS — M17.0 PRIMARY OSTEOARTHRITIS OF BOTH KNEES: Primary | ICD-10-CM

## 2021-04-13 PROCEDURE — 99213 PR OFFICE/OUTPT VISIT, EST, LEVL III, 20-29 MIN: ICD-10-PCS | Mod: S$GLB,,, | Performed by: ORTHOPAEDIC SURGERY

## 2021-04-13 PROCEDURE — 1125F PR PAIN SEVERITY QUANTIFIED, PAIN PRESENT: ICD-10-PCS | Mod: S$GLB,,, | Performed by: ORTHOPAEDIC SURGERY

## 2021-04-13 PROCEDURE — 1159F PR MEDICATION LIST DOCUMENTED IN MEDICAL RECORD: ICD-10-PCS | Mod: S$GLB,,, | Performed by: ORTHOPAEDIC SURGERY

## 2021-04-13 PROCEDURE — 3008F PR BODY MASS INDEX (BMI) DOCUMENTED: ICD-10-PCS | Mod: CPTII,S$GLB,, | Performed by: ORTHOPAEDIC SURGERY

## 2021-04-13 PROCEDURE — 99999 PR PBB SHADOW E&M-EST. PATIENT-LVL III: ICD-10-PCS | Mod: PBBFAC,,, | Performed by: ORTHOPAEDIC SURGERY

## 2021-04-13 PROCEDURE — 1101F PR PT FALLS ASSESS DOC 0-1 FALLS W/OUT INJ PAST YR: ICD-10-PCS | Mod: CPTII,S$GLB,, | Performed by: ORTHOPAEDIC SURGERY

## 2021-04-13 PROCEDURE — 3288F PR FALLS RISK ASSESSMENT DOCUMENTED: ICD-10-PCS | Mod: CPTII,S$GLB,, | Performed by: ORTHOPAEDIC SURGERY

## 2021-04-13 PROCEDURE — 3008F BODY MASS INDEX DOCD: CPT | Mod: CPTII,S$GLB,, | Performed by: ORTHOPAEDIC SURGERY

## 2021-04-13 PROCEDURE — 1125F AMNT PAIN NOTED PAIN PRSNT: CPT | Mod: S$GLB,,, | Performed by: ORTHOPAEDIC SURGERY

## 2021-04-13 PROCEDURE — 1101F PT FALLS ASSESS-DOCD LE1/YR: CPT | Mod: CPTII,S$GLB,, | Performed by: ORTHOPAEDIC SURGERY

## 2021-04-13 PROCEDURE — 99213 OFFICE O/P EST LOW 20 MIN: CPT | Mod: S$GLB,,, | Performed by: ORTHOPAEDIC SURGERY

## 2021-04-13 PROCEDURE — 1159F MED LIST DOCD IN RCRD: CPT | Mod: S$GLB,,, | Performed by: ORTHOPAEDIC SURGERY

## 2021-04-13 PROCEDURE — 3288F FALL RISK ASSESSMENT DOCD: CPT | Mod: CPTII,S$GLB,, | Performed by: ORTHOPAEDIC SURGERY

## 2021-04-13 PROCEDURE — 99999 PR PBB SHADOW E&M-EST. PATIENT-LVL III: CPT | Mod: PBBFAC,,, | Performed by: ORTHOPAEDIC SURGERY

## 2021-04-27 ENCOUNTER — OFFICE VISIT (OUTPATIENT)
Dept: FAMILY MEDICINE | Facility: CLINIC | Age: 71
End: 2021-04-27
Payer: MEDICARE

## 2021-04-27 VITALS
WEIGHT: 153.25 LBS | HEART RATE: 71 BPM | TEMPERATURE: 99 F | OXYGEN SATURATION: 98 % | BODY MASS INDEX: 21.45 KG/M2 | DIASTOLIC BLOOD PRESSURE: 78 MMHG | RESPIRATION RATE: 18 BRPM | SYSTOLIC BLOOD PRESSURE: 118 MMHG | HEIGHT: 71 IN

## 2021-04-27 DIAGNOSIS — K22.70 BARRETT'S ESOPHAGUS WITHOUT DYSPLASIA: ICD-10-CM

## 2021-04-27 DIAGNOSIS — J45.40 MODERATE PERSISTENT ASTHMA WITHOUT COMPLICATION: ICD-10-CM

## 2021-04-27 DIAGNOSIS — M85.88 OSTEOPENIA OF SPINE: ICD-10-CM

## 2021-04-27 PROCEDURE — 1101F PR PT FALLS ASSESS DOC 0-1 FALLS W/OUT INJ PAST YR: ICD-10-PCS | Mod: CPTII,S$GLB,, | Performed by: INTERNAL MEDICINE

## 2021-04-27 PROCEDURE — 1126F PR PAIN SEVERITY QUANTIFIED, NO PAIN PRESENT: ICD-10-PCS | Mod: S$GLB,,, | Performed by: INTERNAL MEDICINE

## 2021-04-27 PROCEDURE — 99214 OFFICE O/P EST MOD 30 MIN: CPT | Mod: S$GLB,,, | Performed by: INTERNAL MEDICINE

## 2021-04-27 PROCEDURE — 3008F PR BODY MASS INDEX (BMI) DOCUMENTED: ICD-10-PCS | Mod: CPTII,S$GLB,, | Performed by: INTERNAL MEDICINE

## 2021-04-27 PROCEDURE — 3288F FALL RISK ASSESSMENT DOCD: CPT | Mod: CPTII,S$GLB,, | Performed by: INTERNAL MEDICINE

## 2021-04-27 PROCEDURE — 99999 PR PBB SHADOW E&M-EST. PATIENT-LVL IV: CPT | Mod: PBBFAC,,, | Performed by: INTERNAL MEDICINE

## 2021-04-27 PROCEDURE — 1159F MED LIST DOCD IN RCRD: CPT | Mod: S$GLB,,, | Performed by: INTERNAL MEDICINE

## 2021-04-27 PROCEDURE — 3288F PR FALLS RISK ASSESSMENT DOCUMENTED: ICD-10-PCS | Mod: CPTII,S$GLB,, | Performed by: INTERNAL MEDICINE

## 2021-04-27 PROCEDURE — 3008F BODY MASS INDEX DOCD: CPT | Mod: CPTII,S$GLB,, | Performed by: INTERNAL MEDICINE

## 2021-04-27 PROCEDURE — 1101F PT FALLS ASSESS-DOCD LE1/YR: CPT | Mod: CPTII,S$GLB,, | Performed by: INTERNAL MEDICINE

## 2021-04-27 PROCEDURE — 1159F PR MEDICATION LIST DOCUMENTED IN MEDICAL RECORD: ICD-10-PCS | Mod: S$GLB,,, | Performed by: INTERNAL MEDICINE

## 2021-04-27 PROCEDURE — 1126F AMNT PAIN NOTED NONE PRSNT: CPT | Mod: S$GLB,,, | Performed by: INTERNAL MEDICINE

## 2021-04-27 PROCEDURE — 99214 PR OFFICE/OUTPT VISIT, EST, LEVL IV, 30-39 MIN: ICD-10-PCS | Mod: S$GLB,,, | Performed by: INTERNAL MEDICINE

## 2021-04-27 PROCEDURE — 99999 PR PBB SHADOW E&M-EST. PATIENT-LVL IV: ICD-10-PCS | Mod: PBBFAC,,, | Performed by: INTERNAL MEDICINE

## 2021-04-27 RX ORDER — HYDROGEN PEROXIDE 3 %
20 SOLUTION, NON-ORAL MISCELLANEOUS
Qty: 90 CAPSULE | Refills: 0 | Status: SHIPPED | OUTPATIENT
Start: 2021-04-27 | End: 2021-07-05 | Stop reason: SDUPTHER

## 2021-04-27 RX ORDER — FLUTICASONE PROPIONATE AND SALMETEROL 250; 50 UG/1; UG/1
1 POWDER RESPIRATORY (INHALATION) 2 TIMES DAILY
Qty: 60 EACH | Refills: 5 | Status: SHIPPED | OUTPATIENT
Start: 2021-04-27 | End: 2021-10-27 | Stop reason: SDUPTHER

## 2021-06-07 ENCOUNTER — PATIENT MESSAGE (OUTPATIENT)
Dept: OBSTETRICS AND GYNECOLOGY | Facility: CLINIC | Age: 71
End: 2021-06-07

## 2021-06-07 DIAGNOSIS — Z12.39 ENCOUNTER FOR SCREENING FOR MALIGNANT NEOPLASM OF BREAST, UNSPECIFIED SCREENING MODALITY: Primary | ICD-10-CM

## 2021-06-08 ENCOUNTER — PATIENT MESSAGE (OUTPATIENT)
Dept: OBSTETRICS AND GYNECOLOGY | Facility: CLINIC | Age: 71
End: 2021-06-08

## 2021-06-08 ENCOUNTER — TELEPHONE (OUTPATIENT)
Dept: OBSTETRICS AND GYNECOLOGY | Facility: CLINIC | Age: 71
End: 2021-06-08

## 2021-06-08 DIAGNOSIS — Z12.31 ENCOUNTER FOR SCREENING MAMMOGRAM FOR MALIGNANT NEOPLASM OF BREAST: Primary | ICD-10-CM

## 2021-07-03 ENCOUNTER — PATIENT MESSAGE (OUTPATIENT)
Dept: FAMILY MEDICINE | Facility: CLINIC | Age: 71
End: 2021-07-03

## 2021-07-03 DIAGNOSIS — K22.70 BARRETT'S ESOPHAGUS WITHOUT DYSPLASIA: ICD-10-CM

## 2021-07-06 RX ORDER — HYDROGEN PEROXIDE 3 %
20 SOLUTION, NON-ORAL MISCELLANEOUS
Qty: 90 CAPSULE | Refills: 0 | Status: SHIPPED | OUTPATIENT
Start: 2021-07-06 | End: 2021-10-27 | Stop reason: SDUPTHER

## 2021-07-14 ENCOUNTER — PATIENT OUTREACH (OUTPATIENT)
Dept: ADMINISTRATIVE | Facility: OTHER | Age: 71
End: 2021-07-14

## 2021-07-22 ENCOUNTER — OFFICE VISIT (OUTPATIENT)
Dept: OBSTETRICS AND GYNECOLOGY | Facility: CLINIC | Age: 71
End: 2021-07-22
Payer: MEDICARE

## 2021-07-22 VITALS
DIASTOLIC BLOOD PRESSURE: 72 MMHG | WEIGHT: 155.88 LBS | HEIGHT: 71 IN | SYSTOLIC BLOOD PRESSURE: 102 MMHG | BODY MASS INDEX: 21.82 KG/M2

## 2021-07-22 DIAGNOSIS — Z12.4 SCREENING FOR CERVICAL CANCER: Primary | ICD-10-CM

## 2021-07-22 PROCEDURE — 1101F PT FALLS ASSESS-DOCD LE1/YR: CPT | Mod: CPTII,S$GLB,, | Performed by: OBSTETRICS & GYNECOLOGY

## 2021-07-22 PROCEDURE — G0101 CA SCREEN;PELVIC/BREAST EXAM: HCPCS | Mod: GZ,S$GLB,, | Performed by: OBSTETRICS & GYNECOLOGY

## 2021-07-22 PROCEDURE — 99999 PR PBB SHADOW E&M-EST. PATIENT-LVL III: CPT | Mod: PBBFAC,,, | Performed by: OBSTETRICS & GYNECOLOGY

## 2021-07-22 PROCEDURE — 3288F PR FALLS RISK ASSESSMENT DOCUMENTED: ICD-10-PCS | Mod: CPTII,S$GLB,, | Performed by: OBSTETRICS & GYNECOLOGY

## 2021-07-22 PROCEDURE — 1101F PR PT FALLS ASSESS DOC 0-1 FALLS W/OUT INJ PAST YR: ICD-10-PCS | Mod: CPTII,S$GLB,, | Performed by: OBSTETRICS & GYNECOLOGY

## 2021-07-22 PROCEDURE — 1126F AMNT PAIN NOTED NONE PRSNT: CPT | Mod: CPTII,S$GLB,, | Performed by: OBSTETRICS & GYNECOLOGY

## 2021-07-22 PROCEDURE — 3288F FALL RISK ASSESSMENT DOCD: CPT | Mod: CPTII,S$GLB,, | Performed by: OBSTETRICS & GYNECOLOGY

## 2021-07-22 PROCEDURE — 1126F PR PAIN SEVERITY QUANTIFIED, NO PAIN PRESENT: ICD-10-PCS | Mod: CPTII,S$GLB,, | Performed by: OBSTETRICS & GYNECOLOGY

## 2021-07-22 PROCEDURE — G0101 PR CA SCREEN;PELVIC/BREAST EXAM: ICD-10-PCS | Mod: GZ,S$GLB,, | Performed by: OBSTETRICS & GYNECOLOGY

## 2021-07-22 PROCEDURE — 3008F BODY MASS INDEX DOCD: CPT | Mod: CPTII,S$GLB,, | Performed by: OBSTETRICS & GYNECOLOGY

## 2021-07-22 PROCEDURE — 3008F PR BODY MASS INDEX (BMI) DOCUMENTED: ICD-10-PCS | Mod: CPTII,S$GLB,, | Performed by: OBSTETRICS & GYNECOLOGY

## 2021-07-22 PROCEDURE — 99999 PR PBB SHADOW E&M-EST. PATIENT-LVL III: ICD-10-PCS | Mod: PBBFAC,,, | Performed by: OBSTETRICS & GYNECOLOGY

## 2021-08-09 ENCOUNTER — LAB VISIT (OUTPATIENT)
Dept: FAMILY MEDICINE | Facility: CLINIC | Age: 71
End: 2021-08-09
Payer: MEDICARE

## 2021-08-09 DIAGNOSIS — M79.10 MUSCLE PAIN: ICD-10-CM

## 2021-08-09 PROCEDURE — U0005 INFEC AGEN DETEC AMPLI PROBE: HCPCS | Performed by: INTERNAL MEDICINE

## 2021-08-09 PROCEDURE — U0003 INFECTIOUS AGENT DETECTION BY NUCLEIC ACID (DNA OR RNA); SEVERE ACUTE RESPIRATORY SYNDROME CORONAVIRUS 2 (SARS-COV-2) (CORONAVIRUS DISEASE [COVID-19]), AMPLIFIED PROBE TECHNIQUE, MAKING USE OF HIGH THROUGHPUT TECHNOLOGIES AS DESCRIBED BY CMS-2020-01-R: HCPCS | Performed by: INTERNAL MEDICINE

## 2021-08-10 LAB
SARS-COV-2 RNA RESP QL NAA+PROBE: NOT DETECTED
SARS-COV-2- CYCLE NUMBER: -1

## 2021-10-09 ENCOUNTER — IMMUNIZATION (OUTPATIENT)
Dept: FAMILY MEDICINE | Facility: CLINIC | Age: 71
End: 2021-10-09
Payer: MEDICARE

## 2021-10-09 PROCEDURE — G0008 ADMIN INFLUENZA VIRUS VAC: HCPCS | Mod: S$GLB,,, | Performed by: FAMILY MEDICINE

## 2021-10-09 PROCEDURE — G0008 FLU VACCINE - QUADRIVALENT - ADJUVANTED: ICD-10-PCS | Mod: S$GLB,,, | Performed by: FAMILY MEDICINE

## 2021-10-09 PROCEDURE — 90694 VACC AIIV4 NO PRSRV 0.5ML IM: CPT | Mod: S$GLB,,, | Performed by: FAMILY MEDICINE

## 2021-10-09 PROCEDURE — 90694 FLU VACCINE - QUADRIVALENT - ADJUVANTED: ICD-10-PCS | Mod: S$GLB,,, | Performed by: FAMILY MEDICINE

## 2021-10-14 ENCOUNTER — IMMUNIZATION (OUTPATIENT)
Dept: INTERNAL MEDICINE | Facility: CLINIC | Age: 71
End: 2021-10-14
Payer: MEDICARE

## 2021-10-14 DIAGNOSIS — Z23 NEED FOR VACCINATION: Primary | ICD-10-CM

## 2021-10-14 PROCEDURE — 91300 COVID-19, MRNA, LNP-S, PF, 30 MCG/0.3 ML DOSE VACCINE: CPT | Mod: PBBFAC | Performed by: INTERNAL MEDICINE

## 2021-10-14 PROCEDURE — 0003A COVID-19, MRNA, LNP-S, PF, 30 MCG/0.3 ML DOSE VACCINE: CPT | Mod: PBBFAC | Performed by: INTERNAL MEDICINE

## 2021-10-27 ENCOUNTER — OFFICE VISIT (OUTPATIENT)
Dept: FAMILY MEDICINE | Facility: CLINIC | Age: 71
End: 2021-10-27
Payer: MEDICARE

## 2021-10-27 VITALS
DIASTOLIC BLOOD PRESSURE: 80 MMHG | TEMPERATURE: 98 F | WEIGHT: 156.06 LBS | OXYGEN SATURATION: 95 % | HEIGHT: 71 IN | RESPIRATION RATE: 18 BRPM | SYSTOLIC BLOOD PRESSURE: 118 MMHG | BODY MASS INDEX: 21.85 KG/M2 | HEART RATE: 68 BPM

## 2021-10-27 DIAGNOSIS — M85.88 OSTEOPENIA OF SPINE: ICD-10-CM

## 2021-10-27 DIAGNOSIS — K22.70 BARRETT'S ESOPHAGUS WITHOUT DYSPLASIA: ICD-10-CM

## 2021-10-27 DIAGNOSIS — J45.40 MODERATE PERSISTENT ASTHMA WITHOUT COMPLICATION: ICD-10-CM

## 2021-10-27 DIAGNOSIS — K21.9 GASTROESOPHAGEAL REFLUX DISEASE, UNSPECIFIED WHETHER ESOPHAGITIS PRESENT: Primary | ICD-10-CM

## 2021-10-27 PROCEDURE — 3074F SYST BP LT 130 MM HG: CPT | Mod: CPTII,S$GLB,, | Performed by: INTERNAL MEDICINE

## 2021-10-27 PROCEDURE — 3288F PR FALLS RISK ASSESSMENT DOCUMENTED: ICD-10-PCS | Mod: CPTII,S$GLB,, | Performed by: INTERNAL MEDICINE

## 2021-10-27 PROCEDURE — 3079F DIAST BP 80-89 MM HG: CPT | Mod: CPTII,S$GLB,, | Performed by: INTERNAL MEDICINE

## 2021-10-27 PROCEDURE — 99999 PR PBB SHADOW E&M-EST. PATIENT-LVL IV: ICD-10-PCS | Mod: PBBFAC,,, | Performed by: INTERNAL MEDICINE

## 2021-10-27 PROCEDURE — 3079F PR MOST RECENT DIASTOLIC BLOOD PRESSURE 80-89 MM HG: ICD-10-PCS | Mod: CPTII,S$GLB,, | Performed by: INTERNAL MEDICINE

## 2021-10-27 PROCEDURE — 99214 OFFICE O/P EST MOD 30 MIN: CPT | Mod: S$GLB,,, | Performed by: INTERNAL MEDICINE

## 2021-10-27 PROCEDURE — 3288F FALL RISK ASSESSMENT DOCD: CPT | Mod: CPTII,S$GLB,, | Performed by: INTERNAL MEDICINE

## 2021-10-27 PROCEDURE — 99999 PR PBB SHADOW E&M-EST. PATIENT-LVL IV: CPT | Mod: PBBFAC,,, | Performed by: INTERNAL MEDICINE

## 2021-10-27 PROCEDURE — 1101F PT FALLS ASSESS-DOCD LE1/YR: CPT | Mod: CPTII,S$GLB,, | Performed by: INTERNAL MEDICINE

## 2021-10-27 PROCEDURE — 99214 PR OFFICE/OUTPT VISIT, EST, LEVL IV, 30-39 MIN: ICD-10-PCS | Mod: S$GLB,,, | Performed by: INTERNAL MEDICINE

## 2021-10-27 PROCEDURE — 3008F PR BODY MASS INDEX (BMI) DOCUMENTED: ICD-10-PCS | Mod: CPTII,S$GLB,, | Performed by: INTERNAL MEDICINE

## 2021-10-27 PROCEDURE — 99499 RISK ADDL DX/OHS AUDIT: ICD-10-PCS | Mod: S$GLB,,, | Performed by: INTERNAL MEDICINE

## 2021-10-27 PROCEDURE — 1160F PR REVIEW ALL MEDS BY PRESCRIBER/CLIN PHARMACIST DOCUMENTED: ICD-10-PCS | Mod: CPTII,S$GLB,, | Performed by: INTERNAL MEDICINE

## 2021-10-27 PROCEDURE — 1126F AMNT PAIN NOTED NONE PRSNT: CPT | Mod: CPTII,S$GLB,, | Performed by: INTERNAL MEDICINE

## 2021-10-27 PROCEDURE — 1159F PR MEDICATION LIST DOCUMENTED IN MEDICAL RECORD: ICD-10-PCS | Mod: CPTII,S$GLB,, | Performed by: INTERNAL MEDICINE

## 2021-10-27 PROCEDURE — 99499 UNLISTED E&M SERVICE: CPT | Mod: S$GLB,,, | Performed by: INTERNAL MEDICINE

## 2021-10-27 PROCEDURE — 3074F PR MOST RECENT SYSTOLIC BLOOD PRESSURE < 130 MM HG: ICD-10-PCS | Mod: CPTII,S$GLB,, | Performed by: INTERNAL MEDICINE

## 2021-10-27 PROCEDURE — 1126F PR PAIN SEVERITY QUANTIFIED, NO PAIN PRESENT: ICD-10-PCS | Mod: CPTII,S$GLB,, | Performed by: INTERNAL MEDICINE

## 2021-10-27 PROCEDURE — 1101F PR PT FALLS ASSESS DOC 0-1 FALLS W/OUT INJ PAST YR: ICD-10-PCS | Mod: CPTII,S$GLB,, | Performed by: INTERNAL MEDICINE

## 2021-10-27 PROCEDURE — 1160F RVW MEDS BY RX/DR IN RCRD: CPT | Mod: CPTII,S$GLB,, | Performed by: INTERNAL MEDICINE

## 2021-10-27 PROCEDURE — 1159F MED LIST DOCD IN RCRD: CPT | Mod: CPTII,S$GLB,, | Performed by: INTERNAL MEDICINE

## 2021-10-27 PROCEDURE — 3008F BODY MASS INDEX DOCD: CPT | Mod: CPTII,S$GLB,, | Performed by: INTERNAL MEDICINE

## 2021-10-27 RX ORDER — FLUTICASONE PROPIONATE AND SALMETEROL 250; 50 UG/1; UG/1
1 POWDER RESPIRATORY (INHALATION) 2 TIMES DAILY
Qty: 60 EACH | Refills: 5 | Status: SHIPPED | OUTPATIENT
Start: 2021-10-27 | End: 2022-02-14

## 2021-10-27 RX ORDER — HYDROGEN PEROXIDE 3 %
20 SOLUTION, NON-ORAL MISCELLANEOUS
Qty: 90 CAPSULE | Refills: 3 | Status: SHIPPED | OUTPATIENT
Start: 2021-10-27 | End: 2023-02-12 | Stop reason: SDUPTHER

## 2021-11-04 ENCOUNTER — LAB VISIT (OUTPATIENT)
Dept: LAB | Facility: HOSPITAL | Age: 71
End: 2021-11-04
Attending: INTERNAL MEDICINE
Payer: MEDICARE

## 2021-11-04 DIAGNOSIS — K22.70 BARRETT'S ESOPHAGUS WITHOUT DYSPLASIA: ICD-10-CM

## 2021-11-04 DIAGNOSIS — K21.9 GASTROESOPHAGEAL REFLUX DISEASE, UNSPECIFIED WHETHER ESOPHAGITIS PRESENT: ICD-10-CM

## 2021-11-04 LAB
ALBUMIN SERPL BCP-MCNC: 4 G/DL (ref 3.5–5.2)
ALP SERPL-CCNC: 46 U/L (ref 55–135)
ALT SERPL W/O P-5'-P-CCNC: 12 U/L (ref 10–44)
ANION GAP SERPL CALC-SCNC: 8 MMOL/L (ref 8–16)
AST SERPL-CCNC: 17 U/L (ref 10–40)
BASOPHILS # BLD AUTO: 0.03 K/UL (ref 0–0.2)
BASOPHILS NFR BLD: 0.8 % (ref 0–1.9)
BILIRUB SERPL-MCNC: 0.9 MG/DL (ref 0.1–1)
BUN SERPL-MCNC: 16 MG/DL (ref 8–23)
CALCIUM SERPL-MCNC: 9.1 MG/DL (ref 8.7–10.5)
CHLORIDE SERPL-SCNC: 106 MMOL/L (ref 95–110)
CO2 SERPL-SCNC: 28 MMOL/L (ref 23–29)
CREAT SERPL-MCNC: 0.9 MG/DL (ref 0.5–1.4)
DIFFERENTIAL METHOD: ABNORMAL
EOSINOPHIL # BLD AUTO: 0.1 K/UL (ref 0–0.5)
EOSINOPHIL NFR BLD: 3.7 % (ref 0–8)
ERYTHROCYTE [DISTWIDTH] IN BLOOD BY AUTOMATED COUNT: 12.6 % (ref 11.5–14.5)
EST. GFR  (AFRICAN AMERICAN): >60 ML/MIN/1.73 M^2
EST. GFR  (NON AFRICAN AMERICAN): >60 ML/MIN/1.73 M^2
GLUCOSE SERPL-MCNC: 87 MG/DL (ref 70–110)
HCT VFR BLD AUTO: 40.9 % (ref 37–48.5)
HGB BLD-MCNC: 13.7 G/DL (ref 12–16)
IMM GRANULOCYTES # BLD AUTO: 0.01 K/UL (ref 0–0.04)
IMM GRANULOCYTES NFR BLD AUTO: 0.3 % (ref 0–0.5)
LYMPHOCYTES # BLD AUTO: 1.1 K/UL (ref 1–4.8)
LYMPHOCYTES NFR BLD: 28.5 % (ref 18–48)
MCH RBC QN AUTO: 30 PG (ref 27–31)
MCHC RBC AUTO-ENTMCNC: 33.5 G/DL (ref 32–36)
MCV RBC AUTO: 90 FL (ref 82–98)
MONOCYTES # BLD AUTO: 0.3 K/UL (ref 0.3–1)
MONOCYTES NFR BLD: 9 % (ref 4–15)
NEUTROPHILS # BLD AUTO: 2.2 K/UL (ref 1.8–7.7)
NEUTROPHILS NFR BLD: 57.7 % (ref 38–73)
NRBC BLD-RTO: 0 /100 WBC
PLATELET # BLD AUTO: 158 K/UL (ref 150–450)
PMV BLD AUTO: 10.3 FL (ref 9.2–12.9)
POTASSIUM SERPL-SCNC: 4.2 MMOL/L (ref 3.5–5.1)
PROT SERPL-MCNC: 6.8 G/DL (ref 6–8.4)
RBC # BLD AUTO: 4.57 M/UL (ref 4–5.4)
SODIUM SERPL-SCNC: 142 MMOL/L (ref 136–145)
WBC # BLD AUTO: 3.76 K/UL (ref 3.9–12.7)

## 2021-11-04 PROCEDURE — 85025 COMPLETE CBC W/AUTO DIFF WBC: CPT | Performed by: INTERNAL MEDICINE

## 2021-11-04 PROCEDURE — 36415 COLL VENOUS BLD VENIPUNCTURE: CPT | Performed by: INTERNAL MEDICINE

## 2021-11-04 PROCEDURE — 80053 COMPREHEN METABOLIC PANEL: CPT | Performed by: INTERNAL MEDICINE

## 2021-12-10 ENCOUNTER — PATIENT MESSAGE (OUTPATIENT)
Dept: ORTHOPEDICS | Facility: CLINIC | Age: 71
End: 2021-12-10
Payer: MEDICARE

## 2022-01-03 ENCOUNTER — PATIENT MESSAGE (OUTPATIENT)
Dept: ADMINISTRATIVE | Facility: OTHER | Age: 72
End: 2022-01-03
Payer: MEDICARE

## 2022-01-03 ENCOUNTER — PATIENT MESSAGE (OUTPATIENT)
Dept: ORTHOPEDICS | Facility: CLINIC | Age: 72
End: 2022-01-03
Payer: MEDICARE

## 2022-01-03 ENCOUNTER — PATIENT OUTREACH (OUTPATIENT)
Dept: ADMINISTRATIVE | Facility: OTHER | Age: 72
End: 2022-01-03
Payer: MEDICARE

## 2022-01-03 ENCOUNTER — LAB VISIT (OUTPATIENT)
Dept: PRIMARY CARE CLINIC | Facility: OTHER | Age: 72
End: 2022-01-03
Payer: MEDICARE

## 2022-01-03 DIAGNOSIS — R05.9 COUGH: ICD-10-CM

## 2022-01-03 PROCEDURE — U0003 INFECTIOUS AGENT DETECTION BY NUCLEIC ACID (DNA OR RNA); SEVERE ACUTE RESPIRATORY SYNDROME CORONAVIRUS 2 (SARS-COV-2) (CORONAVIRUS DISEASE [COVID-19]), AMPLIFIED PROBE TECHNIQUE, MAKING USE OF HIGH THROUGHPUT TECHNOLOGIES AS DESCRIBED BY CMS-2020-01-R: HCPCS | Performed by: FAMILY MEDICINE

## 2022-01-03 NOTE — PROGRESS NOTES
Health Maintenance Due   Topic Date Due    Shingles Vaccine (2 of 3) 04/16/2017     Updates were requested from care everywhere.  Chart was reviewed for overdue Proactive [facility] Encounters (CLAUDINE) topics (CRS, Breast Cancer Screening, Eye exam)  Health Maintenance has been updated.  [place] immunization registry triggered.  Immunizations were reconciled.

## 2022-01-04 ENCOUNTER — PATIENT MESSAGE (OUTPATIENT)
Dept: ADMINISTRATIVE | Facility: OTHER | Age: 72
End: 2022-01-04
Payer: MEDICARE

## 2022-01-06 ENCOUNTER — LAB VISIT (OUTPATIENT)
Dept: PRIMARY CARE CLINIC | Facility: CLINIC | Age: 72
End: 2022-01-06
Payer: MEDICARE

## 2022-01-06 ENCOUNTER — PATIENT MESSAGE (OUTPATIENT)
Dept: ADMINISTRATIVE | Facility: OTHER | Age: 72
End: 2022-01-06
Payer: MEDICARE

## 2022-01-06 DIAGNOSIS — Z20.822 CONTACT WITH AND (SUSPECTED) EXPOSURE TO COVID-19: ICD-10-CM

## 2022-01-06 LAB
CTP QC/QA: YES
SARS-COV-2 AG RESP QL IA.RAPID: NEGATIVE

## 2022-01-06 PROCEDURE — 87811 SARS-COV-2 COVID19 W/OPTIC: CPT

## 2022-01-07 ENCOUNTER — PATIENT MESSAGE (OUTPATIENT)
Dept: ORTHOPEDICS | Facility: CLINIC | Age: 72
End: 2022-01-07
Payer: MEDICARE

## 2022-01-07 LAB
SARS-COV-2 RNA RESP QL NAA+PROBE: NOT DETECTED
SARS-COV-2- CYCLE NUMBER: NORMAL

## 2022-01-21 ENCOUNTER — PATIENT MESSAGE (OUTPATIENT)
Dept: FAMILY MEDICINE | Facility: CLINIC | Age: 72
End: 2022-01-21
Payer: MEDICARE

## 2022-01-27 ENCOUNTER — OFFICE VISIT (OUTPATIENT)
Dept: PODIATRY | Facility: CLINIC | Age: 72
End: 2022-01-27
Payer: MEDICARE

## 2022-01-27 VITALS — HEIGHT: 71 IN | BODY MASS INDEX: 22.26 KG/M2 | WEIGHT: 159 LBS

## 2022-01-27 DIAGNOSIS — B35.1 TINEA UNGUIUM: ICD-10-CM

## 2022-01-27 DIAGNOSIS — L60.1 ONYCHOLYSIS: Primary | ICD-10-CM

## 2022-01-27 PROCEDURE — 1159F PR MEDICATION LIST DOCUMENTED IN MEDICAL RECORD: ICD-10-PCS | Mod: CPTII,S$GLB,, | Performed by: PODIATRIST

## 2022-01-27 PROCEDURE — 99999 PR PBB SHADOW E&M-EST. PATIENT-LVL III: ICD-10-PCS | Mod: PBBFAC,,, | Performed by: PODIATRIST

## 2022-01-27 PROCEDURE — 1101F PR PT FALLS ASSESS DOC 0-1 FALLS W/OUT INJ PAST YR: ICD-10-PCS | Mod: CPTII,S$GLB,, | Performed by: PODIATRIST

## 2022-01-27 PROCEDURE — 1160F RVW MEDS BY RX/DR IN RCRD: CPT | Mod: CPTII,S$GLB,, | Performed by: PODIATRIST

## 2022-01-27 PROCEDURE — 99999 PR PBB SHADOW E&M-EST. PATIENT-LVL III: CPT | Mod: PBBFAC,,, | Performed by: PODIATRIST

## 2022-01-27 PROCEDURE — 1126F AMNT PAIN NOTED NONE PRSNT: CPT | Mod: CPTII,S$GLB,, | Performed by: PODIATRIST

## 2022-01-27 PROCEDURE — 1160F PR REVIEW ALL MEDS BY PRESCRIBER/CLIN PHARMACIST DOCUMENTED: ICD-10-PCS | Mod: CPTII,S$GLB,, | Performed by: PODIATRIST

## 2022-01-27 PROCEDURE — 3288F PR FALLS RISK ASSESSMENT DOCUMENTED: ICD-10-PCS | Mod: CPTII,S$GLB,, | Performed by: PODIATRIST

## 2022-01-27 PROCEDURE — 1101F PT FALLS ASSESS-DOCD LE1/YR: CPT | Mod: CPTII,S$GLB,, | Performed by: PODIATRIST

## 2022-01-27 PROCEDURE — 99213 PR OFFICE/OUTPT VISIT, EST, LEVL III, 20-29 MIN: ICD-10-PCS | Mod: S$GLB,,, | Performed by: PODIATRIST

## 2022-01-27 PROCEDURE — 3008F PR BODY MASS INDEX (BMI) DOCUMENTED: ICD-10-PCS | Mod: CPTII,S$GLB,, | Performed by: PODIATRIST

## 2022-01-27 PROCEDURE — 1126F PR PAIN SEVERITY QUANTIFIED, NO PAIN PRESENT: ICD-10-PCS | Mod: CPTII,S$GLB,, | Performed by: PODIATRIST

## 2022-01-27 PROCEDURE — 3288F FALL RISK ASSESSMENT DOCD: CPT | Mod: CPTII,S$GLB,, | Performed by: PODIATRIST

## 2022-01-27 PROCEDURE — 99213 OFFICE O/P EST LOW 20 MIN: CPT | Mod: S$GLB,,, | Performed by: PODIATRIST

## 2022-01-27 PROCEDURE — 1159F MED LIST DOCD IN RCRD: CPT | Mod: CPTII,S$GLB,, | Performed by: PODIATRIST

## 2022-01-27 PROCEDURE — 3008F BODY MASS INDEX DOCD: CPT | Mod: CPTII,S$GLB,, | Performed by: PODIATRIST

## 2022-01-27 NOTE — PROGRESS NOTES
Subjective:      Patient ID: Sunni Lowe is a 71 y.o. female.    Chief Complaint: Ingrown Toenail    71 y.o. female presenting with bilateral hallux nail pain. Known to Dr. Slaughter who prescribed Lamisil.  Patient has been taking Lamisil for 3 months.  Tells me discoloration of the nail somewhat resolved.  She is not currently taking Lamisil.  Complains of pain of the right hallux nail with partial detachment.  Ambulating in open toe shoe.  Denies trauma.  Enquiring different treatment options for discoloration of the nail.     3/10/21 Presenting with bilateral  Hallux ingrown toenail along the medial border.  Patient usually gets pedicure service which helps with pain but not completely. Aching pain. She works out regularly and bothers her when she wears closed toe shoe.     1/27/22 presenting with concern for L 3rd toenail. Noticed L 3rd toenail lifted up recently. Denies injury. Denies pain. She is also s/p b/l hallux PNA medial border by me last time. Doing well without pain today however it bothers her time to time when she wears closed toe shoe.       Review of Systems   Constitutional: Negative for chills, decreased appetite, fever and malaise/fatigue.   HENT: Negative for congestion, ear discharge and sore throat.    Eyes: Negative for discharge and pain.   Cardiovascular: Negative for chest pain, claudication and leg swelling.   Respiratory: Negative for cough and shortness of breath.    Skin: Positive for color change. Negative for nail changes and rash.   Musculoskeletal: Negative for arthritis, joint pain, joint swelling and muscle weakness.   Gastrointestinal: Negative for bloating, abdominal pain, diarrhea, nausea and vomiting.   Genitourinary: Negative for flank pain and hematuria.   Neurological: Negative for headaches, numbness and weakness.   Psychiatric/Behavioral: Negative for altered mental status.             Past Medical History:   Diagnosis Date    Allergic rhinitis     Asthma      Cortes's esophagus     Fibrocystic breast     Osteopenia     Varicose veins     sees Dr. Brigido Quinones       Past Surgical History:   Procedure Laterality Date    BREAST BIOPSY Left     COLONOSCOPY  2014    Dr. Canas - repeat in 5 to 10 years    ESOPHAGOGASTRODUODENOSCOPY N/A 10/19/2018    Procedure: EGD (ESOPHAGOGASTRODUODENOSCOPY);  Surgeon: Cassie Huber MD;  Location: [facility];  Service: Endoscopy;  Laterality: N/A;    KNEE SURGERY Bilateral     meniscus repair    peniculitis surgery      x 3       Family History   Problem Relation Age of Onset    Ovarian cancer Mother 68    Alzheimer's disease Mother         @ 62    Cancer Father 85        tongue, and Lung    Diabetes Father     Alzheimer's disease Maternal Aunt         @ age 80    No Known Problems Sister     No Known Problems Brother     No Known Problems Sister        Social History     Socioeconomic History    Marital status:     Number of children: 2   Tobacco Use    Smoking status: Former Smoker     Packs/day: 1.00     Years: 10.00     Pack years: 10.00     Quit date:      Years since quittin.0    Smokeless tobacco: Never Used   Substance and Sexual Activity    Alcohol use: Yes     Comment: one glass of wine daily    Drug use: No    Sexual activity: Not Currently     Partners: Male     Comment:        Current Outpatient Medications   Medication Sig Dispense Refill    calcium carbonate (OS-ISABELLE) 600 mg (1,500 mg) Tab Take 1,200 mg by mouth once daily.      cholecalciferol, vitamin D3, (VITAMIN D3) 10,000 unit Cap Take 10,000 Units by mouth once daily.       collagen, bovine, 100 % Powd       CYANOCOBALAMIN, VITAMIN B-12, (VITAMIN B-12 ORAL) Take by mouth.      esomeprazole (NEXIUM) 20 MG capsule Take 1 capsule (20 mg total) by mouth before breakfast. 90 capsule 3    fish oil-omega-3 fatty acids 300-1,000 mg capsule Take 600 mg by mouth once daily.      fluticasone-salmeterol diskus  "inhaler 250-50 mcg Inhale 1 puff into the lungs 2 (two) times daily. Controller 60 each 5    zoledronic acid/mannitol-water (RECLAST IV) Every other year      amoxicillin-clavulanate 875-125mg (AUGMENTIN) 875-125 mg per tablet Take 1 tablet by mouth twice a day (Patient not taking: Reported on 1/27/2022) 20 tablet 0    benzonatate (TESSALON) 100 MG capsule Take 1 capsule by mouth three times a day as needed (Patient not taking: Reported on 1/27/2022) 9 capsule 0    levocetirizine (XYZAL) 5 MG tablet Take 1 tablet (5 mg total) by mouth every evening. 30 tablet 5     Current Facility-Administered Medications   Medication Dose Route Frequency Provider Last Rate Last Admin    acetaminophen tablet 650 mg  650 mg Oral PRN Garland Garber MD        albuterol inhaler 2 puff  2 puff Inhalation PRN Garland Garber MD        diphenhydrAMINE injection 25 mg  25 mg Intravenous PRN Garland Garber MD        EPINEPHrine (EPIPEN) 0.3 mg/0.3 mL pen injection 0.3 mg  0.3 mg Intramuscular PRN Garland Garber MD        methylPREDNISolone sodium succinate injection 40 mg  40 mg Intravenous PRN Garland Garber MD        ondansetron disintegrating tablet 4 mg  4 mg Oral PRN Garland Garber MD        sodium chloride 0.9% 500 mL flush bag   Intravenous PRN Garland Garber MD        sodium chloride 0.9% flush 10 mL  10 mL Intravenous PRN Garland Garber MD         Facility-Administered Medications Ordered in Other Visits   Medication Dose Route Frequency Provider Last Rate Last Admin    0.9%  NaCl infusion   Intravenous Continuous Cassie Huber MD   Stopped at 10/19/18 0839    sodium chloride 0.9% flush 3 mL  3 mL Intravenous PRN Cassie Huber MD           Review of patient's allergies indicates:  No Known Allergies    Vitals:    01/27/22 1057   Weight: 72.1 kg (159 lb)   Height: 5' 11" (1.803 m)   PainSc: 0-No pain       Objective:      Physical Exam  Constitutional:       General: She is " not in acute distress.     Appearance: She is well-developed.   HENT:      Nose: Nose normal.   Eyes:      Conjunctiva/sclera: Conjunctivae normal.   Pulmonary:      Effort: Pulmonary effort is normal.   Chest:      Chest wall: No tenderness.   Abdominal:      Tenderness: There is no abdominal tenderness.   Musculoskeletal:      Cervical back: Normal range of motion.   Neurological:      Mental Status: She is alert and oriented to person, place, and time.   Psychiatric:         Behavior: Behavior normal.         Vascular: Distal DP/PT pulses palpable 2/4. CRT < 3 sec to tips of toes. No vericosities noted to LEs. Hair growth present LE, warm to touch LE, No edema noted to LE.    Dermatologic: No open lesions, lacerations or wounds. Interdigital spaces clean, dry and intact.   L foot: onycholysis 3rd toenail. No nail bed injury.     Musculoskeletal: MMT 5/5 in DF/PF/Inv/Ev resistance with no reproduction of pain in any direction. Passive range of motion of ankle and pedal joints is painless. No calf tenderness LE, Compartments soft/compressible.     Neurological: Light touch, proprioception, and sharp/dull sensation are all intact. Protective threshold with the [place]-Wienstein monofilament is intact. Vibratory sensation intact.         Assessment:       Encounter Diagnoses   Name Primary?    Onycholysis - Left Foot Yes    Tinea unguium          Plan:       Sunni was seen today for ingrown toenail.    Diagnoses and all orders for this visit:    Onycholysis - Left Foot    Tinea unguium      I counseled the patient on her conditions, their implications and medical management.    71 y.o. female with L 3rd toe onycholysis.    -sharply debrided L 3rd toenail. Tolerated well. No nail bed injury noted. She already has topical medication for her onychomycosis. Recommend to continue with it.     -The nature of the condition, options for management, as well as potential risks and complications were discussed in detail with  patient. Patient was amenable to my recommendations and left my office fully informed and will follow up as instructed or sooner if necessary.    -Patient was advised of signs and symptoms of infection including redness, drainage, purulence, odor, streaking, fever, chills and I advised patient to seek medical attention (ER or urgent care) if these symptoms arise.   -f/u prn    Note dictated with voice recognition software, please excuse any grammatical errors.

## 2022-02-01 ENCOUNTER — OFFICE VISIT (OUTPATIENT)
Dept: ORTHOPEDICS | Facility: CLINIC | Age: 72
End: 2022-02-01
Payer: MEDICARE

## 2022-02-01 VITALS — BODY MASS INDEX: 22.25 KG/M2 | WEIGHT: 158.94 LBS | HEIGHT: 71 IN

## 2022-02-01 DIAGNOSIS — M17.0 PRIMARY OSTEOARTHRITIS OF BOTH KNEES: Primary | ICD-10-CM

## 2022-02-01 PROCEDURE — 1160F PR REVIEW ALL MEDS BY PRESCRIBER/CLIN PHARMACIST DOCUMENTED: ICD-10-PCS | Mod: CPTII,S$GLB,, | Performed by: ORTHOPAEDIC SURGERY

## 2022-02-01 PROCEDURE — 3288F PR FALLS RISK ASSESSMENT DOCUMENTED: ICD-10-PCS | Mod: CPTII,S$GLB,, | Performed by: ORTHOPAEDIC SURGERY

## 2022-02-01 PROCEDURE — 1159F PR MEDICATION LIST DOCUMENTED IN MEDICAL RECORD: ICD-10-PCS | Mod: CPTII,S$GLB,, | Performed by: ORTHOPAEDIC SURGERY

## 2022-02-01 PROCEDURE — 3008F BODY MASS INDEX DOCD: CPT | Mod: CPTII,S$GLB,, | Performed by: ORTHOPAEDIC SURGERY

## 2022-02-01 PROCEDURE — 20610 LARGE JOINT ASPIRATION/INJECTION: BILATERAL KNEE: ICD-10-PCS | Mod: 50,S$GLB,, | Performed by: ORTHOPAEDIC SURGERY

## 2022-02-01 PROCEDURE — 99213 PR OFFICE/OUTPT VISIT, EST, LEVL III, 20-29 MIN: ICD-10-PCS | Mod: 25,S$GLB,, | Performed by: ORTHOPAEDIC SURGERY

## 2022-02-01 PROCEDURE — 1126F AMNT PAIN NOTED NONE PRSNT: CPT | Mod: CPTII,S$GLB,, | Performed by: ORTHOPAEDIC SURGERY

## 2022-02-01 PROCEDURE — 3288F FALL RISK ASSESSMENT DOCD: CPT | Mod: CPTII,S$GLB,, | Performed by: ORTHOPAEDIC SURGERY

## 2022-02-01 PROCEDURE — 1101F PT FALLS ASSESS-DOCD LE1/YR: CPT | Mod: CPTII,S$GLB,, | Performed by: ORTHOPAEDIC SURGERY

## 2022-02-01 PROCEDURE — 1160F RVW MEDS BY RX/DR IN RCRD: CPT | Mod: CPTII,S$GLB,, | Performed by: ORTHOPAEDIC SURGERY

## 2022-02-01 PROCEDURE — 1159F MED LIST DOCD IN RCRD: CPT | Mod: CPTII,S$GLB,, | Performed by: ORTHOPAEDIC SURGERY

## 2022-02-01 PROCEDURE — 1126F PR PAIN SEVERITY QUANTIFIED, NO PAIN PRESENT: ICD-10-PCS | Mod: CPTII,S$GLB,, | Performed by: ORTHOPAEDIC SURGERY

## 2022-02-01 PROCEDURE — 99213 OFFICE O/P EST LOW 20 MIN: CPT | Mod: 25,S$GLB,, | Performed by: ORTHOPAEDIC SURGERY

## 2022-02-01 PROCEDURE — 3008F PR BODY MASS INDEX (BMI) DOCUMENTED: ICD-10-PCS | Mod: CPTII,S$GLB,, | Performed by: ORTHOPAEDIC SURGERY

## 2022-02-01 PROCEDURE — 1101F PR PT FALLS ASSESS DOC 0-1 FALLS W/OUT INJ PAST YR: ICD-10-PCS | Mod: CPTII,S$GLB,, | Performed by: ORTHOPAEDIC SURGERY

## 2022-02-01 PROCEDURE — 20610 DRAIN/INJ JOINT/BURSA W/O US: CPT | Mod: 50,S$GLB,, | Performed by: ORTHOPAEDIC SURGERY

## 2022-02-01 PROCEDURE — 99999 PR PBB SHADOW E&M-EST. PATIENT-LVL III: ICD-10-PCS | Mod: PBBFAC,,, | Performed by: ORTHOPAEDIC SURGERY

## 2022-02-01 PROCEDURE — 99999 PR PBB SHADOW E&M-EST. PATIENT-LVL III: CPT | Mod: PBBFAC,,, | Performed by: ORTHOPAEDIC SURGERY

## 2022-02-01 RX ORDER — TRIAMCINOLONE ACETONIDE 40 MG/ML
80 INJECTION, SUSPENSION INTRA-ARTICULAR; INTRAMUSCULAR
Status: DISCONTINUED | OUTPATIENT
Start: 2022-02-01 | End: 2022-02-01 | Stop reason: HOSPADM

## 2022-02-01 RX ADMIN — TRIAMCINOLONE ACETONIDE 80 MG: 40 INJECTION, SUSPENSION INTRA-ARTICULAR; INTRAMUSCULAR at 02:02

## 2022-02-01 NOTE — PROGRESS NOTES
Subjective:      Patient ID: Sunni Lowe is a 71 y.o. female.    Chief Complaint: Follow-up (bilateral knee )    HPI  Follow-up for osteoarthritis.  The patient complains of bilateral pain, slightly more on the left.  She is able to travel and ambulate without limitations or severe pain.  She does acknowledge excellent relief from the past injection.  She uses over-the-counter analgesics presently.            Review of Systems   Constitutional: Negative for fever and weight loss.   HENT: Negative for congestion.    Eyes: Negative for visual disturbance.   Cardiovascular: Negative for chest pain.   Respiratory: Negative for shortness of breath.    Hematologic/Lymphatic: Negative for bleeding problem. Does not bruise/bleed easily.   Skin: Negative for poor wound healing.   Musculoskeletal: Positive for joint pain.   Gastrointestinal: Negative for abdominal pain.   Genitourinary: Negative for dysuria.   Neurological: Negative for seizures.   Psychiatric/Behavioral: Negative for altered mental status.   Allergic/Immunologic: Negative for persistent infections.         Objective:      Ortho/SPM Exam    Left     The patient is not in acute distress.   Sclerae normal  Body habitus is normal.  Respiratory distress:  none   The patient walks without a limp.  Hip irritability  negative.   The skin over the knee is intact.  Knee effusion trace  Palpation- no focal tenderness  Range of motion- Flexion 140 deg, Extension 0 deg,   Ligament laxity exam:   MCL 2+   Lachman 0   Post sag  0    LCL 0  Patellar apprehension negative.  Popliteal cyst negative  Patellar crepitation present.  Meniscal irritability not applicable  Pulses DP present, PT present.  Motor normal 5/5 strength in all tested muscle groups.   Sensory normal.    Right knee  Full range of motion  Trace varus laxity    I reviewed the relevant imaging for the patient's condition:  The left knee has 90% medial narrowing with osteophytes.  The right knee has 30%  lateral narrowing with osteophyte      Assessment:       Encounter Diagnosis   Name Primary?    Primary osteoarthritis of both knees Yes        The condition is structurally moderate to advanced on the left and mild on the right.  The patient does not have enough pain or disability to recommend arthroplasty at the present time.          Plan:       Sunni was seen today for follow-up.    Diagnoses and all orders for this visit:    Primary osteoarthritis of both knees          I explained my diagnostic impression and the reasoning behind it in detail, using layman's terms.  Models and/or pictures were used to help in the explanation.    Injection recommended and consent given    I explained that further progression over time is likely    I explained the potential role of surgery in the treatment of this condition to the patient.  They understand that if nonsurgical measures do not adequately control symptoms, surgery will be considered in the future.

## 2022-02-01 NOTE — PROCEDURES
Large Joint Aspiration/Injection: bilateral knee    Date/Time: 2/1/2022 2:00 PM  Performed by: Leon Arvizu MD  Authorized by: Leon Arvizu MD     Consent Done?:  Yes (Verbal)  Approach:  Anterolateral  Location:  Knee  Laterality:  Bilateral  Site:  Bilateral knee  Medications (Left):  80 mg triamcinolone acetonide 40 mg/mL     After obtaining verbal informed consent both of the patient's knees were prepped aseptically and injected through an inferior lateral approach using 40 mg of triamcinolone and 1 cc of 1% plain Xylocaine.  The patient was warned about postinjection flare and how to manage it with ice, rest and over-the-counter analgesics.  They're advised to contact me for any severe, uncontrolled pain.

## 2022-02-14 ENCOUNTER — OFFICE VISIT (OUTPATIENT)
Dept: FAMILY MEDICINE | Facility: CLINIC | Age: 72
End: 2022-02-14
Payer: MEDICARE

## 2022-02-14 VITALS
OXYGEN SATURATION: 96 % | HEART RATE: 66 BPM | TEMPERATURE: 98 F | SYSTOLIC BLOOD PRESSURE: 126 MMHG | HEIGHT: 71 IN | BODY MASS INDEX: 21.7 KG/M2 | DIASTOLIC BLOOD PRESSURE: 82 MMHG | WEIGHT: 155 LBS

## 2022-02-14 DIAGNOSIS — G47.62 NOCTURNAL LEG CRAMPS: ICD-10-CM

## 2022-02-14 DIAGNOSIS — K21.9 GASTROESOPHAGEAL REFLUX DISEASE, UNSPECIFIED WHETHER ESOPHAGITIS PRESENT: ICD-10-CM

## 2022-02-14 DIAGNOSIS — M81.8 OTHER OSTEOPOROSIS WITHOUT CURRENT PATHOLOGICAL FRACTURE: ICD-10-CM

## 2022-02-14 DIAGNOSIS — R41.89 SUBJECTIVE MEMORY COMPLAINTS: ICD-10-CM

## 2022-02-14 DIAGNOSIS — K22.70 BARRETT'S ESOPHAGUS WITHOUT DYSPLASIA: ICD-10-CM

## 2022-02-14 DIAGNOSIS — J45.40 MODERATE PERSISTENT ASTHMA WITHOUT COMPLICATION: ICD-10-CM

## 2022-02-14 DIAGNOSIS — Z76.89 ENCOUNTER TO ESTABLISH CARE WITH NEW DOCTOR: Primary | ICD-10-CM

## 2022-02-14 DIAGNOSIS — M85.88 OSTEOPENIA OF SPINE: ICD-10-CM

## 2022-02-14 DIAGNOSIS — Z13.6 ENCOUNTER FOR SCREENING FOR CARDIOVASCULAR DISORDERS: ICD-10-CM

## 2022-02-14 PROBLEM — Z86.16 HISTORY OF 2019 NOVEL CORONAVIRUS DISEASE (COVID-19): Status: RESOLVED | Noted: 2020-12-16 | Resolved: 2022-02-14

## 2022-02-14 PROBLEM — R19.8 ABNORMAL BOWEL MOVEMENT: Status: RESOLVED | Noted: 2017-10-02 | Resolved: 2022-02-14

## 2022-02-14 PROBLEM — B35.1 ONYCHOMYCOSIS: Status: RESOLVED | Noted: 2019-07-18 | Resolved: 2022-02-14

## 2022-02-14 PROCEDURE — 1126F AMNT PAIN NOTED NONE PRSNT: CPT | Mod: CPTII,S$GLB,, | Performed by: STUDENT IN AN ORGANIZED HEALTH CARE EDUCATION/TRAINING PROGRAM

## 2022-02-14 PROCEDURE — 99214 OFFICE O/P EST MOD 30 MIN: CPT | Mod: S$GLB,,, | Performed by: STUDENT IN AN ORGANIZED HEALTH CARE EDUCATION/TRAINING PROGRAM

## 2022-02-14 PROCEDURE — 3288F FALL RISK ASSESSMENT DOCD: CPT | Mod: CPTII,S$GLB,, | Performed by: STUDENT IN AN ORGANIZED HEALTH CARE EDUCATION/TRAINING PROGRAM

## 2022-02-14 PROCEDURE — 1160F RVW MEDS BY RX/DR IN RCRD: CPT | Mod: CPTII,S$GLB,, | Performed by: STUDENT IN AN ORGANIZED HEALTH CARE EDUCATION/TRAINING PROGRAM

## 2022-02-14 PROCEDURE — 3008F BODY MASS INDEX DOCD: CPT | Mod: CPTII,S$GLB,, | Performed by: STUDENT IN AN ORGANIZED HEALTH CARE EDUCATION/TRAINING PROGRAM

## 2022-02-14 PROCEDURE — 1159F PR MEDICATION LIST DOCUMENTED IN MEDICAL RECORD: ICD-10-PCS | Mod: CPTII,S$GLB,, | Performed by: STUDENT IN AN ORGANIZED HEALTH CARE EDUCATION/TRAINING PROGRAM

## 2022-02-14 PROCEDURE — 1101F PT FALLS ASSESS-DOCD LE1/YR: CPT | Mod: CPTII,S$GLB,, | Performed by: STUDENT IN AN ORGANIZED HEALTH CARE EDUCATION/TRAINING PROGRAM

## 2022-02-14 PROCEDURE — 3079F PR MOST RECENT DIASTOLIC BLOOD PRESSURE 80-89 MM HG: ICD-10-PCS | Mod: CPTII,S$GLB,, | Performed by: STUDENT IN AN ORGANIZED HEALTH CARE EDUCATION/TRAINING PROGRAM

## 2022-02-14 PROCEDURE — 1101F PR PT FALLS ASSESS DOC 0-1 FALLS W/OUT INJ PAST YR: ICD-10-PCS | Mod: CPTII,S$GLB,, | Performed by: STUDENT IN AN ORGANIZED HEALTH CARE EDUCATION/TRAINING PROGRAM

## 2022-02-14 PROCEDURE — 3074F PR MOST RECENT SYSTOLIC BLOOD PRESSURE < 130 MM HG: ICD-10-PCS | Mod: CPTII,S$GLB,, | Performed by: STUDENT IN AN ORGANIZED HEALTH CARE EDUCATION/TRAINING PROGRAM

## 2022-02-14 PROCEDURE — 3079F DIAST BP 80-89 MM HG: CPT | Mod: CPTII,S$GLB,, | Performed by: STUDENT IN AN ORGANIZED HEALTH CARE EDUCATION/TRAINING PROGRAM

## 2022-02-14 PROCEDURE — 99214 PR OFFICE/OUTPT VISIT, EST, LEVL IV, 30-39 MIN: ICD-10-PCS | Mod: S$GLB,,, | Performed by: STUDENT IN AN ORGANIZED HEALTH CARE EDUCATION/TRAINING PROGRAM

## 2022-02-14 PROCEDURE — 1126F PR PAIN SEVERITY QUANTIFIED, NO PAIN PRESENT: ICD-10-PCS | Mod: CPTII,S$GLB,, | Performed by: STUDENT IN AN ORGANIZED HEALTH CARE EDUCATION/TRAINING PROGRAM

## 2022-02-14 PROCEDURE — 1160F PR REVIEW ALL MEDS BY PRESCRIBER/CLIN PHARMACIST DOCUMENTED: ICD-10-PCS | Mod: CPTII,S$GLB,, | Performed by: STUDENT IN AN ORGANIZED HEALTH CARE EDUCATION/TRAINING PROGRAM

## 2022-02-14 PROCEDURE — 99999 PR PBB SHADOW E&M-EST. PATIENT-LVL IV: ICD-10-PCS | Mod: PBBFAC,,, | Performed by: STUDENT IN AN ORGANIZED HEALTH CARE EDUCATION/TRAINING PROGRAM

## 2022-02-14 PROCEDURE — 1159F MED LIST DOCD IN RCRD: CPT | Mod: CPTII,S$GLB,, | Performed by: STUDENT IN AN ORGANIZED HEALTH CARE EDUCATION/TRAINING PROGRAM

## 2022-02-14 PROCEDURE — 3074F SYST BP LT 130 MM HG: CPT | Mod: CPTII,S$GLB,, | Performed by: STUDENT IN AN ORGANIZED HEALTH CARE EDUCATION/TRAINING PROGRAM

## 2022-02-14 PROCEDURE — 99999 PR PBB SHADOW E&M-EST. PATIENT-LVL IV: CPT | Mod: PBBFAC,,, | Performed by: STUDENT IN AN ORGANIZED HEALTH CARE EDUCATION/TRAINING PROGRAM

## 2022-02-14 PROCEDURE — 3288F PR FALLS RISK ASSESSMENT DOCUMENTED: ICD-10-PCS | Mod: CPTII,S$GLB,, | Performed by: STUDENT IN AN ORGANIZED HEALTH CARE EDUCATION/TRAINING PROGRAM

## 2022-02-14 PROCEDURE — 3008F PR BODY MASS INDEX (BMI) DOCUMENTED: ICD-10-PCS | Mod: CPTII,S$GLB,, | Performed by: STUDENT IN AN ORGANIZED HEALTH CARE EDUCATION/TRAINING PROGRAM

## 2022-02-14 RX ORDER — FLUTICASONE PROPIONATE AND SALMETEROL 100; 50 UG/1; UG/1
1 POWDER RESPIRATORY (INHALATION) 2 TIMES DAILY
COMMUNITY
End: 2022-07-05

## 2022-02-14 NOTE — ASSESSMENT & PLAN NOTE
Stays active mentally and physically   Feels that symptoms are stable   Cleared from neurology perspective  concern due to mothers + hx of Alzheimer's ( of ovarian cancer)

## 2022-02-14 NOTE — PROGRESS NOTES
Subjective:       Patient ID: Sunni oLwe is a 71 y.o. female.    Chief Complaint: Establish Care (Pt here to [facility], former pt of Dr. Sexton )    Subjective memory complaints  Stays active mentally and physically   Feels that symptoms are stable   Cleared from neurology perspective  concern due to mothers + hx of Alzheimer's ( of ovarian cancer)     Asthma  Stable  Using Advair; only once a day   Well controlled, no complaints     Cortes's esophagus  nexium daily   No complaints     Gastroesophageal reflux disease  nexium daily   No complaints     Osteopenia  reclast every other year as does not tolerate bisphosphonate   Stable        C/o nighttime leg cramps; K seems to help; also notes does not drink enough water at times which may contribute   UTD screenings       Health Maintenance Due   Topic Date Due    Shingles Vaccine (2 of 3) 2017        Review of Systems   Constitutional: Negative for fever.   HENT: Negative.    Respiratory: Negative for cough, shortness of breath and wheezing.    Cardiovascular: Negative for chest pain and leg swelling.   Gastrointestinal: Negative for abdominal pain, diarrhea, nausea and vomiting.   Genitourinary: Negative.  Negative for difficulty urinating, dysuria and frequency.   Musculoskeletal: Negative.    Neurological: Negative.  Negative for dizziness, numbness and headaches.   Psychiatric/Behavioral: Negative for dysphoric mood. The patient is not nervous/anxious.       Objective:      Vitals:    22 1311   BP: 126/82   Pulse: 66   Temp: 98.2 °F (36.8 °C)      Physical Exam  Constitutional:       Appearance: Normal appearance. She is normal weight.   HENT:      Head: Normocephalic and atraumatic.   Eyes:      Conjunctiva/sclera: Conjunctivae normal.   Cardiovascular:      Rate and Rhythm: Normal rate and regular rhythm.      Heart sounds: Normal heart sounds.   Pulmonary:      Effort: Pulmonary effort is normal.      Breath sounds: Normal  breath sounds.   Abdominal:      Palpations: Abdomen is soft.      Tenderness: There is no abdominal tenderness.   Musculoskeletal:         General: Normal range of motion.      Cervical back: Normal range of motion.      Right lower leg: No edema.      Left lower leg: No edema.   Neurological:      General: No focal deficit present.      Mental Status: She is alert and oriented to person, place, and time.   Psychiatric:         Mood and Affect: Mood normal.         Behavior: Behavior normal.          Assessment:       1. Encounter to establish care with new doctor    2. Nocturnal leg cramps    3. Osteopenia of spine    4. Other osteoporosis without current pathological fracture     5. Subjective memory complaints    6. Moderate persistent asthma without complication    7. Cortes's esophagus without dysplasia    8. Gastroesophageal reflux disease, unspecified whether esophagitis present    9. Encounter for screening for cardiovascular disorders         Plan:     1. Encounter to establish care with new doctor    2. Nocturnal leg cramps  - CBC Without Differential; Future  - Comprehensive Metabolic Panel; Future  - TSH; Future  - Magnesium; Future  - CBC Without Differential  - Comprehensive Metabolic Panel  - TSH  - Magnesium    3. Osteopenia of spine  - TSH; Future  - TSH    4. Other osteoporosis without current pathological fracture   - TSH; Future  - TSH    5. Subjective memory complaints    6. Moderate persistent asthma without complication    7. Cortes's esophagus without dysplasia    8. Gastroesophageal reflux disease, unspecified whether esophagitis present    9. Encounter for screening for cardiovascular disorders   - CBC Without Differential; Future  - Comprehensive Metabolic Panel; Future  - Hemoglobin A1C; Future  - Lipid Panel; Future  - CBC Without Differential  - Comprehensive Metabolic Panel  - Hemoglobin A1C  - Lipid Panel     Establish care  Labs per orders  UTD screenings; due for shingles  vaccine  Start magnesium for leg cramps; encouraged fluid intake; check labs   Continue current medications as prescribed  Keep routine specialist f/u   RTC in 6 months for routine f/u and/or prn       Alison Rothsmacario [facility]   2/14/22

## 2022-02-15 LAB
ALBUMIN SERPL-MCNC: 4.8 G/DL (ref 3.6–5.1)
ALBUMIN/GLOB SERPL: 1.8 (CALC) (ref 1–2.5)
ALP SERPL-CCNC: 54 U/L (ref 37–153)
ALT SERPL-CCNC: 13 U/L (ref 6–29)
AST SERPL-CCNC: 19 U/L (ref 10–35)
BILIRUB SERPL-MCNC: 0.7 MG/DL (ref 0.2–1.2)
BUN SERPL-MCNC: 20 MG/DL (ref 7–25)
BUN/CREAT SERPL: NORMAL (CALC) (ref 6–22)
CALCIUM SERPL-MCNC: 9.9 MG/DL (ref 8.6–10.4)
CHLORIDE SERPL-SCNC: 101 MMOL/L (ref 98–110)
CHOLEST SERPL-MCNC: 191 MG/DL
CHOLEST/HDLC SERPL: 2.9 (CALC)
CO2 SERPL-SCNC: 28 MMOL/L (ref 20–32)
CREAT SERPL-MCNC: 0.91 MG/DL (ref 0.5–0.99)
GLOBULIN SER CALC-MCNC: 2.6 G/DL (CALC) (ref 1.9–3.7)
GLUCOSE SERPL-MCNC: 83 MG/DL (ref 65–99)
HBA1C MFR BLD: 5.2 % OF TOTAL HGB
HDLC SERPL-MCNC: 65 MG/DL
LDLC SERPL CALC-MCNC: 109 MG/DL (CALC)
MAGNESIUM SERPL-MCNC: 2.1 MG/DL (ref 1.5–2.5)
NONHDLC SERPL-MCNC: 126 MG/DL (CALC)
POTASSIUM SERPL-SCNC: 4.4 MMOL/L (ref 3.5–5.3)
PROT SERPL-MCNC: 7.4 G/DL (ref 6.1–8.1)
SODIUM SERPL-SCNC: 141 MMOL/L (ref 135–146)
TRIGL SERPL-MCNC: 81 MG/DL
TSH SERPL-ACNC: 1.67 MIU/L (ref 0.4–4.5)

## 2022-03-08 ENCOUNTER — PATIENT OUTREACH (OUTPATIENT)
Dept: ADMINISTRATIVE | Facility: OTHER | Age: 72
End: 2022-03-08
Payer: MEDICARE

## 2022-03-09 ENCOUNTER — OFFICE VISIT (OUTPATIENT)
Dept: PODIATRY | Facility: CLINIC | Age: 72
End: 2022-03-09
Payer: MEDICARE

## 2022-03-09 VITALS — BODY MASS INDEX: 21.32 KG/M2 | HEIGHT: 71 IN | WEIGHT: 152.31 LBS

## 2022-03-09 DIAGNOSIS — L60.0 INGROWN NAIL: Primary | ICD-10-CM

## 2022-03-09 DIAGNOSIS — L60.1 ONYCHOLYSIS: ICD-10-CM

## 2022-03-09 PROCEDURE — 1159F PR MEDICATION LIST DOCUMENTED IN MEDICAL RECORD: ICD-10-PCS | Mod: CPTII,S$GLB,, | Performed by: PODIATRIST

## 2022-03-09 PROCEDURE — 11730 AVULSION NAIL PLATE SIMPLE 1: CPT | Mod: 59,T2,S$GLB, | Performed by: PODIATRIST

## 2022-03-09 PROCEDURE — 1159F MED LIST DOCD IN RCRD: CPT | Mod: CPTII,S$GLB,, | Performed by: PODIATRIST

## 2022-03-09 PROCEDURE — 99999 PR PBB SHADOW E&M-EST. PATIENT-LVL III: ICD-10-PCS | Mod: PBBFAC,,, | Performed by: PODIATRIST

## 2022-03-09 PROCEDURE — 1101F PR PT FALLS ASSESS DOC 0-1 FALLS W/OUT INJ PAST YR: ICD-10-PCS | Mod: CPTII,S$GLB,, | Performed by: PODIATRIST

## 2022-03-09 PROCEDURE — 3008F PR BODY MASS INDEX (BMI) DOCUMENTED: ICD-10-PCS | Mod: CPTII,S$GLB,, | Performed by: PODIATRIST

## 2022-03-09 PROCEDURE — 1126F PR PAIN SEVERITY QUANTIFIED, NO PAIN PRESENT: ICD-10-PCS | Mod: CPTII,S$GLB,, | Performed by: PODIATRIST

## 2022-03-09 PROCEDURE — 3044F PR MOST RECENT HEMOGLOBIN A1C LEVEL <7.0%: ICD-10-PCS | Mod: CPTII,S$GLB,, | Performed by: PODIATRIST

## 2022-03-09 PROCEDURE — 3008F BODY MASS INDEX DOCD: CPT | Mod: CPTII,S$GLB,, | Performed by: PODIATRIST

## 2022-03-09 PROCEDURE — 3288F FALL RISK ASSESSMENT DOCD: CPT | Mod: CPTII,S$GLB,, | Performed by: PODIATRIST

## 2022-03-09 PROCEDURE — 1101F PT FALLS ASSESS-DOCD LE1/YR: CPT | Mod: CPTII,S$GLB,, | Performed by: PODIATRIST

## 2022-03-09 PROCEDURE — 3288F PR FALLS RISK ASSESSMENT DOCUMENTED: ICD-10-PCS | Mod: CPTII,S$GLB,, | Performed by: PODIATRIST

## 2022-03-09 PROCEDURE — 11750 NAIL REMOVAL: ICD-10-PCS | Mod: T5,S$GLB,, | Performed by: PODIATRIST

## 2022-03-09 PROCEDURE — 11750 EXCISION NAIL&NAIL MATRIX: CPT | Mod: T5,S$GLB,, | Performed by: PODIATRIST

## 2022-03-09 PROCEDURE — 11730 NAIL REMOVAL: ICD-10-PCS | Mod: 59,T2,S$GLB, | Performed by: PODIATRIST

## 2022-03-09 PROCEDURE — 3044F HG A1C LEVEL LT 7.0%: CPT | Mod: CPTII,S$GLB,, | Performed by: PODIATRIST

## 2022-03-09 PROCEDURE — 1160F PR REVIEW ALL MEDS BY PRESCRIBER/CLIN PHARMACIST DOCUMENTED: ICD-10-PCS | Mod: CPTII,S$GLB,, | Performed by: PODIATRIST

## 2022-03-09 PROCEDURE — 99213 OFFICE O/P EST LOW 20 MIN: CPT | Mod: 25,S$GLB,, | Performed by: PODIATRIST

## 2022-03-09 PROCEDURE — 1126F AMNT PAIN NOTED NONE PRSNT: CPT | Mod: CPTII,S$GLB,, | Performed by: PODIATRIST

## 2022-03-09 PROCEDURE — 99213 PR OFFICE/OUTPT VISIT, EST, LEVL III, 20-29 MIN: ICD-10-PCS | Mod: 25,S$GLB,, | Performed by: PODIATRIST

## 2022-03-09 PROCEDURE — 99999 PR PBB SHADOW E&M-EST. PATIENT-LVL III: CPT | Mod: PBBFAC,,, | Performed by: PODIATRIST

## 2022-03-09 PROCEDURE — 1160F RVW MEDS BY RX/DR IN RCRD: CPT | Mod: CPTII,S$GLB,, | Performed by: PODIATRIST

## 2022-03-09 NOTE — PROCEDURES
Nail Removal    Date/Time: 3/9/2022 8:30 AM  Performed by: Olga Lidia Robins DPM  Authorized by: Olga Lidia Robins DPM     Consent Done?:  Yes (Written)  Time out: Immediately prior to the procedure a time out was called    Prep: patient was prepped and draped in usual sterile fashion    Location:     Location:  Left foot    Location detail:  Left third toe  Anesthesia:     Anesthesia:  Local infiltration    Local anesthetic:  Bupivacaine 0.5% without epinephrine    Anesthetic total (ml):  3  Procedure Details:     Preparation:  Skin prepped with Betadine and skin prepped with alcohol    Amount removed:  Complete    Wedge excision of skin of nail fold: No      Nail bed sutured?: No      Nail matrix removed:  None    Removed nail replaced and anchored: No      Dressing applied:  Gauze roll    Patient tolerance:  Patient tolerated the procedure well with no immediate complications     Timeout was performed w/ pt in the room. Side, laterality, procedure was confirmed.

## 2022-03-09 NOTE — PROGRESS NOTES
Subjective:      Patient ID: Sunni Lowe is a 71 y.o. female.    Chief Complaint: Nail Problem (Left foot, 3rd toe)    71 y.o. female presenting with bilateral hallux nail pain. Known to Dr. Slaughter who prescribed Lamisil.  Patient has been taking Lamisil for 3 months.  Tells me discoloration of the nail somewhat resolved.  She is not currently taking Lamisil.  Complains of pain of the right hallux nail with partial detachment.  Ambulating in open toe shoe.  Denies trauma.  Enquiring different treatment options for discoloration of the nail.     3/10/21 Presenting with bilateral  Hallux ingrown toenail along the medial border.  Patient usually gets pedicure service which helps with pain but not completely. Aching pain. She works out regularly and bothers her when she wears closed toe shoe.     1/27/22 presenting with concern for L 3rd toenail. Noticed L 3rd toenail lifted up recently. Denies injury. Denies pain. She is also s/p b/l hallux PNA medial border by me last time. Doing well without pain today however it bothers her time to time when she wears closed toe shoe.      3/9/22 L 3rd toenail and R hallux toenail pain. In casual shoe. L 3rd toenail is loose and causing some discomfort. Points medial and lateral border of hallux for pain. Aching pain. History of PNA by me on b/l hallux in the past. Sensitive to touch along both medial and lateral border of R hallux.       Review of Systems   Constitutional: Negative for chills, decreased appetite, fever and malaise/fatigue.   HENT: Negative for congestion, ear discharge and sore throat.    Eyes: Negative for discharge and pain.   Cardiovascular: Negative for chest pain, claudication and leg swelling.   Respiratory: Negative for cough and shortness of breath.    Skin: Positive for color change. Negative for nail changes and rash.   Musculoskeletal: Negative for arthritis, joint pain, joint swelling and muscle weakness.        B/l feet pain     Gastrointestinal:  Negative for bloating, abdominal pain, diarrhea, nausea and vomiting.   Genitourinary: Negative for flank pain and hematuria.   Neurological: Negative for headaches, numbness and weakness.   Psychiatric/Behavioral: Negative for altered mental status.             Past Medical History:   Diagnosis Date    Allergic rhinitis     Asthma     Cortes's esophagus     Fibrocystic breast     Osteopenia     Varicose veins     sees Dr. Brigido Quinones       Past Surgical History:   Procedure Laterality Date    BREAST BIOPSY Left     COLONOSCOPY  2014    Dr. Canas - repeat in 5 to 10 years    ESOPHAGOGASTRODUODENOSCOPY N/A 10/19/2018    Procedure: EGD (ESOPHAGOGASTRODUODENOSCOPY);  Surgeon: Cassie Huber MD;  Location: [facility];  Service: Endoscopy;  Laterality: N/A;    KNEE SURGERY Bilateral     meniscus repair    peniculitis surgery      x 3       Family History   Problem Relation Age of Onset    Ovarian cancer Mother 68    Alzheimer's disease Mother         @ 62    Cancer Father 85        tongue, and Lung    Diabetes Father     Alzheimer's disease Maternal Aunt         @ age 80    No Known Problems Sister     No Known Problems Brother     No Known Problems Sister        Social History     Socioeconomic History    Marital status:     Number of children: 2   Tobacco Use    Smoking status: Former Smoker     Packs/day: 1.00     Years: 10.00     Pack years: 10.00     Quit date:      Years since quittin.2    Smokeless tobacco: Never Used   Substance and Sexual Activity    Alcohol use: Yes     Comment: one glass of wine daily    Drug use: No    Sexual activity: Not Currently     Partners: Male     Comment:      Social Determinants of Health     Financial Resource Strain: Low Risk     Difficulty of Paying Living Expenses: Not hard at all   Food Insecurity: No Food Insecurity    Worried About Running Out of Food in the Last Year: Never true    Ran Out of Food in the  "Last Year: Never true   Transportation Needs: No Transportation Needs    Lack of Transportation (Medical): No    Lack of Transportation (Non-Medical): No   Physical Activity: Sufficiently Active    Days of Exercise per Week: 4 days    Minutes of Exercise per Session: 90 min   Stress: No Stress Concern Present    Feeling of Stress : Only a little   Social Connections: Unknown    Frequency of Communication with Friends and Family: More than three times a week    Frequency of Social Gatherings with Friends and Family: Once a week    Active Member of Clubs or Organizations: Yes    Attends Club or Organization Meetings: More than 4 times per year    Marital Status:    Housing Stability: Low Risk     Unable to Pay for Housing in the Last Year: No    Number of Places Lived in the Last Year: 1    Unstable Housing in the Last Year: No       Current Outpatient Medications   Medication Sig Dispense Refill    calcium carbonate (OS-ISABELLE) 600 mg (1,500 mg) Tab Take 1,200 mg by mouth once daily.      cholecalciferol, vitamin D3, (VITAMIN D3) 10,000 unit Cap Take 10,000 Units by mouth once daily.       collagen, bovine, 100 % Powd       CYANOCOBALAMIN, VITAMIN B-12, (VITAMIN B-12 ORAL) Take by mouth.      esomeprazole (NEXIUM) 20 MG capsule Take 1 capsule (20 mg total) by mouth before breakfast. 90 capsule 3    fish oil-omega-3 fatty acids 300-1,000 mg capsule Take 600 mg by mouth once daily.      fluticasone-salmeterol diskus inhaler 100-50 mcg Inhale 1 puff into the lungs 2 (two) times daily. Controller      zoledronic acid/mannitol-water (RECLAST IV) Every other year      levocetirizine (XYZAL) 5 MG tablet Take 1 tablet (5 mg total) by mouth every evening. 30 tablet 5     No current facility-administered medications for this visit.       Review of patient's allergies indicates:  No Known Allergies    Vitals:    03/09/22 0846   Weight: 69.1 kg (152 lb 4.8 oz)   Height: 5' 11" (1.803 m)   PainSc: 0-No " pain       Objective:      Physical Exam  Constitutional:       General: She is not in acute distress.     Appearance: She is well-developed.   HENT:      Nose: Nose normal.   Eyes:      Conjunctiva/sclera: Conjunctivae normal.   Pulmonary:      Effort: Pulmonary effort is normal.   Chest:      Chest wall: No tenderness.   Abdominal:      Tenderness: There is no abdominal tenderness.   Musculoskeletal:      Cervical back: Normal range of motion.   Neurological:      Mental Status: She is alert and oriented to person, place, and time.   Psychiatric:         Behavior: Behavior normal.         Vascular: Distal DP/PT pulses palpable 2/4. CRT < 3 sec to tips of toes. No vericosities noted to LEs. Hair growth present LE, warm to touch LE, No edema noted to LE.    Dermatologic: No open lesions, lacerations or wounds. Interdigital spaces clean, dry and intact.   L foot: onycholysis 3rd toenail. No nail bed injury.   R foot: incurvated nail plate along medial and lateral border of hallux.     Musculoskeletal: MMT 5/5 in DF/PF/Inv/Ev resistance with no reproduction of pain in any direction. Passive range of motion of ankle and pedal joints is painless. No calf tenderness LE, Compartments soft/compressible.   R foot: pain over medial and lateral border of hallux.     Neurological: Light touch, proprioception, and sharp/dull sensation are all intact. Protective threshold with the [place]-Wienstein monofilament is intact. Vibratory sensation intact.         Assessment:       Encounter Diagnoses   Name Primary?    Ingrown nail Yes    Onycholysis          Plan:       Sunni was seen today for nail problem.    Diagnoses and all orders for this visit:    Ingrown nail    Onycholysis      I counseled the patient on her conditions, their implications and medical management.    71 y.o. female with L 3rd toe onycholysis with R hallux ingrown toenail.     -PNA with phenol of R hallux.  Patient tolerated well.  See procedure note. TNA of  L 3rd toenail. Tolerated well.   -Recommend to follow soaking instruction instruction was given to patient.  Advised the patient to continue for a week. If  patient experiencing pain, swelling, drainage, any signs of  infection,  I advised to call and come in to  the office for evaluation.  Patient verbalized understanding.  Recommend weight-bearing as tolerated in surgical shoe versus open toe shoe for a week.     -The nature of the condition, options for management, as well as potential risks and complications were discussed in detail with patient. Patient was amenable to my recommendations and left my office fully informed and will follow up as instructed or sooner if necessary.    -Patient was advised of signs and symptoms of infection including redness, drainage, purulence, odor, streaking, fever, chills and I advised patient to seek medical attention (ER or urgent care) if these symptoms arise.   -f/u prn    Note dictated with voice recognition software, please excuse any grammatical errors.

## 2022-03-09 NOTE — PROCEDURES
Nail Removal    Date/Time: 3/9/2022 8:30 AM  Performed by: Olga Lidia Robins DPM  Authorized by: Olga Lidia Robins DPM     Consent Done?:  Yes (Written)  Time out: Immediately prior to the procedure a time out was called    Prep: patient was prepped and draped in usual sterile fashion    Location:     Location:  Right foot    Location detail:  Right big toe  Anesthesia:     Anesthesia:  Local infiltration    Local anesthetic:  Bupivacaine 0.5% without epinephrine    Anesthetic total (ml):  5  Procedure Details:     Preparation:  Skin prepped with alcohol and skin prepped with Betadine    Amount removed:  Partial    Side:  Bilateral    Wedge excision of skin of nail fold: No      Nail bed sutured?: No      Nail matrix removed:  Partial    Removal method:  Phenol and alcohol    Removed nail replaced and anchored: No      Dressing applied:  4x4, antibiotic ointment and gauze roll    Patient tolerance:  Patient tolerated the procedure well with no immediate complications     Timeout was performed w/ pt in the room. Side, laterality, procedure was confirmed.

## 2022-05-02 ENCOUNTER — PATIENT MESSAGE (OUTPATIENT)
Dept: ORTHOPEDICS | Facility: CLINIC | Age: 72
End: 2022-05-02
Payer: MEDICARE

## 2022-05-02 ENCOUNTER — PATIENT OUTREACH (OUTPATIENT)
Dept: ADMINISTRATIVE | Facility: OTHER | Age: 72
End: 2022-05-02
Payer: MEDICARE

## 2022-05-02 NOTE — PROGRESS NOTES
Health Maintenance Due   Topic Date Due    Shingles Vaccine (2 of 3) 04/16/2017    COVID-19 Vaccine (4 - Booster for Pfizer series) 02/14/2022    DEXA Scan  05/21/2022    Mammogram  06/28/2022     Updates were requested from care everywhere.  Chart was reviewed for overdue Proactive [facility] Encounters ([facility]UDINE) topics (CRS, Breast Cancer Screening, Eye exam)  Health Maintenance has been updated.  LINKS immunization registry triggered.  Immunizations were reconciled.

## 2022-05-03 ENCOUNTER — OFFICE VISIT (OUTPATIENT)
Dept: ORTHOPEDICS | Facility: CLINIC | Age: 72
End: 2022-05-03
Payer: MEDICARE

## 2022-05-03 VITALS — BODY MASS INDEX: 21.28 KG/M2 | WEIGHT: 152 LBS | HEIGHT: 71 IN

## 2022-05-03 DIAGNOSIS — M17.0 PRIMARY OSTEOARTHRITIS OF BOTH KNEES: Primary | ICD-10-CM

## 2022-05-03 PROCEDURE — 3008F BODY MASS INDEX DOCD: CPT | Mod: CPTII,S$GLB,, | Performed by: ORTHOPAEDIC SURGERY

## 2022-05-03 PROCEDURE — 1159F PR MEDICATION LIST DOCUMENTED IN MEDICAL RECORD: ICD-10-PCS | Mod: CPTII,S$GLB,, | Performed by: ORTHOPAEDIC SURGERY

## 2022-05-03 PROCEDURE — 1126F AMNT PAIN NOTED NONE PRSNT: CPT | Mod: CPTII,S$GLB,, | Performed by: ORTHOPAEDIC SURGERY

## 2022-05-03 PROCEDURE — 1159F MED LIST DOCD IN RCRD: CPT | Mod: CPTII,S$GLB,, | Performed by: ORTHOPAEDIC SURGERY

## 2022-05-03 PROCEDURE — 99213 OFFICE O/P EST LOW 20 MIN: CPT | Mod: S$GLB,,, | Performed by: ORTHOPAEDIC SURGERY

## 2022-05-03 PROCEDURE — 1160F RVW MEDS BY RX/DR IN RCRD: CPT | Mod: CPTII,S$GLB,, | Performed by: ORTHOPAEDIC SURGERY

## 2022-05-03 PROCEDURE — 1160F PR REVIEW ALL MEDS BY PRESCRIBER/CLIN PHARMACIST DOCUMENTED: ICD-10-PCS | Mod: CPTII,S$GLB,, | Performed by: ORTHOPAEDIC SURGERY

## 2022-05-03 PROCEDURE — 1101F PT FALLS ASSESS-DOCD LE1/YR: CPT | Mod: CPTII,S$GLB,, | Performed by: ORTHOPAEDIC SURGERY

## 2022-05-03 PROCEDURE — 3008F PR BODY MASS INDEX (BMI) DOCUMENTED: ICD-10-PCS | Mod: CPTII,S$GLB,, | Performed by: ORTHOPAEDIC SURGERY

## 2022-05-03 PROCEDURE — 3288F PR FALLS RISK ASSESSMENT DOCUMENTED: ICD-10-PCS | Mod: CPTII,S$GLB,, | Performed by: ORTHOPAEDIC SURGERY

## 2022-05-03 PROCEDURE — 3288F FALL RISK ASSESSMENT DOCD: CPT | Mod: CPTII,S$GLB,, | Performed by: ORTHOPAEDIC SURGERY

## 2022-05-03 PROCEDURE — 99213 PR OFFICE/OUTPT VISIT, EST, LEVL III, 20-29 MIN: ICD-10-PCS | Mod: S$GLB,,, | Performed by: ORTHOPAEDIC SURGERY

## 2022-05-03 PROCEDURE — 3044F HG A1C LEVEL LT 7.0%: CPT | Mod: CPTII,S$GLB,, | Performed by: ORTHOPAEDIC SURGERY

## 2022-05-03 PROCEDURE — 1101F PR PT FALLS ASSESS DOC 0-1 FALLS W/OUT INJ PAST YR: ICD-10-PCS | Mod: CPTII,S$GLB,, | Performed by: ORTHOPAEDIC SURGERY

## 2022-05-03 PROCEDURE — 99999 PR PBB SHADOW E&M-EST. PATIENT-LVL III: CPT | Mod: PBBFAC,,, | Performed by: ORTHOPAEDIC SURGERY

## 2022-05-03 PROCEDURE — 1126F PR PAIN SEVERITY QUANTIFIED, NO PAIN PRESENT: ICD-10-PCS | Mod: CPTII,S$GLB,, | Performed by: ORTHOPAEDIC SURGERY

## 2022-05-03 PROCEDURE — 99999 PR PBB SHADOW E&M-EST. PATIENT-LVL III: ICD-10-PCS | Mod: PBBFAC,,, | Performed by: ORTHOPAEDIC SURGERY

## 2022-05-03 PROCEDURE — 3044F PR MOST RECENT HEMOGLOBIN A1C LEVEL <7.0%: ICD-10-PCS | Mod: CPTII,S$GLB,, | Performed by: ORTHOPAEDIC SURGERY

## 2022-05-03 NOTE — PROGRESS NOTES
Subjective:      Patient ID: Sunni Lowe is a 71 y.o. female.    Chief Complaint: Follow-up of the Left Knee    HPI  Follow-up for osteoarthritis.  The patient reports that her symptoms are fairly well controlled.  She was injected at the last visit and still feels well.  She does note discomfort with prolonged walking, but does not find to be functionally limiting.            Review of Systems   Constitutional: Negative for fever and weight loss.   HENT: Negative for congestion.    Eyes: Negative for visual disturbance.   Cardiovascular: Negative for chest pain.   Respiratory: Negative for shortness of breath.    Hematologic/Lymphatic: Negative for bleeding problem. Does not bruise/bleed easily.   Skin: Negative for poor wound healing.   Gastrointestinal: Negative for abdominal pain.   Genitourinary: Negative for dysuria.   Neurological: Negative for seizures.   Psychiatric/Behavioral: Negative for altered mental status.   Allergic/Immunologic: Negative for persistent infections.         Objective:      Ortho/SPM Exam      Left     The patient is not in acute distress.   Sclerae normal  Body habitus is normal.  Respiratory distress:  none   The patient walks without a limp.  Hip irritability  negative.   The skin over the knee is intact.  Knee effusion trace  Palpation- no focal tenderness  Range of motion- Flexion 140 deg, Extension 0 deg,   Ligament laxity exam:   MCL 2+   Lachman 0   Post sag  0    LCL 0  Patellar apprehension negative.  Popliteal cyst negative  Patellar crepitation present.  Meniscal irritability not applicable  Pulses DP present, PT present.  Motor normal 5/5 strength in all tested muscle groups.   Sensory normal.     Right knee  Full range of motion  Trace varus laxity              Assessment:       Encounter Diagnosis   Name Primary?    Primary osteoarthritis of both knees Yes        The condition is radiographically and structurally advanced on the left and much milder on the right.   Given the patient's lack of severe pain or functional impairment continue nonsurgical care is appropriate at the present time          Plan:       Sunni was seen today for follow-up.    Diagnoses and all orders for this visit:    Primary osteoarthritis of both knees          I explained my diagnostic impression and the reasoning behind it in detail, using layman's terms.  Models and/or pictures were used to help in the explanation.    Continue over-the-counter arthritis medicine    I explained the potential role of surgery in the treatment of this condition to the patient.  They understand that if nonsurgical measures do not adequately control symptoms, surgery will be considered in the future.    X-ray next visit

## 2022-06-16 ENCOUNTER — IMMUNIZATION (OUTPATIENT)
Dept: INTERNAL MEDICINE | Facility: CLINIC | Age: 72
End: 2022-06-16
Payer: MEDICARE

## 2022-06-16 DIAGNOSIS — Z23 NEED FOR VACCINATION: Primary | ICD-10-CM

## 2022-06-16 PROCEDURE — 91305 COVID-19, MRNA, LNP-S, PF, 30 MCG/0.3 ML DOSE VACCINE (PFIZER): CPT | Mod: PBBFAC | Performed by: FAMILY MEDICINE

## 2022-07-01 ENCOUNTER — PATIENT MESSAGE (OUTPATIENT)
Dept: FAMILY MEDICINE | Facility: CLINIC | Age: 72
End: 2022-07-01
Payer: MEDICARE

## 2022-07-01 ENCOUNTER — PATIENT MESSAGE (OUTPATIENT)
Dept: OBSTETRICS AND GYNECOLOGY | Facility: CLINIC | Age: 72
End: 2022-07-01
Payer: MEDICARE

## 2022-07-01 DIAGNOSIS — Z12.31 ENCOUNTER FOR SCREENING MAMMOGRAM FOR MALIGNANT NEOPLASM OF BREAST: Primary | ICD-10-CM

## 2022-07-05 RX ORDER — FLUTICASONE PROPIONATE AND SALMETEROL 250; 50 UG/1; UG/1
1 POWDER RESPIRATORY (INHALATION) 2 TIMES DAILY
Qty: 180 EACH | Refills: 3 | Status: SHIPPED | OUTPATIENT
Start: 2022-07-05 | End: 2023-07-25 | Stop reason: SDUPTHER

## 2022-08-01 ENCOUNTER — OFFICE VISIT (OUTPATIENT)
Dept: OBSTETRICS AND GYNECOLOGY | Facility: CLINIC | Age: 72
End: 2022-08-01
Payer: MEDICARE

## 2022-08-01 VITALS — BODY MASS INDEX: 21.9 KG/M2 | SYSTOLIC BLOOD PRESSURE: 101 MMHG | WEIGHT: 157 LBS | DIASTOLIC BLOOD PRESSURE: 60 MMHG

## 2022-08-01 DIAGNOSIS — M85.88 OSTEOPENIA OF SPINE: ICD-10-CM

## 2022-08-01 DIAGNOSIS — Z01.419 WELL WOMAN EXAM WITH ROUTINE GYNECOLOGICAL EXAM: Primary | ICD-10-CM

## 2022-08-01 PROCEDURE — 3044F PR MOST RECENT HEMOGLOBIN A1C LEVEL <7.0%: ICD-10-PCS | Mod: CPTII,S$GLB,, | Performed by: OBSTETRICS & GYNECOLOGY

## 2022-08-01 PROCEDURE — G0101 CA SCREEN;PELVIC/BREAST EXAM: HCPCS | Mod: GZ,S$GLB,, | Performed by: OBSTETRICS & GYNECOLOGY

## 2022-08-01 PROCEDURE — 1126F PR PAIN SEVERITY QUANTIFIED, NO PAIN PRESENT: ICD-10-PCS | Mod: CPTII,S$GLB,, | Performed by: OBSTETRICS & GYNECOLOGY

## 2022-08-01 PROCEDURE — 88175 CYTOPATH C/V AUTO FLUID REDO: CPT | Performed by: STUDENT IN AN ORGANIZED HEALTH CARE EDUCATION/TRAINING PROGRAM

## 2022-08-01 PROCEDURE — 1126F AMNT PAIN NOTED NONE PRSNT: CPT | Mod: CPTII,S$GLB,, | Performed by: OBSTETRICS & GYNECOLOGY

## 2022-08-01 PROCEDURE — 3044F HG A1C LEVEL LT 7.0%: CPT | Mod: CPTII,S$GLB,, | Performed by: OBSTETRICS & GYNECOLOGY

## 2022-08-01 PROCEDURE — 3008F PR BODY MASS INDEX (BMI) DOCUMENTED: ICD-10-PCS | Mod: CPTII,S$GLB,, | Performed by: OBSTETRICS & GYNECOLOGY

## 2022-08-01 PROCEDURE — 99999 PR PBB SHADOW E&M-EST. PATIENT-LVL III: CPT | Mod: PBBFAC,,, | Performed by: OBSTETRICS & GYNECOLOGY

## 2022-08-01 PROCEDURE — 3078F PR MOST RECENT DIASTOLIC BLOOD PRESSURE < 80 MM HG: ICD-10-PCS | Mod: CPTII,S$GLB,, | Performed by: OBSTETRICS & GYNECOLOGY

## 2022-08-01 PROCEDURE — 3074F PR MOST RECENT SYSTOLIC BLOOD PRESSURE < 130 MM HG: ICD-10-PCS | Mod: CPTII,S$GLB,, | Performed by: OBSTETRICS & GYNECOLOGY

## 2022-08-01 PROCEDURE — 3074F SYST BP LT 130 MM HG: CPT | Mod: CPTII,S$GLB,, | Performed by: OBSTETRICS & GYNECOLOGY

## 2022-08-01 PROCEDURE — 1160F PR REVIEW ALL MEDS BY PRESCRIBER/CLIN PHARMACIST DOCUMENTED: ICD-10-PCS | Mod: CPTII,S$GLB,, | Performed by: OBSTETRICS & GYNECOLOGY

## 2022-08-01 PROCEDURE — 99999 PR PBB SHADOW E&M-EST. PATIENT-LVL III: ICD-10-PCS | Mod: PBBFAC,,, | Performed by: OBSTETRICS & GYNECOLOGY

## 2022-08-01 PROCEDURE — 1160F RVW MEDS BY RX/DR IN RCRD: CPT | Mod: CPTII,S$GLB,, | Performed by: OBSTETRICS & GYNECOLOGY

## 2022-08-01 PROCEDURE — 3008F BODY MASS INDEX DOCD: CPT | Mod: CPTII,S$GLB,, | Performed by: OBSTETRICS & GYNECOLOGY

## 2022-08-01 PROCEDURE — G0101 PR CA SCREEN;PELVIC/BREAST EXAM: ICD-10-PCS | Mod: GZ,S$GLB,, | Performed by: OBSTETRICS & GYNECOLOGY

## 2022-08-01 PROCEDURE — 1159F PR MEDICATION LIST DOCUMENTED IN MEDICAL RECORD: ICD-10-PCS | Mod: CPTII,S$GLB,, | Performed by: OBSTETRICS & GYNECOLOGY

## 2022-08-01 PROCEDURE — 3078F DIAST BP <80 MM HG: CPT | Mod: CPTII,S$GLB,, | Performed by: OBSTETRICS & GYNECOLOGY

## 2022-08-01 PROCEDURE — 1159F MED LIST DOCD IN RCRD: CPT | Mod: CPTII,S$GLB,, | Performed by: OBSTETRICS & GYNECOLOGY

## 2022-08-01 NOTE — PROGRESS NOTES
CC: Annual check-up    SUBJECTIVE:   71 y.o. female   for annual routine Pap and checkup. No LMP recorded. Patient is postmenopausal..  She has no unusual complaints.       Inquiring about reclast IV with dexa last in    mammo : BIRADS 1; scheduled next mammo already  Pap smear  NILM    Past Medical History:   Diagnosis Date    Allergic rhinitis     Asthma     Cortes's esophagus     Fibrocystic breast     Osteopenia     Varicose veins     sees Dr. Brigido Quinones     Past Surgical History:   Procedure Laterality Date    BREAST BIOPSY Left     COLONOSCOPY  2014    Dr. Canas - repeat in 5 to 10 years    ESOPHAGOGASTRODUODENOSCOPY N/A 10/19/2018    Procedure: EGD (ESOPHAGOGASTRODUODENOSCOPY);  Surgeon: Cassie Huber MD;  Location[facility];  Service: Endoscopy;  Laterality: N/A;    KNEE SURGERY Bilateral     meniscus repair    peniculitis surgery      x 3     Social History     Socioeconomic History    Marital status:     Number of children: 2   Tobacco Use    Smoking status: Former Smoker     Packs/day: 1.00     Years: 10.00     Pack years: 10.00     Quit date:      Years since quittin.6    Smokeless tobacco: Never Used   Substance and Sexual Activity    Alcohol use: Yes     Comment: one glass of wine daily    Drug use: No    Sexual activity: Not Currently     Partners: Male     Comment:      Social Determinants of Health     Financial Resource Strain: Low Risk     Difficulty of Paying Living Expenses: Not hard at all   Food Insecurity: No Food Insecurity    Worried About Running Out of Food in the Last Year: Never true    Ran Out of Food in the Last Year: Never true   Transportation Needs: No Transportation Needs    Lack of Transportation (Medical): No    Lack of Transportation (Non-Medical): No   Physical Activity: Sufficiently Active    Days of Exercise per Week: 4 days    Minutes of Exercise per Session: 90 min   Stress: No Stress  Concern Present    Feeling of Stress : Only a little   Social Connections: Unknown    Frequency of Communication with Friends and Family: More than three times a week    Frequency of Social Gatherings with Friends and Family: Once a week    Active Member of Clubs or Organizations: Yes    Attends Club or Organization Meetings: More than 4 times per year    Marital Status:    Housing Stability: Low Risk     Unable to Pay for Housing in the Last Year: No    Number of Places Lived in the Last Year: 1    Unstable Housing in the Last Year: No     Family History   Problem Relation Age of Onset    Ovarian cancer Mother 68    Alzheimer's disease Mother         @ 62    Cancer Father 85        tongue, and Lung    Diabetes Father     Alzheimer's disease Maternal Aunt         @ age 80    No Known Problems Sister     No Known Problems Brother     No Known Problems Sister      OB History    Para Term  AB Living   2 2 2     2   SAB IAB Ectopic Multiple Live Births           2      # Outcome Date GA Lbr Silvestre/2nd Weight Sex Delivery Anes PTL Lv   2 Term     F Vag-Spont   HAYDEE   1 Term     M Vag-Spont   HAYDEE         Current Outpatient Medications   Medication Sig Dispense Refill    calcium carbonate (OS-ISABELLE) 600 mg (1,500 mg) Tab Take 1,200 mg by mouth once daily.      cholecalciferol, vitamin D3, (VITAMIN D3) 10,000 unit Cap Take 10,000 Units by mouth once daily.       collagen, bovine, 100 % Powd       CYANOCOBALAMIN, VITAMIN B-12, (VITAMIN B-12 ORAL) Take by mouth.      esomeprazole (NEXIUM) 20 MG capsule Take 1 capsule (20 mg total) by mouth before breakfast. 90 capsule 3    fish oil-omega-3 fatty acids 300-1,000 mg capsule Take 600 mg by mouth once daily.      fluticasone-salmeterol diskus inhaler 250-50 mcg Inhale 1 puff into the lungs 2 (two) times daily. Controller 180 each 3    levocetirizine (XYZAL) 5 MG tablet Take 1 tablet (5 mg total) by mouth every evening. 30 tablet 5     tobramycin-dexamethasone 0.3-0.1% (TOBRADEX) 0.3-0.1 % DrpS Instill 2 to 3 drops into the right ear twice daily. 2.5 mL 0    zoledronic acid/mannitol-water (RECLAST IV) Every other year       No current facility-administered medications for this visit.     Allergies: Patient has no known allergies.     ROS:  Constitutional: no weight loss, weight gain, fever, fatigue  Eyes:  No vision changes, glasses/contacts  ENT/Mouth: No ulcers, sinus problems, ears ringing, headache  Cardiovascular: No inability to lie flat, chest pain, exercise intolerance, swelling, heart palpitations  Respiratory: No wheezing, coughing blood, shortness of breath, or cough  Gastrointestinal: No diarrhea, bloody stool, nausea/vomiting, constipation, gas, hemorrhoids  Genitourinary: No blood in urine, painful urination, urgency of urination, frequency of urination, incomplete emptying, incontinence, abnormal bleeding, painful periods, heavy periods, vaginal discharge, vaginal odor, painful intercourse, sexual problems, bleeding after intercourse.  Musculoskeletal: No muscle weakness  Skin/Breast: No painful breasts, nipple discharge, masses, rash, ulcers  Neurological: No passing out, seizures, numbness, headache  Endocrine: No diabetes, hypothyroid, hyperthyroid, hot flashes, hair loss, abnormal hair growth, ance  Psychiatric: No depression, crying  Hematologic: No bruises, bleeding, swollen lymph nodes, anemia.      OBJECTIVE:   The patient appears well, alert, oriented x 3, in no distress.  /60 (BP Location: Left arm, Patient Position: Sitting, BP Method: Small (Manual))   Wt 71.2 kg (157 lb)   BMI 21.90 kg/m²   NECK: no thyromegaly, trachea midline  SKIN: no acne, striae, hirsutism  BREAST EXAM: not tdone  ABDOMEN: no hernias, masses, or hepatosplenomegaly  GENITALIA: normal external genitalia, no erythema, no discharge  URETHRA: normal urethra, normal urethral meatus  VAGINA: Normal  CERVIX: no lesions or cervical motion  tenderness  UTERUS: normal size, contour, position, consistency, mobility, non-tender  ADNEXA: normal adnexa      ASSESSMENT:   well woman  1. Well woman exam with routine gynecological exam    2. Osteopenia of spine      Mammogram scheduled this month  DXA ordered with FRAX, will follow up with requiring IV infusions or not  Will f/u on mmg results  PLAN:   pap smear  return annually or prn     Case discussed with staff, Dr Joel Downing MD  [facility]  8/1/2022 1:41 PM

## 2022-08-03 ENCOUNTER — PATIENT MESSAGE (OUTPATIENT)
Dept: OBSTETRICS AND GYNECOLOGY | Facility: CLINIC | Age: 72
End: 2022-08-03
Payer: MEDICARE

## 2022-08-05 LAB
FINAL PATHOLOGIC DIAGNOSIS: NORMAL
Lab: NORMAL

## 2022-08-09 ENCOUNTER — TELEPHONE (OUTPATIENT)
Dept: OBSTETRICS AND GYNECOLOGY | Facility: CLINIC | Age: 72
End: 2022-08-09
Payer: MEDICARE

## 2022-08-09 NOTE — TELEPHONE ENCOUNTER
Notify pt DEXA is ok and no need to start pharmacological TX based on latest results    lifestyle modifications are all that is reccommended including  Lifestyle measures include adequate calcium and vitamin D, exercise, smoking cessation, counseling on fall prevention, and avoidance of heavy alcohol use

## 2022-08-10 ENCOUNTER — PATIENT MESSAGE (OUTPATIENT)
Dept: OBSTETRICS AND GYNECOLOGY | Facility: CLINIC | Age: 72
End: 2022-08-10
Payer: MEDICARE

## 2022-08-15 ENCOUNTER — OFFICE VISIT (OUTPATIENT)
Dept: FAMILY MEDICINE | Facility: CLINIC | Age: 72
End: 2022-08-15
Payer: MEDICARE

## 2022-08-15 VITALS
WEIGHT: 158.63 LBS | DIASTOLIC BLOOD PRESSURE: 78 MMHG | TEMPERATURE: 98 F | OXYGEN SATURATION: 100 % | SYSTOLIC BLOOD PRESSURE: 124 MMHG | BODY MASS INDEX: 22.21 KG/M2 | HEIGHT: 71 IN | HEART RATE: 67 BPM

## 2022-08-15 DIAGNOSIS — R60.0 LOCALIZED EDEMA: ICD-10-CM

## 2022-08-15 DIAGNOSIS — J45.40 MODERATE PERSISTENT ASTHMA WITHOUT COMPLICATION: Primary | ICD-10-CM

## 2022-08-15 DIAGNOSIS — M85.88 OSTEOPENIA OF SPINE: ICD-10-CM

## 2022-08-15 DIAGNOSIS — K21.9 GASTROESOPHAGEAL REFLUX DISEASE WITHOUT ESOPHAGITIS: ICD-10-CM

## 2022-08-15 DIAGNOSIS — R79.9 ABNORMAL FINDING OF BLOOD CHEMISTRY, UNSPECIFIED: ICD-10-CM

## 2022-08-15 DIAGNOSIS — Z13.6 SCREENING FOR CARDIOVASCULAR CONDITION: ICD-10-CM

## 2022-08-15 PROCEDURE — 1159F MED LIST DOCD IN RCRD: CPT | Mod: CPTII,S$GLB,, | Performed by: STUDENT IN AN ORGANIZED HEALTH CARE EDUCATION/TRAINING PROGRAM

## 2022-08-15 PROCEDURE — 1160F RVW MEDS BY RX/DR IN RCRD: CPT | Mod: CPTII,S$GLB,, | Performed by: STUDENT IN AN ORGANIZED HEALTH CARE EDUCATION/TRAINING PROGRAM

## 2022-08-15 PROCEDURE — 1101F PT FALLS ASSESS-DOCD LE1/YR: CPT | Mod: CPTII,S$GLB,, | Performed by: STUDENT IN AN ORGANIZED HEALTH CARE EDUCATION/TRAINING PROGRAM

## 2022-08-15 PROCEDURE — 99214 PR OFFICE/OUTPT VISIT, EST, LEVL IV, 30-39 MIN: ICD-10-PCS | Mod: S$GLB,,, | Performed by: STUDENT IN AN ORGANIZED HEALTH CARE EDUCATION/TRAINING PROGRAM

## 2022-08-15 PROCEDURE — 1126F AMNT PAIN NOTED NONE PRSNT: CPT | Mod: CPTII,S$GLB,, | Performed by: STUDENT IN AN ORGANIZED HEALTH CARE EDUCATION/TRAINING PROGRAM

## 2022-08-15 PROCEDURE — 99999 PR PBB SHADOW E&M-EST. PATIENT-LVL IV: ICD-10-PCS | Mod: PBBFAC,,, | Performed by: STUDENT IN AN ORGANIZED HEALTH CARE EDUCATION/TRAINING PROGRAM

## 2022-08-15 PROCEDURE — 1159F PR MEDICATION LIST DOCUMENTED IN MEDICAL RECORD: ICD-10-PCS | Mod: CPTII,S$GLB,, | Performed by: STUDENT IN AN ORGANIZED HEALTH CARE EDUCATION/TRAINING PROGRAM

## 2022-08-15 PROCEDURE — 3044F PR MOST RECENT HEMOGLOBIN A1C LEVEL <7.0%: ICD-10-PCS | Mod: CPTII,S$GLB,, | Performed by: STUDENT IN AN ORGANIZED HEALTH CARE EDUCATION/TRAINING PROGRAM

## 2022-08-15 PROCEDURE — 3008F PR BODY MASS INDEX (BMI) DOCUMENTED: ICD-10-PCS | Mod: CPTII,S$GLB,, | Performed by: STUDENT IN AN ORGANIZED HEALTH CARE EDUCATION/TRAINING PROGRAM

## 2022-08-15 PROCEDURE — 1126F PR PAIN SEVERITY QUANTIFIED, NO PAIN PRESENT: ICD-10-PCS | Mod: CPTII,S$GLB,, | Performed by: STUDENT IN AN ORGANIZED HEALTH CARE EDUCATION/TRAINING PROGRAM

## 2022-08-15 PROCEDURE — 99214 OFFICE O/P EST MOD 30 MIN: CPT | Mod: S$GLB,,, | Performed by: STUDENT IN AN ORGANIZED HEALTH CARE EDUCATION/TRAINING PROGRAM

## 2022-08-15 PROCEDURE — 3008F BODY MASS INDEX DOCD: CPT | Mod: CPTII,S$GLB,, | Performed by: STUDENT IN AN ORGANIZED HEALTH CARE EDUCATION/TRAINING PROGRAM

## 2022-08-15 PROCEDURE — 99999 PR PBB SHADOW E&M-EST. PATIENT-LVL IV: CPT | Mod: PBBFAC,,, | Performed by: STUDENT IN AN ORGANIZED HEALTH CARE EDUCATION/TRAINING PROGRAM

## 2022-08-15 PROCEDURE — 3078F PR MOST RECENT DIASTOLIC BLOOD PRESSURE < 80 MM HG: ICD-10-PCS | Mod: CPTII,S$GLB,, | Performed by: STUDENT IN AN ORGANIZED HEALTH CARE EDUCATION/TRAINING PROGRAM

## 2022-08-15 PROCEDURE — 3288F FALL RISK ASSESSMENT DOCD: CPT | Mod: CPTII,S$GLB,, | Performed by: STUDENT IN AN ORGANIZED HEALTH CARE EDUCATION/TRAINING PROGRAM

## 2022-08-15 PROCEDURE — 3288F PR FALLS RISK ASSESSMENT DOCUMENTED: ICD-10-PCS | Mod: CPTII,S$GLB,, | Performed by: STUDENT IN AN ORGANIZED HEALTH CARE EDUCATION/TRAINING PROGRAM

## 2022-08-15 PROCEDURE — 3044F HG A1C LEVEL LT 7.0%: CPT | Mod: CPTII,S$GLB,, | Performed by: STUDENT IN AN ORGANIZED HEALTH CARE EDUCATION/TRAINING PROGRAM

## 2022-08-15 PROCEDURE — 1101F PR PT FALLS ASSESS DOC 0-1 FALLS W/OUT INJ PAST YR: ICD-10-PCS | Mod: CPTII,S$GLB,, | Performed by: STUDENT IN AN ORGANIZED HEALTH CARE EDUCATION/TRAINING PROGRAM

## 2022-08-15 PROCEDURE — 3078F DIAST BP <80 MM HG: CPT | Mod: CPTII,S$GLB,, | Performed by: STUDENT IN AN ORGANIZED HEALTH CARE EDUCATION/TRAINING PROGRAM

## 2022-08-15 PROCEDURE — 3074F PR MOST RECENT SYSTOLIC BLOOD PRESSURE < 130 MM HG: ICD-10-PCS | Mod: CPTII,S$GLB,, | Performed by: STUDENT IN AN ORGANIZED HEALTH CARE EDUCATION/TRAINING PROGRAM

## 2022-08-15 PROCEDURE — 3074F SYST BP LT 130 MM HG: CPT | Mod: CPTII,S$GLB,, | Performed by: STUDENT IN AN ORGANIZED HEALTH CARE EDUCATION/TRAINING PROGRAM

## 2022-08-15 PROCEDURE — 1160F PR REVIEW ALL MEDS BY PRESCRIBER/CLIN PHARMACIST DOCUMENTED: ICD-10-PCS | Mod: CPTII,S$GLB,, | Performed by: STUDENT IN AN ORGANIZED HEALTH CARE EDUCATION/TRAINING PROGRAM

## 2022-08-15 NOTE — ASSESSMENT & PLAN NOTE
Well controlled   Nexium 20 daily   Not following with GI anymore since cleared; told not Barrettes esophagus

## 2022-08-15 NOTE — PROGRESS NOTES
Subjective:       Patient ID: Sunni Lowe is a 71 y.o. female.    Chief Complaint: Follow-up (6 month follow up )    Asthma  Advair once a day works well  No issues with wheezing or SOB  Did smoke many years ago > 42 for less than 10 years or so; stopped once pregnant with first daughter     Gastroesophageal reflux disease  Well controlled   Nexium 20 daily   Not following with GI anymore since cleared; told not Barrettes esophagus     Osteopenia  Managed by GYN  In past has done biyearly reclast but has not had since prior to COVID pandemic   Recent DEXA with hip osteopenia (no real change) she is waiting to hear from GYN in regards to getting another dose of reclast   Denies pains   Active regularly (body pump)   Takes vit D/Ca   Open to adding Mag especially since continues with muscle cramps in legs and feet     Review of Systems   Constitutional: Negative for fever.   HENT: Negative.    Respiratory: Negative for cough, shortness of breath and wheezing.    Cardiovascular: Negative for chest pain and leg swelling.   Gastrointestinal: Negative for abdominal pain, diarrhea, nausea and vomiting.   Genitourinary: Negative.  Negative for difficulty urinating, dysuria and frequency.   Musculoskeletal: Negative.    Neurological: Negative.  Negative for dizziness, numbness and headaches.   Psychiatric/Behavioral: Negative for dysphoric mood. The patient is not nervous/anxious.       Objective:      Vitals:    08/15/22 1310   BP: 124/78   Pulse: 67   Temp: 98.4 °F (36.9 °C)      Physical Exam  Vitals reviewed.   Constitutional:       Appearance: Normal appearance. She is normal weight.   HENT:      Head: Normocephalic and atraumatic.   Eyes:      Conjunctiva/sclera: Conjunctivae normal.   Cardiovascular:      Rate and Rhythm: Normal rate and regular rhythm.      Heart sounds: Normal heart sounds.   Pulmonary:      Effort: Pulmonary effort is normal.      Breath sounds: Normal breath sounds.   Abdominal:       Palpations: Abdomen is soft.      Tenderness: There is no abdominal tenderness.   Musculoskeletal:         General: Normal range of motion.      Cervical back: Normal range of motion.      Right lower leg: No edema.      Left lower leg: No edema.   Neurological:      General: No focal deficit present.      Mental Status: She is alert and oriented to person, place, and time.   Psychiatric:         Mood and Affect: Mood normal.         Behavior: Behavior normal.          Assessment:       1. Moderate persistent asthma without complication    2. Gastroesophageal reflux disease without esophagitis    3. Osteopenia of spine    4. Screening for cardiovascular condition    5. Abnormal finding of blood chemistry, unspecified     6. Localized edema        Plan:   1. Moderate persistent asthma without complication    2. Gastroesophageal reflux disease without esophagitis  - Comprehensive Metabolic Panel; Future  - CBC Auto Differential; Future  - Comprehensive Metabolic Panel  - CBC Auto Differential  - Comprehensive Metabolic Panel; Standing  - Hemoglobin A1C; Standing  - Lipid Panel; Standing  - TSH; Standing  - CBC Without Differential; Standing    3. Osteopenia of spine  - Comprehensive Metabolic Panel; Future  - CBC Auto Differential; Future  - Comprehensive Metabolic Panel  - CBC Auto Differential  - Comprehensive Metabolic Panel; Standing  - Hemoglobin A1C; Standing  - Lipid Panel; Standing  - TSH; Standing    4. Screening for cardiovascular condition  - Comprehensive Metabolic Panel; Standing  - Hemoglobin A1C; Standing  - Lipid Panel; Standing  - TSH; Standing    5. Abnormal finding of blood chemistry, unspecified   - Hemoglobin A1C; Standing  - TSH; Standing    6. Localized edema  - TSH; Standing      Labs per orders  Continue healthy lifestyle efforts  Continue current meds as prescribed  Keep routine specialist f/u   Likely ok from BMD perspective with 1 dose of Reclast and stable DEXA findings but will defer to  GYN  RTC in 6 months and/or PRN with labs prior              [facility]   8/15/22

## 2022-08-15 NOTE — ASSESSMENT & PLAN NOTE
Advair once a day works well  No issues with wheezing or SOB  Did smoke many years ago > 42 for less than 10 years or so; stopped once pregnant with first daughter

## 2022-08-15 NOTE — ASSESSMENT & PLAN NOTE
Managed by GYN  In past has done biyearly reclast but has not had since prior to COVID pandemic   Recent DEXA with hip osteopenia (no real change) she is waiting to hear from GYN in regards to getting another dose of reclast   Denies pains   Active regularly (body pump)   Takes vit D/Ca   Open to adding Mag especially since continues with muscle cramps in legs and feet

## 2022-08-16 LAB
ALBUMIN SERPL-MCNC: 4.2 G/DL (ref 3.6–5.1)
ALBUMIN/GLOB SERPL: 2.1 (CALC) (ref 1–2.5)
ALP SERPL-CCNC: 44 U/L (ref 37–153)
ALT SERPL-CCNC: 12 U/L (ref 6–29)
AST SERPL-CCNC: 16 U/L (ref 10–35)
BASOPHILS # BLD AUTO: 29 CELLS/UL (ref 0–200)
BASOPHILS NFR BLD AUTO: 0.6 %
BILIRUB SERPL-MCNC: 0.8 MG/DL (ref 0.2–1.2)
BUN SERPL-MCNC: 18 MG/DL (ref 7–25)
BUN/CREAT SERPL: NORMAL (CALC) (ref 6–22)
CALCIUM SERPL-MCNC: 9.1 MG/DL (ref 8.6–10.4)
CHLORIDE SERPL-SCNC: 104 MMOL/L (ref 98–110)
CO2 SERPL-SCNC: 31 MMOL/L (ref 20–32)
CREAT SERPL-MCNC: 1.02 MG/DL (ref 0.5–1.05)
EGFR: 60 ML/MIN/1.73M2
EOSINOPHIL # BLD AUTO: 137 CELLS/UL (ref 15–500)
EOSINOPHIL NFR BLD AUTO: 2.8 %
ERYTHROCYTE [DISTWIDTH] IN BLOOD BY AUTOMATED COUNT: 12.5 % (ref 11–15)
GLOBULIN SER CALC-MCNC: 2 G/DL (CALC) (ref 1.9–3.7)
GLUCOSE SERPL-MCNC: 87 MG/DL (ref 65–99)
HCT VFR BLD AUTO: 37.7 % (ref 35–45)
HGB BLD-MCNC: 12.7 G/DL (ref 11.7–15.5)
LYMPHOCYTES # BLD AUTO: 1107 CELLS/UL (ref 850–3900)
LYMPHOCYTES NFR BLD AUTO: 22.6 %
MCH RBC QN AUTO: 29.5 PG (ref 27–33)
MCHC RBC AUTO-ENTMCNC: 33.7 G/DL (ref 32–36)
MCV RBC AUTO: 87.7 FL (ref 80–100)
MONOCYTES # BLD AUTO: 309 CELLS/UL (ref 200–950)
MONOCYTES NFR BLD AUTO: 6.3 %
NEUTROPHILS # BLD AUTO: 3317 CELLS/UL (ref 1500–7800)
NEUTROPHILS NFR BLD AUTO: 67.7 %
PLATELET # BLD AUTO: 174 THOUSAND/UL (ref 140–400)
PMV BLD REES-ECKER: 10.8 FL (ref 7.5–12.5)
POTASSIUM SERPL-SCNC: 4.1 MMOL/L (ref 3.5–5.3)
PROT SERPL-MCNC: 6.2 G/DL (ref 6.1–8.1)
RBC # BLD AUTO: 4.3 MILLION/UL (ref 3.8–5.1)
SODIUM SERPL-SCNC: 141 MMOL/L (ref 135–146)
WBC # BLD AUTO: 4.9 THOUSAND/UL (ref 3.8–10.8)

## 2022-08-18 ENCOUNTER — PES CALL (OUTPATIENT)
Dept: ADMINISTRATIVE | Facility: CLINIC | Age: 72
End: 2022-08-18
Payer: MEDICARE

## 2022-09-14 ENCOUNTER — PES CALL (OUTPATIENT)
Dept: ADMINISTRATIVE | Facility: CLINIC | Age: 72
End: 2022-09-14
Payer: MEDICARE

## 2022-09-15 ENCOUNTER — PES CALL (OUTPATIENT)
Dept: ADMINISTRATIVE | Facility: CLINIC | Age: 72
End: 2022-09-15
Payer: MEDICARE

## 2022-10-10 ENCOUNTER — PES CALL (OUTPATIENT)
Dept: ADMINISTRATIVE | Facility: CLINIC | Age: 72
End: 2022-10-10
Payer: MEDICARE

## 2022-10-11 ENCOUNTER — IMMUNIZATION (OUTPATIENT)
Dept: FAMILY MEDICINE | Facility: CLINIC | Age: 72
End: 2022-10-11
Payer: MEDICARE

## 2022-10-11 DIAGNOSIS — Z23 NEED FOR VACCINATION: Primary | ICD-10-CM

## 2022-10-11 PROCEDURE — 91312 COVID-19, MRNA, LNP-S, BIVALENT BOOSTER, PF, 30 MCG/0.3 ML DOSE: CPT | Mod: S$GLB,,, | Performed by: FAMILY MEDICINE

## 2022-10-11 PROCEDURE — 0124A COVID-19, MRNA, LNP-S, BIVALENT BOOSTER, PF, 30 MCG/0.3 ML DOSE: CPT | Mod: PBBFAC | Performed by: FAMILY MEDICINE

## 2022-10-11 PROCEDURE — 91312 COVID-19, MRNA, LNP-S, BIVALENT BOOSTER, PF, 30 MCG/0.3 ML DOSE: ICD-10-PCS | Mod: S$GLB,,, | Performed by: FAMILY MEDICINE

## 2022-11-08 ENCOUNTER — OFFICE VISIT (OUTPATIENT)
Dept: ORTHOPEDICS | Facility: CLINIC | Age: 72
End: 2022-11-08
Payer: MEDICARE

## 2022-11-08 VITALS — BODY MASS INDEX: 22.23 KG/M2 | WEIGHT: 158.75 LBS | HEIGHT: 71 IN

## 2022-11-08 DIAGNOSIS — M17.0 PRIMARY OSTEOARTHRITIS OF BOTH KNEES: Primary | ICD-10-CM

## 2022-11-08 PROCEDURE — 3288F FALL RISK ASSESSMENT DOCD: CPT | Mod: CPTII,S$GLB,, | Performed by: ORTHOPAEDIC SURGERY

## 2022-11-08 PROCEDURE — 99213 PR OFFICE/OUTPT VISIT, EST, LEVL III, 20-29 MIN: ICD-10-PCS | Mod: S$GLB,,, | Performed by: ORTHOPAEDIC SURGERY

## 2022-11-08 PROCEDURE — 3044F HG A1C LEVEL LT 7.0%: CPT | Mod: CPTII,S$GLB,, | Performed by: ORTHOPAEDIC SURGERY

## 2022-11-08 PROCEDURE — 99999 PR PBB SHADOW E&M-EST. PATIENT-LVL III: ICD-10-PCS | Mod: PBBFAC,,, | Performed by: ORTHOPAEDIC SURGERY

## 2022-11-08 PROCEDURE — 3288F PR FALLS RISK ASSESSMENT DOCUMENTED: ICD-10-PCS | Mod: CPTII,S$GLB,, | Performed by: ORTHOPAEDIC SURGERY

## 2022-11-08 PROCEDURE — 1125F AMNT PAIN NOTED PAIN PRSNT: CPT | Mod: CPTII,S$GLB,, | Performed by: ORTHOPAEDIC SURGERY

## 2022-11-08 PROCEDURE — 1159F PR MEDICATION LIST DOCUMENTED IN MEDICAL RECORD: ICD-10-PCS | Mod: CPTII,S$GLB,, | Performed by: ORTHOPAEDIC SURGERY

## 2022-11-08 PROCEDURE — 3044F PR MOST RECENT HEMOGLOBIN A1C LEVEL <7.0%: ICD-10-PCS | Mod: CPTII,S$GLB,, | Performed by: ORTHOPAEDIC SURGERY

## 2022-11-08 PROCEDURE — 3008F BODY MASS INDEX DOCD: CPT | Mod: CPTII,S$GLB,, | Performed by: ORTHOPAEDIC SURGERY

## 2022-11-08 PROCEDURE — 1160F PR REVIEW ALL MEDS BY PRESCRIBER/CLIN PHARMACIST DOCUMENTED: ICD-10-PCS | Mod: CPTII,S$GLB,, | Performed by: ORTHOPAEDIC SURGERY

## 2022-11-08 PROCEDURE — 1101F PR PT FALLS ASSESS DOC 0-1 FALLS W/OUT INJ PAST YR: ICD-10-PCS | Mod: CPTII,S$GLB,, | Performed by: ORTHOPAEDIC SURGERY

## 2022-11-08 PROCEDURE — 99999 PR PBB SHADOW E&M-EST. PATIENT-LVL III: CPT | Mod: PBBFAC,,, | Performed by: ORTHOPAEDIC SURGERY

## 2022-11-08 PROCEDURE — 1159F MED LIST DOCD IN RCRD: CPT | Mod: CPTII,S$GLB,, | Performed by: ORTHOPAEDIC SURGERY

## 2022-11-08 PROCEDURE — 3008F PR BODY MASS INDEX (BMI) DOCUMENTED: ICD-10-PCS | Mod: CPTII,S$GLB,, | Performed by: ORTHOPAEDIC SURGERY

## 2022-11-08 PROCEDURE — 1160F RVW MEDS BY RX/DR IN RCRD: CPT | Mod: CPTII,S$GLB,, | Performed by: ORTHOPAEDIC SURGERY

## 2022-11-08 PROCEDURE — 99213 OFFICE O/P EST LOW 20 MIN: CPT | Mod: S$GLB,,, | Performed by: ORTHOPAEDIC SURGERY

## 2022-11-08 PROCEDURE — 1101F PT FALLS ASSESS-DOCD LE1/YR: CPT | Mod: CPTII,S$GLB,, | Performed by: ORTHOPAEDIC SURGERY

## 2022-11-08 PROCEDURE — 1125F PR PAIN SEVERITY QUANTIFIED, PAIN PRESENT: ICD-10-PCS | Mod: CPTII,S$GLB,, | Performed by: ORTHOPAEDIC SURGERY

## 2022-11-08 NOTE — PROGRESS NOTES
Subjective:      Patient ID: Sunni Lowe is a 71 y.o. female.    Chief Complaint: Knee Pain (bilateral )    HPI  Follow-up for osteoarthritis.  The patient reports that she has only mild discomfort in her knees.  The left knee has medial pain in the right knee has lateral pain.  She is able to walk unlimited distances and exercise at the gym without severe discomfort.  She uses occasional over-the-counter medication for knee discomfort.            Review of Systems   Constitutional: Negative for fever and weight loss.   HENT:  Negative for congestion.    Eyes:  Negative for visual disturbance.   Cardiovascular:  Negative for chest pain.   Respiratory:  Negative for shortness of breath.    Hematologic/Lymphatic: Negative for bleeding problem. Does not bruise/bleed easily.   Skin:  Negative for poor wound healing.   Gastrointestinal:  Negative for abdominal pain.   Genitourinary:  Negative for dysuria.   Neurological:  Negative for seizures.   Psychiatric/Behavioral:  Negative for altered mental status.    Allergic/Immunologic: Negative for persistent infections.       Objective:      Ortho/SPM Exam      Left     The patient is not in acute distress.   Sclerae normal  Body habitus is normal.  Respiratory distress:  none   The patient walks without a limp.  Hip irritability  negative.   The skin over the knee is intact.  Knee effusion 0  Palpation- no focal tenderness  Range of motion- Flexion 140 deg, Extension 0 deg,   Ligament laxity exam:   MCL 2+   Lachman 0   Post sag  0    LCL 0  Patellar apprehension negative.  Popliteal cyst negative  Patellar crepitation present.  Meniscal irritability not applicable  Pulses DP present, PT present.  Motor normal 5/5 strength in all tested muscle groups.   Sensory normal.     Right knee  Full range of motion  Trace varus laxity    I reviewed the relevant imaging for the patient's condition:  Left knee films show complete loss of medial joint space with small osteophytes.   Right knee films show 80% lateral narrowing with small osteophytes          Assessment:       Encounter Diagnosis   Name Primary?    Primary osteoarthritis of both knees Yes        The fundamental process is stable.  There is no real evidence of clinical progression and minimal radiographic change.  The patient does not have severe pain or functional limitation at this time.    Progressive symptoms may occur in the future.          Plan:       Sunni was seen today for knee pain.    Diagnoses and all orders for this visit:    Primary osteoarthritis of both knees        I explained my diagnostic impression and the reasoning behind it in detail, using layman's terms.  Models and/or pictures were used to help in the explanation.    I discussed appropriate levels of exercise with the patient.    I advised her that in case of an acute flare up she should contact me for an early appointment.    Otherwise annual follow-up with x-ray

## 2022-12-06 ENCOUNTER — TELEPHONE (OUTPATIENT)
Dept: FAMILY MEDICINE | Facility: CLINIC | Age: 72
End: 2022-12-06
Payer: MEDICARE

## 2022-12-06 NOTE — TELEPHONE ENCOUNTER
----- Message from Kayy Peacock sent at 12/6/2022  8:43 AM CST -----  Regarding: same day appt  Contact: Pt  Type:  Same Day Appointment Request    Caller is requesting a same day appointment.  Caller declined first available appointment listed below.    Name of Caller: pt  When is the first available appointment? 12/07/2022  Symptoms: Dizzy  Best Call Back Number: [phone]  Additional Information:  pt;s Scheduled on 12/07, But if there's any cancellation , please call Pt

## 2022-12-07 ENCOUNTER — OFFICE VISIT (OUTPATIENT)
Dept: FAMILY MEDICINE | Facility: CLINIC | Age: 72
End: 2022-12-07
Payer: MEDICARE

## 2022-12-07 VITALS
OXYGEN SATURATION: 99 % | WEIGHT: 157.88 LBS | HEIGHT: 71 IN | DIASTOLIC BLOOD PRESSURE: 76 MMHG | TEMPERATURE: 98 F | BODY MASS INDEX: 22.1 KG/M2 | HEART RATE: 56 BPM | SYSTOLIC BLOOD PRESSURE: 110 MMHG

## 2022-12-07 DIAGNOSIS — R42 DIZZY SPELLS: ICD-10-CM

## 2022-12-07 DIAGNOSIS — H69.93 DYSFUNCTION OF BOTH EUSTACHIAN TUBES: Primary | ICD-10-CM

## 2022-12-07 PROCEDURE — 1101F PT FALLS ASSESS-DOCD LE1/YR: CPT | Mod: CPTII,S$GLB,, | Performed by: STUDENT IN AN ORGANIZED HEALTH CARE EDUCATION/TRAINING PROGRAM

## 2022-12-07 PROCEDURE — 3044F PR MOST RECENT HEMOGLOBIN A1C LEVEL <7.0%: ICD-10-PCS | Mod: CPTII,S$GLB,, | Performed by: STUDENT IN AN ORGANIZED HEALTH CARE EDUCATION/TRAINING PROGRAM

## 2022-12-07 PROCEDURE — 3078F DIAST BP <80 MM HG: CPT | Mod: CPTII,S$GLB,, | Performed by: STUDENT IN AN ORGANIZED HEALTH CARE EDUCATION/TRAINING PROGRAM

## 2022-12-07 PROCEDURE — 99214 PR OFFICE/OUTPT VISIT, EST, LEVL IV, 30-39 MIN: ICD-10-PCS | Mod: 25,S$GLB,, | Performed by: STUDENT IN AN ORGANIZED HEALTH CARE EDUCATION/TRAINING PROGRAM

## 2022-12-07 PROCEDURE — 96372 PR INJECTION,THERAP/PROPH/DIAG2ST, IM OR SUBCUT: ICD-10-PCS | Mod: S$GLB,,, | Performed by: STUDENT IN AN ORGANIZED HEALTH CARE EDUCATION/TRAINING PROGRAM

## 2022-12-07 PROCEDURE — 3074F SYST BP LT 130 MM HG: CPT | Mod: CPTII,S$GLB,, | Performed by: STUDENT IN AN ORGANIZED HEALTH CARE EDUCATION/TRAINING PROGRAM

## 2022-12-07 PROCEDURE — 1159F MED LIST DOCD IN RCRD: CPT | Mod: CPTII,S$GLB,, | Performed by: STUDENT IN AN ORGANIZED HEALTH CARE EDUCATION/TRAINING PROGRAM

## 2022-12-07 PROCEDURE — 3008F BODY MASS INDEX DOCD: CPT | Mod: CPTII,S$GLB,, | Performed by: STUDENT IN AN ORGANIZED HEALTH CARE EDUCATION/TRAINING PROGRAM

## 2022-12-07 PROCEDURE — 1159F PR MEDICATION LIST DOCUMENTED IN MEDICAL RECORD: ICD-10-PCS | Mod: CPTII,S$GLB,, | Performed by: STUDENT IN AN ORGANIZED HEALTH CARE EDUCATION/TRAINING PROGRAM

## 2022-12-07 PROCEDURE — 99214 OFFICE O/P EST MOD 30 MIN: CPT | Mod: 25,S$GLB,, | Performed by: STUDENT IN AN ORGANIZED HEALTH CARE EDUCATION/TRAINING PROGRAM

## 2022-12-07 PROCEDURE — 3288F FALL RISK ASSESSMENT DOCD: CPT | Mod: CPTII,S$GLB,, | Performed by: STUDENT IN AN ORGANIZED HEALTH CARE EDUCATION/TRAINING PROGRAM

## 2022-12-07 PROCEDURE — 1160F PR REVIEW ALL MEDS BY PRESCRIBER/CLIN PHARMACIST DOCUMENTED: ICD-10-PCS | Mod: CPTII,S$GLB,, | Performed by: STUDENT IN AN ORGANIZED HEALTH CARE EDUCATION/TRAINING PROGRAM

## 2022-12-07 PROCEDURE — 3074F PR MOST RECENT SYSTOLIC BLOOD PRESSURE < 130 MM HG: ICD-10-PCS | Mod: CPTII,S$GLB,, | Performed by: STUDENT IN AN ORGANIZED HEALTH CARE EDUCATION/TRAINING PROGRAM

## 2022-12-07 PROCEDURE — 3044F HG A1C LEVEL LT 7.0%: CPT | Mod: CPTII,S$GLB,, | Performed by: STUDENT IN AN ORGANIZED HEALTH CARE EDUCATION/TRAINING PROGRAM

## 2022-12-07 PROCEDURE — 3008F PR BODY MASS INDEX (BMI) DOCUMENTED: ICD-10-PCS | Mod: CPTII,S$GLB,, | Performed by: STUDENT IN AN ORGANIZED HEALTH CARE EDUCATION/TRAINING PROGRAM

## 2022-12-07 PROCEDURE — 3288F PR FALLS RISK ASSESSMENT DOCUMENTED: ICD-10-PCS | Mod: CPTII,S$GLB,, | Performed by: STUDENT IN AN ORGANIZED HEALTH CARE EDUCATION/TRAINING PROGRAM

## 2022-12-07 PROCEDURE — 1101F PR PT FALLS ASSESS DOC 0-1 FALLS W/OUT INJ PAST YR: ICD-10-PCS | Mod: CPTII,S$GLB,, | Performed by: STUDENT IN AN ORGANIZED HEALTH CARE EDUCATION/TRAINING PROGRAM

## 2022-12-07 PROCEDURE — 99999 PR PBB SHADOW E&M-EST. PATIENT-LVL IV: ICD-10-PCS | Mod: PBBFAC,,, | Performed by: STUDENT IN AN ORGANIZED HEALTH CARE EDUCATION/TRAINING PROGRAM

## 2022-12-07 PROCEDURE — 99999 PR PBB SHADOW E&M-EST. PATIENT-LVL IV: CPT | Mod: PBBFAC,,, | Performed by: STUDENT IN AN ORGANIZED HEALTH CARE EDUCATION/TRAINING PROGRAM

## 2022-12-07 PROCEDURE — 1160F RVW MEDS BY RX/DR IN RCRD: CPT | Mod: CPTII,S$GLB,, | Performed by: STUDENT IN AN ORGANIZED HEALTH CARE EDUCATION/TRAINING PROGRAM

## 2022-12-07 PROCEDURE — 1125F AMNT PAIN NOTED PAIN PRSNT: CPT | Mod: CPTII,S$GLB,, | Performed by: STUDENT IN AN ORGANIZED HEALTH CARE EDUCATION/TRAINING PROGRAM

## 2022-12-07 PROCEDURE — 3078F PR MOST RECENT DIASTOLIC BLOOD PRESSURE < 80 MM HG: ICD-10-PCS | Mod: CPTII,S$GLB,, | Performed by: STUDENT IN AN ORGANIZED HEALTH CARE EDUCATION/TRAINING PROGRAM

## 2022-12-07 PROCEDURE — 96372 THER/PROPH/DIAG INJ SC/IM: CPT | Mod: S$GLB,,, | Performed by: STUDENT IN AN ORGANIZED HEALTH CARE EDUCATION/TRAINING PROGRAM

## 2022-12-07 PROCEDURE — 1125F PR PAIN SEVERITY QUANTIFIED, PAIN PRESENT: ICD-10-PCS | Mod: CPTII,S$GLB,, | Performed by: STUDENT IN AN ORGANIZED HEALTH CARE EDUCATION/TRAINING PROGRAM

## 2022-12-07 RX ORDER — AZELASTINE 1 MG/ML
1 SPRAY, METERED NASAL 2 TIMES DAILY
Qty: 30 ML | Refills: 1 | Status: SHIPPED | OUTPATIENT
Start: 2022-12-07 | End: 2023-02-16 | Stop reason: SDUPTHER

## 2022-12-07 RX ORDER — ZINC SULFATE 66 MG
TABLET ORAL
COMMUNITY
Start: 2022-09-13 | End: 2023-09-05

## 2022-12-07 RX ORDER — MAGNESIUM SULFATE HEPTAHYDRATE 40 MG/ML
INJECTION, SOLUTION INTRAVENOUS
COMMUNITY
Start: 2022-07-16 | End: 2023-09-05

## 2022-12-07 RX ORDER — BETAMETHASONE SODIUM PHOSPHATE AND BETAMETHASONE ACETATE 3; 3 MG/ML; MG/ML
9 INJECTION, SUSPENSION INTRA-ARTICULAR; INTRALESIONAL; INTRAMUSCULAR; SOFT TISSUE
Status: COMPLETED | OUTPATIENT
Start: 2022-12-07 | End: 2022-12-07

## 2022-12-07 RX ADMIN — BETAMETHASONE SODIUM PHOSPHATE AND BETAMETHASONE ACETATE 9 MG: 3; 3 INJECTION, SUSPENSION INTRA-ARTICULAR; INTRALESIONAL; INTRAMUSCULAR; SOFT TISSUE at 12:12

## 2022-12-09 NOTE — PROGRESS NOTES
Subjective:      Patient ID: Sunni Lowe is a 71 y.o. female.    Chief Complaint: Dizziness (Started yesterday/ when she got up from the bed she got extremely dizzy/ pt states that she does not feel good in the head/ right ear stuffy/ right side sinus headache/ extreme fatigue)    - headache  - dizziness very concerning bc has never had despite low BP normally   - always gets up carefully  - does not some loss hearing and underwater/muffled hearing sensation       Dizziness:   Chronicity:  New  Onset:  Yesterday  Progression since onset:  Gradually improving  Severity:  Initially severe, but improved  Dizziness characteristics:  Sensation of movement, off-balance, spacial disorientation and lightheaded/impending faint   Associated symptoms: ear congestion, headaches, tinnitus, aural fullness, weakness and light-headedness.no fever, no nausea, no vomiting, no syncope, no palpitations, no panic, no slurred speech, no numbness in extremities and no chest pain.  Aggravated by:  Bending, position changes and getting up  Treatments tried:  Nothing  Review of Systems   Constitutional:  Negative for fever.   HENT:  Positive for postnasal drip, sinus pressure and tinnitus. Negative for congestion and sore throat.    Cardiovascular:  Negative for chest pain, palpitations and syncope.   Gastrointestinal:  Negative for nausea and vomiting.   Neurological:  Positive for dizziness, weakness, light-headedness and headaches.      Objective:     Vitals:    12/07/22 1130   BP: 110/76   Pulse: (!) 56   Temp: 98.1 °F (36.7 °C)      Physical Exam  Constitutional:       Appearance: Normal appearance.   HENT:      Head: Atraumatic.      Right Ear: A middle ear effusion is present.      Left Ear: A middle ear effusion is present.      Nose: No congestion or rhinorrhea.      Mouth/Throat:      Pharynx: Posterior oropharyngeal erythema present.   Eyes:      Conjunctiva/sclera: Conjunctivae normal.   Cardiovascular:      Rate and Rhythm:  Normal rate and regular rhythm.   Pulmonary:      Effort: Pulmonary effort is normal.   Neurological:      General: No focal deficit present.      Mental Status: She is alert and oriented to person, place, and time.   Psychiatric:         Mood and Affect: Mood normal.         Behavior: Behavior normal.      Assessment:         1. Dysfunction of both eustachian tubes    2. Dizzy spells          Plan:   1. Dysfunction of both eustachian tubes  - betamethasone acetate-betamethasone sodium phosphate injection 9 mg  - azelastine (ASTELIN) 137 mcg (0.1 %) nasal spray; Use 1 spray (137 mcg total) in each nostril 2 (two) times daily.  Dispense: 30 mL; Refill: 1    2. Dizzy spells  - betamethasone acetate-betamethasone sodium phosphate injection 9 mg  - azelastine (ASTELIN) 137 mcg (0.1 %) nasal spray; Use 1 spray (137 mcg total) in each nostril 2 (two) times daily.  Dispense: 30 mL; Refill: 1       Suspect ETD and fluid in ears as main source of symptoms  Start daily antihistamine  IM ceslstone today   Astelin nasal spray   Mucinex D   Pop ears frequently   Discussed slow position changes due to low pressures which she noted she is very cautious with already; if occurs again she will attempt to check BP and let me know if low  RTC if symptoms persist or are not improved next week        Alison Fox   [facility]   12/7/22

## 2022-12-21 ENCOUNTER — TELEPHONE (OUTPATIENT)
Dept: FAMILY MEDICINE | Facility: CLINIC | Age: 72
End: 2022-12-21
Payer: MEDICARE

## 2022-12-23 ENCOUNTER — TELEPHONE (OUTPATIENT)
Dept: ADMINISTRATIVE | Facility: CLINIC | Age: 72
End: 2022-12-23
Payer: MEDICARE

## 2022-12-27 ENCOUNTER — OFFICE VISIT (OUTPATIENT)
Dept: FAMILY MEDICINE | Facility: CLINIC | Age: 72
End: 2022-12-27
Payer: MEDICARE

## 2022-12-27 VITALS
BODY MASS INDEX: 21.91 KG/M2 | HEIGHT: 71 IN | WEIGHT: 156.5 LBS | SYSTOLIC BLOOD PRESSURE: 120 MMHG | DIASTOLIC BLOOD PRESSURE: 70 MMHG | OXYGEN SATURATION: 98 % | HEART RATE: 66 BPM

## 2022-12-27 DIAGNOSIS — Z00.00 ENCOUNTER FOR PREVENTIVE HEALTH EXAMINATION: Primary | ICD-10-CM

## 2022-12-27 DIAGNOSIS — R41.89 SUBJECTIVE MEMORY COMPLAINTS: ICD-10-CM

## 2022-12-27 DIAGNOSIS — J45.40 MODERATE PERSISTENT ASTHMA WITHOUT COMPLICATION: ICD-10-CM

## 2022-12-27 DIAGNOSIS — M85.88 OSTEOPENIA OF SPINE: ICD-10-CM

## 2022-12-27 DIAGNOSIS — K21.9 GASTROESOPHAGEAL REFLUX DISEASE WITHOUT ESOPHAGITIS: ICD-10-CM

## 2022-12-27 PROCEDURE — 3044F HG A1C LEVEL LT 7.0%: CPT | Mod: CPTII,S$GLB,,

## 2022-12-27 PROCEDURE — 1159F PR MEDICATION LIST DOCUMENTED IN MEDICAL RECORD: ICD-10-PCS | Mod: CPTII,S$GLB,,

## 2022-12-27 PROCEDURE — 99999 PR PBB SHADOW E&M-EST. PATIENT-LVL IV: ICD-10-PCS | Mod: PBBFAC,,,

## 2022-12-27 PROCEDURE — 99999 PR PBB SHADOW E&M-EST. PATIENT-LVL IV: CPT | Mod: PBBFAC,,,

## 2022-12-27 PROCEDURE — G0439 PR MEDICARE ANNUAL WELLNESS SUBSEQUENT VISIT: ICD-10-PCS | Mod: S$GLB,,,

## 2022-12-27 PROCEDURE — 3078F DIAST BP <80 MM HG: CPT | Mod: CPTII,S$GLB,,

## 2022-12-27 PROCEDURE — 3008F BODY MASS INDEX DOCD: CPT | Mod: CPTII,S$GLB,,

## 2022-12-27 PROCEDURE — 3074F PR MOST RECENT SYSTOLIC BLOOD PRESSURE < 130 MM HG: ICD-10-PCS | Mod: CPTII,S$GLB,,

## 2022-12-27 PROCEDURE — 1160F PR REVIEW ALL MEDS BY PRESCRIBER/CLIN PHARMACIST DOCUMENTED: ICD-10-PCS | Mod: CPTII,S$GLB,,

## 2022-12-27 PROCEDURE — 1101F PT FALLS ASSESS-DOCD LE1/YR: CPT | Mod: CPTII,S$GLB,,

## 2022-12-27 PROCEDURE — 3078F PR MOST RECENT DIASTOLIC BLOOD PRESSURE < 80 MM HG: ICD-10-PCS | Mod: CPTII,S$GLB,,

## 2022-12-27 PROCEDURE — 1159F MED LIST DOCD IN RCRD: CPT | Mod: CPTII,S$GLB,,

## 2022-12-27 PROCEDURE — 3044F PR MOST RECENT HEMOGLOBIN A1C LEVEL <7.0%: ICD-10-PCS | Mod: CPTII,S$GLB,,

## 2022-12-27 PROCEDURE — 1126F AMNT PAIN NOTED NONE PRSNT: CPT | Mod: CPTII,S$GLB,,

## 2022-12-27 PROCEDURE — 3288F FALL RISK ASSESSMENT DOCD: CPT | Mod: CPTII,S$GLB,,

## 2022-12-27 PROCEDURE — G0439 PPPS, SUBSEQ VISIT: HCPCS | Mod: S$GLB,,,

## 2022-12-27 PROCEDURE — 3008F PR BODY MASS INDEX (BMI) DOCUMENTED: ICD-10-PCS | Mod: CPTII,S$GLB,,

## 2022-12-27 PROCEDURE — 3288F PR FALLS RISK ASSESSMENT DOCUMENTED: ICD-10-PCS | Mod: CPTII,S$GLB,,

## 2022-12-27 PROCEDURE — 1126F PR PAIN SEVERITY QUANTIFIED, NO PAIN PRESENT: ICD-10-PCS | Mod: CPTII,S$GLB,,

## 2022-12-27 PROCEDURE — 3074F SYST BP LT 130 MM HG: CPT | Mod: CPTII,S$GLB,,

## 2022-12-27 PROCEDURE — 1160F RVW MEDS BY RX/DR IN RCRD: CPT | Mod: CPTII,S$GLB,,

## 2022-12-27 PROCEDURE — 1101F PR PT FALLS ASSESS DOC 0-1 FALLS W/OUT INJ PAST YR: ICD-10-PCS | Mod: CPTII,S$GLB,,

## 2022-12-27 NOTE — PATIENT INSTRUCTIONS
Counseling and Referral of Other Preventative  (Italic type indicates deductible and co-insurance are waived)    Patient Name: Sunni Lowe  Today's Date: 12/27/2022    Health Maintenance       Date Due Completion Date    Shingles Vaccine (2 of 3) 02/28/2023 (Originally 4/16/2017) 2/19/2017    Mammogram 08/17/2023 8/17/2022    Colorectal Cancer Screening 08/01/2024 8/1/2014    Pap Smear 08/01/2025 8/1/2022    DEXA Scan 08/09/2025 8/9/2022    TETANUS VACCINE 03/02/2026 3/2/2016    Lipid Panel 02/14/2027 2/14/2022        No orders of the defined types were placed in this encounter.    The following information is provided to all patients.  This information is to help you find resources for any of the problems found today that may be affecting your health:                Living healthy guide: www.[facility].[place].[facility]      Understanding Diabetes: www.diabetes.org      Eating healthy: www.cdc.gov/healthyweight      CDC home safety checklist: www.cdc.gov/steadi/patient.html      Agency on Aging: www.goea.[place]      Alcoholics anonymous (AA): www.aa.org      Physical Activity: www.kimber.nih.gov/ez2fdme      Tobacco use: www.quitwithusla.org

## 2022-12-27 NOTE — PROGRESS NOTES
"    Sunni Lowe presented for a  Medicare AWV and comprehensive Health Risk Assessment today. The following components were reviewed and updated:    Medical history  Family History  Social history  Allergies and Current Medications  Health Risk Assessment  Health Maintenance  Care Team         ** See Completed Assessments for Annual Wellness Visit within the encounter summary.**         The following assessments were completed:  Living Situation  CAGE  Depression Screening  Timed Get Up and Go  Whisper Test  Cognitive Function Screening      Nutrition Screening  ADL Screening  PAQ Screening        Vitals:    12/27/22 1501   BP: 120/70   BP Location: Left arm   Patient Position: Sitting   BP Method: Large (Manual)   Pulse: 66   SpO2: 98%   Weight: 71 kg (156 lb 8.4 oz)   Height: 5' 11" (1.803 m)     Body mass index is 21.83 kg/m².  Physical Exam  Vitals reviewed.   Constitutional:       Appearance: Normal appearance. She is well-developed and well-groomed.   Cardiovascular:      Rate and Rhythm: Normal rate and regular rhythm.   Pulmonary:      Effort: Pulmonary effort is normal. No respiratory distress.      Breath sounds: No wheezing, rhonchi or rales.   Skin:     Coloration: Skin is not pale.   Neurological:      General: No focal deficit present.      Mental Status: She is alert and oriented to person, place, and time.   Psychiatric:         Attention and Perception: Attention normal.         Mood and Affect: Mood normal.         Speech: Speech normal.         Behavior: Behavior normal. Behavior is cooperative.             Diagnoses and health risks identified today and associated recommendations/orders:    1. Encounter for preventive health examination  2. Subjective memory complaints  Chronic. Stable. Followed by PCP.     3. Moderate persistent asthma without complication  Chronic; stable on medication. Followed by PCP.    4. Gastroesophageal reflux disease without esophagitis  Chronic; stable on medication. " Followed by PCP.    5. Osteopenia of spine  Chronic; stable on medication. Followed by PCP.      Review for Opioid Screening: Patient does not have rx for Opioids.    Review for Substance Use Disorders: Patient does not use substance.      Provided Sunni with a 5-10 year written screening schedule and personal prevention plan. Recommendations were developed using the USPSTF age appropriate recommendations. Education, counseling, and referrals were provided as needed. After Visit Summary printed and given to patient which includes a list of additional screenings\tests needed.    Follow up in about 1 year (around 12/27/2023) for your next annual wellness visit.    Danette Courtney, NP      Advance Care Planning     I offered to discuss advanced care planning, including how to pick a person who would make decisions for you if you were unable to make them for yourself, called a health care power of , and what kind of decisions you might make such as use of life sustaining treatments such as ventilators and tube feeding when faced with a life limiting illness recorded on a living will that they will need to know. (How you want to be cared for as you near the end of your natural life)     X Patient is interested in learning more about how to make advanced directives.  I provided them paperwork and offered to discuss this with them.

## 2023-01-21 ENCOUNTER — PATIENT MESSAGE (OUTPATIENT)
Dept: ORTHOPEDICS | Facility: CLINIC | Age: 73
End: 2023-01-21
Payer: MEDICARE

## 2023-02-08 ENCOUNTER — PATIENT MESSAGE (OUTPATIENT)
Dept: ORTHOPEDICS | Facility: CLINIC | Age: 73
End: 2023-02-08
Payer: MEDICARE

## 2023-02-11 LAB
CHOLEST SERPL-MSCNC: 178 MG/DL (ref 0–200)
HDLC SERPL-MCNC: 57 MG/DL (ref 35–70)
LDLC SERPL CALC-MCNC: 106 MG/DL (ref 0–160)
TRIGL SERPL-MCNC: 68 MG/DL (ref 40–160)

## 2023-02-12 DIAGNOSIS — K22.70 BARRETT'S ESOPHAGUS WITHOUT DYSPLASIA: ICD-10-CM

## 2023-02-13 RX ORDER — HYDROGEN PEROXIDE 3 %
20 SOLUTION, NON-ORAL MISCELLANEOUS
Qty: 90 CAPSULE | Refills: 3 | Status: SHIPPED | OUTPATIENT
Start: 2023-02-13 | End: 2024-03-15 | Stop reason: SDUPTHER

## 2023-02-13 NOTE — TELEPHONE ENCOUNTER
Refill Routing Note   Medication(s) are not appropriate for processing by [facility] for the following reason(s):       No active prescription written by PCP    [facility] action(s):  Defer                   Appointments  past 12m or future 3m with PCP    Date Provider   Last Visit   12/7/2022 Alison Fox, DO   Next Visit   2/16/2023 Alison Fox DO   ED visits in past 90 days: 0        Note composed:12:47 PM 02/13/2023

## 2023-02-13 NOTE — TELEPHONE ENCOUNTER
No new care gaps identified.  [facility] Embedded Care Gaps. Reference number: [id number]. 2/12/2023   8:33:22 PM CST

## 2023-02-16 ENCOUNTER — OFFICE VISIT (OUTPATIENT)
Dept: FAMILY MEDICINE | Facility: CLINIC | Age: 73
End: 2023-02-16
Payer: MEDICARE

## 2023-02-16 VITALS
TEMPERATURE: 98 F | WEIGHT: 158.75 LBS | DIASTOLIC BLOOD PRESSURE: 74 MMHG | BODY MASS INDEX: 22.23 KG/M2 | HEIGHT: 71 IN | HEART RATE: 65 BPM | OXYGEN SATURATION: 98 % | SYSTOLIC BLOOD PRESSURE: 132 MMHG

## 2023-02-16 DIAGNOSIS — J30.89 ENVIRONMENTAL AND SEASONAL ALLERGIES: ICD-10-CM

## 2023-02-16 DIAGNOSIS — K21.9 GASTROESOPHAGEAL REFLUX DISEASE WITHOUT ESOPHAGITIS: ICD-10-CM

## 2023-02-16 DIAGNOSIS — M85.88 OSTEOPENIA OF SPINE: ICD-10-CM

## 2023-02-16 DIAGNOSIS — H69.93 DYSFUNCTION OF BOTH EUSTACHIAN TUBES: Primary | ICD-10-CM

## 2023-02-16 DIAGNOSIS — J45.40 MODERATE PERSISTENT ASTHMA WITHOUT COMPLICATION: ICD-10-CM

## 2023-02-16 PROCEDURE — 1101F PR PT FALLS ASSESS DOC 0-1 FALLS W/OUT INJ PAST YR: ICD-10-PCS | Mod: CPTII,S$GLB,, | Performed by: STUDENT IN AN ORGANIZED HEALTH CARE EDUCATION/TRAINING PROGRAM

## 2023-02-16 PROCEDURE — 3288F PR FALLS RISK ASSESSMENT DOCUMENTED: ICD-10-PCS | Mod: CPTII,S$GLB,, | Performed by: STUDENT IN AN ORGANIZED HEALTH CARE EDUCATION/TRAINING PROGRAM

## 2023-02-16 PROCEDURE — 99214 OFFICE O/P EST MOD 30 MIN: CPT | Mod: S$GLB,,, | Performed by: STUDENT IN AN ORGANIZED HEALTH CARE EDUCATION/TRAINING PROGRAM

## 2023-02-16 PROCEDURE — 1159F PR MEDICATION LIST DOCUMENTED IN MEDICAL RECORD: ICD-10-PCS | Mod: CPTII,S$GLB,, | Performed by: STUDENT IN AN ORGANIZED HEALTH CARE EDUCATION/TRAINING PROGRAM

## 2023-02-16 PROCEDURE — 1160F PR REVIEW ALL MEDS BY PRESCRIBER/CLIN PHARMACIST DOCUMENTED: ICD-10-PCS | Mod: CPTII,S$GLB,, | Performed by: STUDENT IN AN ORGANIZED HEALTH CARE EDUCATION/TRAINING PROGRAM

## 2023-02-16 PROCEDURE — 3078F PR MOST RECENT DIASTOLIC BLOOD PRESSURE < 80 MM HG: ICD-10-PCS | Mod: CPTII,S$GLB,, | Performed by: STUDENT IN AN ORGANIZED HEALTH CARE EDUCATION/TRAINING PROGRAM

## 2023-02-16 PROCEDURE — 99214 PR OFFICE/OUTPT VISIT, EST, LEVL IV, 30-39 MIN: ICD-10-PCS | Mod: S$GLB,,, | Performed by: STUDENT IN AN ORGANIZED HEALTH CARE EDUCATION/TRAINING PROGRAM

## 2023-02-16 PROCEDURE — 3008F PR BODY MASS INDEX (BMI) DOCUMENTED: ICD-10-PCS | Mod: CPTII,S$GLB,, | Performed by: STUDENT IN AN ORGANIZED HEALTH CARE EDUCATION/TRAINING PROGRAM

## 2023-02-16 PROCEDURE — 99999 PR PBB SHADOW E&M-EST. PATIENT-LVL IV: ICD-10-PCS | Mod: PBBFAC,,, | Performed by: STUDENT IN AN ORGANIZED HEALTH CARE EDUCATION/TRAINING PROGRAM

## 2023-02-16 PROCEDURE — 3008F BODY MASS INDEX DOCD: CPT | Mod: CPTII,S$GLB,, | Performed by: STUDENT IN AN ORGANIZED HEALTH CARE EDUCATION/TRAINING PROGRAM

## 2023-02-16 PROCEDURE — 3075F PR MOST RECENT SYSTOLIC BLOOD PRESS GE 130-139MM HG: ICD-10-PCS | Mod: CPTII,S$GLB,, | Performed by: STUDENT IN AN ORGANIZED HEALTH CARE EDUCATION/TRAINING PROGRAM

## 2023-02-16 PROCEDURE — 3288F FALL RISK ASSESSMENT DOCD: CPT | Mod: CPTII,S$GLB,, | Performed by: STUDENT IN AN ORGANIZED HEALTH CARE EDUCATION/TRAINING PROGRAM

## 2023-02-16 PROCEDURE — 1159F MED LIST DOCD IN RCRD: CPT | Mod: CPTII,S$GLB,, | Performed by: STUDENT IN AN ORGANIZED HEALTH CARE EDUCATION/TRAINING PROGRAM

## 2023-02-16 PROCEDURE — 3078F DIAST BP <80 MM HG: CPT | Mod: CPTII,S$GLB,, | Performed by: STUDENT IN AN ORGANIZED HEALTH CARE EDUCATION/TRAINING PROGRAM

## 2023-02-16 PROCEDURE — 1126F AMNT PAIN NOTED NONE PRSNT: CPT | Mod: CPTII,S$GLB,, | Performed by: STUDENT IN AN ORGANIZED HEALTH CARE EDUCATION/TRAINING PROGRAM

## 2023-02-16 PROCEDURE — 1126F PR PAIN SEVERITY QUANTIFIED, NO PAIN PRESENT: ICD-10-PCS | Mod: CPTII,S$GLB,, | Performed by: STUDENT IN AN ORGANIZED HEALTH CARE EDUCATION/TRAINING PROGRAM

## 2023-02-16 PROCEDURE — 1160F RVW MEDS BY RX/DR IN RCRD: CPT | Mod: CPTII,S$GLB,, | Performed by: STUDENT IN AN ORGANIZED HEALTH CARE EDUCATION/TRAINING PROGRAM

## 2023-02-16 PROCEDURE — 1101F PT FALLS ASSESS-DOCD LE1/YR: CPT | Mod: CPTII,S$GLB,, | Performed by: STUDENT IN AN ORGANIZED HEALTH CARE EDUCATION/TRAINING PROGRAM

## 2023-02-16 PROCEDURE — 3075F SYST BP GE 130 - 139MM HG: CPT | Mod: CPTII,S$GLB,, | Performed by: STUDENT IN AN ORGANIZED HEALTH CARE EDUCATION/TRAINING PROGRAM

## 2023-02-16 PROCEDURE — 99999 PR PBB SHADOW E&M-EST. PATIENT-LVL IV: CPT | Mod: PBBFAC,,, | Performed by: STUDENT IN AN ORGANIZED HEALTH CARE EDUCATION/TRAINING PROGRAM

## 2023-02-16 RX ORDER — AZELASTINE 1 MG/ML
1 SPRAY, METERED NASAL 2 TIMES DAILY
Qty: 30 ML | Refills: 1 | Status: SHIPPED | OUTPATIENT
Start: 2023-02-16 | End: 2024-03-15 | Stop reason: SDUPTHER

## 2023-02-16 NOTE — PROGRESS NOTES
"Subjective:       Patient ID: Sunni Lowe is a 72 y.o. female.    Chief Complaint: Follow-up (6 month fu )  Reports right ear "feels plugged". Has improved since last visit. Still using Astelin spray and taking an antihistamine daily.    Active Problem List with Overview Notes    Diagnosis Date Noted    Dysfunction of both eustachian tubes 02/19/2023     astelin   Flonase   Daily antihistamine   Continues with ear pressure and feeling fluid in ears  Open to ENT referral   Notes son recently had sinus surgery and has done very well and happy with outcome   Notes after steroid shot at last visit did get better but with all the weather changes is starting to get bad again though definitely not as bad as it was       Gastroesophageal reflux disease 01/28/2019     PPI daily   Stable   Vit D/Ca and Mag for bone health due to long term hx taking PPI advised       Asthma      Advair 1 spray once a day   Denies symptoms   Stable       Osteopenia      Managed by GYN   Reclast             Review of Systems   Constitutional: Negative.    HENT:  Negative for hearing loss.         R ear feels "plugged and sluggish"   Respiratory: Negative.     Cardiovascular: Negative.    Psychiatric/Behavioral: Negative.        A1C:  Recent Labs   Lab 02/14/22  1336   Hemoglobin A1C 5.2     CBC:  Recent Labs   Lab 11/04/21  0823 08/15/22  1332   WBC 3.76 L 4.9   RBC 4.57 4.30   Hemoglobin 13.7 12.7   Hematocrit 40.9 37.7   Platelets 158 174   MCV 90 87.7   MCH 30.0 29.5   MCHC 33.5 33.7     CMP:  Recent Labs   Lab 11/04/21  0823 02/14/22  1336 08/15/22  1332   Glucose 87 83 87   Calcium 9.1 9.9 9.1   Albumin 4.0 4.8 4.2   Total Protein 6.8 7.4 6.2   Sodium 142 141 141   Potassium 4.2 4.4 4.1   CO2 28 28 31   Chloride 106 101 104   BUN 16 20 18   Creatinine 0.9 0.91 1.02   Alkaline Phosphatase 46 L  --   --    ALT 12 13 12   AST 17 19 16   Total Bilirubin 0.9 0.7 0.8     LIPIDS:  Recent Labs   Lab 02/14/22  1336   TSH 1.67   HDL 65 "   Cholesterol 191   Triglycerides 81   LDL Cholesterol 109 H   HDL/Cholesterol Ratio 2.9   Non HDL Chol. (LDL+VLDL) 126     TSH:  Recent Labs   Lab 02/14/22  1336   TSH 1.67        Objective:      Vitals:    02/16/23 1510   BP: 132/74   Pulse: 65   Temp: 97.9 °F (36.6 °C)      Physical Exam  Vitals reviewed.   Constitutional:       Appearance: Normal appearance. She is normal weight.   HENT:      Head: Normocephalic.      Right Ear: Ear canal normal. A middle ear effusion is present.      Left Ear: Tympanic membrane and ear canal normal.   Eyes:      Extraocular Movements: Extraocular movements intact.      Conjunctiva/sclera: Conjunctivae normal.   Cardiovascular:      Rate and Rhythm: Normal rate and regular rhythm.      Heart sounds: Normal heart sounds.   Pulmonary:      Effort: Pulmonary effort is normal.      Breath sounds: Wheezing present.   Abdominal:      General: Bowel sounds are normal.      Palpations: Abdomen is soft.   Musculoskeletal:         General: Normal range of motion.      Cervical back: Normal range of motion.   Neurological:      General: No focal deficit present.      Mental Status: She is alert and oriented to person, place, and time.   Psychiatric:         Mood and Affect: Mood normal.         Behavior: Behavior normal.        Assessment:       1. Dysfunction of both eustachian tubes    2. Environmental and seasonal allergies    3. Gastroesophageal reflux disease without esophagitis    4. Moderate persistent asthma without complication    5. Osteopenia of spine          Plan:   1. Dysfunction of both eustachian tubes  - Ambulatory referral/consult to ENT; Future  - azelastine (ASTELIN) 137 mcg (0.1 %) nasal spray; Use 1 spray (137 mcg total) in each nostril 2 (two) times daily.  Dispense: 30 mL; Refill: 1    2. Environmental and seasonal allergies    3. Gastroesophageal reflux disease without esophagitis    4. Moderate persistent asthma without complication    5. Osteopenia of spine        JOHNNY Villagran Student  [facility]   2/16/23     I hereby acknowledge that I am relying upon documentation authored by a NP student working under my supervision and further I hereby attest that I have verified the student documentation or findings by personally re-performing the physical exam and medical decision making activities of the Evaluation and Management service to be billed.    Labs reviewed in detail  HM discussed UTD  Continue healthy lifestyle efforts  Continue current meds as prescribed otherwise; refills per request  Keep routine specialist f/u   Refer to ENT for ETD and chronic sinus issues  RTC in 6 months  with labs prior and/or PRN         Aliosn Fox,    [facility]  2/16/23

## 2023-02-19 PROBLEM — H69.93 DYSFUNCTION OF BOTH EUSTACHIAN TUBES: Status: ACTIVE | Noted: 2023-02-19

## 2023-02-19 PROBLEM — J30.89 ENVIRONMENTAL AND SEASONAL ALLERGIES: Status: ACTIVE | Noted: 2023-02-19

## 2023-03-08 ENCOUNTER — PATIENT OUTREACH (OUTPATIENT)
Dept: ADMINISTRATIVE | Facility: HOSPITAL | Age: 73
End: 2023-03-08
Payer: MEDICARE

## 2023-03-17 ENCOUNTER — OFFICE VISIT (OUTPATIENT)
Dept: OTOLARYNGOLOGY | Facility: CLINIC | Age: 73
End: 2023-03-17
Payer: MEDICARE

## 2023-03-17 ENCOUNTER — CLINICAL SUPPORT (OUTPATIENT)
Dept: OTOLARYNGOLOGY | Facility: CLINIC | Age: 73
End: 2023-03-17
Payer: MEDICARE

## 2023-03-17 VITALS
DIASTOLIC BLOOD PRESSURE: 71 MMHG | HEIGHT: 71 IN | BODY MASS INDEX: 22.04 KG/M2 | WEIGHT: 157.44 LBS | HEART RATE: 62 BPM | SYSTOLIC BLOOD PRESSURE: 105 MMHG

## 2023-03-17 DIAGNOSIS — J31.0 RHINITIS, UNSPECIFIED TYPE: ICD-10-CM

## 2023-03-17 DIAGNOSIS — H93.8X9 SENSATION OF FULLNESS IN EAR, UNSPECIFIED LATERALITY: Primary | ICD-10-CM

## 2023-03-17 DIAGNOSIS — H69.93 DYSFUNCTION OF BOTH EUSTACHIAN TUBES: Primary | ICD-10-CM

## 2023-03-17 DIAGNOSIS — J32.4 CHRONIC PANSINUSITIS: ICD-10-CM

## 2023-03-17 PROCEDURE — 3008F PR BODY MASS INDEX (BMI) DOCUMENTED: ICD-10-PCS | Mod: CPTII,S$GLB,, | Performed by: NURSE PRACTITIONER

## 2023-03-17 PROCEDURE — 99204 PR OFFICE/OUTPT VISIT, NEW, LEVL IV, 45-59 MIN: ICD-10-PCS | Mod: 25,S$GLB,, | Performed by: NURSE PRACTITIONER

## 2023-03-17 PROCEDURE — 31575 DIAGNOSTIC LARYNGOSCOPY: CPT | Mod: S$GLB,,, | Performed by: NURSE PRACTITIONER

## 2023-03-17 PROCEDURE — 3074F SYST BP LT 130 MM HG: CPT | Mod: CPTII,S$GLB,, | Performed by: NURSE PRACTITIONER

## 2023-03-17 PROCEDURE — 3288F PR FALLS RISK ASSESSMENT DOCUMENTED: ICD-10-PCS | Mod: CPTII,S$GLB,, | Performed by: NURSE PRACTITIONER

## 2023-03-17 PROCEDURE — 3074F PR MOST RECENT SYSTOLIC BLOOD PRESSURE < 130 MM HG: ICD-10-PCS | Mod: CPTII,S$GLB,, | Performed by: NURSE PRACTITIONER

## 2023-03-17 PROCEDURE — 92567 PR TYMPA2METRY: ICD-10-PCS | Mod: S$GLB,,,

## 2023-03-17 PROCEDURE — 99999 PR PBB SHADOW E&M-EST. PATIENT-LVL IV: CPT | Mod: PBBFAC,,, | Performed by: NURSE PRACTITIONER

## 2023-03-17 PROCEDURE — 1160F PR REVIEW ALL MEDS BY PRESCRIBER/CLIN PHARMACIST DOCUMENTED: ICD-10-PCS | Mod: CPTII,S$GLB,, | Performed by: NURSE PRACTITIONER

## 2023-03-17 PROCEDURE — 1126F AMNT PAIN NOTED NONE PRSNT: CPT | Mod: CPTII,S$GLB,, | Performed by: NURSE PRACTITIONER

## 2023-03-17 PROCEDURE — 3288F FALL RISK ASSESSMENT DOCD: CPT | Mod: CPTII,S$GLB,, | Performed by: NURSE PRACTITIONER

## 2023-03-17 PROCEDURE — 92567 TYMPANOMETRY: CPT | Mod: S$GLB,,,

## 2023-03-17 PROCEDURE — 1159F MED LIST DOCD IN RCRD: CPT | Mod: CPTII,S$GLB,, | Performed by: NURSE PRACTITIONER

## 2023-03-17 PROCEDURE — 1159F PR MEDICATION LIST DOCUMENTED IN MEDICAL RECORD: ICD-10-PCS | Mod: CPTII,S$GLB,, | Performed by: NURSE PRACTITIONER

## 2023-03-17 PROCEDURE — 1101F PR PT FALLS ASSESS DOC 0-1 FALLS W/OUT INJ PAST YR: ICD-10-PCS | Mod: CPTII,S$GLB,, | Performed by: NURSE PRACTITIONER

## 2023-03-17 PROCEDURE — 31575 LARYNGOSCOPY: ICD-10-PCS | Mod: S$GLB,,, | Performed by: NURSE PRACTITIONER

## 2023-03-17 PROCEDURE — 3008F BODY MASS INDEX DOCD: CPT | Mod: CPTII,S$GLB,, | Performed by: NURSE PRACTITIONER

## 2023-03-17 PROCEDURE — 3078F PR MOST RECENT DIASTOLIC BLOOD PRESSURE < 80 MM HG: ICD-10-PCS | Mod: CPTII,S$GLB,, | Performed by: NURSE PRACTITIONER

## 2023-03-17 PROCEDURE — 3078F DIAST BP <80 MM HG: CPT | Mod: CPTII,S$GLB,, | Performed by: NURSE PRACTITIONER

## 2023-03-17 PROCEDURE — 1101F PT FALLS ASSESS-DOCD LE1/YR: CPT | Mod: CPTII,S$GLB,, | Performed by: NURSE PRACTITIONER

## 2023-03-17 PROCEDURE — 1126F PR PAIN SEVERITY QUANTIFIED, NO PAIN PRESENT: ICD-10-PCS | Mod: CPTII,S$GLB,, | Performed by: NURSE PRACTITIONER

## 2023-03-17 PROCEDURE — 99999 PR PBB SHADOW E&M-EST. PATIENT-LVL IV: ICD-10-PCS | Mod: PBBFAC,,, | Performed by: NURSE PRACTITIONER

## 2023-03-17 PROCEDURE — 1160F RVW MEDS BY RX/DR IN RCRD: CPT | Mod: CPTII,S$GLB,, | Performed by: NURSE PRACTITIONER

## 2023-03-17 PROCEDURE — 99204 OFFICE O/P NEW MOD 45 MIN: CPT | Mod: 25,S$GLB,, | Performed by: NURSE PRACTITIONER

## 2023-03-17 RX ORDER — FLUTICASONE PROPIONATE 50 MCG
2 SPRAY, SUSPENSION (ML) NASAL DAILY
Qty: 16 G | Refills: 11 | Status: SHIPPED | OUTPATIENT
Start: 2023-03-17 | End: 2024-02-19 | Stop reason: SDUPTHER

## 2023-03-17 NOTE — PROCEDURES
Laryngoscopy    Date/Time: 3/17/2023 3:40 PM  Performed by: Angle Patricia NP  Authorized by: Angle Patricia NP     Consent Done?:  Yes (Verbal)  Anesthesia:     Local anesthetic:  Lidocaine 2% and Paul-Synephrine 1/2%  Laryngoscopy:     Areas examined:  Nasal cavities, nasopharynx, oropharynx, hypopharynx, larynx and vocal cords  Nose External:      No external nasal deformity  Nose Intranasal:      Mucosa no polyps     Mucosa ulcers not present     No mucosa lesions present     No septum gross deformity     Enlarged turbinates  Nasopharynx:      No mucosa lesions     Adenoids not present     Posterior choanae patent     Eustachian tube patent  Larynx/hypopharynx:      No epiglottis lesions     No epiglottis edema     No AE folds lesions     No vocal cord polyps     Equal and normal bilateral     No hypopharynx lesions     No piriform sinus pooling     No piriform sinus lesions     No post cricoid edema     No post cricoid erythema

## 2023-03-17 NOTE — PROGRESS NOTES
Sunniconrad Villagomezjohn, a 72 y.o. female was seen today in the clinic for impedance testing.      Tympanometry revealed Type A tympanograms, bilaterally.

## 2023-03-17 NOTE — PROGRESS NOTES
Chief Complaint   Patient presents with    Sinus Problem     X 1 month   Nasal drip, voice is raspy    Ear Fullness     Right ear feels clogged    .     HPI: Sunni Lowe is a 72 y.o. female who presents for evaluation of a month history of nasal congestion and postnasal drip. She describes difficulty breathing at night. There is right nasal obstruction. She  does not use sinus rinses or but has used nasal sprays.She has midface pain and pressure every now and then. She has rhinorrhea and postnasal drip. There is not maxillary tooth pain.She has headaches.  She has not had sinus or nasal surgery. There is no history of sinonasal trauma.  She has hx of chronic congestion.   She saw her PCP on 23 and was rx'ed Astelin for ETD. She is also a generic antihistamine (patient does not know the name of medication).       Past Medical History:   Diagnosis Date    Allergic rhinitis     Asthma     Fibrocystic breast     Osteopenia     Varicose veins     sees Dr. Brigido Quinones     Social History     Socioeconomic History    Marital status:     Number of children: 2   Tobacco Use    Smoking status: Former     Packs/day: 1.00     Years: 10.00     Pack years: 10.00     Types: Cigarettes     Quit date:      Years since quittin.2    Smokeless tobacco: Never   Substance and Sexual Activity    Alcohol use: Yes     Alcohol/week: 7.0 standard drinks     Types: 7 Glasses of wine per week     Comment: one glass of wine daily    Drug use: No    Sexual activity: Not Currently     Partners: Male     Comment:      Social Determinants of Health     Financial Resource Strain: Low Risk     Difficulty of Paying Living Expenses: Not hard at all   Food Insecurity: No Food Insecurity    Worried About Running Out of Food in the Last Year: Never true    Ran Out of Food in the Last Year: Never true   Transportation Needs: No Transportation Needs    Lack of Transportation (Medical): No    Lack of Transportation  (Non-Medical): No   Physical Activity: Sufficiently Active    Days of Exercise per Week: 4 days    Minutes of Exercise per Session: 60 min   Stress: No Stress Concern Present    Feeling of Stress : Only a little   Social Connections: Socially Integrated    Frequency of Communication with Friends and Family: More than three times a week    Frequency of Social Gatherings with Friends and Family: Twice a week    Attends Nondenominational Services: More than 4 times per year    Active Member of Clubs or Organizations: Yes    Attends Club or Organization Meetings: More than 4 times per year    Marital Status:    Housing Stability: Low Risk     Unable to Pay for Housing in the Last Year: No    Number of Places Lived in the Last Year: 1    Unstable Housing in the Last Year: No     Past Surgical History:   Procedure Laterality Date    BREAST BIOPSY Left     COLONOSCOPY  08/01/2014    Dr. Canas - repeat in 5 to 10 years    ESOPHAGOGASTRODUODENOSCOPY N/A 10/19/2018    Procedure: EGD (ESOPHAGOGASTRODUODENOSCOPY);  Surgeon: Cassie Huber MD;  Location: [facility];  Service: Endoscopy;  Laterality: N/A;    KNEE SURGERY Bilateral     meniscus repair    peniculitis surgery      x 3     Family History   Problem Relation Age of Onset    Ovarian cancer Mother 63    Alzheimer's disease Mother         @ 62    Cancer Father 85        tongue, and Lung    Diabetes Father     No Known Problems Sister     No Known Problems Sister     No Known Problems Brother     No Known Problems Daughter     No Known Problems Son     Alzheimer's disease Maternal Aunt         @ age 80           Review of Systems  General: negative for chills, fever or weight loss  Psychological: negative for mood changes or depression  Ophthalmic: negative for blurry vision, photophobia, eye pain, eye swelling   ENT: see HPI  Respiratory: no cough, shortness of breath, or wheezing  Cardiovascular: no chest pain or dyspnea on exertion  Gastrointestinal: no  abdominal pain, change in bowel habits, or black/ bloody stools  Musculoskeletal: negative for gait disturbance or muscular weakness  Neurological: no syncope or seizures; no ataxia  Dermatological: negative for pruritis,  rash and jaundice  Hematologic/lymphatic: no easy bruising, no new adenopathy      Physical Exam:    Vitals:    03/17/23 1125   BP: 105/71   Pulse: 62       Constitutional: Well appearing / communicating without difficutly.  NAD.  Eyes: EOM I Bilaterally  Head/Face: Normocephalic. Negative paranasal sinus pressure/tenderness.  Salivary glands WNL.  House Brackmann I Bilaterally.    Right Ear: Auricle normal appearance. External Auditory Canal within normal limits no lesions or masses,TM w/o masses/lesions/perforations. TM mobility noted.   Left Ear: Auricle normal appearance. External Auditory Canal within normal limits no lesions or masses,TM w/o masses/lesions/perforations. TM mobility noted.  Nose: No gross nasal septal deviation. Inferior Turbinates 3+ bilaterally. No septal perforation. No masses/lesions. External nasal skin appears normal without masses/lesions. Clear rhinorrhea. Erythematous and congested nasal mucosa throughtout.   Oral Cavity: Gingiva/lips within normal limits.  Dentition/gingiva healthy appearing. Mucus membranes moist. Floor of mouth soft, no masses palpated. Oral Tongue mobile. Hard Palate appears normal.    Oropharynx: Base of tongue appears normal. No masses/lesions noted. Tonsillar fossa/pharyngeal wall without lesions. Posterior oropharynx WNL.  Soft palate without masses. Midline uvula.   Neck/Lymphatic: No LAD I-VI bilaterally.  No thyromegaly.  No masses noted on exam.    Mirror laryngoscopy/nasopharyngoscopy: Active gag reflex.  Unable to perform.    Neuro/Psychiatric: AOx3.  Normal mood and affect.   Cardiovascular: Normal carotid pulses bilaterally, no increasing jugular venous distention noted at cervical region bilaterally.    Respiratory: Normal  respiratory effort, no stridor, no retractions noted.      Tympanometry revealed Type A tympanograms, bilaterally.            Assessment:    ICD-10-CM ICD-9-CM    1. Dysfunction of both eustachian tubes  H69.83 381.81 Ambulatory referral/consult to ENT      2. Rhinitis, unspecified type  J31.0 472.0 Aspergillus fumagatus IgE      Bermuda grass IgE      Cat epithelium IgE      Cladosporium IgE      Cockroach, American IgE      Cropsey, bald IgE      D. farinae IgE      D. pteronyssinus IgE      Dog dander IgE      Plantain, English IgE      Malcolm grass IgE      Marsh elder, rough IgE      Mugwort IgE      Nettle IgE      Oak, white IgE      Penicillium IgE      Ragweed, short, common IgE      Dany IgE      Allergen, Cocklebur      Allergen, Elm [place]      Allergen, Meadow Grass ([employer] [employer])      Allergen, Mucor Racemosus      Allergen, Pecan [place] IgE      Allergen, White Mark      Allergen-Alternaria Alternata      Allergen-[place]gett      Allergen-Common Pigweed      Allergen-Silver Birch      Feather Panel #2      RAST Allergen for Eastern [place]      RAST Allergen Maple ([place])      RAST Allergen [place]      RAST Allergen, Lamb's Quarters      RAST Allergen, Sheep [place])      3. Chronic pansinusitis  J32.4 473.8 CT Medtronic Sinuses without        The primary encounter diagnosis was Dysfunction of both eustachian tubes. Diagnoses of Rhinitis, unspecified type and Chronic pansinusitis were also pertinent to this visit.      Plan:  Orders Placed This Encounter   Procedures    CT Medtronic Sinuses without    Aspergillus fumagatus IgE    Bermuda grass IgE    Cat epithelium IgE    Cladosporium IgE    Cockroach, American IgE    Cropsey, bald IgE    D. farinae IgE    D. pteronyssinus IgE    Dog dander IgE    Plantain, English IgE    Malcolm grass IgE    Marsh elder, rough IgE    Mugwort IgE    Nettle IgE    Oak, white IgE    Penicillium IgE    Ragweed, short, common IgE    Dany IgE     Allergen, Cocklebur    Allergen, Elm [place]    Allergen, Meadow Grass ([place] Blue)    Allergen, Mucor Racemosus    Allergen, Pecan [place]ickory IgE    Allergen, White Mark    Allergen-Alternaria Alternata    Allergen-[place]    Allergen-Common Pigweed    Allergen-Silver Birch    Feather Panel #2    RAST Allergen for Eastern [place]    RAST Allergen Maple ([place])    RAST Allergen [place]    RAST Allergen, Lamb's Quarters    RAST Allergen, Sheep [place])         -start on Flonase 2 sprays in each nostril daily  -start on nasal saline rinses bid  -continue on oral antihistamine  - continue on Astelin nasal spray  -ordered CT medtronic sinuses  - order allergy panel/RAST to ascertain allergic triggesr  -f/u 4-6 weeks or PRN    No follow-ups on file.    Angle Patricia NP      Answers submitted by the patient for this visit:  Review of Symptoms Questionnaire  (Submitted on 3/16/2023)  Fatigue (Tiredness)?: Yes  ear pain: Yes  sinus pressure : Yes  Sinus infection(s)?: Yes  postnasal drip: Yes  Voice Change?: Yes  Light sensitivity / Light hurts the eyes?: Yes  Snoring?: Yes  cough: Yes  Irregular heartbeat?: Yes  Foot swelling?: Yes  diarrhea: Yes  constipation: Yes  Acid Reflux?: Yes  None of these: Yes  None of these: Yes  None of these : Yes  Seasonal Allergies?: Yes  None of these : Yes  Light-headedness: Yes  None of these: Yes  None of these: Yes

## 2023-03-24 ENCOUNTER — HOSPITAL ENCOUNTER (OUTPATIENT)
Dept: RADIOLOGY | Facility: HOSPITAL | Age: 73
Discharge: HOME | End: 2023-03-24
Attending: NURSE PRACTITIONER
Payer: MEDICARE

## 2023-03-24 DIAGNOSIS — J32.4 CHRONIC PANSINUSITIS: ICD-10-CM

## 2023-03-24 PROCEDURE — 70486 CT MAXILLOFACIAL W/O DYE: CPT | Mod: TC

## 2023-03-24 PROCEDURE — 70486 CT MAXILLOFACIAL W/O DYE: CPT | Mod: 26,,, | Performed by: RADIOLOGY

## 2023-03-24 PROCEDURE — 70486 CT MEDTRONIC SINUSES WITHOUT: ICD-10-PCS | Mod: 26,,, | Performed by: RADIOLOGY

## 2023-04-18 ENCOUNTER — OFFICE VISIT (OUTPATIENT)
Dept: OTOLARYNGOLOGY | Facility: CLINIC | Age: 73
End: 2023-04-18
Payer: MEDICARE

## 2023-04-18 ENCOUNTER — CLINICAL SUPPORT (OUTPATIENT)
Dept: OTOLARYNGOLOGY | Facility: CLINIC | Age: 73
End: 2023-04-18
Payer: MEDICARE

## 2023-04-18 VITALS — WEIGHT: 155.75 LBS | BODY MASS INDEX: 21.81 KG/M2 | HEIGHT: 71 IN

## 2023-04-18 DIAGNOSIS — H90.3 SENSORINEURAL HEARING LOSS (SNHL) OF BOTH EARS: Primary | ICD-10-CM

## 2023-04-18 DIAGNOSIS — J30.89 NON-SEASONAL ALLERGIC RHINITIS, UNSPECIFIED TRIGGER: Primary | ICD-10-CM

## 2023-04-18 DIAGNOSIS — H90.3 SENSORINEURAL HEARING LOSS (SNHL) OF BOTH EARS: ICD-10-CM

## 2023-04-18 DIAGNOSIS — H93.13 TINNITUS, BILATERAL: ICD-10-CM

## 2023-04-18 DIAGNOSIS — H92.01 RIGHT EAR PAIN: ICD-10-CM

## 2023-04-18 DIAGNOSIS — J34.2 NASAL SEPTAL DEVIATION: ICD-10-CM

## 2023-04-18 DIAGNOSIS — M26.621 ARTHRALGIA OF RIGHT TEMPOROMANDIBULAR JOINT: ICD-10-CM

## 2023-04-18 PROCEDURE — 99999 PR PBB SHADOW E&M-EST. PATIENT-LVL III: CPT | Mod: PBBFAC,,, | Performed by: NURSE PRACTITIONER

## 2023-04-18 PROCEDURE — 1101F PR PT FALLS ASSESS DOC 0-1 FALLS W/OUT INJ PAST YR: ICD-10-PCS | Mod: CPTII,S$GLB,, | Performed by: NURSE PRACTITIONER

## 2023-04-18 PROCEDURE — 99214 PR OFFICE/OUTPT VISIT, EST, LEVL IV, 30-39 MIN: ICD-10-PCS | Mod: S$GLB,,, | Performed by: NURSE PRACTITIONER

## 2023-04-18 PROCEDURE — 99214 OFFICE O/P EST MOD 30 MIN: CPT | Mod: S$GLB,,, | Performed by: NURSE PRACTITIONER

## 2023-04-18 PROCEDURE — 1157F ADVNC CARE PLAN IN RCRD: CPT | Mod: CPTII,S$GLB,, | Performed by: NURSE PRACTITIONER

## 2023-04-18 PROCEDURE — 1126F PR PAIN SEVERITY QUANTIFIED, NO PAIN PRESENT: ICD-10-PCS | Mod: CPTII,S$GLB,, | Performed by: NURSE PRACTITIONER

## 2023-04-18 PROCEDURE — 1159F MED LIST DOCD IN RCRD: CPT | Mod: CPTII,S$GLB,, | Performed by: NURSE PRACTITIONER

## 2023-04-18 PROCEDURE — 1101F PT FALLS ASSESS-DOCD LE1/YR: CPT | Mod: CPTII,S$GLB,, | Performed by: NURSE PRACTITIONER

## 2023-04-18 PROCEDURE — 1160F RVW MEDS BY RX/DR IN RCRD: CPT | Mod: CPTII,S$GLB,, | Performed by: NURSE PRACTITIONER

## 2023-04-18 PROCEDURE — 92567 TYMPANOMETRY: CPT | Mod: S$GLB,,,

## 2023-04-18 PROCEDURE — 1157F PR ADVANCE CARE PLAN OR EQUIV PRESENT IN MEDICAL RECORD: ICD-10-PCS | Mod: CPTII,S$GLB,, | Performed by: NURSE PRACTITIONER

## 2023-04-18 PROCEDURE — 1160F PR REVIEW ALL MEDS BY PRESCRIBER/CLIN PHARMACIST DOCUMENTED: ICD-10-PCS | Mod: CPTII,S$GLB,, | Performed by: NURSE PRACTITIONER

## 2023-04-18 PROCEDURE — 3008F BODY MASS INDEX DOCD: CPT | Mod: CPTII,S$GLB,, | Performed by: NURSE PRACTITIONER

## 2023-04-18 PROCEDURE — 92567 PR TYMPA2METRY: ICD-10-PCS | Mod: S$GLB,,,

## 2023-04-18 PROCEDURE — 92557 PR COMPREHENSIVE HEARING TEST: ICD-10-PCS | Mod: S$GLB,,,

## 2023-04-18 PROCEDURE — 1126F AMNT PAIN NOTED NONE PRSNT: CPT | Mod: CPTII,S$GLB,, | Performed by: NURSE PRACTITIONER

## 2023-04-18 PROCEDURE — 3008F PR BODY MASS INDEX (BMI) DOCUMENTED: ICD-10-PCS | Mod: CPTII,S$GLB,, | Performed by: NURSE PRACTITIONER

## 2023-04-18 PROCEDURE — 99999 PR PBB SHADOW E&M-EST. PATIENT-LVL III: ICD-10-PCS | Mod: PBBFAC,,, | Performed by: NURSE PRACTITIONER

## 2023-04-18 PROCEDURE — 92557 COMPREHENSIVE HEARING TEST: CPT | Mod: S$GLB,,,

## 2023-04-18 PROCEDURE — 3288F PR FALLS RISK ASSESSMENT DOCUMENTED: ICD-10-PCS | Mod: CPTII,S$GLB,, | Performed by: NURSE PRACTITIONER

## 2023-04-18 PROCEDURE — 1159F PR MEDICATION LIST DOCUMENTED IN MEDICAL RECORD: ICD-10-PCS | Mod: CPTII,S$GLB,, | Performed by: NURSE PRACTITIONER

## 2023-04-18 PROCEDURE — 3288F FALL RISK ASSESSMENT DOCD: CPT | Mod: CPTII,S$GLB,, | Performed by: NURSE PRACTITIONER

## 2023-04-18 NOTE — PROGRESS NOTES
Sunni Lowe, a 72 y.o. female was seen today in the clinic for an audiologic evaluation. Ms. Lowe has been wearing hearing aids for approximately 3 years. She reports experiencing tinnitus in both ears which is worse in the right ear.  No family history of hearing loss or history of noise exposure was reported.      Pure tone testing revealed mild sloping to profound sensorineural hearing loss, bilaterally. Speech reception thresholds were obtained at 40 dBHL in the right ear and 30 dBHL in the left ear. Speech discrimination scores were 48% in the right ear and 76% in the left ear. Tympanometry revealed Type A tympanograms in both ears.    Results were reviewed with the patient and she was given a copy of the audiogram to bring to her fitting audiologist for adjustments to hearing aids as needed.    Recommendations:  Annual audiologic evaluation  Hearing protection in noise  Hearing aid consultation for adjustments to hearing aids as needed.

## 2023-04-18 NOTE — PATIENT INSTRUCTIONS
We had a long discussion regarding the underlying pathology of temporomandibular joint dysfunction (TMD) as the cause of ear pain.  We further discussed conservative measures to treat TMD including avoiding gum and other foods that require lots of chewing, warm compresses, and scheduled antinflammatories (such as Motrin, Ibuprofen, or Aleve).  The patient should also wear a  (purchased OTC or see dentist for custom), which prevents additional pressure on the TM joint.  Stress can also exacerbate TMJ symptoms.    If the pain persists, the patient will then schedule an appointment with a dentist for further evaluation.

## 2023-04-18 NOTE — PROGRESS NOTES
Chief Complaint   Patient presents with    Follow-up   .     HPI 3/17/2023: Sunni Lowe is a 72 y.o. female who presents for evaluation of a month history of nasal congestion and postnasal drip. She describes difficulty breathing at night. There is right nasal obstruction. She  does not use sinus rinses or but has used nasal sprays.She has midface pain and pressure every now and then. She has rhinorrhea and postnasal drip. There is not maxillary tooth pain.She has headaches.  She has not had sinus or nasal surgery. There is no history of sinonasal trauma.  She has hx of chronic congestion.   She saw her PCP on 23 and was rx'ed Astelin for ETD. She is also a generic antihistamine (patient does not know the name of medication).     Interval HPI 2023:  [facility] showed no evidence of acute or chronic sinusitis. Deviation of the septum to the right with a right uncinate spur making a contact point with the right middle turbinate. She reports feeling better. She has been using Lizbeth pot, Flonase, Astelin, and oral antihistamine. RAST blood work on 3/20/2023 showed she's allergic to mold, fungus, cat and dog dander. She has some pain to her right ear and comes and goes. She states she's not too concerned about it.  Only lasts a couple seconds. Denies ear drainage, tinnitus and dizziness. She wears bilateral hearing aids. She does chew gum and wears a mouth guard at night.      Past Medical History:   Diagnosis Date    Allergic rhinitis     Asthma     Fibrocystic breast     Osteopenia     Varicose veins     sees Dr. Brigido Quinones     Social History     Socioeconomic History    Marital status:     Number of children: 2   Tobacco Use    Smoking status: Former     Packs/day: 1.00     Years: 10.00     Pack years: 10.00     Types: Cigarettes     Quit date:      Years since quittin.3    Smokeless tobacco: Never   Substance and Sexual Activity    Alcohol  use: Yes     Alcohol/week: 7.0 standard drinks     Types: 7 Glasses of wine per week     Comment: one glass of wine daily    Drug use: No    Sexual activity: Not Currently     Partners: Male     Comment:      Social Determinants of Health     Financial Resource Strain: Low Risk     Difficulty of Paying Living Expenses: Not hard at all   Food Insecurity: No Food Insecurity    Worried About Running Out of Food in the Last Year: Never true    Ran Out of Food in the Last Year: Never true   Transportation Needs: No Transportation Needs    Lack of Transportation (Medical): No    Lack of Transportation (Non-Medical): No   Physical Activity: Sufficiently Active    Days of Exercise per Week: 4 days    Minutes of Exercise per Session: 60 min   Stress: No Stress Concern Present    Feeling of Stress : Only a little   Social Connections: Socially Integrated    Frequency of Communication with Friends and Family: More than three times a week    Frequency of Social Gatherings with Friends and Family: Twice a week    Attends Rastafarian Services: More than 4 times per year    Active Member of Clubs or Organizations: Yes    Attends Club or Organization Meetings: More than 4 times per year    Marital Status:    Housing Stability: Low Risk     Unable to Pay for Housing in the Last Year: No    Number of Places Lived in the Last Year: 1    Unstable Housing in the Last Year: No     Past Surgical History:   Procedure Laterality Date    BREAST BIOPSY Left     COLONOSCOPY  08/01/2014    Dr. Canas - repeat in 5 to 10 years    ESOPHAGOGASTRODUODENOSCOPY N/A 10/19/2018    Procedure: EGD (ESOPHAGOGASTRODUODENOSCOPY);  Surgeon: Cassie Huber MD;  [facility]: [facility];  Service: Endoscopy;  Laterality: N/A;    KNEE SURGERY Bilateral     meniscus repair    peniculitis surgery      x 3     Family History   Problem Relation Age of Onset    Ovarian cancer Mother 63    Alzheimer's disease Mother         @ 62    Cancer Father 85         tongue, and Lung    Diabetes Father     No Known Problems Sister     No Known Problems Sister     No Known Problems Brother     No Known Problems Daughter     No Known Problems Son     Alzheimer's disease Maternal Aunt         @ age 80           Review of Systems  General: negative for chills, fever or weight loss  Psychological: negative for mood changes or depression  Ophthalmic: negative for blurry vision, photophobia, eye pain, eye swelling   ENT: see HPI  Respiratory: no cough, shortness of breath, or wheezing  Cardiovascular: no chest pain or dyspnea on exertion  Gastrointestinal: no abdominal pain, change in bowel habits, or black/ bloody stools  Musculoskeletal: negative for gait disturbance or muscular weakness  Neurological: no syncope or seizures; no ataxia  Dermatological: negative for pruritis,  rash and jaundice  Hematologic/lymphatic: no easy bruising, no new adenopathy      Physical Exam:    There were no vitals filed for this visit.      Constitutional: Well appearing / communicating without difficutly.  NAD.  Eyes: EOM I Bilaterally  Head/Face: Normocephalic. Negative paranasal sinus pressure/tenderness.  Salivary glands WNL.  House Brackmann I Bilaterally. + right TMJ clicking    Right Ear: Auricle normal appearance. External Auditory Canal within normal limits no lesions or masses,TM w/o masses/lesions/perforations. TM mobility noted.   Left Ear: Auricle normal appearance. External Auditory Canal within normal limits no lesions or masses,TM w/o masses/lesions/perforations. TM mobility noted.  Nose: + right nasal septal deviation. Inferior Turbinates 3+ bilaterally. No septal perforation. No masses/lesions. External nasal skin appears normal without masses/lesions.   Oral Cavity: Gingiva/lips within normal limits.  Dentition/gingiva healthy appearing. Mucus membranes moist. Floor of mouth soft, no masses palpated. Oral Tongue mobile. Hard Palate appears normal.    Oropharynx: Base of tongue  appears normal. No masses/lesions noted. Tonsillar fossa/pharyngeal wall without lesions. Posterior oropharynx WNL.  Soft palate without masses. Midline uvula.   Neck/Lymphatic: No LAD I-VI bilaterally.  No thyromegaly.  No masses noted on exam.    Mirror laryngoscopy/nasopharyngoscopy: Active gag reflex.  Unable to perform.    Neuro/Psychiatric: AOx3.  Normal mood and affect.   Cardiovascular: Normal carotid pulses bilaterally, no increasing jugular venous distention noted at cervical region bilaterally.    Respiratory: Normal respiratory effort, no stridor, no retractions noted.        Audiogram interpreted personally by me and discussed in detail with the patient today.   Pure tone testing revealed mild sloping to profound sensorineural hearing loss, bilaterally. Speech reception thresholds were obtained at 40 dBHL in the right ear and 30 dBHL in the left ear. Speech discrimination scores were 48% in the right ear and 76% in the left ear. Tympanometry revealed Type A tympanograms in both ears.          Diagnostic testing reviewed:    CT MEDTRONIC SINUSES WITHOUT 3/24/2023    Impression:     No evidence of acute or chronic sinusitis.     Deviation of the septum to the right with a right uncinate spur making a contact point with the right middle turbinate.      Assessment:    ICD-10-CM ICD-9-CM    1. Non-seasonal allergic rhinitis, unspecified trigger  J30.89 477.8       2. Right ear pain  H92.01 388.70       3. Arthralgia of right temporomandibular joint  M26.621 524.62       4. Nasal septal deviation -right  J34.2 470       5. Sensorineural hearing loss (SNHL) of both ears  H90.3 389.18           The primary encounter diagnosis was Non-seasonal allergic rhinitis, unspecified trigger. Diagnoses of Right ear pain, Arthralgia of right temporomandibular joint, Nasal septal deviation -right, and Sensorineural hearing loss (SNHL) of both ears were also pertinent to this visit.      Plan:  No orders of the defined types  were placed in this encounter.    -We had a long discussion regarding the underlying pathology of temporomandibular joint dysfunction (TMD) as the cause of ear pain.  We further discussed conservative measures to treat TMD including avoiding gum and other foods that require lots of chewing, warm compresses, and scheduled antinflammatories (such as Motrin, Ibuprofen, or Aleve).  The patient should also wear a  (purchased OTC or see dentist for custom), which prevents additional pressure on the TM joint.  Stress can also exacerbate TMJ symptoms.    If the pain persists, the patient will then schedule an appointment with a dentist for further evaluation.    -recommend yearly audiologic evaluation. She wears bilateral hearing aids  -continue with nasal saline rinses, Flonase, Astelin and oral antihistamine  -f/u 1 year or sooner as needed     No follow-ups on file.    Angle Patricia NP      Answers submitted by the patient for this visit:  Review of Symptoms Questionnaire  (Submitted on 4/17/2023)  None of these: Yes  ear pain: Yes  sinus pressure : Yes  Voice Change?: Yes  None of these : Yes  Snoring?: Yes  None of these: Yes  Urinating too frequently?: Yes  None of these : Yes  Seasonal Allergies?: Yes  None of these : Yes  None of these: Yes  None of these: Yes  decreased concentration: Yes  sleep disturbance: Yes

## 2023-04-20 ENCOUNTER — TELEPHONE (OUTPATIENT)
Dept: OTOLARYNGOLOGY | Facility: CLINIC | Age: 73
End: 2023-04-20
Payer: MEDICARE

## 2023-04-20 NOTE — TELEPHONE ENCOUNTER
Attempted to return call. No answer, voicemail was left.     ----- Message from Rizwana Contreras sent at 4/20/2023  2:38 PM CDT -----  Type:  Needs Medical Advice    Who Called: pt  Symptoms (please be specific): pt is wanting to make sure that you accept her peoples health insurance, needs a call back from you to schedule appt   Would the patient rather a call back or a response via [facility]? call  Best Call Back Number: [phone]   Additional Information:

## 2023-07-12 ENCOUNTER — PATIENT MESSAGE (OUTPATIENT)
Dept: ORTHOPEDICS | Facility: CLINIC | Age: 73
End: 2023-07-12
Payer: MEDICARE

## 2023-07-12 DIAGNOSIS — M17.10 PRIMARY OSTEOARTHRITIS OF KNEE, UNSPECIFIED LATERALITY: Primary | ICD-10-CM

## 2023-07-25 ENCOUNTER — PATIENT MESSAGE (OUTPATIENT)
Dept: FAMILY MEDICINE | Facility: CLINIC | Age: 73
End: 2023-07-25
Payer: MEDICARE

## 2023-07-25 NOTE — TELEPHONE ENCOUNTER
Refill Routing Note   Medication(s) are not appropriate for processing by [facility] for the following reason(s):      New or recently adjusted medication  Clarification of medication (Rx) details    [facility] action(s):  Defer Care Due:  None identified     Medication Therapy Plan: Per [facility] notes on 2/16/23, patient using 1 inhalation a day. Will update directions and pend for your approval.        Appointments  past 12m or future 3m with PCP    Date Provider   Last Visit   2/16/2023 Alison Fox DO   Next Visit   8/16/2023 Alison Fox DO   ED visits in past 90 days: 0        Note composed:6:27 PM 07/25/2023

## 2023-07-25 NOTE — TELEPHONE ENCOUNTER
No care due was identified.  [facility] Embedded Care Due Messages. Reference number: [id number].   7/25/2023 3:50:41 PM CDT

## 2023-07-26 RX ORDER — FLUTICASONE PROPIONATE AND SALMETEROL 250; 50 UG/1; UG/1
1 POWDER RESPIRATORY (INHALATION) DAILY
Qty: 120 EACH | Refills: 1 | Status: SHIPPED | OUTPATIENT
Start: 2023-07-26

## 2023-07-26 NOTE — TELEPHONE ENCOUNTER
Let pt know that I do not do this medicare wellness as it is a very specific thing; but we can try to get her with the NP that does them that same day; if she still wants to keep her appt I am happy to see her for routine follow up     Pleas contact the person who schedules the HRA to see if they can do this

## 2023-07-27 ENCOUNTER — PATIENT MESSAGE (OUTPATIENT)
Dept: FAMILY MEDICINE | Facility: CLINIC | Age: 73
End: 2023-07-27
Payer: MEDICARE

## 2023-07-28 ENCOUNTER — IMMUNIZATION (OUTPATIENT)
Dept: INTERNAL MEDICINE | Facility: CLINIC | Age: 73
End: 2023-07-28
Payer: MEDICARE

## 2023-07-28 DIAGNOSIS — Z23 NEED FOR VACCINATION: Primary | ICD-10-CM

## 2023-07-28 PROCEDURE — 91312 COVID-19, MRNA, LNP-S, BIVALENT BOOSTER, PF, 30 MCG/0.3 ML DOSE: CPT | Mod: S$GLB,,, | Performed by: INTERNAL MEDICINE

## 2023-07-28 PROCEDURE — 0124A COVID-19, MRNA, LNP-S, BIVALENT BOOSTER, PF, 30 MCG/0.3 ML DOSE: ICD-10-PCS | Mod: S$GLB,,, | Performed by: INTERNAL MEDICINE

## 2023-07-28 PROCEDURE — 91312 COVID-19, MRNA, LNP-S, BIVALENT BOOSTER, PF, 30 MCG/0.3 ML DOSE: ICD-10-PCS | Mod: S$GLB,,, | Performed by: INTERNAL MEDICINE

## 2023-07-28 PROCEDURE — 0124A COVID-19, MRNA, LNP-S, BIVALENT BOOSTER, PF, 30 MCG/0.3 ML DOSE: CPT | Mod: S$GLB,,, | Performed by: INTERNAL MEDICINE

## 2023-08-09 ENCOUNTER — PATIENT MESSAGE (OUTPATIENT)
Dept: OBSTETRICS AND GYNECOLOGY | Facility: CLINIC | Age: 73
End: 2023-08-09
Payer: MEDICARE

## 2023-08-09 ENCOUNTER — PATIENT MESSAGE (OUTPATIENT)
Dept: FAMILY MEDICINE | Facility: CLINIC | Age: 73
End: 2023-08-09
Payer: MEDICARE

## 2023-08-09 DIAGNOSIS — Z12.39 SCREENING BREAST EXAMINATION: Primary | ICD-10-CM

## 2023-08-09 DIAGNOSIS — Z12.31 ENCOUNTER FOR SCREENING MAMMOGRAM FOR MALIGNANT NEOPLASM OF BREAST: ICD-10-CM

## 2023-08-09 LAB
CHOLEST SERPL-MSCNC: 191 MG/DL (ref 0–200)
HDLC SERPL-MCNC: 65 MG/DL (ref 35–70)
LDLC SERPL CALC-MCNC: 110 MG/DL (ref 0–160)
TRIGL SERPL-MCNC: 70 MG/DL (ref 40–160)

## 2023-08-16 ENCOUNTER — OFFICE VISIT (OUTPATIENT)
Dept: FAMILY MEDICINE | Facility: CLINIC | Age: 73
End: 2023-08-16
Payer: MEDICARE

## 2023-08-16 VITALS
TEMPERATURE: 97 F | HEIGHT: 71 IN | OXYGEN SATURATION: 100 % | DIASTOLIC BLOOD PRESSURE: 84 MMHG | WEIGHT: 155.31 LBS | BODY MASS INDEX: 21.74 KG/M2 | SYSTOLIC BLOOD PRESSURE: 120 MMHG | HEART RATE: 67 BPM

## 2023-08-16 DIAGNOSIS — H69.93 DYSFUNCTION OF BOTH EUSTACHIAN TUBES: Primary | ICD-10-CM

## 2023-08-16 DIAGNOSIS — K21.9 GASTROESOPHAGEAL REFLUX DISEASE WITHOUT ESOPHAGITIS: ICD-10-CM

## 2023-08-16 DIAGNOSIS — J45.40 MODERATE PERSISTENT ASTHMA WITHOUT COMPLICATION: ICD-10-CM

## 2023-08-16 DIAGNOSIS — M85.88 OSTEOPENIA OF SPINE: ICD-10-CM

## 2023-08-16 PROCEDURE — 3074F PR MOST RECENT SYSTOLIC BLOOD PRESSURE < 130 MM HG: ICD-10-PCS | Mod: CPTII,S$GLB,, | Performed by: STUDENT IN AN ORGANIZED HEALTH CARE EDUCATION/TRAINING PROGRAM

## 2023-08-16 PROCEDURE — 1101F PT FALLS ASSESS-DOCD LE1/YR: CPT | Mod: CPTII,S$GLB,, | Performed by: STUDENT IN AN ORGANIZED HEALTH CARE EDUCATION/TRAINING PROGRAM

## 2023-08-16 PROCEDURE — 1126F AMNT PAIN NOTED NONE PRSNT: CPT | Mod: CPTII,S$GLB,, | Performed by: STUDENT IN AN ORGANIZED HEALTH CARE EDUCATION/TRAINING PROGRAM

## 2023-08-16 PROCEDURE — 3079F DIAST BP 80-89 MM HG: CPT | Mod: CPTII,S$GLB,, | Performed by: STUDENT IN AN ORGANIZED HEALTH CARE EDUCATION/TRAINING PROGRAM

## 2023-08-16 PROCEDURE — 3008F BODY MASS INDEX DOCD: CPT | Mod: CPTII,S$GLB,, | Performed by: STUDENT IN AN ORGANIZED HEALTH CARE EDUCATION/TRAINING PROGRAM

## 2023-08-16 PROCEDURE — 99999 PR PBB SHADOW E&M-EST. PATIENT-LVL IV: CPT | Mod: PBBFAC,,, | Performed by: STUDENT IN AN ORGANIZED HEALTH CARE EDUCATION/TRAINING PROGRAM

## 2023-08-16 PROCEDURE — 1126F PR PAIN SEVERITY QUANTIFIED, NO PAIN PRESENT: ICD-10-PCS | Mod: CPTII,S$GLB,, | Performed by: STUDENT IN AN ORGANIZED HEALTH CARE EDUCATION/TRAINING PROGRAM

## 2023-08-16 PROCEDURE — 3288F FALL RISK ASSESSMENT DOCD: CPT | Mod: CPTII,S$GLB,, | Performed by: STUDENT IN AN ORGANIZED HEALTH CARE EDUCATION/TRAINING PROGRAM

## 2023-08-16 PROCEDURE — 1160F RVW MEDS BY RX/DR IN RCRD: CPT | Mod: CPTII,S$GLB,, | Performed by: STUDENT IN AN ORGANIZED HEALTH CARE EDUCATION/TRAINING PROGRAM

## 2023-08-16 PROCEDURE — 99214 OFFICE O/P EST MOD 30 MIN: CPT | Mod: S$GLB,,, | Performed by: STUDENT IN AN ORGANIZED HEALTH CARE EDUCATION/TRAINING PROGRAM

## 2023-08-16 PROCEDURE — 99214 PR OFFICE/OUTPT VISIT, EST, LEVL IV, 30-39 MIN: ICD-10-PCS | Mod: S$GLB,,, | Performed by: STUDENT IN AN ORGANIZED HEALTH CARE EDUCATION/TRAINING PROGRAM

## 2023-08-16 PROCEDURE — 99999 PR PBB SHADOW E&M-EST. PATIENT-LVL IV: ICD-10-PCS | Mod: PBBFAC,,, | Performed by: STUDENT IN AN ORGANIZED HEALTH CARE EDUCATION/TRAINING PROGRAM

## 2023-08-16 PROCEDURE — 3074F SYST BP LT 130 MM HG: CPT | Mod: CPTII,S$GLB,, | Performed by: STUDENT IN AN ORGANIZED HEALTH CARE EDUCATION/TRAINING PROGRAM

## 2023-08-16 PROCEDURE — 3079F PR MOST RECENT DIASTOLIC BLOOD PRESSURE 80-89 MM HG: ICD-10-PCS | Mod: CPTII,S$GLB,, | Performed by: STUDENT IN AN ORGANIZED HEALTH CARE EDUCATION/TRAINING PROGRAM

## 2023-08-16 PROCEDURE — 1157F ADVNC CARE PLAN IN RCRD: CPT | Mod: CPTII,S$GLB,, | Performed by: STUDENT IN AN ORGANIZED HEALTH CARE EDUCATION/TRAINING PROGRAM

## 2023-08-16 PROCEDURE — 1160F PR REVIEW ALL MEDS BY PRESCRIBER/CLIN PHARMACIST DOCUMENTED: ICD-10-PCS | Mod: CPTII,S$GLB,, | Performed by: STUDENT IN AN ORGANIZED HEALTH CARE EDUCATION/TRAINING PROGRAM

## 2023-08-16 PROCEDURE — 3288F PR FALLS RISK ASSESSMENT DOCUMENTED: ICD-10-PCS | Mod: CPTII,S$GLB,, | Performed by: STUDENT IN AN ORGANIZED HEALTH CARE EDUCATION/TRAINING PROGRAM

## 2023-08-16 PROCEDURE — 3008F PR BODY MASS INDEX (BMI) DOCUMENTED: ICD-10-PCS | Mod: CPTII,S$GLB,, | Performed by: STUDENT IN AN ORGANIZED HEALTH CARE EDUCATION/TRAINING PROGRAM

## 2023-08-16 PROCEDURE — 1157F PR ADVANCE CARE PLAN OR EQUIV PRESENT IN MEDICAL RECORD: ICD-10-PCS | Mod: CPTII,S$GLB,, | Performed by: STUDENT IN AN ORGANIZED HEALTH CARE EDUCATION/TRAINING PROGRAM

## 2023-08-16 PROCEDURE — 1159F MED LIST DOCD IN RCRD: CPT | Mod: CPTII,S$GLB,, | Performed by: STUDENT IN AN ORGANIZED HEALTH CARE EDUCATION/TRAINING PROGRAM

## 2023-08-16 PROCEDURE — 1101F PR PT FALLS ASSESS DOC 0-1 FALLS W/OUT INJ PAST YR: ICD-10-PCS | Mod: CPTII,S$GLB,, | Performed by: STUDENT IN AN ORGANIZED HEALTH CARE EDUCATION/TRAINING PROGRAM

## 2023-08-16 PROCEDURE — 1159F PR MEDICATION LIST DOCUMENTED IN MEDICAL RECORD: ICD-10-PCS | Mod: CPTII,S$GLB,, | Performed by: STUDENT IN AN ORGANIZED HEALTH CARE EDUCATION/TRAINING PROGRAM

## 2023-08-16 RX ORDER — MV-MN/C/THEANINE/HERB NO.310 1000-200MG
POWDER IN PACKET (EA) ORAL
COMMUNITY
Start: 2023-04-03 | End: 2024-03-27

## 2023-08-16 NOTE — PROGRESS NOTES
Subjective:       Patient ID: Sunni Lowe is a 72 y.o. female.    Chief Complaint: Follow-up      Active Problem List with Overview Notes    Diagnosis Date Noted    Dysfunction of both eustachian tubes 02/19/2023     astelin   Flonase   Daily antihistamine   Went to ENT  Did well for a time but past few days has noticed ear pressure again; she had stopped the flonase consistently so will restart  We discussed adding Mucinex D (short term only) and popping ears       Gastroesophageal reflux disease 01/28/2019     PPI daily   Stable   Vit D/Ca and Mag for bone health due to long term hx taking PPI advised       Asthma      Advair 1 spray once a day   Denies symptoms   Stable       Osteopenia      Managed by GYN   Reclast annually           Review of Systems   HENT:  Positive for congestion, hearing loss, postnasal drip and sinus pressure.    All other systems reviewed and are negative.       A1C:  Recent Labs   Lab 02/14/22  1336   Hemoglobin A1C 5.2     CBC:  Recent Labs   Lab 11/04/21  0823 08/15/22  1332   WBC 3.76 L 4.9   RBC 4.57 4.30   Hemoglobin 13.7 12.7   Hematocrit 40.9 37.7   Platelets 158 174   MCV 90 87.7   MCH 30.0 29.5   MCHC 33.5 33.7     CMP:  Recent Labs   Lab 11/04/21  0823 02/14/22  1336 08/15/22  1332   Glucose 87 83 87   Calcium 9.1 9.9 9.1   Albumin 4.0 4.8 4.2   Total Protein 6.8 7.4 6.2   Sodium 142 141 141   Potassium 4.2 4.4 4.1   CO2 28 28 31   Chloride 106 101 104   BUN 16 20 18   Creatinine 0.9 0.91 1.02   Alkaline Phosphatase 46 L  --   --    ALT 12 13 12   AST 17 19 16   Total Bilirubin 0.9 0.7 0.8     LIPIDS:  Recent Labs   Lab 02/14/22  1336 02/11/23  0000   TSH 1.67  --    HDL 65 57   Cholesterol 191 178   Triglycerides 81 68   LDL Calculated  --  106   LDL Cholesterol 109 H  --    HDL/Cholesterol Ratio 2.9  --    Non HDL Chol. (LDL+VLDL) 126  --      TSH:  Recent Labs   Lab 02/14/22  1336   TSH 1.67        Objective:      Vitals:    08/16/23 1124   BP: 120/84   Pulse: 67    Temp: 97.1 °F (36.2 °C)      Physical Exam  Vitals reviewed.   Constitutional:       Appearance: Normal appearance. She is normal weight.   HENT:      Head: Normocephalic and atraumatic.      Right Ear: A middle ear effusion is present.      Left Ear: A middle ear effusion is present.   Eyes:      Conjunctiva/sclera: Conjunctivae normal.   Cardiovascular:      Rate and Rhythm: Normal rate and regular rhythm.      Heart sounds: Normal heart sounds.   Pulmonary:      Effort: Pulmonary effort is normal.      Breath sounds: Normal breath sounds.   Abdominal:      Palpations: Abdomen is soft.      Tenderness: There is no abdominal tenderness.   Musculoskeletal:         General: Normal range of motion.      Cervical back: Normal range of motion.      Right lower leg: No edema.      Left lower leg: No edema.   Neurological:      General: No focal deficit present.      Mental Status: She is alert and oriented to person, place, and time.   Psychiatric:         Mood and Affect: Mood normal.         Behavior: Behavior normal.          Assessment:       1. Dysfunction of both eustachian tubes    2. Moderate persistent asthma without complication    3. Gastroesophageal reflux disease without esophagitis    4. Osteopenia of spine        Plan:   1. Dysfunction of both eustachian tubes    2. Moderate persistent asthma without complication    3. Gastroesophageal reflux disease without esophagitis    4. Osteopenia of spine     Labs reviewed in detail  Continue healthy lifestyle efforts  START Mucinex D PRN (short term) and pop ears frequently; restart flonase regularly   Continue current meds as prescribed otherwise; refills per request  Keep routine specialist f/u   RTC in 6 months  and/or PRN; will continue with annual labs only          Alison Fox   [facility]   8/16/23

## 2023-08-16 NOTE — PATIENT INSTRUCTIONS
Flonase AM   Astelin PM   Continue antihistamine daily  AS needed for 5-7 days at a time Mucinex D in AM for fluid in ears   Pop ears frequently  RESTART NETTIPOT

## 2023-08-17 ENCOUNTER — TELEPHONE (OUTPATIENT)
Dept: FAMILY MEDICINE | Facility: CLINIC | Age: 73
End: 2023-08-17
Payer: MEDICARE

## 2023-08-18 ENCOUNTER — PATIENT MESSAGE (OUTPATIENT)
Dept: OBSTETRICS AND GYNECOLOGY | Facility: CLINIC | Age: 73
End: 2023-08-18
Payer: MEDICARE

## 2023-08-30 ENCOUNTER — OFFICE VISIT (OUTPATIENT)
Dept: ORTHOPEDICS | Facility: CLINIC | Age: 73
End: 2023-08-30
Payer: MEDICARE

## 2023-08-30 VITALS — HEIGHT: 71 IN | BODY MASS INDEX: 21.66 KG/M2

## 2023-08-30 DIAGNOSIS — M19.049 HAND ARTHRITIS: ICD-10-CM

## 2023-08-30 PROCEDURE — 99203 OFFICE O/P NEW LOW 30 MIN: CPT | Mod: S$GLB,,, | Performed by: ORTHOPAEDIC SURGERY

## 2023-08-30 PROCEDURE — 3008F PR BODY MASS INDEX (BMI) DOCUMENTED: ICD-10-PCS | Mod: CPTII,S$GLB,, | Performed by: ORTHOPAEDIC SURGERY

## 2023-08-30 PROCEDURE — 1125F PR PAIN SEVERITY QUANTIFIED, PAIN PRESENT: ICD-10-PCS | Mod: CPTII,S$GLB,, | Performed by: ORTHOPAEDIC SURGERY

## 2023-08-30 PROCEDURE — 99203 PR OFFICE/OUTPT VISIT, NEW, LEVL III, 30-44 MIN: ICD-10-PCS | Mod: S$GLB,,, | Performed by: ORTHOPAEDIC SURGERY

## 2023-08-30 PROCEDURE — 1159F PR MEDICATION LIST DOCUMENTED IN MEDICAL RECORD: ICD-10-PCS | Mod: CPTII,S$GLB,, | Performed by: ORTHOPAEDIC SURGERY

## 2023-08-30 PROCEDURE — 1157F ADVNC CARE PLAN IN RCRD: CPT | Mod: CPTII,S$GLB,, | Performed by: ORTHOPAEDIC SURGERY

## 2023-08-30 PROCEDURE — 3288F FALL RISK ASSESSMENT DOCD: CPT | Mod: CPTII,S$GLB,, | Performed by: ORTHOPAEDIC SURGERY

## 2023-08-30 PROCEDURE — 3008F BODY MASS INDEX DOCD: CPT | Mod: CPTII,S$GLB,, | Performed by: ORTHOPAEDIC SURGERY

## 2023-08-30 PROCEDURE — 99999 PR PBB SHADOW E&M-EST. PATIENT-LVL III: ICD-10-PCS | Mod: PBBFAC,,, | Performed by: ORTHOPAEDIC SURGERY

## 2023-08-30 PROCEDURE — 1125F AMNT PAIN NOTED PAIN PRSNT: CPT | Mod: CPTII,S$GLB,, | Performed by: ORTHOPAEDIC SURGERY

## 2023-08-30 PROCEDURE — 1101F PT FALLS ASSESS-DOCD LE1/YR: CPT | Mod: CPTII,S$GLB,, | Performed by: ORTHOPAEDIC SURGERY

## 2023-08-30 PROCEDURE — 1157F PR ADVANCE CARE PLAN OR EQUIV PRESENT IN MEDICAL RECORD: ICD-10-PCS | Mod: CPTII,S$GLB,, | Performed by: ORTHOPAEDIC SURGERY

## 2023-08-30 PROCEDURE — 99999 PR PBB SHADOW E&M-EST. PATIENT-LVL III: CPT | Mod: PBBFAC,,, | Performed by: ORTHOPAEDIC SURGERY

## 2023-08-30 PROCEDURE — 1159F MED LIST DOCD IN RCRD: CPT | Mod: CPTII,S$GLB,, | Performed by: ORTHOPAEDIC SURGERY

## 2023-08-30 PROCEDURE — 3288F PR FALLS RISK ASSESSMENT DOCUMENTED: ICD-10-PCS | Mod: CPTII,S$GLB,, | Performed by: ORTHOPAEDIC SURGERY

## 2023-08-30 PROCEDURE — 1101F PR PT FALLS ASSESS DOC 0-1 FALLS W/OUT INJ PAST YR: ICD-10-PCS | Mod: CPTII,S$GLB,, | Performed by: ORTHOPAEDIC SURGERY

## 2023-08-30 RX ORDER — CELECOXIB 200 MG/1
200 CAPSULE ORAL DAILY
Qty: 30 CAPSULE | Refills: 2 | Status: SHIPPED | OUTPATIENT
Start: 2023-08-30 | End: 2023-10-18 | Stop reason: SDUPTHER

## 2023-08-30 NOTE — PROGRESS NOTES
Subjective:      Patient ID: Sunni Lowe is a 72 y.o. female.    Chief Complaint: Consult (Left pinky and thumb pain)      HPI  Sunni Lowe is a  72 y.o. female presenting today for left hand and thumb pain.  There was not a history of trauma.  Onset of symptoms began several months ago   She also has some pain in her small fingers of both hands   No numbness or tingling reported she does report sort of a radiating pain in both small fingers   Symptoms worse with use particularly gripping and working out in the gym   .      Review of patient's allergies indicates:  No Known Allergies      Current Outpatient Medications   Medication Sig Dispense Refill    azelastine (ASTELIN) 137 mcg (0.1 %) nasal spray Use 1 spray (137 mcg total) in each nostril 2 (two) times daily. 30 mL 1    calcium carbonate (OS-ISABELLE) 600 mg (1,500 mg) Tab Take 1,200 mg by mouth once daily.      cholecalciferol, vitamin D3, (VITAMIN D3) 10,000 unit Cap Take 10,000 Units by mouth once daily.       coffee xt-phosphatidyl serine (NEURIVA ORIGINAL) 100-100 mg Cap       collagen, bovine, 100 % Powd       CYANOCOBALAMIN, VITAMIN B-12, (VITAMIN B-12 ORAL) Take by mouth.      esomeprazole (NEXIUM) 20 MG capsule Take 1 capsule (20 mg total) by mouth before breakfast. 90 capsule 3    fish oil-omega-3 fatty acids 300-1,000 mg capsule Take 600 mg by mouth once daily.      fluticasone propionate (FLONASE) 50 mcg/actuation nasal spray 2 sprays (100 mcg total) by Each Nostril route once daily. 16 g 11    fluticasone-salmeterol diskus inhaler 250-50 mcg Inhale 1 puff into the lungs once daily. Controller 120 each 1    celecoxib (CELEBREX) 200 MG capsule Take 1 capsule (200 mg total) by mouth once daily. 30 capsule 2    magnesium sulfate in water (MAGNESIUM SULFATE 20 GRAM/500 ML) 20 gram/500 mL (4 %) SolP       zinc sulfate (ZINC-15) 66 mg Tab        No current facility-administered medications for this visit.       Past Medical History:   Diagnosis  "Date    Allergic rhinitis     Asthma     Fibrocystic breast     Osteopenia     Varicose veins     sees Dr. Brigido Quinones       Past Surgical History:   Procedure Laterality Date    BREAST BIOPSY Left     COLONOSCOPY  08/01/2014    Dr. Canas - repeat in 5 to 10 years    ESOPHAGOGASTRODUODENOSCOPY N/A 10/19/2018    Procedure: EGD (ESOPHAGOGASTRODUODENOSCOPY);  Surgeon: Cassie Huber MD;  [facility]: [facility];  Service: Endoscopy;  Laterality: N/A;    KNEE SURGERY Bilateral     meniscus repair    peniculitis surgery      x 3       Review of Systems:  ROS    OBJECTIVE:     PHYSICAL EXAM:  Height: 5' 11" (180.3 cm)    Vitals:    08/30/23 1306   Height: 5' 11" (1.803 m)   PainSc:   3   PainLoc: Hand     Well developed, well nourished female in no acute distress  Alert and oriented x 3  HEENT- Normal exam  Lungs- Clear to auscultation  Heart- Regular rate and rhythm  Abdomen- Soft nontender  Extremity exam- examination hands there is tenderness at the base of left thumb CMC joint slight bony enlargement is noted positive grind test mild crepitation   Tinel sign negative  strength slightly decreased   Small fingers have some bony enlargement at the D IP joints of both fingers left worse than right little bit of mild tenderness range of motion full no instability   Sensation intact Tinel sign negative at the wrist and elbow    RADIOGRAPHS:  None  Comments: I have personally reviewed the imaging and I agree with the above radiologist's report.    ASSESSMENT/PLAN:     IMPRESSION:  Bilateral hand arthritis mainly affecting the left thumb CMC joint    PLAN:  I explained the nature of the problem to the patient   We talked about injection as an option she would like to try something by mouth 1st   I have started her on Celebrex 200 mg once a day with food   I have also given her a thumb support brace for part-time use   In the future we may consider injection if needed   If she develops any numbness in the hand in " the future we will need to get nerve conduction studies follow-up 2-3 months       - We talked at length about the anatomy and pathophysiology of   Encounter Diagnosis   Name Primary?    Hand arthritis            Disclaimer: This note has been generated using voice-recognition software. There may be typographical errors that have been missed during proof-reading.

## 2023-09-05 ENCOUNTER — OFFICE VISIT (OUTPATIENT)
Dept: ORTHOPEDICS | Facility: CLINIC | Age: 73
End: 2023-09-05
Payer: MEDICARE

## 2023-09-05 VITALS — BODY MASS INDEX: 21.7 KG/M2 | HEIGHT: 71 IN | WEIGHT: 155 LBS

## 2023-09-05 DIAGNOSIS — M17.12 PRIMARY OSTEOARTHRITIS OF LEFT KNEE: Primary | ICD-10-CM

## 2023-09-05 PROCEDURE — 3008F BODY MASS INDEX DOCD: CPT | Mod: CPTII,S$GLB,, | Performed by: ORTHOPAEDIC SURGERY

## 2023-09-05 PROCEDURE — 99999 PR PBB SHADOW E&M-EST. PATIENT-LVL III: CPT | Mod: PBBFAC,,, | Performed by: ORTHOPAEDIC SURGERY

## 2023-09-05 PROCEDURE — 1101F PR PT FALLS ASSESS DOC 0-1 FALLS W/OUT INJ PAST YR: ICD-10-PCS | Mod: CPTII,S$GLB,, | Performed by: ORTHOPAEDIC SURGERY

## 2023-09-05 PROCEDURE — 99213 OFFICE O/P EST LOW 20 MIN: CPT | Mod: S$GLB,,, | Performed by: ORTHOPAEDIC SURGERY

## 2023-09-05 PROCEDURE — 3288F PR FALLS RISK ASSESSMENT DOCUMENTED: ICD-10-PCS | Mod: CPTII,S$GLB,, | Performed by: ORTHOPAEDIC SURGERY

## 2023-09-05 PROCEDURE — 1157F PR ADVANCE CARE PLAN OR EQUIV PRESENT IN MEDICAL RECORD: ICD-10-PCS | Mod: CPTII,S$GLB,, | Performed by: ORTHOPAEDIC SURGERY

## 2023-09-05 PROCEDURE — 3288F FALL RISK ASSESSMENT DOCD: CPT | Mod: CPTII,S$GLB,, | Performed by: ORTHOPAEDIC SURGERY

## 2023-09-05 PROCEDURE — 1159F PR MEDICATION LIST DOCUMENTED IN MEDICAL RECORD: ICD-10-PCS | Mod: CPTII,S$GLB,, | Performed by: ORTHOPAEDIC SURGERY

## 2023-09-05 PROCEDURE — 99999 PR PBB SHADOW E&M-EST. PATIENT-LVL III: ICD-10-PCS | Mod: PBBFAC,,, | Performed by: ORTHOPAEDIC SURGERY

## 2023-09-05 PROCEDURE — 1125F AMNT PAIN NOTED PAIN PRSNT: CPT | Mod: CPTII,S$GLB,, | Performed by: ORTHOPAEDIC SURGERY

## 2023-09-05 PROCEDURE — 3008F PR BODY MASS INDEX (BMI) DOCUMENTED: ICD-10-PCS | Mod: CPTII,S$GLB,, | Performed by: ORTHOPAEDIC SURGERY

## 2023-09-05 PROCEDURE — 99213 PR OFFICE/OUTPT VISIT, EST, LEVL III, 20-29 MIN: ICD-10-PCS | Mod: S$GLB,,, | Performed by: ORTHOPAEDIC SURGERY

## 2023-09-05 PROCEDURE — 1157F ADVNC CARE PLAN IN RCRD: CPT | Mod: CPTII,S$GLB,, | Performed by: ORTHOPAEDIC SURGERY

## 2023-09-05 PROCEDURE — 1160F RVW MEDS BY RX/DR IN RCRD: CPT | Mod: CPTII,S$GLB,, | Performed by: ORTHOPAEDIC SURGERY

## 2023-09-05 PROCEDURE — 1101F PT FALLS ASSESS-DOCD LE1/YR: CPT | Mod: CPTII,S$GLB,, | Performed by: ORTHOPAEDIC SURGERY

## 2023-09-05 PROCEDURE — 1125F PR PAIN SEVERITY QUANTIFIED, PAIN PRESENT: ICD-10-PCS | Mod: CPTII,S$GLB,, | Performed by: ORTHOPAEDIC SURGERY

## 2023-09-05 PROCEDURE — 1160F PR REVIEW ALL MEDS BY PRESCRIBER/CLIN PHARMACIST DOCUMENTED: ICD-10-PCS | Mod: CPTII,S$GLB,, | Performed by: ORTHOPAEDIC SURGERY

## 2023-09-05 PROCEDURE — 1159F MED LIST DOCD IN RCRD: CPT | Mod: CPTII,S$GLB,, | Performed by: ORTHOPAEDIC SURGERY

## 2023-09-05 NOTE — PROGRESS NOTES
Subjective:      Patient ID: Sunni Lowe is a 72 y.o. female.    Chief Complaint:  Left knee arthritis  HPI    Follow-up for osteoarthritis.  The patient is taking Celebrex for an unrelated problem and finds that it helps her knee.  She has mild pain, mostly posterior.  She denies any functional limitation with regard to walking and is able to cycle without severe pain          Review of Systems   Constitutional: Negative for fever and weight loss.   HENT:  Negative for congestion.    Eyes:  Negative for visual disturbance.   Cardiovascular:  Negative for chest pain.   Respiratory:  Negative for shortness of breath.    Hematologic/Lymphatic: Negative for bleeding problem. Does not bruise/bleed easily.   Skin:  Negative for poor wound healing.   Gastrointestinal:  Negative for abdominal pain.   Genitourinary:  Negative for dysuria.   Neurological:  Negative for seizures.   Psychiatric/Behavioral:  Negative for altered mental status.    Allergic/Immunologic: Negative for persistent infections.         Objective:      Ortho/SPM Exam      Left knee    The patient is not in acute distress.   Sclerae normal  Body habitus is normal.  Respiratory distress:  none   The patient walks without a limp.  Hip irritability  negative.   The skin over the knee is intact.  Knee effusion 1+  Palpation- no focal tenderness  Range of motion- Flexion 140 deg, Extension 0 deg,   Ligament laxity exam:   MCL 2+   Lachman 0   Post sag  0    LCL 0  Patellar apprehension negative.  Popliteal cyst negative  Patellar crepitation present.  Meniscal irritability not applicable  Pulses DP present, PT present.  Motor normal 5/5 strength in all tested muscle groups.   Sensory normal.    I reviewed the relevant imaging for the patient's condition:  Left knee radiographs show 50-60% medial narrowing with osteophytes, Kellgren stage 3      Assessment:       Encounter Diagnosis   Name Primary?    Primary osteoarthritis of left knee Yes         Although the patient has significant radiographic findings, she does not have severe pain or impairment.  She is responding well to NSAIDs at this time.  Progressive symptoms may develop in the future.          Plan:       Diagnoses and all orders for this visit:    Primary osteoarthritis of left knee          I explained my diagnostic impression and the reasoning behind it in detail, using layman's terms.  Models and/or pictures were used to help in the explanation.    Continue Celebrex    Appropriate exercise regimen discussed    Annual follow-up with x-ray unless symptoms acutely worsen.

## 2023-09-07 ENCOUNTER — PATIENT OUTREACH (OUTPATIENT)
Dept: ADMINISTRATIVE | Facility: HOSPITAL | Age: 73
End: 2023-09-07
Payer: MEDICARE

## 2023-09-13 ENCOUNTER — OFFICE VISIT (OUTPATIENT)
Dept: FAMILY MEDICINE | Facility: CLINIC | Age: 73
End: 2023-09-13
Payer: MEDICARE

## 2023-09-13 VITALS
SYSTOLIC BLOOD PRESSURE: 116 MMHG | BODY MASS INDEX: 22.22 KG/M2 | HEART RATE: 62 BPM | DIASTOLIC BLOOD PRESSURE: 70 MMHG | OXYGEN SATURATION: 98 % | HEIGHT: 71 IN | WEIGHT: 158.69 LBS

## 2023-09-13 DIAGNOSIS — R41.89 SUBJECTIVE MEMORY COMPLAINTS: ICD-10-CM

## 2023-09-13 DIAGNOSIS — M19.049 HAND ARTHRITIS: ICD-10-CM

## 2023-09-13 DIAGNOSIS — R41.3 MEMORY CHANGES: ICD-10-CM

## 2023-09-13 DIAGNOSIS — Z00.00 ENCOUNTER FOR PREVENTIVE HEALTH EXAMINATION: Primary | ICD-10-CM

## 2023-09-13 DIAGNOSIS — H69.93 DYSFUNCTION OF BOTH EUSTACHIAN TUBES: ICD-10-CM

## 2023-09-13 DIAGNOSIS — M85.88 OSTEOPENIA OF SPINE: ICD-10-CM

## 2023-09-13 DIAGNOSIS — J45.40 MODERATE PERSISTENT ASTHMA WITHOUT COMPLICATION: ICD-10-CM

## 2023-09-13 DIAGNOSIS — D69.2 PURPURA: ICD-10-CM

## 2023-09-13 DIAGNOSIS — K21.9 GASTROESOPHAGEAL REFLUX DISEASE WITHOUT ESOPHAGITIS: ICD-10-CM

## 2023-09-13 PROCEDURE — 1101F PT FALLS ASSESS-DOCD LE1/YR: CPT | Mod: CPTII,S$GLB,,

## 2023-09-13 PROCEDURE — 1157F ADVNC CARE PLAN IN RCRD: CPT | Mod: CPTII,S$GLB,,

## 2023-09-13 PROCEDURE — 1101F PR PT FALLS ASSESS DOC 0-1 FALLS W/OUT INJ PAST YR: ICD-10-PCS | Mod: CPTII,S$GLB,,

## 2023-09-13 PROCEDURE — 1159F PR MEDICATION LIST DOCUMENTED IN MEDICAL RECORD: ICD-10-PCS | Mod: CPTII,S$GLB,,

## 2023-09-13 PROCEDURE — 1159F MED LIST DOCD IN RCRD: CPT | Mod: CPTII,S$GLB,,

## 2023-09-13 PROCEDURE — 3074F PR MOST RECENT SYSTOLIC BLOOD PRESSURE < 130 MM HG: ICD-10-PCS | Mod: CPTII,S$GLB,,

## 2023-09-13 PROCEDURE — 3078F DIAST BP <80 MM HG: CPT | Mod: CPTII,S$GLB,,

## 2023-09-13 PROCEDURE — 1170F PR FUNCTIONAL STATUS ASSESSED: ICD-10-PCS | Mod: CPTII,S$GLB,,

## 2023-09-13 PROCEDURE — 1160F PR REVIEW ALL MEDS BY PRESCRIBER/CLIN PHARMACIST DOCUMENTED: ICD-10-PCS | Mod: CPTII,S$GLB,,

## 2023-09-13 PROCEDURE — 1160F RVW MEDS BY RX/DR IN RCRD: CPT | Mod: CPTII,S$GLB,,

## 2023-09-13 PROCEDURE — 1157F PR ADVANCE CARE PLAN OR EQUIV PRESENT IN MEDICAL RECORD: ICD-10-PCS | Mod: CPTII,S$GLB,,

## 2023-09-13 PROCEDURE — G0439 PR MEDICARE ANNUAL WELLNESS SUBSEQUENT VISIT: ICD-10-PCS | Mod: S$GLB,,,

## 2023-09-13 PROCEDURE — 3288F PR FALLS RISK ASSESSMENT DOCUMENTED: ICD-10-PCS | Mod: CPTII,S$GLB,,

## 2023-09-13 PROCEDURE — 99999 PR PBB SHADOW E&M-EST. PATIENT-LVL IV: CPT | Mod: PBBFAC,,,

## 2023-09-13 PROCEDURE — 3078F PR MOST RECENT DIASTOLIC BLOOD PRESSURE < 80 MM HG: ICD-10-PCS | Mod: CPTII,S$GLB,,

## 2023-09-13 PROCEDURE — 3008F PR BODY MASS INDEX (BMI) DOCUMENTED: ICD-10-PCS | Mod: CPTII,S$GLB,,

## 2023-09-13 PROCEDURE — 3074F SYST BP LT 130 MM HG: CPT | Mod: CPTII,S$GLB,,

## 2023-09-13 PROCEDURE — 3288F FALL RISK ASSESSMENT DOCD: CPT | Mod: CPTII,S$GLB,,

## 2023-09-13 PROCEDURE — 3008F BODY MASS INDEX DOCD: CPT | Mod: CPTII,S$GLB,,

## 2023-09-13 PROCEDURE — G0439 PPPS, SUBSEQ VISIT: HCPCS | Mod: S$GLB,,,

## 2023-09-13 PROCEDURE — 99999 PR PBB SHADOW E&M-EST. PATIENT-LVL IV: ICD-10-PCS | Mod: PBBFAC,,,

## 2023-09-13 PROCEDURE — 1170F FXNL STATUS ASSESSED: CPT | Mod: CPTII,S$GLB,,

## 2023-09-13 RX ORDER — PSYLLIUM HUSK 0.4 G
1 CAPSULE ORAL DAILY
COMMUNITY

## 2023-09-13 NOTE — PROGRESS NOTES
"      Sunni Lowe presented for a  Medicare AWV and comprehensive Health Risk Assessment today. The following components were reviewed and updated:    Medical history  Family History  Social history  Allergies and Current Medications  Health Risk Assessment  Health Maintenance  Care Team         ** See Completed Assessments for Annual Wellness Visit within the encounter summary.**         The following assessments were completed:  Living Situation  CAGE  Depression Screening  Timed Get Up and Go  Whisper Test  Cognitive Function Screening      Nutrition Screening  ADL Screening  PAQ Screening        Vitals:    09/13/23 1456   BP: 116/70   BP Location: Left arm   Patient Position: Sitting   BP Method: Medium (Manual)   Pulse: 62   SpO2: 98%   Weight: 72 kg (158 lb 11.2 oz)   Height: 5' 11" (1.803 m)     Body mass index is 22.13 kg/m².  Physical Exam  Vitals reviewed.   Constitutional:       Appearance: Normal appearance. She is well-developed and well-groomed.   Cardiovascular:      Rate and Rhythm: Normal rate and regular rhythm.   Pulmonary:      Effort: Pulmonary effort is normal. No respiratory distress.      Breath sounds: No wheezing, rhonchi or rales.   Skin:     Coloration: Skin is not pale.   Neurological:      General: No focal deficit present.      Mental Status: She is alert and oriented to person, place, and time.   Psychiatric:         Speech: Speech normal.         Behavior: Behavior normal.                 Diagnoses and health risks identified today and associated recommendations/orders:    1. Encounter for preventive health examination  Screenings performed, as noted above. Personal preventative testing needs reviewed.     2. Purpura  Chronic. Stable. Followed by PCP.     3. Subjective memory complaints  4. Memory changes  Patient reports some memory issues; was last seen by Neurology in 2019;  states her MD is no longer seeing patients. Would like to go back to see someone again. Referral placed " as last visit was > 3 years ago.   - Ambulatory referral/consult to Neurology; Future    5. Dysfunction of both eustachian tubes  Chronic. Stable. Followed by ENT.     6. Moderate persistent asthma without complication  Chronic; stable on medication. Followed by PCP.    7. Gastroesophageal reflux disease without esophagitis  Chronic; stable on medication. Followed by PCP.    8. Osteopenia of spine  Chronic; stable on medication. Followed by PCP.    9. Hand arthritis  Chronic; stable on medication. Followed by Orthopedics.       Provided Sunni with a 5-10 year written screening schedule and personal prevention plan. Recommendations were developed using the USPSTF age appropriate recommendations. Education, counseling, and referrals were provided as needed. After Visit Summary printed and given to patient which includes a list of additional screenings\tests needed.    Follow up in about 1 year (around 9/13/2024) for your next annual wellness visit.    Danette Courtney, NP      Advance Care Planning       I offered to discuss advanced care planning, including how to pick a person who would make decisions for you if you were unable to make them for yourself, called a health care power of , and what kind of decisions you might make such as use of life sustaining treatments such as ventilators and tube feeding when faced with a life limiting illness recorded on a living will that they will need to know. (How you want to be cared for as you near the end of your natural life)     X  Patient has advanced directives on file, which we reviewed, and they do not wish to make changes.

## 2023-09-13 NOTE — PATIENT INSTRUCTIONS
[employer] gift card # [phone]-call and inquire about your gift card.     Counseling and Referral of Other Preventative  (Italic type indicates deductible and co-insurance are waived)    Patient Name: Sunni Lowe  Today's Date: 9/13/2023    Health Maintenance         Date Due Completion Date    Mammogram 08/17/2023 8/17/2022    Influenza Vaccine (1) 09/01/2023 10/13/2022    Override on 10/1/2020: [facility] [place])    Colorectal Cancer Screening 08/01/2024 8/1/2014    Pap Smear 08/01/2025 8/1/2022    DEXA Scan 08/09/2025 8/9/2022    TETANUS VACCINE 03/02/2026 3/2/2016    Lipid Panel 08/09/2028 8/9/2023          Orders Placed This Encounter   Procedures    Ambulatory referral/consult to Neurology     The following information is provided to all patients.  This information is to help you find resources for any of the problems found today that may be affecting your health:                Living healthy guide: www.[facility].[place].gov      Understanding Diabetes: www.diabetes.org      Eating healthy: www.cdc.gov/healthyweight      CDC home safety checklist: www.cdc.gov/steadi/patient.html      Agency on Aging: www.goea.[place].gov      Alcoholics anonymous (AA): www.aa.org      Physical Activity: www.kimber.nih.gov/ni6momz      Tobacco use: www.quitwithusla.org

## 2023-09-14 PROBLEM — D69.2 PURPURA: Status: ACTIVE | Noted: 2023-09-14

## 2023-09-18 ENCOUNTER — TELEPHONE (OUTPATIENT)
Dept: PODIATRY | Facility: CLINIC | Age: 73
End: 2023-09-18
Payer: MEDICARE

## 2023-09-18 ENCOUNTER — OFFICE VISIT (OUTPATIENT)
Dept: OBSTETRICS AND GYNECOLOGY | Facility: CLINIC | Age: 73
End: 2023-09-18
Payer: MEDICARE

## 2023-09-18 VITALS — WEIGHT: 156.5 LBS | SYSTOLIC BLOOD PRESSURE: 116 MMHG | BODY MASS INDEX: 21.83 KG/M2 | DIASTOLIC BLOOD PRESSURE: 71 MMHG

## 2023-09-18 DIAGNOSIS — Z01.419 WELL WOMAN EXAM WITH ROUTINE GYNECOLOGICAL EXAM: ICD-10-CM

## 2023-09-18 DIAGNOSIS — M85.852 OSTEOPENIA OF NECK OF LEFT FEMUR: ICD-10-CM

## 2023-09-18 DIAGNOSIS — Z12.39 SCREENING BREAST EXAMINATION: Primary | ICD-10-CM

## 2023-09-18 PROCEDURE — 3288F FALL RISK ASSESSMENT DOCD: CPT | Mod: CPTII,S$GLB,, | Performed by: OBSTETRICS & GYNECOLOGY

## 2023-09-18 PROCEDURE — 1160F PR REVIEW ALL MEDS BY PRESCRIBER/CLIN PHARMACIST DOCUMENTED: ICD-10-PCS | Mod: CPTII,S$GLB,, | Performed by: OBSTETRICS & GYNECOLOGY

## 2023-09-18 PROCEDURE — 3078F PR MOST RECENT DIASTOLIC BLOOD PRESSURE < 80 MM HG: ICD-10-PCS | Mod: CPTII,S$GLB,, | Performed by: OBSTETRICS & GYNECOLOGY

## 2023-09-18 PROCEDURE — 3008F PR BODY MASS INDEX (BMI) DOCUMENTED: ICD-10-PCS | Mod: CPTII,S$GLB,, | Performed by: OBSTETRICS & GYNECOLOGY

## 2023-09-18 PROCEDURE — 1159F PR MEDICATION LIST DOCUMENTED IN MEDICAL RECORD: ICD-10-PCS | Mod: CPTII,S$GLB,, | Performed by: OBSTETRICS & GYNECOLOGY

## 2023-09-18 PROCEDURE — 1101F PT FALLS ASSESS-DOCD LE1/YR: CPT | Mod: CPTII,S$GLB,, | Performed by: OBSTETRICS & GYNECOLOGY

## 2023-09-18 PROCEDURE — G0101 CA SCREEN;PELVIC/BREAST EXAM: HCPCS | Mod: GZ,S$GLB,, | Performed by: OBSTETRICS & GYNECOLOGY

## 2023-09-18 PROCEDURE — 1160F RVW MEDS BY RX/DR IN RCRD: CPT | Mod: CPTII,S$GLB,, | Performed by: OBSTETRICS & GYNECOLOGY

## 2023-09-18 PROCEDURE — 3288F PR FALLS RISK ASSESSMENT DOCUMENTED: ICD-10-PCS | Mod: CPTII,S$GLB,, | Performed by: OBSTETRICS & GYNECOLOGY

## 2023-09-18 PROCEDURE — G0101 PR CA SCREEN;PELVIC/BREAST EXAM: ICD-10-PCS | Mod: GZ,S$GLB,, | Performed by: OBSTETRICS & GYNECOLOGY

## 2023-09-18 PROCEDURE — 1101F PR PT FALLS ASSESS DOC 0-1 FALLS W/OUT INJ PAST YR: ICD-10-PCS | Mod: CPTII,S$GLB,, | Performed by: OBSTETRICS & GYNECOLOGY

## 2023-09-18 PROCEDURE — 1126F PR PAIN SEVERITY QUANTIFIED, NO PAIN PRESENT: ICD-10-PCS | Mod: CPTII,S$GLB,, | Performed by: OBSTETRICS & GYNECOLOGY

## 2023-09-18 PROCEDURE — 1157F PR ADVANCE CARE PLAN OR EQUIV PRESENT IN MEDICAL RECORD: ICD-10-PCS | Mod: CPTII,S$GLB,, | Performed by: OBSTETRICS & GYNECOLOGY

## 2023-09-18 PROCEDURE — 3074F SYST BP LT 130 MM HG: CPT | Mod: CPTII,S$GLB,, | Performed by: OBSTETRICS & GYNECOLOGY

## 2023-09-18 PROCEDURE — 1159F MED LIST DOCD IN RCRD: CPT | Mod: CPTII,S$GLB,, | Performed by: OBSTETRICS & GYNECOLOGY

## 2023-09-18 PROCEDURE — 3078F DIAST BP <80 MM HG: CPT | Mod: CPTII,S$GLB,, | Performed by: OBSTETRICS & GYNECOLOGY

## 2023-09-18 PROCEDURE — 1126F AMNT PAIN NOTED NONE PRSNT: CPT | Mod: CPTII,S$GLB,, | Performed by: OBSTETRICS & GYNECOLOGY

## 2023-09-18 PROCEDURE — 99999 PR PBB SHADOW E&M-EST. PATIENT-LVL III: ICD-10-PCS | Mod: PBBFAC,,, | Performed by: OBSTETRICS & GYNECOLOGY

## 2023-09-18 PROCEDURE — 3008F BODY MASS INDEX DOCD: CPT | Mod: CPTII,S$GLB,, | Performed by: OBSTETRICS & GYNECOLOGY

## 2023-09-18 PROCEDURE — 1157F ADVNC CARE PLAN IN RCRD: CPT | Mod: CPTII,S$GLB,, | Performed by: OBSTETRICS & GYNECOLOGY

## 2023-09-18 PROCEDURE — 3074F PR MOST RECENT SYSTOLIC BLOOD PRESSURE < 130 MM HG: ICD-10-PCS | Mod: CPTII,S$GLB,, | Performed by: OBSTETRICS & GYNECOLOGY

## 2023-09-18 PROCEDURE — 99999 PR PBB SHADOW E&M-EST. PATIENT-LVL III: CPT | Mod: PBBFAC,,, | Performed by: OBSTETRICS & GYNECOLOGY

## 2023-09-18 NOTE — PROGRESS NOTES
CC: Annual check-up    SUBJECTIVE:   72 y.o. female   for annual routine Pap and checkup. No LMP recorded. Patient is postmenopausal..  She has no unusual complaints and occ gets some twinges in Rt lower quadrant.        Past Medical History:   Diagnosis Date    Allergic rhinitis     Asthma     Fibrocystic breast     Osteopenia     Varicose veins     sees Dr. Brigido Quinones     Past Surgical History:   Procedure Laterality Date    BREAST BIOPSY Left     COLONOSCOPY  2014    Dr. Canas - repeat in 5 to 10 years    ESOPHAGOGASTRODUODENOSCOPY N/A 10/19/2018    Procedure: EGD (ESOPHAGOGASTRODUODENOSCOPY);  Surgeon: Cassie Huber MD;  Location: [facility];  Service: Endoscopy;  Laterality: N/A;    KNEE SURGERY Bilateral     meniscus repair    peniculitis surgery      x 3     Social History     Socioeconomic History    Marital status:     Number of children: 2   Tobacco Use    Smoking status: Former     Current packs/day: 0.00     Average packs/day: 1 pack/day for 10.0 years (10.0 ttl pk-yrs)     Types: Cigarettes     Start date:      Quit date:      Years since quittin.7    Smokeless tobacco: Never   Substance and Sexual Activity    Alcohol use: Yes     Alcohol/week: 7.0 standard drinks of alcohol     Types: 7 Glasses of wine per week     Comment: one glass of wine daily    Drug use: No    Sexual activity: Not Currently     Partners: Male     Comment:      Social Determinants of Health     Financial Resource Strain: Low Risk  (2023)    Overall Financial Resource Strain (CARDIA)     Difficulty of Paying Living Expenses: Not hard at all   Food Insecurity: No Food Insecurity (2023)    Hunger Vital Sign     Worried About Running Out of Food in the Last Year: Never true     Ran Out of Food in the Last Year: Never true   Transportation Needs: No Transportation Needs (2023)    PRAPARE - Transportation     Lack of Transportation (Medical): No     Lack of  Transportation (Non-Medical): No   Physical Activity: Sufficiently Active (2023)    Exercise Vital Sign     Days of Exercise per Week: 4 days     Minutes of Exercise per Session: 70 min   Stress: Stress Concern Present (2023)    [place] [employer] - Occupational Stress Questionnaire     Feeling of Stress : To some extent   Social Connections: Socially Integrated (2023)    Social Connection and Isolation Panel [NHANES]     Frequency of Communication with Friends and Family: More than three times a week     Frequency of Social Gatherings with Friends and Family: Three times a week     Attends Mandaeism Services: More than 4 times per year     Active Member of Clubs or Organizations: Yes     Attends Club or Organization Meetings: More than 4 times per year     Marital Status:    Housing Stability: Low Risk  (2023)    Housing Stability Vital Sign     Unable to Pay for Housing in the Last Year: No     Number of Places Lived in the Last Year: 1     Unstable Housing in the Last Year: No     Family History   Problem Relation Age of Onset    Ovarian cancer Mother 63    Alzheimer's disease Mother         @ 62    Cancer Father 85        tongue, and Lung    Diabetes Father     No Known Problems Sister     No Known Problems Sister     No Known Problems Brother     No Known Problems Daughter     No Known Problems Son     Alzheimer's disease Maternal Aunt         @ age 80     OB History    Para Term  AB Living   2 2 2     2   SAB IAB Ectopic Multiple Live Births           2      # Outcome Date GA Lbr Silvestre/2nd Weight Sex Delivery Anes PTL Lv   2 Term     F Vag-Spont   HAYDEE   1 Term     M Vag-Spont   HAYDEE         Current Outpatient Medications   Medication Sig Dispense Refill    azelastine (ASTELIN) 137 mcg (0.1 %) nasal spray Use 1 spray (137 mcg total) in each nostril 2 (two) times daily. 30 mL 1    calcium carbonate (OS-ISABELLE) 600 mg (1,500 mg) Tab Take 1,200 mg by  mouth once daily.      celecoxib (CELEBREX) 200 MG capsule Take 1 capsule (200 mg total) by mouth once daily. 30 capsule 2    cholecalciferol, vitamin D3, (VITAMIN D3) 10,000 unit Cap Take 10,000 Units by mouth once daily.       coffee xt-phosphatidyl serine (NEURIVA ORIGINAL) 100-100 mg Cap       collagen, bovine, 100 % Powd Take 1 Scoop by mouth Daily. Puts 1 scoop in coffee in am daily      CYANOCOBALAMIN, VITAMIN B-12, (VITAMIN B-12 ORAL) Take by mouth.      ELDERBERRY FRUIT ORAL Take 1 tablet by mouth every other day.      esomeprazole (NEXIUM) 20 MG capsule Take 1 capsule (20 mg total) by mouth before breakfast. 90 capsule 3    fish oil-omega-3 fatty acids 300-1,000 mg capsule Take 600 mg by mouth once daily.      fluticasone propionate (FLONASE) 50 mcg/actuation nasal spray 2 sprays (100 mcg total) by Each Nostril route once daily. 16 g 11    fluticasone-salmeterol diskus inhaler 250-50 mcg Inhale 1 puff into the lungs once daily. Controller 120 each 1    psyllium husk 0.4 gram Cap Take 1 capsule by mouth once daily.       No current facility-administered medications for this visit.     Allergies: Patient has no known allergies.     ROS:  Constitutional: no weight loss, weight gain, fever, fatigue  Eyes:  No vision changes, glasses/contacts  ENT/Mouth: No ulcers, sinus problems, ears ringing, headache  Cardiovascular: No inability to lie flat, chest pain, exercise intolerance, swelling, heart palpitations  Respiratory: No wheezing, coughing blood, shortness of breath, or cough  Gastrointestinal: No diarrhea, bloody stool, nausea/vomiting, constipation, gas, hemorrhoids  Genitourinary: No blood in urine, painful urination, urgency of urination, frequency of urination, incomplete emptying, incontinence, abnormal bleeding, painful periods, heavy periods, vaginal discharge, vaginal odor, painful intercourse, sexual problems, bleeding after intercourse.  Musculoskeletal: No muscle weakness  Skin/Breast: No painful  breasts, nipple discharge, masses, rash, ulcers  Neurological: No passing out, seizures, numbness, headache  Endocrine: No diabetes, hypothyroid, hyperthyroid, hot flashes, hair loss, abnormal hair growth, ance  Psychiatric: No depression, crying  Hematologic: No bruises, bleeding, swollen lymph nodes, anemia.      OBJECTIVE:   The patient appears well, alert, oriented x 3, in no distress.  /71   Wt 71 kg (156 lb 8.4 oz)   BMI 21.83 kg/m²   NECK: no thyromegaly, trachea midline  SKIN: no acne, striae, hirsutism  BREAST EXAM: breasts appear normal, no suspicious masses, no skin or nipple changes or axillary nodes, symmetric fibrous changes in both upper outer quadrants  ABDOMEN: no hernias, masses, or hepatosplenomegaly  GENITALIA: normal external genitalia, no erythema, no discharge  URETHRA: normal urethra, normal urethral meatus  VAGINA: Normal  CERVIX: no lesions or cervical motion tenderness  UTERUS: normal  ADNEXA: normal adnexa and no mass, fullness, tenderness  There is a 8.3% risk of a major osteoporotic fracture and a 1.0% risk of hip fracture in the next 10 years (FRAX).     Impression:     Lumbar spine normal bone density.     Left femoral neck osteopenia.        Electronically signed by: Ashu Dutton MD  Date:                                            08/09/2022  Time:                                           11:41    ASSESSMENT:   well woman  1. Screening breast examination    2. Well woman exam with routine gynecological exam    3. Osteopenia of neck of left femur        PLAN:   Mammogram done today  pap smear  return annually or prn  Fracture risk low  Rpt dexa in 1-2 yrs

## 2023-10-18 ENCOUNTER — OFFICE VISIT (OUTPATIENT)
Dept: ORTHOPEDICS | Facility: CLINIC | Age: 73
End: 2023-10-18
Payer: MEDICARE

## 2023-10-18 VITALS — WEIGHT: 154 LBS | BODY MASS INDEX: 21.56 KG/M2 | HEIGHT: 71 IN

## 2023-10-18 DIAGNOSIS — M19.049 HAND ARTHRITIS: Primary | ICD-10-CM

## 2023-10-18 PROCEDURE — 3008F PR BODY MASS INDEX (BMI) DOCUMENTED: ICD-10-PCS | Mod: CPTII,S$GLB,, | Performed by: ORTHOPAEDIC SURGERY

## 2023-10-18 PROCEDURE — 3288F PR FALLS RISK ASSESSMENT DOCUMENTED: ICD-10-PCS | Mod: CPTII,S$GLB,, | Performed by: ORTHOPAEDIC SURGERY

## 2023-10-18 PROCEDURE — 1157F PR ADVANCE CARE PLAN OR EQUIV PRESENT IN MEDICAL RECORD: ICD-10-PCS | Mod: CPTII,S$GLB,, | Performed by: ORTHOPAEDIC SURGERY

## 2023-10-18 PROCEDURE — 1101F PR PT FALLS ASSESS DOC 0-1 FALLS W/OUT INJ PAST YR: ICD-10-PCS | Mod: CPTII,S$GLB,, | Performed by: ORTHOPAEDIC SURGERY

## 2023-10-18 PROCEDURE — 99213 PR OFFICE/OUTPT VISIT, EST, LEVL III, 20-29 MIN: ICD-10-PCS | Mod: S$GLB,,, | Performed by: ORTHOPAEDIC SURGERY

## 2023-10-18 PROCEDURE — 1159F PR MEDICATION LIST DOCUMENTED IN MEDICAL RECORD: ICD-10-PCS | Mod: CPTII,S$GLB,, | Performed by: ORTHOPAEDIC SURGERY

## 2023-10-18 PROCEDURE — 99999 PR PBB SHADOW E&M-EST. PATIENT-LVL III: CPT | Mod: PBBFAC,,, | Performed by: ORTHOPAEDIC SURGERY

## 2023-10-18 PROCEDURE — 99213 OFFICE O/P EST LOW 20 MIN: CPT | Mod: S$GLB,,, | Performed by: ORTHOPAEDIC SURGERY

## 2023-10-18 PROCEDURE — 1125F AMNT PAIN NOTED PAIN PRSNT: CPT | Mod: CPTII,S$GLB,, | Performed by: ORTHOPAEDIC SURGERY

## 2023-10-18 PROCEDURE — 99999 PR PBB SHADOW E&M-EST. PATIENT-LVL III: ICD-10-PCS | Mod: PBBFAC,,, | Performed by: ORTHOPAEDIC SURGERY

## 2023-10-18 PROCEDURE — 3008F BODY MASS INDEX DOCD: CPT | Mod: CPTII,S$GLB,, | Performed by: ORTHOPAEDIC SURGERY

## 2023-10-18 PROCEDURE — 1125F PR PAIN SEVERITY QUANTIFIED, PAIN PRESENT: ICD-10-PCS | Mod: CPTII,S$GLB,, | Performed by: ORTHOPAEDIC SURGERY

## 2023-10-18 PROCEDURE — 1101F PT FALLS ASSESS-DOCD LE1/YR: CPT | Mod: CPTII,S$GLB,, | Performed by: ORTHOPAEDIC SURGERY

## 2023-10-18 PROCEDURE — 1159F MED LIST DOCD IN RCRD: CPT | Mod: CPTII,S$GLB,, | Performed by: ORTHOPAEDIC SURGERY

## 2023-10-18 PROCEDURE — 1157F ADVNC CARE PLAN IN RCRD: CPT | Mod: CPTII,S$GLB,, | Performed by: ORTHOPAEDIC SURGERY

## 2023-10-18 PROCEDURE — 3288F FALL RISK ASSESSMENT DOCD: CPT | Mod: CPTII,S$GLB,, | Performed by: ORTHOPAEDIC SURGERY

## 2023-10-18 RX ORDER — CELECOXIB 200 MG/1
200 CAPSULE ORAL DAILY
Qty: 30 CAPSULE | Refills: 2 | Status: SHIPPED | OUTPATIENT
Start: 2023-10-18 | End: 2024-01-16

## 2023-10-18 NOTE — PROGRESS NOTES
Subjective:      Patient ID: Sunni Lowe is a 72 y.o. female.  Chief Complaint: Follow-up of the Left Hand      HPI  Sunni Lowe is a  72 y.o. female presenting today for follow up of left hand and thumb pain related to arthritis.  She reports that she is much improved since last visit the Celebrex is helping as well as the thumb splint which she wears part-time  Previously she would had some numbness tingling but this seems to have resolved.    Review of patient's allergies indicates:  No Known Allergies      Current Outpatient Medications   Medication Sig Dispense Refill    azelastine (ASTELIN) 137 mcg (0.1 %) nasal spray Use 1 spray (137 mcg total) in each nostril 2 (two) times daily. 30 mL 1    calcium carbonate (OS-ISABELLE) 600 mg (1,500 mg) Tab Take 1,200 mg by mouth once daily.      celecoxib (CELEBREX) 200 MG capsule Take 1 capsule (200 mg total) by mouth once daily. 30 capsule 2    cholecalciferol, vitamin D3, (VITAMIN D3) 10,000 unit Cap Take 10,000 Units by mouth once daily.       coffee xt-phosphatidyl serine (NEURIVA ORIGINAL) 100-100 mg Cap       collagen, bovine, 100 % Powd Take 1 Scoop by mouth Daily. Puts 1 scoop in coffee in am daily      CYANOCOBALAMIN, VITAMIN B-12, (VITAMIN B-12 ORAL) Take by mouth.      ELDERBERRY FRUIT ORAL Take 1 tablet by mouth every other day.      esomeprazole (NEXIUM) 20 MG capsule Take 1 capsule (20 mg total) by mouth before breakfast. 90 capsule 3    fish oil-omega-3 fatty acids 300-1,000 mg capsule Take 600 mg by mouth once daily.      fluticasone propionate (FLONASE) 50 mcg/actuation nasal spray 2 sprays (100 mcg total) by Each Nostril route once daily. 16 g 11    fluticasone-salmeterol diskus inhaler 250-50 mcg Inhale 1 puff into the lungs once daily. Controller 120 each 1    psyllium husk 0.4 gram Cap Take 1 capsule by mouth once daily.       No current facility-administered medications for this visit.       Past Medical History:   Diagnosis Date    Allergic  "rhinitis     Asthma     Fibrocystic breast     Osteopenia     Varicose veins     sees Dr. Brigido Quinones       Past Surgical History:   Procedure Laterality Date    BREAST BIOPSY Left     COLONOSCOPY  08/01/2014    Dr. Canas - repeat in 5 to 10 years    ESOPHAGOGASTRODUODENOSCOPY N/A 10/19/2018    Procedure: EGD (ESOPHAGOGASTRODUODENOSCOPY);  Surgeon: Cassie Huber MD;  Location: [facility];  Service: Endoscopy;  Laterality: N/A;    KNEE SURGERY Bilateral     meniscus repair    peniculitis surgery      x 3       OBJECTIVE:   PHYSICAL EXAM:  Height: 5' 11" (180.3 cm) Weight: 69.9 kg (154 lb)  Vitals:    10/18/23 1323   Weight: 69.9 kg (154 lb)   Height: 5' 11" (1.803 m)   PainSc:   1     Ortho/SPM Exam  Examination left hand there is some mild tenderness at the base of left thumb slight bony enlargement is noted negative grind test no crepitation    strength slightly decreased   Sensation intact  Tinel sign negative       RADIOGRAPHS:  None  Comments: I have personally reviewed the imaging and I agree with the above radiologist's report.    ASSESSMENT/PLAN:     IMPRESSION:  CMC arthritis left thumb stable    PLAN:  She is doing well with the Celebrex I would continue with that for now thumb brace as needed especially for heavy lifting   She can also add Voltaren gel topical    FOLLOW UP:  3-4 months or as needed    Disclaimer: This note has been generated using voice-recognition software. There may be typographical errors that have been missed during proof-reading.     "

## 2023-11-15 ENCOUNTER — OFFICE VISIT (OUTPATIENT)
Dept: PODIATRY | Facility: CLINIC | Age: 73
End: 2023-11-15
Payer: MEDICARE

## 2023-11-15 VITALS — WEIGHT: 154 LBS | BODY MASS INDEX: 21.56 KG/M2 | HEIGHT: 71 IN

## 2023-11-15 DIAGNOSIS — L60.0 INGROWN NAIL: Primary | ICD-10-CM

## 2023-11-15 DIAGNOSIS — M79.671 FOOT PAIN, BILATERAL: ICD-10-CM

## 2023-11-15 DIAGNOSIS — M79.672 FOOT PAIN, BILATERAL: ICD-10-CM

## 2023-11-15 PROCEDURE — 1160F PR REVIEW ALL MEDS BY PRESCRIBER/CLIN PHARMACIST DOCUMENTED: ICD-10-PCS | Mod: CPTII,S$GLB,, | Performed by: PODIATRIST

## 2023-11-15 PROCEDURE — 99213 OFFICE O/P EST LOW 20 MIN: CPT | Mod: 25,S$GLB,, | Performed by: PODIATRIST

## 2023-11-15 PROCEDURE — 11765 NAIL REMOVAL: ICD-10-PCS | Mod: T5,S$GLB,, | Performed by: PODIATRIST

## 2023-11-15 PROCEDURE — 99999 PR PBB SHADOW E&M-EST. PATIENT-LVL I: CPT | Mod: PBBFAC,,, | Performed by: PODIATRIST

## 2023-11-15 PROCEDURE — 1157F ADVNC CARE PLAN IN RCRD: CPT | Mod: CPTII,S$GLB,, | Performed by: PODIATRIST

## 2023-11-15 PROCEDURE — 99999 PR PBB SHADOW E&M-EST. PATIENT-LVL I: ICD-10-PCS | Mod: PBBFAC,,, | Performed by: PODIATRIST

## 2023-11-15 PROCEDURE — 1159F PR MEDICATION LIST DOCUMENTED IN MEDICAL RECORD: ICD-10-PCS | Mod: CPTII,S$GLB,, | Performed by: PODIATRIST

## 2023-11-15 PROCEDURE — 1159F MED LIST DOCD IN RCRD: CPT | Mod: CPTII,S$GLB,, | Performed by: PODIATRIST

## 2023-11-15 PROCEDURE — 99213 PR OFFICE/OUTPT VISIT, EST, LEVL III, 20-29 MIN: ICD-10-PCS | Mod: 25,S$GLB,, | Performed by: PODIATRIST

## 2023-11-15 PROCEDURE — 1157F PR ADVANCE CARE PLAN OR EQUIV PRESENT IN MEDICAL RECORD: ICD-10-PCS | Mod: CPTII,S$GLB,, | Performed by: PODIATRIST

## 2023-11-15 PROCEDURE — 1160F RVW MEDS BY RX/DR IN RCRD: CPT | Mod: CPTII,S$GLB,, | Performed by: PODIATRIST

## 2023-11-15 PROCEDURE — 11765 WEDGE EXCISION SKN NAIL FOLD: CPT | Mod: T5,S$GLB,, | Performed by: PODIATRIST

## 2023-11-15 NOTE — PROGRESS NOTES
[facility] - PODIATRY  [address]  [address]  [place] [address]  Dept: [phone]  Dept Fax: [phone]    William Wallace Jr., DPM     Assessment:   MDM    Coding  1. Ingrown nail  Nail Removal      2. Foot pain, bilateral            Plan:     Nail Removal    Date/Time: 11/15/2023 2:12 PM    Performed by: William Wallace Jr., DPM  Authorized by: William Wallace Jr., DPM    Consent Done?:  Yes (Verbal)  Time out: Immediately prior to the procedure a time out was called    Prep: patient was prepped and draped in usual sterile fashion    Location:     Location:  Right foot    Location detail:  Right big toe  Anesthesia:     Anesthesia:  Local infiltration    Local anesthetic:  Bupivacaine 0.5% without epinephrine    Anesthetic total (ml):  4  Procedure Details:     Preparation:  Skin prepped with alcohol, skin prepped with Betadine and sterile field established    Amount removed:  Complete    Wedge excision of skin of nail fold: Yes      Nail bed sutured?: No      Nail matrix removed:  None    Removed nail replaced and anchored: No      Dressing applied:  4x4, antibiotic ointment, gauze roll, petrolatum-impregnated gauze and dressing applied    Patient tolerance:  Patient tolerated the procedure well with no immediate complications      Diagnoses and all orders for this visit:    Ingrown nail  -     Nail Removal    Foot pain, bilateral        -pt seen, evaluated, and managed  -dx discussed in detail. All questions/concerns addressed  -all tx options discussed. All alternatives, risks, benefits of all txs discussed  -The patient was educated regarding the above diagnosis.   -discussed ingrowing toenails and all tx options. Pt opts for R hallux nail avulsion  -we offered b/l avulsion of nail but pt declines  -pt to perform epsom salt or betadine soaks once daily x 2wks. Written instructions dispensed  -keep toe covered with triple abx ointment + bandaid x  2wks    Rx dispensed: none  Referrals: none  -WB: wbat      Follow up in about 3 weeks (around 12/6/2023).    Subjective:      Patient ID: Sunni Lowe is a 72 y.o. female.    Chief Complaint: No chief complaint on file.      CC - ingrown nail: Patient presents to the clinic complaining of painful ingrown toenail on both feet. Patients rates pain 7/10 on pain scale. patient seeking tx options.    HPI    Last Podiatry Enc: Visit date not found  Last Enc w/ Me: Visit date not found    Outside reports reviewed: historical medical records.  Family hx: as below  Past Medical History:   Diagnosis Date    Allergic rhinitis     Asthma     Fibrocystic breast     Osteopenia     Varicose veins     sees Dr. Brigido Quinones     Past Surgical History:   Procedure Laterality Date    BREAST BIOPSY Left     COLONOSCOPY  08/01/2014    Dr. Canas - repeat in 5 to 10 years    ESOPHAGOGASTRODUODENOSCOPY N/A 10/19/2018    Procedure: EGD (ESOPHAGOGASTRODUODENOSCOPY);  Surgeon: Cassie Huber MD;  Location: [facility];  Service: Endoscopy;  Laterality: N/A;    KNEE SURGERY Bilateral     meniscus repair    peniculitis surgery      x 3     Family History   Problem Relation Age of Onset    Ovarian cancer Mother 63    Alzheimer's disease Mother         @ 62    Cancer Father 85        tongue, and Lung    Diabetes Father     No Known Problems Sister     No Known Problems Sister     No Known Problems Brother     No Known Problems Daughter     No Known Problems Son     Alzheimer's disease Maternal Aunt         @ age 80     Current Outpatient Medications   Medication Sig Dispense Refill    azelastine (ASTELIN) 137 mcg (0.1 %) nasal spray Use 1 spray (137 mcg total) in each nostril 2 (two) times daily. 30 mL 1    calcium carbonate (OS-ISABELLE) 600 mg (1,500 mg) Tab Take 1,200 mg by mouth once daily.      celecoxib (CELEBREX) 200 MG capsule Take 1 capsule (200 mg total) by mouth once daily. 30 capsule 2    cholecalciferol, vitamin D3, (VITAMIN  D3) 10,000 unit Cap Take 10,000 Units by mouth once daily.       coffee xt-phosphatidyl serine (NEURIVA ORIGINAL) 100-100 mg Cap       collagen, bovine, 100 % Powd Take 1 Scoop by mouth Daily. Puts 1 scoop in coffee in am daily      CYANOCOBALAMIN, VITAMIN B-12, (VITAMIN B-12 ORAL) Take by mouth.      ELDERBERRY FRUIT ORAL Take 1 tablet by mouth every other day.      esomeprazole (NEXIUM) 20 MG capsule Take 1 capsule (20 mg total) by mouth before breakfast. 90 capsule 3    fish oil-omega-3 fatty acids 300-1,000 mg capsule Take 600 mg by mouth once daily.      fluticasone propionate (FLONASE) 50 mcg/actuation nasal spray 2 sprays (100 mcg total) by Each Nostril route once daily. 16 g 11    fluticasone-salmeterol diskus inhaler 250-50 mcg Inhale 1 puff into the lungs once daily. Controller 120 each 1    psyllium husk 0.4 gram Cap Take 1 capsule by mouth once daily.       No current facility-administered medications for this visit.     Review of patient's allergies indicates:  No Known Allergies  Social History     Socioeconomic History    Marital status:     Number of children: 2   Tobacco Use    Smoking status: Former     Current packs/day: 0.00     Average packs/day: 1 pack/day for 10.0 years (10.0 ttl pk-yrs)     Types: Cigarettes     Start date:      Quit date:      Years since quittin.8    Smokeless tobacco: Never   Substance and Sexual Activity    Alcohol use: Yes     Alcohol/week: 7.0 standard drinks of alcohol     Types: 7 Glasses of wine per week     Comment: one glass of wine daily    Drug use: No    Sexual activity: Not Currently     Partners: Male     Comment:      Social Determinants of Health     Financial Resource Strain: Low Risk  (2023)    Overall Financial Resource Strain (CARDIA)     Difficulty of Paying Living Expenses: Not hard at all   Food Insecurity: No Food Insecurity (2023)    Hunger Vital Sign     Worried About Running Out of Food in the Last Year: Never  true     Ran Out of Food in the Last Year: Never true   Transportation Needs: No Transportation Needs (9/13/2023)    PRAPARE - Transportation     Lack of Transportation (Medical): No     Lack of Transportation (Non-Medical): No   Physical Activity: Sufficiently Active (9/13/2023)    Exercise Vital Sign     Days of Exercise per Week: 4 days     Minutes of Exercise per Session: 70 min   Stress: Stress Concern Present (9/13/2023)    [employer] - Occupational Stress Questionnaire     Feeling of Stress : To some extent   Social Connections: Socially Integrated (9/13/2023)    Social Connection and Isolation Panel [NHANES]     Frequency of Communication with Friends and Family: More than three times a week     Frequency of Social Gatherings with Friends and Family: Three times a week     Attends [employer] Services: More than 4 times per year     Active Member of Clubs or Organizations: Yes     Attends Club or Organization Meetings: More than 4 times per year     Marital Status:    Housing Stability: Low Risk  (9/13/2023)    Housing Stability Vital Sign     Unable to Pay for Housing in the Last Year: No     Number of Places Lived in the Last Year: 1     Unstable Housing in the Last Year: No       ROS    REVIEW OF SYSTEMS: Negative as documented below as well as positive findings in bold.       Constitutional  Respiratory  Gastrointestinal  Skin   - Fever - Cough - Heartburn - Rash   - Chills - Spit blood - Nausea - Itching   - Weight Loss - Shortness of breath - Vomiting - Nail pain   - Malaise/Fatigue - Wheezing - Abdominal Pain  Wound/Ulcer   - Weight Gain   - Blood in Stool  Poor wound healing       - Diarrhea          Cardiovascular  Genitourinary  Neurological  HEENT   - Chest Pain - Dysuria - Burning Sensation of feet - Headache   - Palpitations - Hematuria - Tingling / Paresthesia - Congestion   - Pain at night in legs - Flank Pain - Dizziness - Sore Throat   - Cramping   - Tremor  - Blurred Vision   - Leg Swelling   - Sensory Change - Double Vision   - Dizzy when standing   - Speech Change - Eye Redness       - Focal Weakness - Dry Eyes       - Loss of Consciousness          Endocrine  Musculoskeletal  Psychiatric   - Cold intolerance - Muscle Pain - Depression   - Heat intolerance - Neck Pain - Insomnia   - Anemia - Joint Pain - Memory Loss   -  Easy bruising, bleeding - Heel pain - Anxiety      Toe Pain        Leg/Ankle/Foot Pain         Objective:     There were no vitals taken for this visit.  There were no vitals filed for this visit.    Physical Exam    General Appearance:   Patient appears well developed, well nourished  Patient appears stated age    Psychiatric:   Patient is oriented to time, place, and person.  Patient has appropriate mood and affect    Neck:  Trachea Midline  No visible masses    Respiratory/Ears:  No distress or labored breathing.  Able to differentiate between normal talking voice and whisper.  Able to follow commands    Eyes:  Visual Acuity intact  Lids and conjunctivae normal. No discoloration noted.    Foot Exam  Physical Exam  Ortho Exam  Ortho/SPM Exam  Physical Exam  Neurologic Exam    B/l LE exam con't:  V:  DP 2/4, PT 2/4   CRT< 3s to all digits tested   Tibial and popliteal lymph nodes are w/o abnormality        N:  Patient displays normal ankle reflexes   SILT in SP/DP/T/Louie/Saph distributions    Ortho: +Motor EHL/FHL/TA/GA   +TTP b/l great toe  Compartments soft/compressible. No pain on passive stretch of big toe. No calf  Pain.    Derm:  skin intact, skin warm and dry, skin without ulcers or lesions, skin without induration, left, right, great toe ingrowing and incurvated     Imaging / Labs:      No results found.      Note: This was dictated using a computer transcription program. Although proofread, it may contain computer transcription errors and phonetic errors. Other human proofreading errors may also exist. Corrections may be performed at a later  time. Please contact us for any clarification if needed.    William Wallace DPM  [facility]

## 2023-11-15 NOTE — PATIENT INSTRUCTIONS
Instructions for Care after Ingrown Nail removal    General Information: Stay off your feet as much as possible today. You may wear a surgical shoe, sandal or any open toed shoe that does not squeeze, constrict or put pressure on your toe(s). Your toe(s) may remain numb for up to 2-24 hours after the procedure. Although most patients can wear a closed loose fitting shoe after the first week, the toe will heal faster the more you use the open toed shoe in the first 2-3  weeks. Please contact our office if you have any questions or concerns.    Bleeding: Slight bleeding, discoloration and drainage are normal. Due to the chemical used there may be some yellow-clear drainage coming from the toe for 2-3 weeks.    Discomfort: You can elevate your foot to help alleviate minor swelling, bleeding and discomfort. You may also take Advil, Tylenol or other over the counter pain medications to help alleviate pain. Call our office if the pain is not well controlled. Most patients have very little discomfort as long as they minimize their walking for the first 24 hours and do not bump the toe.    Removing the Bandage: Starting the day after the procedure, carefully remove the dressing and shower or bathe as normal. It is Ok to get the bandage soaking wet in the shower and when you remove it, it should not stick to the surgery site.    Dressing Options- Traditional Method:  1. Soaking two times a day in WARM water with Epsom salts or diluted Povidone Iodine  (Betadine) for 15-20 minutes. You will need to purchase these products from the pharmacy.  2. Dry toe then apply an antibiotic cream or ointment such as Neosporin or Polysporin plus or  Garamycin and cover with a 2 x 2 inch size gauze and then secure with a 1 inch band aid.  3. In the second week, take the dressing off at bedtime to air dry the toe.  4. If the toe is infected take the Antibiotic Pills as directed until finished.      Ingrown Toenail        What Is an Ingrown  Toenail?    When a toenail is ingrown, it is curved and grows into the skin, usually at the nail borders (the sides of the nail). This digging in of the nail irritates the skin, often creating pain, redness, swelling and warmth in the toe.    If an ingrown nail causes a break in the skin, bacteria may enter and cause an infection in the area, which is often marked by drainage and a foul odor. However, even if the toe is not painful, red, swollen or warm, a nail that curves downward into the skin can progress to an infection.    Ingrown toenail and normal toenail    Causes  Causes of ingrown toenails include:    Heredity. In many people, the tendency for ingrown toenails is inherited.  Trauma. Sometimes an ingrown toenail is the result of trauma, such as stubbing your toe, having an object fall on your toe or engaging in activities that involve repeated pressure on the toes, such as kicking or running.  Improper trimming. The most common cause of ingrown toenails is cutting your nails too short. This encourages the skin next to the nail to fold over the nail.   Improperly sized footwear. Ingrown toenails can result from wearing socks and shoes that are tight or short.  Nail conditions. Ingrown toenails can be caused by nail problems, such as fungal infections or losing a nail due to trauma.     Treatment  Sometimes initial treatment for ingrown toenails can be safely performed at home. However, home treatment is strongly discouraged if an infection is suspected or for those who have medical conditions that put feet at high risk, such as diabetes, nerve damage in the foot or poor circulation.        Ingrown toenail before and after treatment    Home Care  If you do not have an infection or any of the above medical conditions, you can soak your foot in room-temperature water (adding Epsom salt may be recommended by your doctor) and gently massage the side of the nail fold to help reduce the inflammation.    Avoid  attempting bathroom surgery. Repeated cutting of the nail can cause the condition to worsen over time. If your symptoms fail to improve, it is time to see a foot and ankle surgeon.    Physician Care  After examining the toe, the foot and ankle surgeon will select the treatment best suited for you. If an infection is present, an oral antibiotic may be prescribed.    Sometimes a minor surgical procedure, often performed in the office, will ease the pain and remove the offending nail. After applying a local anesthetic, the doctor removes part of the nails side border. Some nails may become ingrown again, requiring removal of the nail root.    Following the nail procedure, a light bandage will be applied. Most people experience very little pain after surgery and may resume normal activity the next day. If your surgeon has prescribed an oral antibiotic, be sure to take all the medication, even if your symptoms have improved.    Preventing Ingrown Toenails  Many cases of ingrown toenails may be prevented by:    Proper trimming. Cut toenails in a fairly straight line, and do not cut them too short. You should be able to get your fingernail under the sides and end of the nail.  Well-fitting shoes and socks. Do not wear shoes that are short or tight in the toe area. Avoid shoes that are loose because they too cause pressure on the toes, especially when running or walking briskly.               What You Should Know About Home Treatment  Do not cut a notch in the nail. Contrary to what some people believe, this does not reduce the tendency for the nail to curve downward.  Do not repeatedly trim nail borders. Repeated trimming does not change the way the nail grows and can make the condition worse.  Do not place cotton under the nail. Not only does this not relieve the pain, it provides a place for harmful bacteria to grow, resulting in infection.  Over-the-counter medications are ineffective. Topical medications may mask  the pain, but they do not correct the underlying problem.        Understanding Ingrown Toenails    An ingrown nail is the result of a nail growing into the skin that surrounds it. This often occurs at either edge of the big toe. Ingrown nails may be caused by improper trimming, inherited nail deformities, injuries, fungal infections, or pressure.  Symptoms  Ingrown nails may cause pain at the tip of the toe or all the way to the base of the toe. The pain is often worse while walking. An ingrown nail may also lead to infection, inflammation, or a more serious condition. If its infected, you might see pus or redness.  Evaluation  To determine the extent of your problem, your healthcare provider examines and possibly presses the painful area. If other problems are suspected, blood tests, cultures, and X-rays may be done as well.  Treatment  If the nail isnt infected, your healthcare provider may trim the corner of it to help relieve your symptoms. He or she may need to remove one side of your nail back to the cuticle. The base of the nail may then be treated with a chemical to keep the ingrown part from growing back. Severe infections or ingrown nails may require antibiotics and temporary or permanent removal of a portion of the nail. To prevent pain, a local anesthetic may be used in these procedures. This treatment is usually done at your healthcare provider's office.  Prevention  Many nail problems can be prevented by wearing the right shoes and trimming your nails properly. To help avoid infection, keep your feet clean and dry. If you have diabetes, talk with your healthcare provider before doing any foot self-care.  The right shoes: Get your feet measured (your size may change as you age). Wear shoes that are supportive and roomy enough for your toes to wiggle. Look for shoes made of natural materials such as leather, which allow your feet to breathe.  Proper trimming: To avoid problems, trim your toenails  straight across without cutting down into the corners. If you cant trim your own nails, ask your healthcare provider to do so for you.  Date Last Reviewed: 10/1/2016  © 1145-1350 [employer]. [address] [place] [address], [place] [address]. All rights reserved. This information is not intended as a substitute for professional medical care. Always follow your healthcare professional's instructions.

## 2023-11-22 ENCOUNTER — PATIENT MESSAGE (OUTPATIENT)
Dept: PODIATRY | Facility: CLINIC | Age: 73
End: 2023-11-22
Payer: MEDICARE

## 2023-12-11 ENCOUNTER — OFFICE VISIT (OUTPATIENT)
Dept: PODIATRY | Facility: CLINIC | Age: 73
End: 2023-12-11
Payer: MEDICARE

## 2023-12-11 VITALS — BODY MASS INDEX: 21.58 KG/M2 | WEIGHT: 154.13 LBS | HEIGHT: 71 IN

## 2023-12-11 DIAGNOSIS — L60.0 INGROWN NAIL: Primary | ICD-10-CM

## 2023-12-11 PROCEDURE — 1101F PT FALLS ASSESS-DOCD LE1/YR: CPT | Mod: CPTII,S$GLB,, | Performed by: PODIATRIST

## 2023-12-11 PROCEDURE — 1126F AMNT PAIN NOTED NONE PRSNT: CPT | Mod: CPTII,S$GLB,, | Performed by: PODIATRIST

## 2023-12-11 PROCEDURE — 1159F PR MEDICATION LIST DOCUMENTED IN MEDICAL RECORD: ICD-10-PCS | Mod: CPTII,S$GLB,, | Performed by: PODIATRIST

## 2023-12-11 PROCEDURE — 99213 PR OFFICE/OUTPT VISIT, EST, LEVL III, 20-29 MIN: ICD-10-PCS | Mod: S$GLB,,, | Performed by: PODIATRIST

## 2023-12-11 PROCEDURE — 1160F PR REVIEW ALL MEDS BY PRESCRIBER/CLIN PHARMACIST DOCUMENTED: ICD-10-PCS | Mod: CPTII,S$GLB,, | Performed by: PODIATRIST

## 2023-12-11 PROCEDURE — 1126F PR PAIN SEVERITY QUANTIFIED, NO PAIN PRESENT: ICD-10-PCS | Mod: CPTII,S$GLB,, | Performed by: PODIATRIST

## 2023-12-11 PROCEDURE — 1157F ADVNC CARE PLAN IN RCRD: CPT | Mod: CPTII,S$GLB,, | Performed by: PODIATRIST

## 2023-12-11 PROCEDURE — 1160F RVW MEDS BY RX/DR IN RCRD: CPT | Mod: CPTII,S$GLB,, | Performed by: PODIATRIST

## 2023-12-11 PROCEDURE — 99999 PR PBB SHADOW E&M-EST. PATIENT-LVL III: CPT | Mod: PBBFAC,,, | Performed by: PODIATRIST

## 2023-12-11 PROCEDURE — 1101F PR PT FALLS ASSESS DOC 0-1 FALLS W/OUT INJ PAST YR: ICD-10-PCS | Mod: CPTII,S$GLB,, | Performed by: PODIATRIST

## 2023-12-11 PROCEDURE — 3288F PR FALLS RISK ASSESSMENT DOCUMENTED: ICD-10-PCS | Mod: CPTII,S$GLB,, | Performed by: PODIATRIST

## 2023-12-11 PROCEDURE — 1159F MED LIST DOCD IN RCRD: CPT | Mod: CPTII,S$GLB,, | Performed by: PODIATRIST

## 2023-12-11 PROCEDURE — 3008F BODY MASS INDEX DOCD: CPT | Mod: CPTII,S$GLB,, | Performed by: PODIATRIST

## 2023-12-11 PROCEDURE — 3008F PR BODY MASS INDEX (BMI) DOCUMENTED: ICD-10-PCS | Mod: CPTII,S$GLB,, | Performed by: PODIATRIST

## 2023-12-11 PROCEDURE — 3288F FALL RISK ASSESSMENT DOCD: CPT | Mod: CPTII,S$GLB,, | Performed by: PODIATRIST

## 2023-12-11 PROCEDURE — 99213 OFFICE O/P EST LOW 20 MIN: CPT | Mod: S$GLB,,, | Performed by: PODIATRIST

## 2023-12-11 PROCEDURE — 99999 PR PBB SHADOW E&M-EST. PATIENT-LVL III: ICD-10-PCS | Mod: PBBFAC,,, | Performed by: PODIATRIST

## 2023-12-11 PROCEDURE — 1157F PR ADVANCE CARE PLAN OR EQUIV PRESENT IN MEDICAL RECORD: ICD-10-PCS | Mod: CPTII,S$GLB,, | Performed by: PODIATRIST

## 2023-12-11 NOTE — PROGRESS NOTES
[place] - PODIATRY  [address]  [address]  [place] [address]  Dept: [phone]  Dept Fax: [phone]    William Wallace Jr., DPM     Assessment:   MDM    Coding  1. Ingrown nail - Right Foot            Plan:     Procedures    Diagnoses and all orders for this visit:    Ingrown nail - Right Foot        -pt seen, evaluated, and managed  -dx discussed in detail. All questions/concerns addressed  -all tx options discussed. All alternatives, risks, benefits of all txs discussed  -The patient was educated regarding the above diagnosis.   -discussed ingrowing toenails and all tx options. Focused on prevention going fwd  -manually curetted nail px site R foot and covered with trpl abx ointment + bandaid  -we offered L hallux avulsion of nail but pt declines  -pt to perform epsom salt or betadine soaks once daily x 2wks. Written instructions dispensed  -keep toe covered with triple abx ointment + bandaid x 2wks    Rx dispensed: none  Referrals: none  -WB: wbat      Follow up if symptoms worsen or fail to improve.    Subjective:      Patient ID: Sunni Lowe is a 72 y.o. female.    Chief Complaint: No chief complaint on file.      CC - ingrown nail: Patient presents to the clinic complaining of painful ingrown toenail on both feet. Patients rates pain 7/10 on pain scale. patient seeking tx options.    12/11/23:  Hx as above. S/p R hallux nail avulsion.        Last Podiatry Enc: Visit date not found  Last Enc w/ Me: Visit date not found    Outside reports reviewed: historical medical records.  Family hx: as below  Past Medical History:   Diagnosis Date    Allergic rhinitis     Asthma     Fibrocystic breast     Osteopenia     Varicose veins     sees Dr. Brigido Quinones     Past Surgical History:   Procedure Laterality Date    BREAST BIOPSY Left     COLONOSCOPY  08/01/2014    Dr. Canas - repeat in 5 to 10 years    ESOPHAGOGASTRODUODENOSCOPY N/A 10/19/2018     Procedure: EGD (ESOPHAGOGASTRODUODENOSCOPY);  Surgeon: Cassie Huber MD;  Location: [facility];  Service: Endoscopy;  Laterality: N/A;    KNEE SURGERY Bilateral     meniscus repair    peniculitis surgery      x 3     Family History   Problem Relation Age of Onset    Ovarian cancer Mother 63    Alzheimer's disease Mother         @ 62    Cancer Father 85        tongue, and Lung    Diabetes Father     No Known Problems Sister     No Known Problems Sister     No Known Problems Brother     No Known Problems Daughter     No Known Problems Son     Alzheimer's disease Maternal Aunt         @ age 80     Current Outpatient Medications   Medication Sig Dispense Refill    azelastine (ASTELIN) 137 mcg (0.1 %) nasal spray Use 1 spray (137 mcg total) in each nostril 2 (two) times daily. 30 mL 1    calcium carbonate (OS-ISABELLE) 600 mg (1,500 mg) Tab Take 1,200 mg by mouth once daily.      celecoxib (CELEBREX) 200 MG capsule Take 1 capsule (200 mg total) by mouth once daily. 30 capsule 2    cholecalciferol, vitamin D3, (VITAMIN D3) 10,000 unit Cap Take 10,000 Units by mouth once daily.       coffee xt-phosphatidyl serine (NEURIVA ORIGINAL) 100-100 mg Cap       collagen, bovine, 100 % Powd Take 1 Scoop by mouth Daily. Puts 1 scoop in coffee in am daily      CYANOCOBALAMIN, VITAMIN B-12, (VITAMIN B-12 ORAL) Take by mouth.      ELDERBERRY FRUIT ORAL Take 1 tablet by mouth every other day.      esomeprazole (NEXIUM) 20 MG capsule Take 1 capsule (20 mg total) by mouth before breakfast. 90 capsule 3    fish oil-omega-3 fatty acids 300-1,000 mg capsule Take 600 mg by mouth once daily.      fluticasone propionate (FLONASE) 50 mcg/actuation nasal spray 2 sprays (100 mcg total) by Each Nostril route once daily. 16 g 11    fluticasone-salmeterol diskus inhaler 250-50 mcg Inhale 1 puff into the lungs once daily. Controller 120 each 1    psyllium husk 0.4 gram Cap Take 1 capsule by mouth once daily.      RSVPreF3 antigen-AS01E, PF, (AREXVY,  PF,) 120 mcg/0.5 mL SusR vaccine Inject into the muscle once. 1 each 0     No current facility-administered medications for this visit.     Review of patient's allergies indicates:  No Known Allergies  Social History     Socioeconomic History    Marital status:     Number of children: 2   Tobacco Use    Smoking status: Former     Current packs/day: 0.00     Average packs/day: 1 pack/day for 10.0 years (10.0 ttl pk-yrs)     Types: Cigarettes     Start date:      Quit date:      Years since quittin.9    Smokeless tobacco: Never   Substance and Sexual Activity    Alcohol use: Yes     Alcohol/week: 7.0 standard drinks of alcohol     Types: 7 Glasses of wine per week     Comment: one glass of wine daily    Drug use: No    Sexual activity: Not Currently     Partners: Male     Comment:      Social Determinants of Health     Financial Resource Strain: Low Risk  (2023)    Overall Financial Resource Strain (CARDIA)     Difficulty of Paying Living Expenses: Not hard at all   Food Insecurity: No Food Insecurity (2023)    Hunger Vital Sign     Worried About Running Out of Food in the Last Year: Never true     Ran Out of Food in the Last Year: Never true   Transportation Needs: No Transportation Needs (2023)    PRAPARE - Transportation     Lack of Transportation (Medical): No     Lack of Transportation (Non-Medical): No   Physical Activity: Sufficiently Active (2023)    Exercise Vital Sign     Days of Exercise per Week: 4 days     Minutes of Exercise per Session: 70 min   Stress: Stress Concern Present (2023)    [employer] - Occupational Stress Questionnaire     Feeling of Stress : To some extent   Social Connections: Socially Integrated (2023)    Social Connection and Isolation Panel [NHANES]     Frequency of Communication with Friends and Family: More than three times a week     Frequency of Social Gatherings with Friends and Family: Three  "times a week     Attends Mandaeism Services: More than 4 times per year     Active Member of Clubs or Organizations: Yes     Attends Club or Organization Meetings: More than 4 times per year     Marital Status:    Housing Stability: Low Risk  (9/13/2023)    Housing Stability Vital Sign     Unable to Pay for Housing in the Last Year: No     Number of Places Lived in the Last Year: 1     Unstable Housing in the Last Year: No       ROS    REVIEW OF SYSTEMS: Negative as documented below as well as positive findings in bold.       Constitutional  Respiratory  Gastrointestinal  Skin   - Fever - Cough - Heartburn - Rash   - Chills - Spit blood - Nausea - Itching   - Weight Loss - Shortness of breath - Vomiting - Nail pain   - Malaise/Fatigue - Wheezing - Abdominal Pain  Wound/Ulcer   - Weight Gain   - Blood in Stool  Poor wound healing       - Diarrhea          Cardiovascular  Genitourinary  Neurological  HEENT   - Chest Pain - Dysuria - Burning Sensation of feet - Headache   - Palpitations - Hematuria - Tingling / Paresthesia - Congestion   - Pain at night in legs - Flank Pain - Dizziness - Sore Throat   - Cramping   - Tremor - Blurred Vision   - Leg Swelling   - Sensory Change - Double Vision   - Dizzy when standing   - Speech Change - Eye Redness       - Focal Weakness - Dry Eyes       - Loss of Consciousness          Endocrine  Musculoskeletal  Psychiatric   - Cold intolerance - Muscle Pain - Depression   - Heat intolerance - Neck Pain - Insomnia   - Anemia - Joint Pain - Memory Loss   -  Easy bruising, bleeding - Heel pain - Anxiety      Toe Pain        Leg/Ankle/Foot Pain         Objective:     Ht 5' 11" (1.803 m)   Wt 69.9 kg (154 lb 1.6 oz)   BMI 21.49 kg/m²   Vitals:    12/11/23 1359   Weight: 69.9 kg (154 lb 1.6 oz)   Height: 5' 11" (1.803 m)   PainSc: 0-No pain       Physical Exam    General Appearance:   Patient appears well developed, well nourished  Patient appears stated age    Psychiatric: "   Patient is oriented to time, place, and person.  Patient has appropriate mood and affect    Neck:  Trachea Midline  No visible masses    Respiratory/Ears:  No distress or labored breathing.  Able to differentiate between normal talking voice and whisper.  Able to follow commands    Eyes:  Visual Acuity intact  Lids and conjunctivae normal. No discoloration noted.    Foot Exam  Physical Exam  Ortho Exam  Ortho/SPM Exam  Physical Exam  Neurologic Exam    B/l LE exam con't:  V:  DP 2/4, PT 2/4   CRT< 3s to all digits tested   Tibial and popliteal lymph nodes are w/o abnormality    N:  Patient displays normal ankle reflexes   SILT in SP/DP/T/Louie/Saph distributions    Ortho: +Motor EHL/FHL/TA/GA   +TTP b/l great toe  Compartments soft/compressible. No pain on passive stretch of big toe. No calf  Pain.    Derm:  skin intact, skin warm and dry, skin without ulcers or lesions, skin without induration, left,  great toe ingrowing and incurvated , R hallux nail px site healing well w/o signs of infection    Imaging / Labs:      No results found.      Note: This was dictated using a computer transcription program. Although proofread, it may contain computer transcription errors and phonetic errors. Other human proofreading errors may also exist. Corrections may be performed at a later time. Please contact us for any clarification if needed.    William Wallace DPM  [facility]

## 2023-12-11 NOTE — PATIENT INSTRUCTIONS
Instructions for Care after Ingrown Nail removal    General Information: Stay off your feet as much as possible today. You may wear a surgical shoe, sandal or any open toed shoe that does not squeeze, constrict or put pressure on your toe(s). Your toe(s) may remain numb for up to 2-24 hours after the procedure. Although most patients can wear a closed loose fitting shoe after the first week, the toe will heal faster the more you use the open toed shoe in the first 2-3  weeks. Please contact our office if you have any questions or concerns.    Bleeding: Slight bleeding, discoloration and drainage are normal. Due to the chemical used there may be some yellow-clear drainage coming from the toe for 2-3 weeks.    Discomfort: You can elevate your foot to help alleviate minor swelling, bleeding and discomfort. You may also take Advil, Tylenol or other over the counter pain medications to help alleviate pain. Call our office if the pain is not well controlled. Most patients have very little discomfort as long as they minimize their walking for the first 24 hours and do not bump the toe.    Removing the Bandage: Starting the day after the procedure, carefully remove the dressing and shower or bathe as normal. It is Ok to get the bandage soaking wet in the shower and when you remove it, it should not stick to the surgery site.    Dressing Options- Traditional Method:  1. Soaking two times a day in WARM water with Epsom salts or diluted Povidone Iodine  (Betadine) for 15-20 minutes. You will need to purchase these products from the pharmacy.  2. Dry toe then apply an antibiotic cream or ointment such as Neosporin or Polysporin plus or  Garamycin and cover with a 2 x 2 inch size gauze and then secure with a 1 inch band aid.  3. In the second week, take the dressing off at bedtime to air dry the toe.  4. If the toe is infected take the Antibiotic Pills as directed until finished.      Ingrown Toenail        What Is an Ingrown  Toenail?    When a toenail is ingrown, it is curved and grows into the skin, usually at the nail borders (the sides of the nail). This digging in of the nail irritates the skin, often creating pain, redness, swelling and warmth in the toe.    If an ingrown nail causes a break in the skin, bacteria may enter and cause an infection in the area, which is often marked by drainage and a foul odor. However, even if the toe is not painful, red, swollen or warm, a nail that curves downward into the skin can progress to an infection.    Ingrown toenail and normal toenail    Causes  Causes of ingrown toenails include:    Heredity. In many people, the tendency for ingrown toenails is inherited.  Trauma. Sometimes an ingrown toenail is the result of trauma, such as stubbing your toe, having an object fall on your toe or engaging in activities that involve repeated pressure on the toes, such as kicking or running.  Improper trimming. The most common cause of ingrown toenails is cutting your nails too short. This encourages the skin next to the nail to fold over the nail.   Improperly sized footwear. Ingrown toenails can result from wearing socks and shoes that are tight or short.  Nail conditions. Ingrown toenails can be caused by nail problems, such as fungal infections or losing a nail due to trauma.     Treatment  Sometimes initial treatment for ingrown toenails can be safely performed at home. However, home treatment is strongly discouraged if an infection is suspected or for those who have medical conditions that put feet at high risk, such as diabetes, nerve damage in the foot or poor circulation.        Ingrown toenail before and after treatment    Home Care  If you do not have an infection or any of the above medical conditions, you can soak your foot in room-temperature water (adding Epsom salt may be recommended by your doctor) and gently massage the side of the nail fold to help reduce the inflammation.    Avoid  attempting bathroom surgery. Repeated cutting of the nail can cause the condition to worsen over time. If your symptoms fail to improve, it is time to see a foot and ankle surgeon.    Physician Care  After examining the toe, the foot and ankle surgeon will select the treatment best suited for you. If an infection is present, an oral antibiotic may be prescribed.    Sometimes a minor surgical procedure, often performed in the office, will ease the pain and remove the offending nail. After applying a local anesthetic, the doctor removes part of the nails side border. Some nails may become ingrown again, requiring removal of the nail root.    Following the nail procedure, a light bandage will be applied. Most people experience very little pain after surgery and may resume normal activity the next day. If your surgeon has prescribed an oral antibiotic, be sure to take all the medication, even if your symptoms have improved.    Preventing Ingrown Toenails  Many cases of ingrown toenails may be prevented by:    Proper trimming. Cut toenails in a fairly straight line, and do not cut them too short. You should be able to get your fingernail under the sides and end of the nail.  Well-fitting shoes and socks. Do not wear shoes that are short or tight in the toe area. Avoid shoes that are loose because they too cause pressure on the toes, especially when running or walking briskly.               What You Should Know About Home Treatment  Do not cut a notch in the nail. Contrary to what some people believe, this does not reduce the tendency for the nail to curve downward.  Do not repeatedly trim nail borders. Repeated trimming does not change the way the nail grows and can make the condition worse.  Do not place cotton under the nail. Not only does this not relieve the pain, it provides a place for harmful bacteria to grow, resulting in infection.  Over-the-counter medications are ineffective. Topical medications may mask  the pain, but they do not correct the underlying problem.        Understanding Ingrown Toenails    An ingrown nail is the result of a nail growing into the skin that surrounds it. This often occurs at either edge of the big toe. Ingrown nails may be caused by improper trimming, inherited nail deformities, injuries, fungal infections, or pressure.  Symptoms  Ingrown nails may cause pain at the tip of the toe or all the way to the base of the toe. The pain is often worse while walking. An ingrown nail may also lead to infection, inflammation, or a more serious condition. If its infected, you might see pus or redness.  Evaluation  To determine the extent of your problem, your healthcare provider examines and possibly presses the painful area. If other problems are suspected, blood tests, cultures, and X-rays may be done as well.  Treatment  If the nail isnt infected, your healthcare provider may trim the corner of it to help relieve your symptoms. He or she may need to remove one side of your nail back to the cuticle. The base of the nail may then be treated with a chemical to keep the ingrown part from growing back. Severe infections or ingrown nails may require antibiotics and temporary or permanent removal of a portion of the nail. To prevent pain, a local anesthetic may be used in these procedures. This treatment is usually done at your healthcare provider's office.  Prevention  Many nail problems can be prevented by wearing the right shoes and trimming your nails properly. To help avoid infection, keep your feet clean and dry. If you have diabetes, talk with your healthcare provider before doing any foot self-care.  The right shoes: Get your feet measured (your size may change as you age). Wear shoes that are supportive and roomy enough for your toes to wiggle. Look for shoes made of natural materials such as leather, which allow your feet to breathe.  Proper trimming: To avoid problems, trim your toenails  straight across without cutting down into the corners. If you cant trim your own nails, ask your healthcare provider to do so for you.  Date Last Reviewed: 10/1/2016  © 6215-3271 [employer]. [address] [place] [address], [place] [address]. All rights reserved. This information is not intended as a substitute for professional medical care. Always follow your healthcare professional's instructions.

## 2024-01-01 NOTE — TELEPHONE ENCOUNTER
----- Message from Margaret Estrada MD sent at 2024  8:33 AM CDT -----  [facility] - they have access online but the patient info that I printed was for 1 month old and not 2 month old - not sure if they want us to mail them a copy or stop by to pick one up or just look online?     Lmtrc, per Dr. Maldonado pt is due to have inj every other year. Left message to notify pt and will order.

## 2024-01-08 ENCOUNTER — TELEPHONE (OUTPATIENT)
Dept: OTOLARYNGOLOGY | Facility: CLINIC | Age: 74
End: 2024-01-08
Payer: MEDICARE

## 2024-01-08 NOTE — TELEPHONE ENCOUNTER
I called patient to let her know that we do not deal with insurance related question to fee free to give us a phone call back to get central pricing phone number     ----- Message from Sully Ryan sent at 1/8/2024  2:13 PM CST -----  Pt Requesting Call Back    Who called: pt  Who called for pt:  Best call back #:  [phone]  Add notes: pt said her previous insurance of people's [employer] was not accepted for this dept; says her people's health will now be taken over by [facility] and she wants to know if her insurance will now be accepted (possibly for [facility]) (pt didn't specify her payor for this insurance when I asked)

## 2024-01-30 ENCOUNTER — LAB VISIT (OUTPATIENT)
Dept: LAB | Facility: HOSPITAL | Age: 74
End: 2024-01-30
Attending: PSYCHIATRY & NEUROLOGY
Payer: MEDICARE

## 2024-01-30 ENCOUNTER — OFFICE VISIT (OUTPATIENT)
Dept: NEUROLOGY | Facility: CLINIC | Age: 74
End: 2024-01-30
Payer: MEDICARE

## 2024-01-30 VITALS
HEART RATE: 61 BPM | DIASTOLIC BLOOD PRESSURE: 76 MMHG | BODY MASS INDEX: 21.52 KG/M2 | WEIGHT: 153.75 LBS | HEIGHT: 71 IN | SYSTOLIC BLOOD PRESSURE: 133 MMHG

## 2024-01-30 DIAGNOSIS — R41.3 OTHER AMNESIA: ICD-10-CM

## 2024-01-30 DIAGNOSIS — R41.3 MEMORY CHANGES: ICD-10-CM

## 2024-01-30 DIAGNOSIS — R41.89 SUBJECTIVE MEMORY COMPLAINTS: ICD-10-CM

## 2024-01-30 DIAGNOSIS — R41.89 SUBJECTIVE MEMORY COMPLAINTS: Primary | ICD-10-CM

## 2024-01-30 DIAGNOSIS — R44.1 VISUAL HALLUCINATIONS: ICD-10-CM

## 2024-01-30 LAB
ALBUMIN SERPL BCP-MCNC: 4.4 G/DL (ref 3.5–5.2)
ALP SERPL-CCNC: 53 U/L (ref 55–135)
ALT SERPL W/O P-5'-P-CCNC: 19 U/L (ref 10–44)
ANION GAP SERPL CALC-SCNC: 14 MMOL/L (ref 8–16)
AST SERPL-CCNC: 24 U/L (ref 10–40)
BASOPHILS # BLD AUTO: 0.03 K/UL (ref 0–0.2)
BASOPHILS NFR BLD: 0.6 % (ref 0–1.9)
BILIRUB SERPL-MCNC: 1.1 MG/DL (ref 0.1–1)
BUN SERPL-MCNC: 23 MG/DL (ref 8–23)
CALCIUM SERPL-MCNC: 9.5 MG/DL (ref 8.7–10.5)
CHLORIDE SERPL-SCNC: 101 MMOL/L (ref 95–110)
CO2 SERPL-SCNC: 24 MMOL/L (ref 23–29)
CREAT SERPL-MCNC: 1 MG/DL (ref 0.5–1.4)
DIFFERENTIAL METHOD BLD: NORMAL
EOSINOPHIL # BLD AUTO: 0.1 K/UL (ref 0–0.5)
EOSINOPHIL NFR BLD: 1.7 % (ref 0–8)
ERYTHROCYTE [DISTWIDTH] IN BLOOD BY AUTOMATED COUNT: 12.5 % (ref 11.5–14.5)
EST. GFR  (NO RACE VARIABLE): >60 ML/MIN/1.73 M^2
GLUCOSE SERPL-MCNC: 99 MG/DL (ref 70–110)
HCT VFR BLD AUTO: 42.3 % (ref 37–48.5)
HGB BLD-MCNC: 14.4 G/DL (ref 12–16)
IMM GRANULOCYTES # BLD AUTO: 0.02 K/UL (ref 0–0.04)
IMM GRANULOCYTES NFR BLD AUTO: 0.4 % (ref 0–0.5)
LYMPHOCYTES # BLD AUTO: 1.2 K/UL (ref 1–4.8)
LYMPHOCYTES NFR BLD: 22.2 % (ref 18–48)
MCH RBC QN AUTO: 29.9 PG (ref 27–31)
MCHC RBC AUTO-ENTMCNC: 34 G/DL (ref 32–36)
MCV RBC AUTO: 88 FL (ref 82–98)
MONOCYTES # BLD AUTO: 0.4 K/UL (ref 0.3–1)
MONOCYTES NFR BLD: 6.9 % (ref 4–15)
NEUTROPHILS # BLD AUTO: 3.7 K/UL (ref 1.8–7.7)
NEUTROPHILS NFR BLD: 68.2 % (ref 38–73)
NRBC BLD-RTO: 0 /100 WBC
PLATELET # BLD AUTO: 166 K/UL (ref 150–450)
PLATELET # BLD AUTO: 166 K/UL (ref 150–450)
PMV BLD AUTO: 10.7 FL (ref 9.2–12.9)
PMV BLD AUTO: 10.7 FL (ref 9.2–12.9)
POTASSIUM SERPL-SCNC: 4 MMOL/L (ref 3.5–5.1)
PROT SERPL-MCNC: 7.2 G/DL (ref 6–8.4)
RBC # BLD AUTO: 4.81 M/UL (ref 4–5.4)
SODIUM SERPL-SCNC: 139 MMOL/L (ref 136–145)
TSH SERPL DL<=0.005 MIU/L-ACNC: 0.92 UIU/ML (ref 0.4–4)
WBC # BLD AUTO: 5.35 K/UL (ref 3.9–12.7)

## 2024-01-30 PROCEDURE — 85025 COMPLETE CBC W/AUTO DIFF WBC: CPT | Performed by: PSYCHIATRY & NEUROLOGY

## 2024-01-30 PROCEDURE — 82607 VITAMIN B-12: CPT | Performed by: PSYCHIATRY & NEUROLOGY

## 2024-01-30 PROCEDURE — 36415 COLL VENOUS BLD VENIPUNCTURE: CPT | Performed by: PSYCHIATRY & NEUROLOGY

## 2024-01-30 PROCEDURE — 82746 ASSAY OF FOLIC ACID SERUM: CPT | Performed by: PSYCHIATRY & NEUROLOGY

## 2024-01-30 PROCEDURE — 84443 ASSAY THYROID STIM HORMONE: CPT | Performed by: PSYCHIATRY & NEUROLOGY

## 2024-01-30 PROCEDURE — 99205 OFFICE O/P NEW HI 60 MIN: CPT | Mod: S$GLB,,, | Performed by: PSYCHIATRY & NEUROLOGY

## 2024-01-30 PROCEDURE — 99999 PR PBB SHADOW E&M-EST. PATIENT-LVL V: CPT | Mod: PBBFAC,,, | Performed by: PSYCHIATRY & NEUROLOGY

## 2024-01-30 PROCEDURE — 83520 IMMUNOASSAY QUANT NOS NONAB: CPT | Performed by: PSYCHIATRY & NEUROLOGY

## 2024-01-30 PROCEDURE — 80053 COMPREHEN METABOLIC PANEL: CPT | Performed by: PSYCHIATRY & NEUROLOGY

## 2024-01-30 NOTE — PROGRESS NOTES
Subjective:       Patient ID: Sunni Lowe is a 73 y.o. female.    Chief Complaint: Memory Loss      Ms Lowe is a   73-year-old  woman who has a 2 year history of subjective cognitive decline.  She presents alone today but she is  and lives with her .  She is hearing impaired and although she has her hearing aids the visit was minimally limited due to her hearing issue.  She states she has had trouble with her memory for  a few years.  Her first assessment was 8 yrs ago, when she was losing things and her  noted that she was forgetful. The last documented visit was w/with Dr. Middleton for this same problem, on February 8, 2019 ([place] 24/30)     She is concerned because her mother was diagnosed with Alzheimer's in her late 60s.  (she passed at age 69 with ovarian cancer)     Also has 3 maternal aunts with Alzheimer's unsure of their ages of onset but believes it was more late 70s and early 80s.      Patient is frustrated with inability to find the best words sometimes and she finds herself stopping mid-sentence fairly often.  Sometimes she has to describing item that she can not think of the word for.  This happens 2 or 3 times a week.    Regarding memory she states she has to write all of her appointments down but she does not make mistakes due to memory i.e. does not affect her daily life.  Her  does tell her that she is repeating herself.  She on the other hand notes that he does not try to adjust the volume of his voice or his head position to accommodate her hearing deficits.      She remains 100% independent in her ADLs and is very social and involved in several clubs.  That she is aware of.  She drinks 1-2 glasses of wine every day typically close to 12 oz.  She has no history of a stroke  She had a minor head injury a few years ago when she hit her head on a garbage can but did not lose, she business and there was no residual        CT Head Without Contrast    Reading  Physician Reading Date Result Priority  Lucho Dave MD  [phone] 1/13/2017     Narrative & Impression  Head CT     Technique: CT scan of the head was performed without contrast utilizing 5-mm contiguous axial sections.     Comparison: 9/3/15     Findings:   There is no intracranial hemorrhage, fluid collection, or mass effect.  Gray-white differentiation is preserved.  There is mucoperiosteal thickening in the left maxillary sinus and bilateral ethmoid air cells.  Mastoid air cells are aerated.  IMPRESSION:    No acute intracranial process.        Electronically signed by: LUCHO DAVE MD  Date:                                            01/13/17  Time:                                           12:08                       Past Medical History:   Diagnosis Date    Allergic rhinitis     Asthma     Fibrocystic breast     Osteopenia     Varicose veins     sees Dr. Brigido Quinones      Past Surgical History:   Procedure Laterality Date    BREAST BIOPSY Left     COLONOSCOPY  08/01/2014    Dr. Canas - repeat in 5 to 10 years    ESOPHAGOGASTRODUODENOSCOPY N/A 10/19/2018    Procedure: EGD (ESOPHAGOGASTRODUODENOSCOPY);  Surgeon: Cassie Huber MD;  Location: [facility];  Service: Endoscopy;  Laterality: N/A;    KNEE SURGERY Bilateral     meniscus repair    peniculitis surgery      x 3        Current Outpatient Medications:     azelastine (ASTELIN) 137 mcg (0.1 %) nasal spray, Use 1 spray (137 mcg total) in each nostril 2 (two) times daily., Disp: 30 mL, Rfl: 1    calcium carbonate (OS-ISABELLE) 600 mg (1,500 mg) Tab, Take 1,200 mg by mouth once daily., Disp: , Rfl:     cholecalciferol, vitamin D3, (VITAMIN D3) 10,000 unit Cap, Take 10,000 Units by mouth once daily. , Disp: , Rfl:     coffee xt-phosphatidyl serine (NEURIVA ORIGINAL) 100-100 mg Cap, , Disp: , Rfl:     collagen, bovine, 100 % Powd, Take 1 Scoop by mouth Daily. Puts 1 scoop in coffee in am daily, Disp: , Rfl:     CYANOCOBALAMIN, VITAMIN B-12, (VITAMIN  "B-12 ORAL), Take by mouth., Disp: , Rfl:     ELDERBERRY FRUIT ORAL, Take 1 tablet by mouth every other day., Disp: , Rfl:     esomeprazole (NEXIUM) 20 MG capsule, Take 1 capsule (20 mg total) by mouth before breakfast., Disp: 90 capsule, Rfl: 3    fish oil-omega-3 fatty acids 300-1,000 mg capsule, Take 600 mg by mouth once daily., Disp: , Rfl:     fluticasone propionate (FLONASE) 50 mcg/actuation nasal spray, 2 sprays (100 mcg total) by Each Nostril route once daily., Disp: 16 g, Rfl: 11    fluticasone-salmeterol diskus inhaler 250-50 mcg, Inhale 1 puff into the lungs once daily. Controller, Disp: 120 each, Rfl: 1    psyllium husk 0.4 gram Cap, Take 1 capsule by mouth once daily., Disp: , Rfl:     RSVPreF3 antigen-AS01E, PF, (AREXVY, PF,) 120 mcg/0.5 mL SusR vaccine, Inject into the muscle once., Disp: 1 each, Rfl: 0   Review of patient's allergies indicates:  No Known Allergies     Review of Systems   Constitutional:  Negative for fatigue.   HENT:  Positive for hearing loss.    Neurological:  Positive for memory loss. Negative for seizures and headaches.   Psychiatric/Behavioral:  Positive for hallucinations (visual hallucinations, sees shapes like a small ghost, gummy bear or animal on her walll this occurs during sleep and occurs primarily when trying to fall back asleep after goes to the restroom.) and sleep disturbance (sometimes cannot go back to sleep after nocturia which is generally at 2 am; takes only a short nap and not daily).            Objective:      Physical Exam  Constitutional:       Appearance: Normal appearance. She is not ill-appearing.      Comments: Appears youthful   Neurological:      Mental Status: She is alert.      Cranial Nerves: Cranial nerves 2-12 are intact. No dysarthria.      Comments: Had minor difficulty expressing herself only once during our lengthy visit ( referring to MoCA she said "that brings me nervous")  MoCA 21/30   Psychiatric:         Behavior: Behavior normal.         " Thought Content: Thought content normal.           Assessment:       1. Subjective memory complaints    2. Visual hallucinations    3. Memory changes    4. Other amnesia        Plan:            cognitive decline with memory dysfunction and mild expressive aphasia, not evident on today's exam. Onset around 8 yrs ago.   Strong maternal hx of GEORGE.  Admits to non threatening visual hallucinations x6 months to 1 year.  No history of RBD and no parkinsonism on exam. No depression     Discussed potential option of lecanemab depending on w/u results and after discussion she chose to have the p tau testing.  See orders            Emma Flynn MD   01/30/2024   11:00 AM

## 2024-01-31 LAB
FOLATE SERPL-MCNC: 38.6 NG/ML (ref 4–24)
VIT B12 SERPL-MCNC: 619 PG/ML (ref 210–950)

## 2024-02-06 LAB — PHOSPHO-TAU (181P): 1.87 PG/ML (ref 0–0.97)

## 2024-02-15 ENCOUNTER — PATIENT MESSAGE (OUTPATIENT)
Dept: NEUROLOGY | Facility: CLINIC | Age: 74
End: 2024-02-15
Payer: MEDICARE

## 2024-02-15 DIAGNOSIS — R41.3 MEMORY CHANGES: Primary | ICD-10-CM

## 2024-02-15 RX ORDER — RIVASTIGMINE TARTRATE 4.5 MG/1
4.5 CAPSULE ORAL 2 TIMES DAILY
Qty: 60 CAPSULE | Refills: 2 | Status: SHIPPED | OUTPATIENT
Start: 2024-02-15 | End: 2024-04-09

## 2024-02-15 RX ORDER — RIVASTIGMINE TARTRATE 3 MG/1
3 CAPSULE ORAL 2 TIMES DAILY
Qty: 60 CAPSULE | Refills: 0 | Status: SHIPPED | OUTPATIENT
Start: 2024-02-15 | End: 2024-04-09

## 2024-02-15 RX ORDER — RIVASTIGMINE TARTRATE 1.5 MG/1
1.5 CAPSULE ORAL 2 TIMES DAILY
Qty: 60 CAPSULE | Refills: 0 | Status: SHIPPED | OUTPATIENT
Start: 2024-02-15 | End: 2024-03-22

## 2024-02-19 ENCOUNTER — OFFICE VISIT (OUTPATIENT)
Dept: FAMILY MEDICINE | Facility: CLINIC | Age: 74
End: 2024-02-19
Payer: MEDICARE

## 2024-02-19 VITALS
SYSTOLIC BLOOD PRESSURE: 110 MMHG | HEIGHT: 71 IN | TEMPERATURE: 98 F | WEIGHT: 157.5 LBS | OXYGEN SATURATION: 98 % | HEART RATE: 71 BPM | DIASTOLIC BLOOD PRESSURE: 74 MMHG | BODY MASS INDEX: 22.05 KG/M2

## 2024-02-19 DIAGNOSIS — R41.3 MEMORY CHANGES: Primary | ICD-10-CM

## 2024-02-19 DIAGNOSIS — J45.40 MODERATE PERSISTENT ASTHMA WITHOUT COMPLICATION: ICD-10-CM

## 2024-02-19 DIAGNOSIS — K21.9 GASTROESOPHAGEAL REFLUX DISEASE WITHOUT ESOPHAGITIS: ICD-10-CM

## 2024-02-19 DIAGNOSIS — H69.93 DYSFUNCTION OF BOTH EUSTACHIAN TUBES: ICD-10-CM

## 2024-02-19 DIAGNOSIS — M85.852 OSTEOPENIA OF NECK OF LEFT FEMUR: ICD-10-CM

## 2024-02-19 PROBLEM — D69.2 PURPURA: Status: RESOLVED | Noted: 2023-09-14 | Resolved: 2024-02-19

## 2024-02-19 PROBLEM — M19.049 HAND ARTHRITIS: Status: RESOLVED | Noted: 2023-08-30 | Resolved: 2024-02-19

## 2024-02-19 PROCEDURE — 99215 OFFICE O/P EST HI 40 MIN: CPT | Mod: S$GLB,,, | Performed by: STUDENT IN AN ORGANIZED HEALTH CARE EDUCATION/TRAINING PROGRAM

## 2024-02-19 PROCEDURE — 99999 PR PBB SHADOW E&M-EST. PATIENT-LVL IV: CPT | Mod: PBBFAC,,, | Performed by: STUDENT IN AN ORGANIZED HEALTH CARE EDUCATION/TRAINING PROGRAM

## 2024-02-19 RX ORDER — FLUTICASONE PROPIONATE 50 MCG
2 SPRAY, SUSPENSION (ML) NASAL DAILY
Qty: 16 G | Refills: 1 | Status: SHIPPED | OUTPATIENT
Start: 2024-02-19

## 2024-02-19 NOTE — PROGRESS NOTES
Subjective:       Patient ID: Sunni Lowe is a 73 y.o. female.    Chief Complaint: No chief complaint on file.    Endorses R-subcostal discomfort that radiates towards her groin for the past month. Nothing seems to start the pain. No discomfort on palpation. No skin changes or rash. She is concerned, as her mother has a h/o of ovarian cancer. She denies early satiety, bloating, unexpected change in weight, dysuria, fevers, chills.    She has a long family history of GEORGE. She endorses word-finding difficulty today. She remains 100% independent in her ADLs. She drives herself to the gym everyday. She got worked up by neurology recently. She was not a candidate for the infusion trial. They prescribed Rivastgmine, which she has not started.    Asthma  Has not had to use rescue inhaler. Has not had to use a rescue inhaler recently.  GERD - Has been taking nexium 20mg.  Osteopenia - Taking Vitamin D.     Discussed healthy diet and exercise.    Diet: Weight Watcher  Exercise: [employer]. Bikes.    Last mammogram 9/18/23. Negative.  She has received RSV vaccine.    She is supported by her  at home.   Discussed fall precautions    Active Problem List with Overview Notes    Diagnosis Date Noted    Purpura 09/14/2023    Hand arthritis 08/30/2023    Dysfunction of both eustachian tubes 02/19/2023     astelin   Flonase   Daily antihistamine   Went to ENT  Did well for a time but past few days has noticed ear pressure again; she had stopped the flonase consistently so will restart  We discussed adding Mucinex D (short term only) and popping ears       Gastroesophageal reflux disease 01/28/2019     PPI daily   Stable   Vit D/Ca and Mag for bone health due to long term hx taking PPI advised       Asthma      Advair 1 spray once a day   Denies symptoms   Stable       Osteopenia      Managed by GYN   Reclast annually           Review of Systems   Constitutional:  Negative for appetite change, chills, fatigue, fever  and unexpected weight change.   HENT:  Positive for sinus pressure. Negative for congestion, ear discharge, ear pain, facial swelling, rhinorrhea, sinus pain, sore throat and trouble swallowing.    Eyes: Negative.    Respiratory:  Negative for cough, shortness of breath and wheezing.    Cardiovascular:  Negative for chest pain, palpitations and leg swelling.   Gastrointestinal:  Negative for blood in stool, constipation, diarrhea, nausea and vomiting.   Genitourinary:  Negative for dysuria, urgency, vaginal bleeding, vaginal discharge and vaginal pain.   Musculoskeletal:  Positive for arthralgias and joint swelling. Negative for gait problem and neck stiffness.   Skin:  Negative for rash and wound.   Neurological:  Negative for dizziness, tremors, weakness and light-headedness.        Memory change   Psychiatric/Behavioral: Negative.     All other systems reviewed and are negative.       A1C:  Recent Labs   Lab 02/14/22  1336   Hemoglobin A1C 5.2     CBC:  Recent Labs   Lab 11/04/21  0823 08/15/22  1332 01/30/24  1207   WBC 3.76 L 4.9 5.35   RBC 4.57 4.30 4.81   Hemoglobin 13.7 12.7 14.4   Hematocrit 40.9 37.7 42.3   Platelets 158 174 166  166   MCV 90 87.7 88   MCH 30.0 29.5 29.9   MCHC 33.5 33.7 34.0     CMP:  Recent Labs   Lab 02/14/22  1336 08/15/22  1332 01/30/24  1207   Glucose 83 87 99   Calcium 9.9 9.1 9.5   Albumin 4.8 4.2 4.4   Total Protein 7.4 6.2 7.2   Sodium 141 141 139   Potassium 4.4 4.1 4.0   CO2 28 31 24   Chloride 101 104 101   BUN 20 18 23   Creatinine 0.91 1.02 1.0   Alkaline Phosphatase  --   --  53 L   ALT 13 12 19   AST 19 16 24   Total Bilirubin 0.7 0.8 1.1 H     LIPIDS:  Recent Labs   Lab 02/14/22  1336 02/11/23  0000 08/09/23  0000 01/30/24  1207   TSH 1.67  --   --  0.921   HDL 65   < > 65  --    Cholesterol 191   < > 191  --    Triglycerides 81   < > 70  --    LDL Cholesterol 109 H  --   --   --    LDL Calculated  --    < > 110  --    HDL/Cholesterol Ratio 2.9  --   --   --    Non HDL  Chol. (LDL+VLDL) 126  --   --   --     < > = values in this interval not displayed.     TSH:  Recent Labs   Lab 02/14/22  1336 01/30/24  1207   TSH 1.67 0.921        Objective:      Vitals:    02/19/24 1038   BP: 110/74   Pulse: 71   Temp: 98.1 °F (36.7 °C)      Physical Exam  Constitutional:       Appearance: Normal appearance. She is normal weight.   HENT:      Nose: Nose normal.   Cardiovascular:      Rate and Rhythm: Normal rate and regular rhythm.      Pulses: Normal pulses.      Heart sounds: Normal heart sounds.   Pulmonary:      Effort: Pulmonary effort is normal.      Breath sounds: Normal breath sounds.   Abdominal:      General: Abdomen is flat.      Palpations: Abdomen is soft. There is no mass.      Tenderness: There is no abdominal tenderness. There is no guarding.      Hernia: No hernia is present.   Musculoskeletal:         General: No tenderness.      Right hand: No swelling or deformity.      Left hand: No swelling or deformity.      Right knee: No swelling or deformity.      Left knee: No swelling or deformity.   Skin:     Findings: No bruising or rash.   Neurological:      Mental Status: She is alert and oriented to person, place, and time. Mental status is at baseline.   Psychiatric:         Attention and Perception: Attention normal.         Mood and Affect: Mood and affect normal.         Behavior: Behavior normal. Behavior is cooperative.         Thought Content: Thought content normal.         Cognition and Memory: Memory is impaired.         Judgment: Judgment normal.          Assessment:       1. Dysfunction of both eustachian tubes      2. Memory Changes  3. Asthma without complicatin  4. GERD  5. Osteopenia  6. Osteoarthritis of hands and knee  Plan:   1. Dysfunction of both eustachian tubes  Takes both flucastasone and astelin with optimal relief of symptoms. Stable.    2. Memory Changes  Family history of GEORGE. Worked up by neurology in late 2023. Will see if the rivastigamine is  available at the pharmacy downstairs.    3. Asthma without complication  Takes preventative inhaler daily. Stable.    4. GERD  Nexium 20 mg daily. Stable    5. Osteopenia  Vitamin D supplement daily. IJ managed by GYN.   Fall precautions discussed    6. Osteoarthritis of hands and knee  Has seen ortho before. Will take Tylenol occasionally. Stable    Lisa Akbar  [employer], MS4      [facility]   2/19/24     I hereby acknowledge that I am relying upon documentation authored by a Medical student working under my supervision and further I hereby attest that I have verified the student documentation or findings by personally performing the physical exam and medical decision making activities of the Evaluation and Management service to be billed.    Labs reviewed in detail  Continue healthy lifestyle efforts  colonoscopy due later this year  Continue current meds as prescribed otherwise; refills per request  Keep routine specialist f/u   RTC in 6 months  with labs prior and/or PRN         Alison Fox DO   [facility]  2/19/24     I spent a total of >40 minutes on the day of the visit.This includes face to face time and non-face to face time preparing to see the patient (eg, review of tests), obtaining and/or reviewing separately obtained history, documenting clinical information in the electronic or other health record, independently interpreting results and communicating results to the patient/family/caregiver, or care coordinator.

## 2024-03-12 ENCOUNTER — TELEPHONE (OUTPATIENT)
Dept: FAMILY MEDICINE | Facility: CLINIC | Age: 74
End: 2024-03-12
Payer: MEDICARE

## 2024-03-12 DIAGNOSIS — R41.3 MEMORY CHANGES: ICD-10-CM

## 2024-03-12 DIAGNOSIS — M85.852 OSTEOPENIA OF NECK OF LEFT FEMUR: ICD-10-CM

## 2024-03-12 DIAGNOSIS — Z00.00 WELLNESS EXAMINATION: Primary | ICD-10-CM

## 2024-03-12 NOTE — TELEPHONE ENCOUNTER
Pt has a lab apt on 8/14/2024 at [facility]. Can you please add lab orders for pt in the system. Please advise message.

## 2024-03-15 DIAGNOSIS — H69.93 DYSFUNCTION OF BOTH EUSTACHIAN TUBES: ICD-10-CM

## 2024-03-15 DIAGNOSIS — K22.70 BARRETT'S ESOPHAGUS WITHOUT DYSPLASIA: ICD-10-CM

## 2024-03-15 RX ORDER — HYDROGEN PEROXIDE 3 %
20 SOLUTION, NON-ORAL MISCELLANEOUS
Qty: 90 CAPSULE | Refills: 3 | Status: SHIPPED | OUTPATIENT
Start: 2024-03-15 | End: 2025-03-15

## 2024-03-15 NOTE — TELEPHONE ENCOUNTER
No care due was identified.  [facility] Embedded Care Due Messages. Reference number: [id number].   3/15/2024 2:59:40 PM CDT

## 2024-03-15 NOTE — TELEPHONE ENCOUNTER
No care due was identified.  [facility] Embedded Care Due Messages. Reference number: [id number].   3/15/2024 5:54:08 PM CDT

## 2024-03-15 NOTE — TELEPHONE ENCOUNTER
Refill Decision Note   uSnni Lowe  is requesting a refill authorization.  Brief Assessment and Rationale for Refill:  Approve     Medication Therapy Plan:         Comments:     Note composed:4:38 PM 03/15/2024

## 2024-03-16 RX ORDER — AZELASTINE 1 MG/ML
1 SPRAY, METERED NASAL 2 TIMES DAILY
Qty: 90 ML | Refills: 3 | Status: SHIPPED | OUTPATIENT
Start: 2024-03-16

## 2024-03-16 NOTE — TELEPHONE ENCOUNTER
Refill Decision Note   Sunni Lowe  is requesting a refill authorization.  Brief Assessment and Rationale for Refill:  Approve     Medication Therapy Plan:        Comments:     Note composed:12:29 AM 03/16/2024

## 2024-03-26 ENCOUNTER — PATIENT MESSAGE (OUTPATIENT)
Dept: FAMILY MEDICINE | Facility: CLINIC | Age: 74
End: 2024-03-26
Payer: MEDICARE

## 2024-03-26 ENCOUNTER — TELEPHONE (OUTPATIENT)
Dept: FAMILY MEDICINE | Facility: CLINIC | Age: 74
End: 2024-03-26
Payer: MEDICARE

## 2024-03-26 NOTE — TELEPHONE ENCOUNTER
----- Message from Riley Edge sent at 3/26/2024  2:20 PM CDT -----  Contact: Pt  .Type:  Needs Medical Advice    Who Called: pt  Would the patient rather a call back or a response via [facility]?  Call back  Best Call Back Number: [phone]  Additional Information:  Pt. Is calling regarding  the appt. That was offered to her on tomorrow @11:20 she would like to know if it is in [place] or [place].  Please text her the info or sent it in the [facility].

## 2024-03-26 NOTE — TELEPHONE ENCOUNTER
Called and spoke with pt in regards of her message. Informed pt that she has an apt tomorrow to see Dr. Fox for 11:20 am here in [place]. Pt verbalized understanding of message.

## 2024-03-27 ENCOUNTER — OFFICE VISIT (OUTPATIENT)
Dept: FAMILY MEDICINE | Facility: CLINIC | Age: 74
End: 2024-03-27
Payer: MEDICARE

## 2024-03-27 VITALS
WEIGHT: 153.44 LBS | BODY MASS INDEX: 21.48 KG/M2 | SYSTOLIC BLOOD PRESSURE: 120 MMHG | HEART RATE: 67 BPM | DIASTOLIC BLOOD PRESSURE: 84 MMHG | TEMPERATURE: 98 F | OXYGEN SATURATION: 99 % | HEIGHT: 71 IN

## 2024-03-27 DIAGNOSIS — K52.9 ENTERITIS: Primary | ICD-10-CM

## 2024-03-27 DIAGNOSIS — R09.A2 GLOBUS SENSATION: ICD-10-CM

## 2024-03-27 DIAGNOSIS — E86.0 DEHYDRATION: ICD-10-CM

## 2024-03-27 PROCEDURE — 99999 PR PBB SHADOW E&M-EST. PATIENT-LVL IV: CPT | Mod: PBBFAC,,, | Performed by: STUDENT IN AN ORGANIZED HEALTH CARE EDUCATION/TRAINING PROGRAM

## 2024-03-27 PROCEDURE — 99215 OFFICE O/P EST HI 40 MIN: CPT | Mod: S$GLB,,, | Performed by: STUDENT IN AN ORGANIZED HEALTH CARE EDUCATION/TRAINING PROGRAM

## 2024-03-27 NOTE — PROGRESS NOTES
Subjective:      Patient ID: Sunni Lowe is a 73 y.o. female.    Chief Complaint: Hospital Follow Up (Pt went to ER on Sunday for throwing up, nausea, diarrhea )    - ER this past weekend for GI bug  - nausea, vomiting and diarrhea, fever, dehydration, got fluids which helped to perk her back up to normal   - she continues with some weakness, but does have more appetite again and is having more solid Bms though almost constipated now  - she feels she has lump in throat and wants to see GI ASAP for possible colonoscopy and/or EGD   - she is not have nausea or committing today, denies abdominal pain just some mild discomfort  - no other questions or concerns       Review of Systems   Gastrointestinal:  Positive for diarrhea, nausea and vomiting.   Psychiatric/Behavioral:  The patient is nervous/anxious.    All other systems reviewed and are negative.       Objective:     Vitals:    03/27/24 1122   BP: 120/84   Pulse: 67   Temp: 98.1 °F (36.7 °C)      Physical Exam  Constitutional:       Appearance: Normal appearance.   HENT:      Head: Atraumatic.   Eyes:      Conjunctiva/sclera: Conjunctivae normal.   Cardiovascular:      Rate and Rhythm: Normal rate and regular rhythm.   Pulmonary:      Effort: Pulmonary effort is normal.   Neurological:      Mental Status: She is alert. Mental status is at baseline.   Psychiatric:         Mood and Affect: Mood normal.         Behavior: Behavior normal.        Assessment:         1. Enteritis    2. Globus sensation    3. Dehydration          Plan:   1. Enteritis    2. Globus sensation    3. Dehydration     Sooner appt made with GI for pt, she wants to get EGD for this globus sensation which worries her  She is drinking water and keeping down, advised to add electrolytes 2/2 severe loses with diarrhea   RTC as scheduled and/or PRN       Alison Fox   [facility]   3/27/24     I spent a total of >40 minutes on the day of the visit.This includes face to face  time and non-face to face time preparing to see the patient (eg, review of tests), obtaining and/or reviewing separately obtained history, documenting clinical information in the electronic or other health record, independently interpreting results and communicating results to the patient/family/caregiver, or care coordinator.

## 2024-03-28 ENCOUNTER — OFFICE VISIT (OUTPATIENT)
Dept: GASTROENTEROLOGY | Facility: CLINIC | Age: 74
End: 2024-03-28
Payer: MEDICARE

## 2024-03-28 VITALS
WEIGHT: 152.13 LBS | HEART RATE: 63 BPM | DIASTOLIC BLOOD PRESSURE: 71 MMHG | SYSTOLIC BLOOD PRESSURE: 122 MMHG | BODY MASS INDEX: 21.3 KG/M2 | HEIGHT: 71 IN | OXYGEN SATURATION: 94 %

## 2024-03-28 DIAGNOSIS — K21.9 GASTROESOPHAGEAL REFLUX DISEASE WITHOUT ESOPHAGITIS: ICD-10-CM

## 2024-03-28 DIAGNOSIS — Z86.010 PERSONAL HISTORY OF COLONIC POLYPS: ICD-10-CM

## 2024-03-28 DIAGNOSIS — R13.10 DYSPHAGIA, UNSPECIFIED TYPE: Primary | ICD-10-CM

## 2024-03-28 PROBLEM — Z86.0100 PERSONAL HISTORY OF COLONIC POLYPS: Status: ACTIVE | Noted: 2024-03-28

## 2024-03-28 PROCEDURE — 99204 OFFICE O/P NEW MOD 45 MIN: CPT | Mod: S$GLB,,, | Performed by: NURSE PRACTITIONER

## 2024-03-28 PROCEDURE — 99999 PR PBB SHADOW E&M-EST. PATIENT-LVL IV: CPT | Mod: PBBFAC,,, | Performed by: NURSE PRACTITIONER

## 2024-03-28 RX ORDER — SODIUM PICOSULFATE, MAGNESIUM OXIDE, AND ANHYDROUS CITRIC ACID 12; 3.5; 1 G/175ML; G/175ML; MG/175ML
1 LIQUID ORAL ONCE
Qty: 350 ML | Refills: 0 | Status: SHIPPED | OUTPATIENT
Start: 2024-03-28 | End: 2024-03-29

## 2024-03-28 NOTE — PATIENT INSTRUCTIONS
CLENPIQ Instructions    [facility] [address]  [address]    You are scheduled for a EGD/colonoscopy with Dr. Huber on 4/5/2024 at [facility]. You will enter through the [facility] entrance and check in at [facility] Day Surgery.  To ensure that your test is accurate and complete, you MUST follow these instructions listed below.  If you have any questions, please call our office at [phone].  Plan on being at the hospital for your procedure for 3-4 hours.    1.  Follow a CLEAR LIQUID DIET for the entire day before your scheduled colonoscopy.  This means no solid food the entire day starting when you wake.  You may have as much of the clear liquids as you want throughout the day.   CLEAR LIQUID DIET:   - NO DAIRY   - You can have:  Coffee with sugar (no creamer), tea, water, soda, apple or white grape juice, chicken or beef broth/bouillon (no meat, noodles, or veggies), popsicles, Jell-O, lemonade.    2.  AT 5 pm the evening before your colonoscopy, OPEN ONE (1) BOTTLE OF CLENPIQ AND DRINK THE ENTIRE BOTTLE.  DRINK FIVE (5) 8-OUNCE GLASSES OF WATER (40 OUNCES TOTAL) OVER THE NEXT FIVE (5) HOURS.     3.  The endoscopy department will call you 1 day before your colonoscopy to tell you the exact time to arrive, AND to tell you the exact time to drink the 2nd portion of your prep (which will be FIVE HOURS BEFORE YOUR ARRIVAL TIME).  At this time given to you, OPEN THE OTHER ONE (1) BOTTLE OF CLENPIQ AND DRINK THE ENTIRE BOTTLE.  DRINK THREE (3) 8-OUNCE GLASSES OF WATER (24 OUNCES TOTAL) OVER THE NEXT THREE (3) HOURS. Once this is complete, you can not have anything else by mouth!    4.  You must have someone with you to DRIVE YOU HOME since you will be receiving IV sedation for the colonoscopy.    5.  It is ok to take MOST of your REGULAR MEDICATIONS  in the morning of your test with a SIP of water.  THE ONLY MEDS YOU NEED TO HOLD ARE YOUR DIABETES MEDICATIONS,  SOME  BLOOD PRESSURE MEDS, AND BLOOD THINNERS IF OK'D BY YOUR DOCTOR.  Do NOT have anything else to eat or drink the morning of your colonoscopy.  It is ok to brush your teeth.    6.  If you are on blood thinners THAT YOU HAVE BEEN INSTRUCTED TO HOLD BY YOUR DOCTOR FOR THIS PROCEDURE, then do NOT take this the morning of your colonoscopy.  Do NOT stop these medications on your own, they must be approved to be held by your doctor.  Your colonoscopy can NOT be done if you are on these medications.  Examples of blood thinners include: Coumadin, Aggrenox, Plavix, Pradaxa, Reapro, Pletal, Xarelto, Ticagrelor, Brilinta, Eliquis, and high dose aspirin (325 mg).  You do not have to stop baby aspirin 81 mg.    7.  IF YOU ARE DIABETIC:  NO INSULIN OR ORAL MEDICATIONS THE MORNING OF THE COLONOSCOPY.  TAKE ONLY HALF THE DOSE OF YOUR INSULIN THE DAY BEFORE THE COLONOSCOPY.  DO NOT TAKE ANY ORAL DIABETIC MEDICATIONS THE DAY BEFORE THE COLONOSCOPY.  IF YOU ARE AN INSULIN DEPENDENT DIABETIC WITH UNSTABLE BLOOD SUGARS, NOTIFY YOUR PRIMARY CARE PHYSICIAN FOR INSTRUCTIONS.    8.  Please DO use your inhalers the morning of your procedure.    You will receive a call a few days before your colonoscopy to tell you the time to arrive.  If you have not received a call by the day before your procedure, call the Pre-op Coordinator at [phone].

## 2024-03-28 NOTE — PROGRESS NOTES
Subjective:       Patient ID: Sunni Lowe is a 73 y.o. female.    Chief Complaint: Dysphagia, Colonoscopy, and Gastroesophageal Reflux    74 y/o female referred by PCP for dysphagia. Patient was seen in [facility] ER 4 days ago with c/o abdominal pain, diarrhea, n/v. She was treated with IVF and discharged. Today she is feeling much better. No longer having diarrhea or n/v. Tolerating normal diet. Last BM this morning with solid stool. She is having trouble swallowing solid food. Feels like she has lump in her throat just above sternal notch. Sensation is worse when lying flat. Hx of GERD with increased belching. She is taking esomeprazole 20 mg daily. EGD in 2018 revealed normal esophagus; mucosal changes in the cardia; this is not Cortes's esophagus; biopsied; normal examined duodenum. Colonoscopy in 2014 with one polyp removed, diverticulosis in the sigmoid colon, and IH.           Past Medical History:   Diagnosis Date    Allergic rhinitis     Asthma     Fibrocystic breast     Osteopenia     Varicose veins     sees Dr. Brigido Quinones       Past Surgical History:   Procedure Laterality Date    BREAST BIOPSY Left     COLONOSCOPY  08/01/2014    Dr. Canas - repeat in 5 to 10 years    ESOPHAGOGASTRODUODENOSCOPY N/A 10/19/2018    Procedure: EGD (ESOPHAGOGASTRODUODENOSCOPY);  Surgeon: Cassie Huber MD;  Location: [facility];  Service: Endoscopy;  Laterality: N/A;    KNEE SURGERY Bilateral     meniscus repair    peniculitis surgery      x 3       Family History   Problem Relation Age of Onset    Ovarian cancer Mother 63    Alzheimer's disease Mother         @ 62    Cancer Father 85        tongue, and Lung    Diabetes Father     No Known Problems Sister     No Known Problems Sister     No Known Problems Brother     No Known Problems Daughter     No Known Problems Son     Alzheimer's disease Maternal Aunt         @ age 80       Social History     Socioeconomic History    Marital status:      Number of children: 2   Tobacco Use    Smoking status: Former     Current packs/day: 0.00     Average packs/day: 1 pack/day for 10.0 years (10.0 ttl pk-yrs)     Types: Cigarettes     Start date:      Quit date:      Years since quittin.2    Smokeless tobacco: Never   Substance and Sexual Activity    Alcohol use: Yes     Alcohol/week: 7.0 standard drinks of alcohol     Types: 7 Glasses of wine per week     Comment: one glass of wine daily    Drug use: No    Sexual activity: Not Currently     Partners: Male     Comment:      Social Determinants of Health     Financial Resource Strain: Low Risk  (2/15/2024)    Overall Financial Resource Strain (CARDIA)     Difficulty of Paying Living Expenses: Not hard at all   Food Insecurity: No Food Insecurity (2/15/2024)    Hunger Vital Sign     Worried About Running Out of Food in the Last Year: Never true     Ran Out of Food in the Last Year: Never true   Transportation Needs: No Transportation Needs (2/15/2024)    PRAPARE - Transportation     Lack of Transportation (Medical): No     Lack of Transportation (Non-Medical): No   Physical Activity: Sufficiently Active (2/15/2024)    Exercise Vital Sign     Days of Exercise per Week: 4 days     Minutes of Exercise per Session: 70 min   Stress: Stress Concern Present (2/15/2024)    [employer] - Occupational Stress Questionnaire     Feeling of Stress : To some extent   Social Connections: Socially Integrated (2/15/2024)    Social Connection and Isolation Panel [NHANES]     Frequency of Communication with Friends and Family: More than three times a week     Frequency of Social Gatherings with Friends and Family: Three times a week     Attends Hoah[employer]ism Services: More than 4 times per year     Active Member of Clubs or Organizations: Yes     Attends Club or Organization Meetings: More than 4 times per year     Marital Status:    Housing Stability: Low Risk  (2/15/2024)    Housing  "Stability Vital Sign     Unable to Pay for Housing in the Last Year: No     Number of Places Lived in the Last Year: 1     Unstable Housing in the Last Year: No       Review of Systems   Constitutional:  Negative for appetite change and unexpected weight change.   HENT:  Positive for trouble swallowing.    Respiratory:  Negative for shortness of breath.    Cardiovascular:  Negative for chest pain.   Gastrointestinal:  Negative for abdominal pain and blood in stool.   Psychiatric/Behavioral:  Negative for dysphoric mood.          Objective:     Vitals:    03/28/24 0837   BP: 122/71   BP Location: Right arm   Patient Position: Sitting   BP Method: Medium (Automatic)   Pulse: 63   SpO2: (!) 94%   Weight: 69 kg (152 lb 1.9 oz)   Height: 5' 11" (1.803 m)          Physical Exam  Constitutional:       General: She is not in acute distress.     Appearance: Normal appearance.   HENT:      Head: Normocephalic.   Eyes:      Conjunctiva/sclera: Conjunctivae normal.   Pulmonary:      Effort: Pulmonary effort is normal. No respiratory distress.   Musculoskeletal:         General: Normal range of motion.      Cervical back: Normal range of motion.   Skin:     General: Skin is warm and dry.   Neurological:      Mental Status: She is alert and oriented to person, place, and time.   Psychiatric:         Mood and Affect: Mood normal.         Behavior: Behavior normal.               Assessment:         ICD-10-CM ICD-9-CM   1. Dysphagia, unspecified type  R13.10 787.20   2. Gastroesophageal reflux disease without esophagitis  K21.9 530.81   3. Personal history of colonic polyps  Z86.010 V12.72       Plan:       Dysphagia, unspecified type; Gastroesophageal reflux disease without esophagitis  -     Case Request Endoscopy: EGD (ESOPHAGOGASTRODUODENOSCOPY), COLONOSCOPY    Personal history of colonic polyps  -     sod picosulf-mag ox-citric ac (CLENPIQ) 10 mg-3.5 gram- 12 gram/175 mL Soln; Take 1 Package by mouth once. for 1 dose  Dispense: " 350 mL; Refill: 0  -     Case Request Endoscopy: EGD (ESOPHAGOGASTRODUODENOSCOPY), COLONOSCOPY      Follow up if symptoms worsen or fail to improve.     Patient's Medications   New Prescriptions    SOD PICOSULF-MAG OX-CITRIC AC (CLENPIQ) 10 MG-3.5 GRAM- 12 GRAM/175 ML SOLN    Take 1 Package by mouth once. for 1 dose   Previous Medications    AZELASTINE (ASTELIN) 137 MCG (0.1 %) NASAL SPRAY    Use 1 spray (137 mcg total) in each nostril 2 (two) times daily.    CALCIUM CARBONATE (OS-ISABELLE) 600 MG (1,500 MG) TAB    Take 1,200 mg by mouth once daily.    CHOLECALCIFEROL, VITAMIN D3, (VITAMIN D3) 10,000 UNIT CAP    Take 10,000 Units by mouth once daily.     COLLAGEN, BOVINE, 100 % POWD    Take 1 Scoop by mouth Daily. Puts 1 scoop in coffee in am daily    CYANOCOBALAMIN, VITAMIN B-12, (VITAMIN B-12 ORAL)    Take by mouth.    ELDERBERRY FRUIT ORAL    Take 1 tablet by mouth every other day.    ESOMEPRAZOLE (NEXIUM) 20 MG CAPSULE    Take 1 capsule (20 mg total) by mouth before breakfast.    FISH OIL-OMEGA-3 FATTY ACIDS 300-1,000 MG CAPSULE    Take 600 mg by mouth once daily.    FLUTICASONE PROPIONATE (FLONASE) 50 MCG/ACTUATION NASAL SPRAY    2 sprays (100 mcg total) by Each Nostril route once daily.    FLUTICASONE-SALMETEROL DISKUS INHALER 250-50 MCG    Inhale 1 puff into the lungs once daily. Controller    PSYLLIUM HUSK 0.4 GRAM CAP    Take 1 capsule by mouth once daily.    RIVASTIGMINE TARTRATE (EXELON) 3 MG CAPSULE    Take 1 capsule (3 mg total) by mouth 2 (two) times daily.    RIVASTIGMINE TARTRATE 4.5 MG CAPSULE    Take 1 capsule (4.5 mg total) by mouth 2 (two) times daily.   Modified Medications    No medications on file   Discontinued Medications    No medications on file

## 2024-04-04 ENCOUNTER — TELEPHONE (OUTPATIENT)
Dept: GASTROENTEROLOGY | Facility: CLINIC | Age: 74
End: 2024-04-04
Payer: MEDICARE

## 2024-04-04 NOTE — TELEPHONE ENCOUNTER
LM to inform pt arrival time of 0700 in SDS. informed pt 1st prep due @5pm. 2nd prep due @0200. instructed pt to be on clear liquids. gave office number for pt to call back to confirm.

## 2024-04-05 ENCOUNTER — PATIENT MESSAGE (OUTPATIENT)
Dept: NEUROLOGY | Facility: CLINIC | Age: 74
End: 2024-04-05
Payer: MEDICARE

## 2024-04-09 RX ORDER — RIVASTIGMINE 4.6 MG/24H
1 PATCH, EXTENDED RELEASE TRANSDERMAL DAILY
Qty: 30 PATCH | Refills: 1 | Status: SHIPPED | OUTPATIENT
Start: 2024-04-09 | End: 2024-05-09

## 2024-04-10 ENCOUNTER — PATIENT MESSAGE (OUTPATIENT)
Dept: NEUROLOGY | Facility: CLINIC | Age: 74
End: 2024-04-10
Payer: MEDICARE

## 2024-04-11 ENCOUNTER — PATIENT MESSAGE (OUTPATIENT)
Dept: FAMILY MEDICINE | Facility: CLINIC | Age: 74
End: 2024-04-11
Payer: MEDICARE

## 2024-04-15 ENCOUNTER — PATIENT MESSAGE (OUTPATIENT)
Dept: GASTROENTEROLOGY | Facility: CLINIC | Age: 74
End: 2024-04-15
Payer: MEDICARE

## 2024-04-18 ENCOUNTER — TELEPHONE (OUTPATIENT)
Dept: GASTROENTEROLOGY | Facility: CLINIC | Age: 74
End: 2024-04-18
Payer: MEDICARE

## 2024-04-18 NOTE — TELEPHONE ENCOUNTER
LM for pt to call office back at [phone] to discuss EGD/colonoscopy results.       ----- Message from Cassie Huber MD sent at 4/11/2024  3:20 PM CDT -----  Celiac (-), EoE (-), benign fundic gland polyp which is not of concern, HP (-), cont PPI, RTC as needed, repeat EGD with dilation if needed.    2 benign adenomas, repeat 3 years.

## 2024-04-19 ENCOUNTER — TELEPHONE (OUTPATIENT)
Dept: GASTROENTEROLOGY | Facility: CLINIC | Age: 74
End: 2024-04-19
Payer: MEDICARE

## 2024-04-19 NOTE — TELEPHONE ENCOUNTER
Please see previous encounter from today.      ----- Message from Radha Lawler sent at 4/19/2024  8:23 AM CDT -----  Type:  Patient Returning Call    Who Called:Pt   Who Left Message for Patient:Kailee   Does the patient know what this is regarding?:  Would the patient rather a call back or a response via [facility]? Call back   Best Call Back Number:[phone]  Additional Information:

## 2024-04-19 NOTE — TELEPHONE ENCOUNTER
Called and spoke with pt. Informed pt of EGD/colonoscopy results. Informed pt Dr Huber can do a repeat EGD with dilation if she needs it. Informed pt to continue taking Nexium. Informed pt repeat colonoscopy in 3 years. Pt voiced understanding.       ----- Message from Cassie Huber MD sent at 4/11/2024  3:20 PM CDT -----  Celiac (-), EoE (-), benign fundic gland polyp which is not of concern, HP (-), cont PPI, RTC as needed, repeat EGD with dilation if needed.    2 benign adenomas, repeat 3 years.

## 2024-05-01 ENCOUNTER — OFFICE VISIT (OUTPATIENT)
Dept: FAMILY MEDICINE | Facility: CLINIC | Age: 74
End: 2024-05-01
Payer: MEDICARE

## 2024-05-01 VITALS
BODY MASS INDEX: 21.82 KG/M2 | OXYGEN SATURATION: 99 % | WEIGHT: 155.88 LBS | HEIGHT: 71 IN | DIASTOLIC BLOOD PRESSURE: 80 MMHG | SYSTOLIC BLOOD PRESSURE: 118 MMHG | HEART RATE: 55 BPM | TEMPERATURE: 98 F

## 2024-05-01 DIAGNOSIS — E86.0 DEHYDRATION: ICD-10-CM

## 2024-05-01 DIAGNOSIS — R41.89 BRAIN FOG: Primary | ICD-10-CM

## 2024-05-01 PROCEDURE — 99999 PR PBB SHADOW E&M-EST. PATIENT-LVL IV: CPT | Mod: PBBFAC,,, | Performed by: STUDENT IN AN ORGANIZED HEALTH CARE EDUCATION/TRAINING PROGRAM

## 2024-05-01 PROCEDURE — 1159F MED LIST DOCD IN RCRD: CPT | Mod: CPTII,S$GLB,, | Performed by: STUDENT IN AN ORGANIZED HEALTH CARE EDUCATION/TRAINING PROGRAM

## 2024-05-01 PROCEDURE — 99215 OFFICE O/P EST HI 40 MIN: CPT | Mod: S$GLB,,, | Performed by: STUDENT IN AN ORGANIZED HEALTH CARE EDUCATION/TRAINING PROGRAM

## 2024-05-01 PROCEDURE — 1160F RVW MEDS BY RX/DR IN RCRD: CPT | Mod: CPTII,S$GLB,, | Performed by: STUDENT IN AN ORGANIZED HEALTH CARE EDUCATION/TRAINING PROGRAM

## 2024-05-01 PROCEDURE — 1101F PT FALLS ASSESS-DOCD LE1/YR: CPT | Mod: CPTII,S$GLB,, | Performed by: STUDENT IN AN ORGANIZED HEALTH CARE EDUCATION/TRAINING PROGRAM

## 2024-05-01 PROCEDURE — 3008F BODY MASS INDEX DOCD: CPT | Mod: CPTII,S$GLB,, | Performed by: STUDENT IN AN ORGANIZED HEALTH CARE EDUCATION/TRAINING PROGRAM

## 2024-05-01 PROCEDURE — 1126F AMNT PAIN NOTED NONE PRSNT: CPT | Mod: CPTII,S$GLB,, | Performed by: STUDENT IN AN ORGANIZED HEALTH CARE EDUCATION/TRAINING PROGRAM

## 2024-05-01 PROCEDURE — 1157F ADVNC CARE PLAN IN RCRD: CPT | Mod: CPTII,S$GLB,, | Performed by: STUDENT IN AN ORGANIZED HEALTH CARE EDUCATION/TRAINING PROGRAM

## 2024-05-01 PROCEDURE — 3079F DIAST BP 80-89 MM HG: CPT | Mod: CPTII,S$GLB,, | Performed by: STUDENT IN AN ORGANIZED HEALTH CARE EDUCATION/TRAINING PROGRAM

## 2024-05-01 PROCEDURE — 3288F FALL RISK ASSESSMENT DOCD: CPT | Mod: CPTII,S$GLB,, | Performed by: STUDENT IN AN ORGANIZED HEALTH CARE EDUCATION/TRAINING PROGRAM

## 2024-05-01 PROCEDURE — 3074F SYST BP LT 130 MM HG: CPT | Mod: CPTII,S$GLB,, | Performed by: STUDENT IN AN ORGANIZED HEALTH CARE EDUCATION/TRAINING PROGRAM

## 2024-05-01 NOTE — PROGRESS NOTES
Subjective:      Patient ID: Sunni Lowe is a 73 y.o. female.    Chief Complaint: Follow-up (Pt here to follow up and to see if she is Hypoglycemia are not/)    F/u for weakness, dizziness  Has been worried about sugars so checking sugars when feels foggy; has all be running normal   She has been feeling much better and not having the symptoms any longer  If comes back did want to check labs also   Does have routine labs with annual scheduled in next few months      Follow-up      Review of Systems   All other systems reviewed and are negative.       Objective:     Vitals:    05/01/24 1359   BP: 118/80   Pulse: (!) 55   Temp: 97.5 °F (36.4 °C)      Physical Exam  Vitals reviewed.   Constitutional:       Appearance: Normal appearance. She is normal weight.   HENT:      Head: Normocephalic and atraumatic.   Eyes:      Conjunctiva/sclera: Conjunctivae normal.   Cardiovascular:      Rate and Rhythm: Normal rate and regular rhythm.      Heart sounds: Normal heart sounds.   Pulmonary:      Effort: Pulmonary effort is normal.      Breath sounds: Normal breath sounds.   Abdominal:      Palpations: Abdomen is soft.      Tenderness: There is no abdominal tenderness.   Musculoskeletal:         General: Normal range of motion.      Cervical back: Normal range of motion.      Right lower leg: No edema.      Left lower leg: No edema.   Neurological:      Mental Status: She is alert. Mental status is at baseline.   Psychiatric:         Mood and Affect: Mood normal.         Behavior: Behavior normal.        Assessment:         1. Brain fog    2. Dehydration          Plan:   1. Brain fog    2. Dehydration       Symptoms have resolved   If returns will check labs   RTC as scheduled       [facility]   5/1/24     I spent a total of >40 minutes on the day of the visit.This includes face to face time and non-face to face time preparing to see the patient (eg, review of tests), obtaining and/or  reviewing separately obtained history, documenting clinical information in the electronic or other health record, independently interpreting results and communicating results to the patient/family/caregiver, or care coordinator.

## 2024-05-09 ENCOUNTER — PATIENT MESSAGE (OUTPATIENT)
Dept: NEUROLOGY | Facility: CLINIC | Age: 74
End: 2024-05-09
Payer: MEDICARE

## 2024-05-09 RX ORDER — RIVASTIGMINE TARTRATE 4.5 MG/1
4.5 CAPSULE ORAL 2 TIMES DAILY
Qty: 60 CAPSULE | Refills: 5 | Status: SHIPPED | OUTPATIENT
Start: 2024-05-09 | End: 2024-05-20 | Stop reason: SINTOL

## 2024-05-10 ENCOUNTER — TELEPHONE (OUTPATIENT)
Dept: NEUROLOGY | Facility: CLINIC | Age: 74
End: 2024-05-10
Payer: MEDICARE

## 2024-05-17 ENCOUNTER — PATIENT MESSAGE (OUTPATIENT)
Dept: FAMILY MEDICINE | Facility: CLINIC | Age: 74
End: 2024-05-17
Payer: MEDICARE

## 2024-05-17 DIAGNOSIS — F41.9 ANXIETY DISORDER, UNSPECIFIED: ICD-10-CM

## 2024-05-17 DIAGNOSIS — R42 DIZZINESS: Primary | ICD-10-CM

## 2024-05-18 ENCOUNTER — NURSE TRIAGE (OUTPATIENT)
Dept: ADMINISTRATIVE | Facility: CLINIC | Age: 74
End: 2024-05-18
Payer: MEDICARE

## 2024-05-18 NOTE — TELEPHONE ENCOUNTER
Patient states she is calling back because whe was not able to hear triage nurse, Candice at the end of the call. She just wanted to let her know that she will go to the ER. No other needs voiced at this time. Please contact caller directly to discuss any further care advice.  Reason for Disposition   Caller has already spoken with another triager and has no further questions.    Protocols used: No Contact or Duplicate Contact Call-A-

## 2024-05-18 NOTE — TELEPHONE ENCOUNTER
Pt reports was switched from a medication patch to the pill form of rivastigmine tartrate that is causing her to be very sick N/V/D and unable to sleep, wanting to switch back to the patch as she did not feel like this when on it, but did not know if she can stop the medication abruptly or not. Call placed to [facility], Dr. Marsh, who advised that pt may stop the medication and reach out to Dr. Flynn on Monday for a prescription change. Pt was then triaged for her vomiting and advised to go to the ED now. Pt verbalized understanding, and as we were wrapping up the call, we lost connection. Attempt made to re contact pt and a VM message was left if further assistance was needed for pt to call back.      Reason for Disposition   [1] Caller has URGENT medicine question about med that PCP or specialist prescribed AND [2] triager unable to answer question   [1] MODERATE vomiting (e.g., 3 - 5 times/day) AND [2] age > 60 years    Additional Information   Negative: [1] Intentional drug overdose AND [2] suicidal thoughts or ideas   Negative: MORE THAN A DOUBLE DOSE of a prescription or over-the-counter (OTC) drug   Negative: [1] DOUBLE DOSE (an extra dose or lesser amount) of prescription drug AND [2] any symptoms (e.g., dizziness, nausea, pain, sleepiness)   Negative: [1] DOUBLE DOSE (an extra dose or lesser amount) of over-the-counter (OTC) drug AND [2] any symptoms (e.g., dizziness, nausea, pain, sleepiness)   Negative: Took another person's prescription drug   Negative: [1] DOUBLE DOSE (an extra dose or lesser amount) of prescription drug AND [2] NO symptoms  (Exception: A double dose of antibiotics.)   Negative: Diabetes drug error or overdose (e.g., took wrong type of insulin or took extra dose)   Negative: [1] Prescription not at pharmacy AND [2] was prescribed by PCP recently (Exception: Triager has access to EMR and prescription is recorded there. Go to Home Care and confirm for pharmacy.)   Negative: [1]  "Pharmacy calling with prescription question AND [2] triager unable to answer question   Negative: Shock suspected (e.g., cold/pale/clammy skin, too weak to stand, low BP, rapid pulse)   Negative: Difficult to awaken or acting confused (e.g., disoriented, slurred speech)   Negative: Sounds like a life-threatening emergency to the triager   Negative: [1] Vomiting AND [2] contains red blood or black ("coffee ground") material  (Exception: Few red streaks in vomit that only happened once.)   Negative: Severe pain in one eye   Negative: Recent head injury (within last 3 days)   Negative: Recent abdominal injury (within last 3 days)   Negative: [1] Insulin-dependent diabetes (Type I) AND [2] glucose > 400 mg/dl (22 mmol/l)   Negative: [1] Vomiting AND [2] hernia is more painful or swollen than usual   Negative: [1] SEVERE vomiting (e.g., 6 or more times/day) AND [2] present > 8 hours (Exception: Patient sounds well, is drinking liquids, does not sound dehydrated, and vomiting has lasted less than 24 hours.)    Protocols used: Medication Question Call-A-AH, Vomiting-A-AH    "

## 2024-05-20 ENCOUNTER — PATIENT MESSAGE (OUTPATIENT)
Dept: NEUROLOGY | Facility: CLINIC | Age: 74
End: 2024-05-20
Payer: MEDICARE

## 2024-05-20 RX ORDER — RIVASTIGMINE 4.6 MG/24H
1 PATCH, EXTENDED RELEASE TRANSDERMAL DAILY
Qty: 30 PATCH | Refills: 3 | Status: SHIPPED | OUTPATIENT
Start: 2024-05-20 | End: 2024-06-10 | Stop reason: SDUPTHER

## 2024-06-10 ENCOUNTER — PATIENT MESSAGE (OUTPATIENT)
Dept: NEUROLOGY | Facility: CLINIC | Age: 74
End: 2024-06-10
Payer: MEDICARE

## 2024-06-10 RX ORDER — RIVASTIGMINE 4.6 MG/24H
1 PATCH, EXTENDED RELEASE TRANSDERMAL DAILY
Qty: 30 PATCH | Refills: 3 | Status: SHIPPED | OUTPATIENT
Start: 2024-06-10 | End: 2024-10-08

## 2024-06-10 RX ORDER — FLUTICASONE PROPIONATE AND SALMETEROL 250; 50 UG/1; UG/1
1 POWDER RESPIRATORY (INHALATION) DAILY
Qty: 120 EACH | Refills: 1 | Status: SHIPPED | OUTPATIENT
Start: 2024-06-10

## 2024-06-10 NOTE — TELEPHONE ENCOUNTER
No care due was identified.  [facility] Embedded Care Due Messages. Reference number: [id number].   6/10/2024 4:23:23 PM CDT

## 2024-06-11 ENCOUNTER — OFFICE VISIT (OUTPATIENT)
Dept: CARDIOLOGY | Facility: CLINIC | Age: 74
End: 2024-06-11
Payer: MEDICARE

## 2024-06-11 VITALS
HEIGHT: 71 IN | BODY MASS INDEX: 21.81 KG/M2 | DIASTOLIC BLOOD PRESSURE: 75 MMHG | OXYGEN SATURATION: 98 % | HEART RATE: 68 BPM | SYSTOLIC BLOOD PRESSURE: 95 MMHG | WEIGHT: 155.75 LBS

## 2024-06-11 DIAGNOSIS — R42 DIZZINESS: ICD-10-CM

## 2024-06-11 PROCEDURE — 3008F BODY MASS INDEX DOCD: CPT | Mod: CPTII,S$GLB,, | Performed by: INTERNAL MEDICINE

## 2024-06-11 PROCEDURE — 3078F DIAST BP <80 MM HG: CPT | Mod: CPTII,S$GLB,, | Performed by: INTERNAL MEDICINE

## 2024-06-11 PROCEDURE — 1157F ADVNC CARE PLAN IN RCRD: CPT | Mod: CPTII,S$GLB,, | Performed by: INTERNAL MEDICINE

## 2024-06-11 PROCEDURE — 99999 PR PBB SHADOW E&M-EST. PATIENT-LVL III: CPT | Mod: PBBFAC,,, | Performed by: INTERNAL MEDICINE

## 2024-06-11 PROCEDURE — 3288F FALL RISK ASSESSMENT DOCD: CPT | Mod: CPTII,S$GLB,, | Performed by: INTERNAL MEDICINE

## 2024-06-11 PROCEDURE — 3074F SYST BP LT 130 MM HG: CPT | Mod: CPTII,S$GLB,, | Performed by: INTERNAL MEDICINE

## 2024-06-11 PROCEDURE — 1126F AMNT PAIN NOTED NONE PRSNT: CPT | Mod: CPTII,S$GLB,, | Performed by: INTERNAL MEDICINE

## 2024-06-11 PROCEDURE — 99204 OFFICE O/P NEW MOD 45 MIN: CPT | Mod: S$GLB,,, | Performed by: INTERNAL MEDICINE

## 2024-06-11 PROCEDURE — 1159F MED LIST DOCD IN RCRD: CPT | Mod: CPTII,S$GLB,, | Performed by: INTERNAL MEDICINE

## 2024-06-11 PROCEDURE — 1101F PT FALLS ASSESS-DOCD LE1/YR: CPT | Mod: CPTII,S$GLB,, | Performed by: INTERNAL MEDICINE

## 2024-06-11 PROCEDURE — 1160F RVW MEDS BY RX/DR IN RCRD: CPT | Mod: CPTII,S$GLB,, | Performed by: INTERNAL MEDICINE

## 2024-06-11 NOTE — PROGRESS NOTES
[facility] Cardiology     Subjective:    Patient ID:  Sunni Lowe is a 73 y.o. female who presents for evaluation of Dizziness    Review of patient's allergies indicates:  No Known Allergies   She started rivastigmine therapy for memory issues by her neurologist recently.  She was in the emergency room May 18th for dizziness.  Ultimately the medication was converted to topical administration with the patch.  Her dizziness is much better.    She has never seen a cardiologist.  Since starting the patch her dizziness has improved significantly.  She lives with her .  She denies palpitations.  She had an Electrocardiogram in March of this year and she was told that it was okay.  Her Electrocardiogram in 2019 did not show arrhythmias.  She denies palpitations.  There is no history of hypertension.  She has never had syncope.        Review of Systems   Constitutional: Negative for chills, decreased appetite, diaphoresis, fever, malaise/fatigue, night sweats, weight gain and weight loss.   HENT:  Positive for hearing loss. Negative for congestion, ear discharge, ear pain, hoarse voice, nosebleeds, odynophagia, sore throat, stridor and tinnitus.    Eyes:  Negative for blurred vision, discharge, double vision, pain, photophobia, redness, vision loss in left eye, vision loss in right eye, visual disturbance and visual halos.   Cardiovascular:  Negative for chest pain, claudication, cyanosis, dyspnea on exertion, irregular heartbeat, leg swelling, near-syncope, orthopnea, palpitations, paroxysmal nocturnal dyspnea and syncope.   Respiratory:  Negative for cough, hemoptysis, shortness of breath, sleep disturbances due to breathing, snoring, sputum production and wheezing.    Endocrine: Negative for cold intolerance, heat intolerance, polydipsia, polyphagia and polyuria.   Hematologic/Lymphatic: Negative for adenopathy and bleeding problem. Does  "not bruise/bleed easily.   Skin:  Negative for color change, dry skin, flushing, itching, nail changes, poor wound healing, rash, skin cancer, suspicious lesions and unusual hair distribution.   Musculoskeletal:  Negative for arthritis, back pain, falls, gout, joint pain, joint swelling, muscle cramps, muscle weakness, myalgias, neck pain and stiffness.   Gastrointestinal:  Negative for bloating, abdominal pain, anorexia, change in bowel habit, bowel incontinence, constipation, diarrhea, dysphagia, excessive appetite, flatus, heartburn, hematemesis, hematochezia, hemorrhoids, jaundice, melena, nausea and vomiting.   Genitourinary:  Negative for bladder incontinence, decreased libido, dysuria, flank pain, frequency, genital sores, hematuria, hesitancy, incomplete emptying, nocturia and urgency.   Neurological:  Positive for dizziness. Negative for aphonia, brief paralysis, difficulty with concentration, disturbances in coordination, excessive daytime sleepiness, focal weakness, headaches, light-headedness, loss of balance, numbness, paresthesias, seizures, sensory change, tremors, vertigo and weakness.   Psychiatric/Behavioral:  Positive for memory loss. Negative for altered mental status, depression, hallucinations, substance abuse, suicidal ideas and thoughts of violence. The patient does not have insomnia and is not nervous/anxious.    Allergic/Immunologic: Negative for hives and persistent infections.        Objective:       Vitals:    06/11/24 1506   BP: 95/75   Pulse: 68   SpO2: 98%   Weight: 70.7 kg (155 lb 12.1 oz)   Height: 5' 11" (1.803 m)    Physical Exam  Constitutional:       General: She is not in acute distress.     Appearance: She is well-developed. She is not diaphoretic.   HENT:      Head: Normocephalic and atraumatic.      Nose: Nose normal.   Eyes:      General: No scleral icterus.        Right eye: No discharge.      Conjunctiva/sclera: Conjunctivae normal.      Pupils: Pupils are equal, round, " and reactive to light.   Neck:      Thyroid: No thyromegaly.      Vascular: No JVD.      Trachea: No tracheal deviation.   Cardiovascular:      Rate and Rhythm: Normal rate and regular rhythm.      Pulses:           Carotid pulses are 2+ on the right side and 2+ on the left side.       Radial pulses are 2+ on the left side.        Dorsalis pedis pulses are 2+ on the right side and 2+ on the left side.        Posterior tibial pulses are 2+ on the right side and 2+ on the left side.      Heart sounds: Normal heart sounds. No murmur heard.     No friction rub. No gallop.   Pulmonary:      Effort: Pulmonary effort is normal. No respiratory distress.      Breath sounds: Normal breath sounds. No stridor. No wheezing or rales.   Chest:      Chest wall: No tenderness.   Abdominal:      General: Bowel sounds are normal. There is no distension.      Palpations: Abdomen is soft. There is no mass.      Tenderness: There is no abdominal tenderness. There is no guarding or rebound.   Musculoskeletal:         General: No tenderness. Normal range of motion.      Cervical back: Normal range of motion and neck supple.   Lymphadenopathy:      Cervical: No cervical adenopathy.   Skin:     General: Skin is warm and dry.      Coloration: Skin is not pale.      Findings: No erythema or rash.   Neurological:      Mental Status: She is alert and oriented to person, place, and time.      Cranial Nerves: No cranial nerve deficit.      Coordination: Coordination normal.   Psychiatric:         Behavior: Behavior normal.         Thought Content: Thought content normal.         Judgment: Judgment normal.           Assessment:       1. Dizziness      Results for orders placed or performed during the hospital encounter of 05/18/24   CBC Auto Differential   Result Value Ref Range    WBC 7.32 3.90 - 12.70 K/uL    RBC 5.06 4.00 - 5.40 M/uL    Hemoglobin 14.7 12.0 - 16.0 g/dL    Hematocrit 44.1 37.0 - 48.5 %    MCV 87 82 - 98 fL    MCH 29.1 27.0 - 31.0  pg    MCHC 33.3 32.0 - 36.0 g/dL    RDW 12.5 11.5 - 14.5 %    Platelets 186 150 - 450 K/uL    MPV 10.1 9.2 - 12.9 fL    Immature Granulocytes 0.3 0.0 - 0.5 %    Gran # (ANC) 6.4 1.8 - 7.7 K/uL    Immature Grans (Abs) 0.02 0.00 - 0.04 K/uL    Lymph # 0.6 (L) 1.0 - 4.8 K/uL    Mono # 0.3 0.3 - 1.0 K/uL    Eos # 0.1 0.0 - 0.5 K/uL    Baso # 0.03 0.00 - 0.20 K/uL    nRBC 0 0 /100 WBC    Gran % 86.9 (H) 38.0 - 73.0 %    Lymph % 8.6 (L) 18.0 - 48.0 %    Mono % 3.4 (L) 4.0 - 15.0 %    Eosinophil % 0.7 0.0 - 8.0 %    Basophil % 0.4 0.0 - 1.9 %    Differential Method Automated    Basic Metabolic Panel   Result Value Ref Range    Sodium 139 136 - 145 mmol/L    Potassium 3.9 3.5 - 5.1 mmol/L    Chloride 101 95 - 110 mmol/L    CO2 27 23 - 29 mmol/L    Glucose 129 (H) 70 - 110 mg/dL    BUN 13 8 - 23 mg/dL    Creatinine 1.1 0.5 - 1.4 mg/dL    Calcium 9.9 8.7 - 10.5 mg/dL    Anion Gap 11 8 - 16 mmol/L    eGFR 53.7 (A) >60 mL/min/1.73 m^2         Current Outpatient Medications:     azelastine (ASTELIN) 137 mcg (0.1 %) nasal spray, Use 1 spray (137 mcg total) in each nostril 2 (two) times daily., Disp: 90 mL, Rfl: 3    calcium carbonate (OS-ISABELLE) 600 mg (1,500 mg) Tab, Take 1,200 mg by mouth once daily., Disp: , Rfl:     cholecalciferol, vitamin D3, (VITAMIN D3) 10,000 unit Cap, Take 10,000 Units by mouth once daily. , Disp: , Rfl:     collagen, bovine, 100 % Powd, Take 1 Scoop by mouth Daily. Puts 1 scoop in coffee in am daily, Disp: , Rfl:     CYANOCOBALAMIN, VITAMIN B-12, (VITAMIN B-12 ORAL), Take by mouth., Disp: , Rfl:     ELDERBERRY FRUIT ORAL, Take 1 tablet by mouth every other day., Disp: , Rfl:     esomeprazole (NEXIUM) 20 MG capsule, Take 1 capsule (20 mg total) by mouth before breakfast., Disp: 90 capsule, Rfl: 3    fish oil-omega-3 fatty acids 300-1,000 mg capsule, Take 600 mg by mouth once daily., Disp: , Rfl:     fluticasone propionate (FLONASE) 50 mcg/actuation nasal spray, 2 sprays (100 mcg total) by Each Nostril  "route once daily., Disp: 16 g, Rfl: 1    fluticasone-salmeterol diskus inhaler 250-50 mcg, Inhale 1 puff into the lungs once daily. Controller, Disp: 120 each, Rfl: 1    psyllium husk 0.4 gram Cap, Take 1 capsule by mouth once daily., Disp: , Rfl:     rivastigmine (EXELON) 4.6 mg/24 hour PT24, Place 1 patch onto the skin once daily., Disp: 30 patch, Rfl: 3     Lab Results   Component Value Date    WBC 7.32 05/18/2024    RBC 5.06 05/18/2024    HGB 14.7 05/18/2024    HCT 44.1 05/18/2024    MCV 87 05/18/2024    MCH 29.1 05/18/2024    MCHC 33.3 05/18/2024    RDW 12.5 05/18/2024     05/18/2024    MPV 10.1 05/18/2024    GRAN 6.4 05/18/2024    GRAN 86.9 (H) 05/18/2024    LYMPH 0.6 (L) 05/18/2024    LYMPH 8.6 (L) 05/18/2024    MONO 0.3 05/18/2024    MONO 3.4 (L) 05/18/2024    EOS 0.1 05/18/2024    BASO 0.03 05/18/2024    EOSINOPHIL 0.7 05/18/2024    BASOPHIL 0.4 05/18/2024    MG 2.1 02/14/2022        CMP  Lab Results   Component Value Date     05/18/2024    K 3.9 05/18/2024     05/18/2024    CO2 27 05/18/2024     (H) 05/18/2024    BUN 13 05/18/2024    CREATININE 1.1 05/18/2024    CALCIUM 9.9 05/18/2024    PROT 7.2 01/30/2024    ALBUMIN 4.4 01/30/2024    BILITOT 1.1 (H) 01/30/2024    ALKPHOS 53 (L) 01/30/2024    AST 24 01/30/2024    ALT 19 01/30/2024    ANIONGAP 11 05/18/2024    ESTGFRAFRICA 75 02/14/2022    EGFRNONAA 64 02/14/2022        No results found for: "LABBLOO", "LABURIN", "RESPIRATORYC", "GSRESP"         Results for orders placed or performed in visit on 11/29/19   IN OFFICE EKG 12-LEAD (to [place])    Collection Time: 11/29/19  4:04 PM    Narrative    Test Reason : R00.2,    Vent. Rate : 065 BPM     Atrial Rate : 065 BPM     P-R Int : 150 ms          QRS Dur : 074 ms      QT Int : 412 ms       P-R-T Axes : 065 018 040 degrees     QTc Int : 428 ms    Normal sinus rhythm  Normal ECG    No previous ECGs available  Confirmed by Jadiel Lara MD ([id number]) on 11/29/2019 11:23:42 PM    Referred By: "             Confirmed By:Jadiel Lara MD        MRI Brain Without Contrast 02/09/2024 [id number] Final   Mammo Digital Screening Bilat w/ Yovani 09/18/2023 [id number] Final   X-ray Knee Ortho Bilateral with Flexion 09/05/2023 [id number] Final            Plan:       Problem List Items Addressed This Visit          Other    Dizziness     Symptoms developed after starting rivastigmine.  Hypotension and bradycardia are known to be associated with this medication particularly oral dosing.    Her symptoms have essentially resolved and she is now on the patch.  There was mild bradycardia noted on May 1st 2024 HR 55 BPM.     I have no objections to continuing this medication long-term.         Relevant Orders    IN OFFICE EKG 12-LEAD (to [place])          Essentially her dizziness has resolved after converting to topical patches off medications.  Obviously there is no way to be sure that the pill was the only trigger for dizziness but given improvement currently I do not advise a workup to be initiated.    Please refer her back if there is recurrent dizziness.    Thank you for allowing me to participate in your patient's care.              Stoney Wise MD  06/12/2024   3:53 PM

## 2024-06-12 PROBLEM — R42 DIZZINESS: Status: ACTIVE | Noted: 2024-06-12

## 2024-06-12 NOTE — ASSESSMENT & PLAN NOTE
Symptoms developed after starting rivastigmine.  Hypotension and bradycardia are known to be associated with this medication particularly oral dosing.    Her symptoms have essentially resolved and she is now on the patch.  There was mild bradycardia noted on May 1st 2024 HR 55 BPM.     I have no objections to continuing this medication long-term.

## 2024-07-15 ENCOUNTER — PATIENT MESSAGE (OUTPATIENT)
Dept: OBSTETRICS AND GYNECOLOGY | Facility: CLINIC | Age: 74
End: 2024-07-15
Payer: MEDICARE

## 2024-07-15 DIAGNOSIS — Z12.31 ENCOUNTER FOR SCREENING MAMMOGRAM FOR BREAST CANCER: Primary | ICD-10-CM

## 2024-07-30 ENCOUNTER — OFFICE VISIT (OUTPATIENT)
Dept: NEUROLOGY | Facility: CLINIC | Age: 74
End: 2024-07-30
Payer: MEDICARE

## 2024-07-30 VITALS
BODY MASS INDEX: 21.6 KG/M2 | SYSTOLIC BLOOD PRESSURE: 118 MMHG | HEIGHT: 71 IN | WEIGHT: 154.31 LBS | HEART RATE: 69 BPM | DIASTOLIC BLOOD PRESSURE: 78 MMHG

## 2024-07-30 DIAGNOSIS — G31.84 MCI (MILD COGNITIVE IMPAIRMENT): Primary | ICD-10-CM

## 2024-07-30 PROCEDURE — 1159F MED LIST DOCD IN RCRD: CPT | Mod: CPTII,S$GLB,, | Performed by: PSYCHIATRY & NEUROLOGY

## 2024-07-30 PROCEDURE — 1101F PT FALLS ASSESS-DOCD LE1/YR: CPT | Mod: CPTII,S$GLB,, | Performed by: PSYCHIATRY & NEUROLOGY

## 2024-07-30 PROCEDURE — 1157F ADVNC CARE PLAN IN RCRD: CPT | Mod: CPTII,S$GLB,, | Performed by: PSYCHIATRY & NEUROLOGY

## 2024-07-30 PROCEDURE — 3008F BODY MASS INDEX DOCD: CPT | Mod: CPTII,S$GLB,, | Performed by: PSYCHIATRY & NEUROLOGY

## 2024-07-30 PROCEDURE — 3074F SYST BP LT 130 MM HG: CPT | Mod: CPTII,S$GLB,, | Performed by: PSYCHIATRY & NEUROLOGY

## 2024-07-30 PROCEDURE — 3288F FALL RISK ASSESSMENT DOCD: CPT | Mod: CPTII,S$GLB,, | Performed by: PSYCHIATRY & NEUROLOGY

## 2024-07-30 PROCEDURE — 99999 PR PBB SHADOW E&M-EST. PATIENT-LVL III: CPT | Mod: PBBFAC,,, | Performed by: PSYCHIATRY & NEUROLOGY

## 2024-07-30 PROCEDURE — 3078F DIAST BP <80 MM HG: CPT | Mod: CPTII,S$GLB,, | Performed by: PSYCHIATRY & NEUROLOGY

## 2024-07-30 PROCEDURE — 99214 OFFICE O/P EST MOD 30 MIN: CPT | Mod: S$GLB,,, | Performed by: PSYCHIATRY & NEUROLOGY

## 2024-07-30 PROCEDURE — 1126F AMNT PAIN NOTED NONE PRSNT: CPT | Mod: CPTII,S$GLB,, | Performed by: PSYCHIATRY & NEUROLOGY

## 2024-07-30 RX ORDER — RIVASTIGMINE 9.5 MG/24H
1 PATCH, EXTENDED RELEASE TRANSDERMAL DAILY
Qty: 30 PATCH | Refills: 5 | Status: SHIPPED | OUTPATIENT
Start: 2024-07-30 | End: 2025-01-26

## 2024-07-30 NOTE — PROGRESS NOTES
Subjective:       Patient ID: Sunni Lowe is a 73 y.o. female.    Chief Complaint: No chief complaint on file.      Mrs Lowe presents for her 1st follow-up and is here with her daughter who is a clinical pharmacist.    MRI brain as noted below.      P tau elevated at 1.87     Patient is tolerating oral rivastigmine, with no side effects.      For the time being she the visual hallucinations seemed to have resolved.      Regarding memory both the patient and her daughter feel that she is stable.  For the most part she functions quite well.  She tends to get the details of conversations but not the entire conversation.  She will get the gist of it.    Trouble with word-finding sometimes has to describe the term she is thinking of or use a synonym.        MRI Brain Without Contrast    Narrative & Impression  EXAMINATION:  MRI BRAIN WITHOUT CONTRAST     CLINICAL HISTORY:  Memory loss; Other amnesia     TECHNIQUE:  Multiplanar multisequence MR imaging of the brain was performed without contrast.     COMPARISON:  Head CT 01/13/2017     FINDINGS:  There is no evidence of advanced volume loss of the parenchymal or hippocampal formations.     No hydrocephalus, mass effect, intracranial hemorrhage or acute infarct.     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  Few punctate foci of chronic microhemorrhage scattered within the cortex/subcortical white matter may represent mild amyloid angiopathy.  Few scattered white matter lesions nonspecific but may reflect mild chronic small vessel ischemic change.     Normal arterial flow voids are preserved at the skull base.     The visualized sinuses and mastoid air cells are clear.     Impression:     No acute intracranial process.  No advanced volume loss.  Chronic ischemic changes as detailed above with potentially mild amyloid angiopathy with few scattered areas of chronic microhemorrhage.        Electronically signed by:William  Terrell  Date:                                            02/09/2024  Time:                                           14:16    Exam Ended: 02/09/24 10:51 CST              Past Medical History:   Diagnosis Date    Allergic rhinitis     Asthma     Fibrocystic breast     Osteopenia     Varicose veins     sees Dr. Brigido Quinones      Past Surgical History:   Procedure Laterality Date    BREAST BIOPSY Left     COLONOSCOPY  08/01/2014    Dr. Canas - repeat in 5 to 10 years    COLONOSCOPY N/A 4/5/2024    Procedure: COLONOSCOPY;  Surgeon: Cassie Huber MD;  Location: [facility];  Service: Endoscopy;  Laterality: N/A;    ESOPHAGOGASTRODUODENOSCOPY N/A 10/19/2018    Procedure: EGD (ESOPHAGOGASTRODUODENOSCOPY);  Surgeon: Cassie Huber MD;  Location: [facility];  Service: Endoscopy;  Laterality: N/A;    ESOPHAGOGASTRODUODENOSCOPY N/A 4/5/2024    Procedure: EGD (ESOPHAGOGASTRODUODENOSCOPY);  Surgeon: Cassie Huber MD;  Location: [facility];  Service: Endoscopy;  Laterality: N/A;    KNEE SURGERY Bilateral     meniscus repair    peniculitis surgery      x 3        Current Outpatient Medications:     azelastine (ASTELIN) 137 mcg (0.1 %) nasal spray, Use 1 spray (137 mcg total) in each nostril 2 (two) times daily., Disp: 90 mL, Rfl: 3    calcium carbonate (OS-ISABELLE) 600 mg (1,500 mg) Tab, Take 1,200 mg by mouth once daily., Disp: , Rfl:     cholecalciferol, vitamin D3, (VITAMIN D3) 10,000 unit Cap, Take 10,000 Units by mouth once daily. , Disp: , Rfl:     collagen, bovine, 100 % Powd, Take 1 Scoop by mouth Daily. Puts 1 scoop in coffee in am daily, Disp: , Rfl:     CYANOCOBALAMIN, VITAMIN B-12, (VITAMIN B-12 ORAL), Take by mouth., Disp: , Rfl:     ELDERBERRY FRUIT ORAL, Take 1 tablet by mouth every other day., Disp: , Rfl:     esomeprazole (NEXIUM) 20 MG capsule, Take 1 capsule (20 mg total) by mouth before breakfast., Disp: 90 capsule, Rfl: 3    fish oil-omega-3 fatty acids 300-1,000 mg capsule, Take 600 mg by  mouth once daily., Disp: , Rfl:     fluticasone propionate (FLONASE) 50 mcg/actuation nasal spray, 2 sprays (100 mcg total) by Each Nostril route once daily., Disp: 16 g, Rfl: 1    fluticasone-salmeterol diskus inhaler 250-50 mcg, Inhale 1 puff into the lungs once daily. Controller, Disp: 120 each, Rfl: 1    psyllium husk 0.4 gram Cap, Take 1 capsule by mouth once daily., Disp: , Rfl:     rivastigmine (EXELON PATCH) 9.5 mg/24 hour PT24, Place 1 patch onto the skin once daily., Disp: 30 patch, Rfl: 5   Review of patient's allergies indicates:  No Known Allergies     Review of Systems   HENT:  Positive for hearing loss (profound hearing loss Lt ear, ears a hearing aid).    Psychiatric/Behavioral:  Negative for hallucinations (not since last visit).            Objective:      Physical Exam  Neurological:      Mental Status: She is alert. Mental status is at baseline.           Assessment:       1. 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  Remains independent in her ADLs.  Level of function is high.    MoCA 24/30 2019, 21/30 1-2024  P tau 181 elevated at 1.87  Neuropsych test set for 8-20th  Discussed the option of an anti amyloid monoclonal antibody with the patient again (i.e. if she were to have a positive amyloid PET scan).      She does have a couple of microhemorrhages on MRI imaging as well as a couple of small infarctions (no clinical event that she is aware of) and thus will touch base with Dr. Elinor soriano to ask if she would definitely be excluded as a candidate for lecanemab or donanemab.  If the answer is yes she is unsure if she wants to proceed with the amyloid PET whereas if the answer is no then she absolutely will proceed as she does want to be considered for the treatment.                    Emma Flynn MD   07/30/2024   11:58 AM

## 2024-08-04 ENCOUNTER — E-CONSULT (OUTPATIENT)
Dept: NEUROLOGY | Facility: HOSPITAL | Age: 74
End: 2024-08-04
Payer: MEDICARE

## 2024-08-04 DIAGNOSIS — G31.84 MCI (MILD COGNITIVE IMPAIRMENT): ICD-10-CM

## 2024-08-04 DIAGNOSIS — G30.1 MILD LATE ONSET ALZHEIMER'S DEMENTIA WITHOUT BEHAVIORAL DISTURBANCE, PSYCHOTIC DISTURBANCE, MOOD DISTURBANCE, OR ANXIETY: ICD-10-CM

## 2024-08-04 DIAGNOSIS — I61.9 CEREBRAL HEMORRHAGE: Primary | ICD-10-CM

## 2024-08-04 DIAGNOSIS — F02.A0 MILD LATE ONSET ALZHEIMER'S DEMENTIA WITHOUT BEHAVIORAL DISTURBANCE, PSYCHOTIC DISTURBANCE, MOOD DISTURBANCE, OR ANXIETY: ICD-10-CM

## 2024-08-04 PROCEDURE — 99452 NTRPROF PH1/NTRNET/EHR RFRL: CPT | Mod: ,,, | Performed by: PSYCHIATRY & NEUROLOGY

## 2024-08-05 ENCOUNTER — TELEPHONE (OUTPATIENT)
Dept: NEUROLOGY | Facility: CLINIC | Age: 74
End: 2024-08-05
Payer: MEDICARE

## 2024-08-07 ENCOUNTER — PATIENT MESSAGE (OUTPATIENT)
Dept: NEUROLOGY | Facility: CLINIC | Age: 74
End: 2024-08-07
Payer: MEDICARE

## 2024-08-09 ENCOUNTER — PATIENT MESSAGE (OUTPATIENT)
Dept: FAMILY MEDICINE | Facility: CLINIC | Age: 74
End: 2024-08-09
Payer: MEDICARE

## 2024-08-11 ENCOUNTER — OFFICE VISIT (OUTPATIENT)
Dept: URGENT CARE | Facility: CLINIC | Age: 74
End: 2024-08-11
Payer: MEDICARE

## 2024-08-11 VITALS
BODY MASS INDEX: 22.13 KG/M2 | SYSTOLIC BLOOD PRESSURE: 118 MMHG | RESPIRATION RATE: 17 BRPM | WEIGHT: 154.56 LBS | HEIGHT: 70 IN | HEART RATE: 92 BPM | TEMPERATURE: 99 F | OXYGEN SATURATION: 97 % | DIASTOLIC BLOOD PRESSURE: 82 MMHG

## 2024-08-11 DIAGNOSIS — U07.1 COVID: Primary | ICD-10-CM

## 2024-08-11 DIAGNOSIS — R52 BODY ACHES: ICD-10-CM

## 2024-08-11 DIAGNOSIS — U07.1 COVID-19 VIRUS DETECTED: ICD-10-CM

## 2024-08-11 LAB
CTP QC/QA: YES
SARS-COV-2 AG RESP QL IA.RAPID: POSITIVE

## 2024-08-11 PROCEDURE — 99213 OFFICE O/P EST LOW 20 MIN: CPT | Mod: ,,, | Performed by: PHYSICIAN ASSISTANT

## 2024-08-11 PROCEDURE — 87811 SARS-COV-2 COVID19 W/OPTIC: CPT | Mod: QW,,, | Performed by: PHYSICIAN ASSISTANT

## 2024-08-11 NOTE — PROGRESS NOTES
"Subjective:      Patient ID: Sunni Lowe is a 73 y.o. female.    Vitals:  height is 5' 10" (1.778 m) and weight is 70.1 kg (154 lb 8.7 oz). Her oral temperature is 98.8 °F (37.1 °C). Her blood pressure is 118/82 and her pulse is 92. Her respiration is 17 and oxygen saturation is 97%.     Chief Complaint: Nasal Congestion    Pt states her symptoms started Friday. She began with runny nose on Friday. Saturday began with body aches and headaches. She reports mild improvement today.      Other  This is a new problem. Episode onset: 3 days. The problem occurs constantly. The problem has been unchanged. Associated symptoms include congestion, coughing and headaches. Pertinent negatives include no sore throat. Associated symptoms comments: Body aches. Nothing aggravates the symptoms. Treatments tried: theraflu. The treatment provided mild relief.       HENT:  Positive for congestion. Negative for sore throat.    Respiratory:  Positive for cough.    Neurological:  Positive for headaches.      Objective:     Physical Exam   Constitutional: She is oriented to person, place, and time. She appears well-developed. She is cooperative.  Non-toxic appearance. She does not appear ill. No distress.   HENT:   Head: Normocephalic and atraumatic.   Ears:   Right Ear: Hearing, tympanic membrane, external ear and ear canal normal.   Left Ear: Hearing, tympanic membrane, external ear and ear canal normal.   Nose: Nose normal. No mucosal edema, rhinorrhea or nasal deformity. No epistaxis. Right sinus exhibits no maxillary sinus tenderness and no frontal sinus tenderness. Left sinus exhibits no maxillary sinus tenderness and no frontal sinus tenderness.   Mouth/Throat: Uvula is midline, oropharynx is clear and moist and mucous membranes are normal. No trismus in the jaw. Normal dentition. No uvula swelling. No oropharyngeal exudate, posterior oropharyngeal edema or posterior oropharyngeal erythema.   Eyes: Conjunctivae and lids are " normal. No scleral icterus.   Neck: Trachea normal and phonation normal. Neck supple. No edema present. No erythema present. No neck rigidity present.   Cardiovascular: Normal rate, regular rhythm, normal heart sounds and normal pulses.   Pulmonary/Chest: Effort normal and breath sounds normal. No respiratory distress. She has no decreased breath sounds. She has no rhonchi.   Abdominal: Normal appearance.   Musculoskeletal: Normal range of motion.         General: No deformity. Normal range of motion.   Neurological: She is alert and oriented to person, place, and time. She exhibits normal muscle tone. Coordination normal.   Skin: Skin is warm, dry, intact, not diaphoretic and not pale.   Psychiatric: Her speech is normal and behavior is normal. Judgment and thought content normal.   Nursing note and vitals reviewed.      Assessment:     1. COVID    2. Body aches        Plan:       COVID    Body aches  -     SARS Coronavirus 2 Antigen, POCT Manual Read      Results for orders placed or performed in visit on 08/11/24   SARS Coronavirus 2 Antigen, POCT Manual Read   Result Value Ref Range    SARS Coronavirus 2 Antigen Positive (A) Negative     Acceptable Yes            Medical Decision Making:   Urgent Care Management:  Patient declined paxlovid.

## 2024-08-15 NOTE — PROGRESS NOTES
NEUROPSYCHOLOGY CONSULT (TELEHEALTH)  Referral Information  Name: Sunni Lowe  MRN: [id number]  : 1950  Age: 73 y.o.  Race: White  Gender: female  REFERRAL SOURCE: Emma Flynn MD   DATE CONDUCTED: 2024  SOURCES OF INFORMATION:  The following was gathered from a clinical interview with Ms. Sunni Lowe, a separate interview with her daughter Trudy, and review of the available medical records. Ms. Lowe expressed an understanding of the purpose of the evaluation and consented to all procedures. Total licensed billing psychologists professional time including clinical interview, test administration and interpretation of tests administered by the billing psychologist, integration of test results and other clinical data, preparing the final report, and personally reporting results to the patient   Billin - 60 minutes  Telemedicine:   The patient location is: Home  The provider location is: Home  The chief complaint/medical necessity leading to consultation/medical necessity is: cognitive decline  Visit type: Virtual visit with audio  Total time spent with patient: 35 minutes  Each patient to whom he or she provides medical services by telemedicine is:  (1) informed of the relationship between the physician and patient and the respective role of any other health care provider with respect to management of the patient; and (2) notified that he or she may decline to receive medical services by telemedicine and may withdraw from such care at any time.  Consent/Emergency Plan: The patient expressed an understanding of the purpose of the evaluation and consented to all procedures. I informed the patient of limits to confidentiality and discussed an emergency plan.    NEUROPSYCHOLOGICAL EVALUATION - CONFIDENTIAL    SUMMARY/TREATMENT PLAN   Ms. Lowe is a 73 year old female with self and family reported progressive cognitive decline over the past decade. She remains functionally independent,  but is more reliant on compensatory mechanisms. Her daughter feels that her symptom progression has been very mild over the past decade, noting that she still seems very functional. Memory loss and word finding difficulty are her primary symptoms. She is scheduled for neuropsychological testing on 9/17. Behavioral neurology e-consult noted suspected Alzheimer's disease pathology in addition to cerebral amyloid angiopathy.     Diagnoses  Problem List Items Addressed This Visit          Neuro    Memory changes - Primary    Overview     Follows with neurology          Current Assessment & Plan     Neurology: [facility] clinic on 8/29. Amyloid PET on 8/22.     Neuropsychology: Testing on 9/17.            Ms. Lowe will be provided the results of the evaluation.     Thank you for allowing me to participate in Ms. Hook care.  If you have any questions, please contact me at [phone].    Darrell Marie Psy.D., SARKISP  Board Certified in Clinical Neuropsychology  Department of Neurology    HISTORY OF PRESENT ILLNESS: Ms. Sunni Lowe is a 73 y.o., right-handed, female with 12 years of education who was referred for a neuropsychological evaluation in the setting of self and family reported cognitive dysfunction.     10/2015 HPI (DR. BLACKMAN): referred for Neuropsychological Evaluation on an outpatient basis due to possible memory decline for the past year. Her daughter and  are more concerned than she is. Family history is significant for with mother and maternal aunt with Alzheimers disease, which is part of why her pharmacist daughter wanted her to be evaluated. Mrs. Lowe reported she thinks she just does not concentrate well, and her  says she does not concentrate enough. She also thinks stress may be playing a role. She noted several current stressors including moving to a new home, difficulty selling old home, father with lung and chest cancer and a history of tongue cancer, and daughter with a new  baby. She did report that she notices that when asked a question point blank or when put on the spot to come up with an answer, she has to think a few minutes before the answer comes to her. There was one instance when she agreed to babysit when she was driving but did not recall the specific date she had agreed to. At work, she has to think a few minutes to recall why a flight was delayed a minute or two. One time she could not recall the work dustpan. She reported that she uses a day-timer calendar to remind herself of things; forgets what she reads when reading a complex piece of fiction; is electronically challenged and has some difficulty operating her s computer or the TV remote at times; and loses her train of thought in conversation. She reported she drives, works full-time, and manages her medications, fiancés, and household without difficulty. She reported her memory does not seem to be getting worse over time. No precipitant could be identified. Mrs. Lowe saw a psychologist and a psychiatrist many years ago in the context of marital problems. She has not sought or needed psychiatric care since that divorce any years ago. She denied current adjustment problems. She was very pleasant and cooperative in interview.     10/2015 NEUROPSYCH RESULTS (DR. BLACKMAN): Neuropsychological testing revealed mild impairment in attention, visuospatial perception, immediate visual memory, and abstract reasoning; and variability in verbal fluency (performances in the average and mildly impaired ranges). Immediate and delayed auditory/verbal memory and delayed visual memory were intact. The deficits identified are mild, and it is not clear whether they reflect a decline from a prior level of ability or whether they reflect long standing premorbid weaknesses. The pattern of deficits is not consistent with any specific disorder. The impairment present does not rise to the level of mild cognitive impairment. Mrs.  "Chrissy should be monitored for future decline considering her significant family history and the current presence of some cognitive deficits, with future testing if indicated. The current data may serve as a baseline of her cognitive functioning.     8/2024 BEHAVIORAL NEUROLOGY E-CONSULT (DR. QUINN): Clinical presentation is consistent with Alzheimer's disease related neurocognitive disorder. Elevated P tau 181 suggests Alzheimer's disease however does not confirm presence of. Recent MRI brain does show evidence of micro bleed pattern suggestive of comorbid cerebral amyloid angiopathy. Given evidence of multiple microbleeds patient would not be considered a candidate for anti amyloid therapy. Patient would however be a candidate for cerebral amyloid angiopathy clinic given mixed micro bleed with Alzheimer's disease pattern. Recommend amyloid PET scan and referral to [facility] clinic.     8/2024 INTERVAL HISTORY:  -Scheduled for [facility] clinic on 8/29  -Ms. Lowe indicated that both she and her family are worried about her cognition.   -She described her  as "negative," noting that he will make comments such as "I know you won't remember."  -When asked about onset, she noted first seeing a neurologist about 10 years ago in the setting of a family history of Alzheimer's disease.   -She is assuming that she's had some progression over the past 10 years.   -She reported short-term memory difficulty, suspecting she will have trouble on memory tests. She indicated that she is familiar with the testing process, having undergone brief testing with neurology and at annual wellness visits.   -She notices losing her train her thought, in conversation or when she walks into a room.   -She denied loss of episodic memory for recent events. For instance, she indicated that she had blood work yesterday, this testing today, PCP appointment tomorrow, and neuroimaging on Thursday (all of which was accurate).   -She reported word " "finding difficulty, feeling as though she can "see it in my head, but can't say it."  -She endorsed changes in spelling.     -She provided consent to speak with her daughter, Trudy.   -Overall, Trudy doesn't feel there is a significant concern about her mother's cognition or functioning, but she and the family want to get ahead of any issues that can be addressed.   -Trudy reported very slow progression since the 2015 evaluation.   -Memory is her primary weakness. She is reliant on notetaking/keeping a calendar. For instance, she can remember that one of her grandchildren has an upcoming event, but she might not remember which grandchild and at what time.   -She is not sure if this difficulty is related to inattention. For instance, she might cut off someone in the middle of them answering a question for her. Times when she is wrapping up the conversation even when someone is still talking.   -She notices word finding difficulty, but does not feel that it is significant. For instance, she is typically able to find an alternative word and still make her point.     Neuropsychiatric Symptoms:  Hallucinations: Endorsed, at night, estimated over the past several years. She has seen people at night and other odd items. For instance, she saw boxes that looked liked wire cages moving towards her. She had to leave the room since she was so scared. It has looked like the wall of her bedroom turned red. She feels that hallucinations have improved since starting Exelon patch. When they do happen, she feels that they more quickly resolve when she closes/open her eyes.   Depression/Labile Mood: Denied, she tries to maintain a positive outlook, although indicated that she is going through this process for diagnostic clarity. She denied active SI, plan, or intent.   Anxiety: Endorsed    DAILY FUNCTIONING:  BASIC ADLS:  Feeding: independent, she denied anosmia.   Dressing: independent, no issues putting her clothes on correctly. " "  Bathing: independent    IADLS:  Support System: Resides with . Daughter, Trudy, is a clinical pharmacist and a good source of support.   Appointment Management: independent, she manages with no reported problems.   Medication Compliance: independent, she fills 2 weekly pillboxes and reported strong adherence.   Financial Management: independent, she manages with no reported problems.   Cooking:  is the primary cook. No issues when she does cook.   Driving: She continues to drive with no reported problems. She denied recent MVCs. She has no trouble navigating to very familiar places and will use GPS to less familiar locations.     BRAIN HEALTH RISK FACTORS:  Hearing Loss: Endorsed, hearing aids.   Falls: Denied. Nothing recent, but she fell and hit her head about 3-5 years ago.   Sleep: Sleep maintenance is an issue. She can wake up every 2-3 hours, sometimes due to nocturia. She typically takes a nap after going to the gym. She is not aware of her  commenting on dream enactment behavior.      MEDICAL HISTORY: Ms. Lowe  has a past medical history of Allergic rhinitis, Asthma, Fibrocystic breast, Osteopenia, and Varicose veins.    NEUROIMAGIN2024 Brain MRI: "No acute intracranial process. No advanced volume loss. Chronic ischemic changes as detailed above with potentially mild amyloid angiopathy with few scattered areas of chronic microhemorrhage."    SUBSTANCE USE: Ms. Lowe  reports that she quit smoking about 43 years ago. Her smoking use included cigarettes. She started smoking about 53 years ago. She has a 10 pack-year smoking history. She has been exposed to tobacco smoke. She has never used smokeless tobacco. She used to drink 1-2 glasses of red wine per night. She now drinks a smaller glass each night per the recommendation of her neurologist.     CURRENT MEDICATIONS:    Current Outpatient Medications:     calcium carbonate (OS-ISABELLE) 600 mg (1,500 mg) Tab, Take 1,200 mg by mouth " once daily., Disp: , Rfl:     cholecalciferol, vitamin D3, (VITAMIN D3) 10,000 unit Cap, Take 10,000 Units by mouth once daily. , Disp: , Rfl:     collagen, bovine, 100 % Powd, Take 1 Scoop by mouth Daily. Puts 1 scoop in coffee in am daily, Disp: , Rfl:     CYANOCOBALAMIN, VITAMIN B-12, (VITAMIN B-12 ORAL), Take by mouth., Disp: , Rfl:     ELDERBERRY FRUIT ORAL, Take 1 tablet by mouth every other day., Disp: , Rfl:     esomeprazole (NEXIUM) 20 MG capsule, Take 1 capsule (20 mg total) by mouth before breakfast., Disp: 90 capsule, Rfl: 3    fish oil-omega-3 fatty acids 300-1,000 mg capsule, Take 600 mg by mouth once daily., Disp: , Rfl:     fluticasone-salmeterol diskus inhaler 250-50 mcg, Inhale 1 puff into the lungs once daily. Controller, Disp: 120 each, Rfl: 1    psyllium husk 0.4 gram Cap, Take 1 capsule by mouth once daily., Disp: , Rfl:     rivastigmine (EXELON PATCH) 9.5 mg/24 hour PT24, Place 1 patch onto the skin once daily., Disp: 30 patch, Rfl: 5     FAMILY HISTORY: family history includes Alzheimer's disease in her maternal aunt and mother; Cancer (age of onset: 85) in her father; Diabetes in her father; No Known Problems in her brother, daughter, sister, sister, and son; Ovarian cancer (age of onset: 63) in her mother.    PSYCHOSOCIAL HISTORY:   Education:   Level Attained: High school graduate. Business certification.    Learning Difficulties: Felt that she began struggling academically around 5th grade which persisted into high school.    Repeated Grade: Denied     Vocation:   Highest Attained: She worked for [employer] for 31 years, highest position was .     Retired: 67    Relationship Status:   : Yes, 18 years   : Yes, 1x   Children: 2    MENTAL STATUS AND OBSERVATIONS:  APPEARANCE: Unable to assess.   ALERTNESS/ORIENTATION: Attentive and alert. Elaborative. Seemed to demonstrate intact episodic memory for recent events. She recalled her recent and upcoming  medical appointments. She remembered Dr. Flynn and Dr. Middleton' names from memory.   GAIT/MOTOR: Unable to assess.   SENSORY: Unable to assess.   SPEECH/LANGUAGE: Receptive language was grossly intact. Word finding difficulty was noted in conversation, which seemed to make her overly elaborative/inefficient. Mildly halting.   STATED MOOD/AFFECT: Mood was euthymic.   INTERPERSONAL BEHAVIOR: Rapport was quickly and easily established   THOUGHT PROCESSES: Thoughts seemed logical and goal-directed.     PROPOSED TEST BATTERY:  YEARS OF ED: 12    MSVT  MOCA  WRAT Spelling  TOPF  WAIS - SIM, DS, ARITH, SS, CODING  RAVLT  WMS STORY  ONEIDA COPY  TRAILS  FAS/ANIMALS  NAB NAMING  PEGS  BDI, GAD7

## 2024-08-20 ENCOUNTER — OFFICE VISIT (OUTPATIENT)
Dept: NEUROLOGY | Facility: CLINIC | Age: 74
End: 2024-08-20
Payer: MEDICARE

## 2024-08-20 DIAGNOSIS — R41.3 MEMORY CHANGES: Primary | ICD-10-CM

## 2024-08-20 PROCEDURE — 99499 UNLISTED E&M SERVICE: CPT | Mod: FQ,,, | Performed by: PSYCHIATRY & NEUROLOGY

## 2024-08-20 PROCEDURE — 96116 NUBHVL XM PHYS/QHP 1ST HR: CPT | Mod: FQ,,, | Performed by: PSYCHIATRY & NEUROLOGY

## 2024-08-21 ENCOUNTER — OFFICE VISIT (OUTPATIENT)
Dept: FAMILY MEDICINE | Facility: CLINIC | Age: 74
End: 2024-08-21
Payer: MEDICARE

## 2024-08-21 VITALS
TEMPERATURE: 98 F | SYSTOLIC BLOOD PRESSURE: 110 MMHG | HEIGHT: 70 IN | WEIGHT: 151 LBS | DIASTOLIC BLOOD PRESSURE: 72 MMHG | OXYGEN SATURATION: 99 % | BODY MASS INDEX: 21.62 KG/M2 | HEART RATE: 60 BPM

## 2024-08-21 DIAGNOSIS — M85.852 OSTEOPENIA OF NECK OF LEFT FEMUR: ICD-10-CM

## 2024-08-21 DIAGNOSIS — K21.9 GASTROESOPHAGEAL REFLUX DISEASE WITHOUT ESOPHAGITIS: ICD-10-CM

## 2024-08-21 DIAGNOSIS — J45.40 MODERATE PERSISTENT ASTHMA WITHOUT COMPLICATION: ICD-10-CM

## 2024-08-21 DIAGNOSIS — H69.93 DYSFUNCTION OF BOTH EUSTACHIAN TUBES: ICD-10-CM

## 2024-08-21 DIAGNOSIS — R41.3 MEMORY CHANGES: Primary | ICD-10-CM

## 2024-08-21 PROBLEM — Z86.0100 PERSONAL HISTORY OF COLONIC POLYPS: Status: RESOLVED | Noted: 2024-03-28 | Resolved: 2024-08-21

## 2024-08-21 PROBLEM — Z86.010 PERSONAL HISTORY OF COLONIC POLYPS: Status: RESOLVED | Noted: 2024-03-28 | Resolved: 2024-08-21

## 2024-08-21 PROBLEM — R42 DIZZINESS: Status: RESOLVED | Noted: 2024-06-12 | Resolved: 2024-08-21

## 2024-08-21 PROCEDURE — 3074F SYST BP LT 130 MM HG: CPT | Mod: CPTII,S$GLB,, | Performed by: STUDENT IN AN ORGANIZED HEALTH CARE EDUCATION/TRAINING PROGRAM

## 2024-08-21 PROCEDURE — 1157F ADVNC CARE PLAN IN RCRD: CPT | Mod: CPTII,S$GLB,, | Performed by: STUDENT IN AN ORGANIZED HEALTH CARE EDUCATION/TRAINING PROGRAM

## 2024-08-21 PROCEDURE — 1126F AMNT PAIN NOTED NONE PRSNT: CPT | Mod: CPTII,S$GLB,, | Performed by: STUDENT IN AN ORGANIZED HEALTH CARE EDUCATION/TRAINING PROGRAM

## 2024-08-21 PROCEDURE — 1160F RVW MEDS BY RX/DR IN RCRD: CPT | Mod: CPTII,S$GLB,, | Performed by: STUDENT IN AN ORGANIZED HEALTH CARE EDUCATION/TRAINING PROGRAM

## 2024-08-21 PROCEDURE — 99215 OFFICE O/P EST HI 40 MIN: CPT | Mod: S$GLB,,, | Performed by: STUDENT IN AN ORGANIZED HEALTH CARE EDUCATION/TRAINING PROGRAM

## 2024-08-21 PROCEDURE — 3008F BODY MASS INDEX DOCD: CPT | Mod: CPTII,S$GLB,, | Performed by: STUDENT IN AN ORGANIZED HEALTH CARE EDUCATION/TRAINING PROGRAM

## 2024-08-21 PROCEDURE — 1101F PT FALLS ASSESS-DOCD LE1/YR: CPT | Mod: CPTII,S$GLB,, | Performed by: STUDENT IN AN ORGANIZED HEALTH CARE EDUCATION/TRAINING PROGRAM

## 2024-08-21 PROCEDURE — 1159F MED LIST DOCD IN RCRD: CPT | Mod: CPTII,S$GLB,, | Performed by: STUDENT IN AN ORGANIZED HEALTH CARE EDUCATION/TRAINING PROGRAM

## 2024-08-21 PROCEDURE — 3288F FALL RISK ASSESSMENT DOCD: CPT | Mod: CPTII,S$GLB,, | Performed by: STUDENT IN AN ORGANIZED HEALTH CARE EDUCATION/TRAINING PROGRAM

## 2024-08-21 PROCEDURE — 3078F DIAST BP <80 MM HG: CPT | Mod: CPTII,S$GLB,, | Performed by: STUDENT IN AN ORGANIZED HEALTH CARE EDUCATION/TRAINING PROGRAM

## 2024-08-21 PROCEDURE — 3044F HG A1C LEVEL LT 7.0%: CPT | Mod: CPTII,S$GLB,, | Performed by: STUDENT IN AN ORGANIZED HEALTH CARE EDUCATION/TRAINING PROGRAM

## 2024-08-21 PROCEDURE — 99999 PR PBB SHADOW E&M-EST. PATIENT-LVL IV: CPT | Mod: PBBFAC,,, | Performed by: STUDENT IN AN ORGANIZED HEALTH CARE EDUCATION/TRAINING PROGRAM

## 2024-08-21 NOTE — PROGRESS NOTES
Subjective:       Patient ID: Sunni Lowe is a 73 y.o. female.    Chief Complaint: Follow-up (Pt here for a 6 month follow up )      Review of Systems   Psychiatric/Behavioral:  Positive for dysphoric mood. The patient is nervous/anxious (home life situation).    All other systems reviewed and are negative.       A1C:  Recent Labs   Lab 02/14/22  1336 08/19/24  0713   Hemoglobin A1C 5.2 5.3     CBC:  Recent Labs   Lab 01/30/24  1207 05/18/24  1537 08/19/24  0713   WBC 5.35 7.32 4.73   RBC 4.81 5.06 4.74   Hemoglobin 14.4 14.7 13.6   Hematocrit 42.3 44.1 40.9   Platelets 166  166 186 199   MCV 88 87 86   MCH 29.9 29.1 28.7   MCHC 34.0 33.3 33.3     CMP:  Recent Labs   Lab 08/15/22  1332 01/30/24  1207 05/18/24  1537 08/19/24  0713   Glucose 87 99   < > 103   Calcium 9.1 9.5   < > 9.7   Albumin 4.2 4.4  --  4.0   Total Protein 6.2 7.2  --  7.0   Sodium 141 139   < > 141   Potassium 4.1 4.0   < > 4.4   CO2 31 24   < > 28   Chloride 104 101   < > 105   BUN 18 23   < > 20   Creatinine 1.02 1.0   < > 1.0   Alkaline Phosphatase  --  53 L  --  48 L   ALT 12 19  --  13   AST 16 24  --  16   Total Bilirubin 0.8 1.1 H  --  0.8    < > = values in this interval not displayed.     LIPIDS:  Recent Labs   Lab 08/19/24  0713   TSH 1.477   HDL 47   Cholesterol 178   Triglycerides 77   LDL Cholesterol 115.6   HDL/Cholesterol Ratio 26.4   Non-HDL Cholesterol 131   Total Cholesterol/HDL Ratio 3.8     TSH:  Recent Labs   Lab 02/14/22  1336 01/30/24  1207 08/19/24  0713   TSH 1.67 0.921 1.477        Objective:      Vitals:    08/21/24 1040   BP: 110/72   Pulse: 60   Temp: 97.7 °F (36.5 °C)      Physical Exam  Vitals reviewed.   Constitutional:       Appearance: Normal appearance. She is normal weight.   HENT:      Head: Normocephalic and atraumatic.   Eyes:      Conjunctiva/sclera: Conjunctivae normal.   Cardiovascular:      Rate and Rhythm: Normal rate and regular rhythm.      Heart sounds: Normal heart sounds.   Pulmonary:       Effort: Pulmonary effort is normal.      Breath sounds: Normal breath sounds.   Abdominal:      Palpations: Abdomen is soft.      Tenderness: There is no abdominal tenderness.   Musculoskeletal:         General: Normal range of motion.      Cervical back: Normal range of motion.      Right lower leg: No edema.      Left lower leg: No edema.   Neurological:      Mental Status: She is alert. Mental status is at baseline.   Psychiatric:         Mood and Affect: Mood normal.         Behavior: Behavior normal.          Assessment:       1. Memory changes    2. Dysfunction of both eustachian tubes    3. Moderate persistent asthma without complication    4. Gastroesophageal reflux disease without esophagitis    5. Osteopenia of neck of left femur        Plan:     Problem List Items Addressed This Visit          Neuro    Memory changes - Primary    Overview     Follows with neurology   Waiting for RX for Exelon to be ready at pharmacy             ENT    Dysfunction of both eustachian tubes    Overview     astelin   Flonase   Daily antihistamine   Headaches and sinus symptoms improved             Pulmonary    Asthma    Overview     Advair 1 puff once a day   Denies symptoms   Stable             GI    Gastroesophageal reflux disease    Overview     PPI daily   Stable   20mg omeprazole   Vit D/Ca and Mag for bone health due to long term hx taking PPI advised             Orthopedic    Osteopenia    Overview     Managed by GYN   Reclast annually           Labs reviewed in detail  Problem list reviewed in detail   Continue healthy lifestyle efforts  Continue current meds as prescribed otherwise; refills per request  Keep routine specialist f/u   RTC in 6 months  with labs prior and/or PRN          Sarah A. Champagne [facility]   8/21/24     I spent a total of 41 minutes on the day of the visit.This includes face to face time and non-face to face time preparing to see the patient (eg, review of tests), obtaining  and/or reviewing separately obtained history, documenting clinical information in the electronic or other health record, independently interpreting results and communicating results to the patient/family/caregiver, or care coordinator.

## 2024-08-22 ENCOUNTER — HOSPITAL ENCOUNTER (OUTPATIENT)
Dept: RADIOLOGY | Facility: HOSPITAL | Age: 74
Discharge: HOME | End: 2024-08-22
Attending: PSYCHIATRY & NEUROLOGY
Payer: MEDICARE

## 2024-08-22 DIAGNOSIS — F02.A0 MILD LATE ONSET ALZHEIMER'S DEMENTIA WITHOUT BEHAVIORAL DISTURBANCE, PSYCHOTIC DISTURBANCE, MOOD DISTURBANCE, OR ANXIETY: ICD-10-CM

## 2024-08-22 DIAGNOSIS — G30.1 MILD LATE ONSET ALZHEIMER'S DEMENTIA WITHOUT BEHAVIORAL DISTURBANCE, PSYCHOTIC DISTURBANCE, MOOD DISTURBANCE, OR ANXIETY: ICD-10-CM

## 2024-08-22 PROCEDURE — A9586 FLORBETAPIR F18: HCPCS | Performed by: PSYCHIATRY & NEUROLOGY

## 2024-08-22 PROCEDURE — 78814 PET IMAGE W/CT LMTD: CPT | Mod: 26,PI,, | Performed by: NUCLEAR MEDICINE

## 2024-08-22 PROCEDURE — 78814 PET IMAGE W/CT LMTD: CPT | Mod: TC

## 2024-08-22 RX ADMIN — FLORBETAPIR F 18 11.47 MILLICURIE: 51 INJECTION, SOLUTION INTRAVENOUS at 01:08

## 2024-08-26 ENCOUNTER — OUTPATIENT CASE MANAGEMENT (OUTPATIENT)
Dept: NEUROLOGY | Facility: CLINIC | Age: 74
End: 2024-08-26
Payer: MEDICARE

## 2024-08-26 DIAGNOSIS — R46.89 COGNITIVE AND BEHAVIORAL CHANGES: Primary | ICD-10-CM

## 2024-08-26 DIAGNOSIS — R41.89 COGNITIVE AND BEHAVIORAL CHANGES: Primary | ICD-10-CM

## 2024-08-26 PROCEDURE — 99358 PROLONG SERVICE W/O CONTACT: CPT | Mod: S$GLB,,, | Performed by: PSYCHIATRY & NEUROLOGY

## 2024-08-26 NOTE — PROGRESS NOTES
[facility]  B[facility] Cognitive [facility] Program    PATIENT: Sunni Lowe  DATE: 08/26/2024  MRN: [id number]  PRIMARY PROVIDER: Alison Fox DO    Future Appointments   Date Time Provider Department Center   8/29/2024  1:00 PM CAA MULTI-D CLINIC [facility] STROKE8 [facility]   9/17/2024  8:30 AM NEUROPSYCH TESTING, MARISOL [facility] NERPSY8 [facility]   9/18/2024  2:00 PM [facility] MAMMO1 HOLOGIC DIMENSIONS [facility] MAMMO [facility]   10/29/2024  2:00 PM Leon Arvizu MD [facility] ORTHO [place]   11/1/2024 10:20 AM Stoney Wise MD [facility] CARDIO [place]   3/3/2025  7:30 AM HOSPITAL LAB, [facility] LAB [facility] LAB [facility]   3/6/2025 11:00 AM Alison Fox DO [facility] FAM[facility] [place]       Pre-Clinical Chart Review:     I conducted a thorough review of previous records and/or communicated with other professionals or the patient's family for a total duration of 35 minutes (from 02:00 PM to 02:35 PM) on on 08/26/2024. This activity is directly related to the face-to-face Evaluation and Management service provided to the patient.    For billing purposes, the CPT code for prolonged evaluation and management services, including non-face-to-face review of records or communications with the patient's family or other medical professionals, is 95158.    See below for the review of these [facility] EMR records:      Clinical History Summary:     In late August 2015, a 64-year-old female patient was referred to a neurologist at [facility] for a consultation regarding cognitive issues, which she and her daughter had noticed over the past year. She expressed concerns about forgetfulness and a family history of Alzheimer's disease. Her past medical history includes asthma, allergic rhinitis, Cortes's esophagus, osteopenia, and varicose veins, and she has undergone several surgeries. She reported no known allergies, uses tobacco, consumes alcohol regularly, and drinks three cups of coffee daily. She had a history of wrist and thumb  fractures. The initial diagnostic impression included possible mild cognitive impairment (MCI) and Alzheimer's disease, with plans for an MRI scan, blood tests, consideration of Aricept, formal neuropsychological testing, and a follow-up in one month. In late January 2017, the same patient, now 66 years old, returned for a follow-up. She had undergone routine screenings and neuropsychological tests, which were within normal limits. She exhibited no substantial progression of her cognitive deficits. A baseline [place] Cognitive Assessment (MoCA) scored 24 out of 30. The plan was to continue clinical follow-up. By late February 2019, the patient, now 68, reported substantial improvement in her cognitive function following USP and stress reduction. Neuroprotective activities were discussed, and follow-up was recommended as needed. Her MoCA score was consistent with previous evaluations. In late January 2024, at 73 years old, the patient presented again with a two-year history of cognitive decline. She expressed frustrations with word-finding difficulties and forgetfulness, though she remained independent in daily activities. Her MoCA score had declined to 21 out of 30. She reported non-threatening visual hallucinations and a family history of Alzheimer's. Potential treatment options, including lecanemab, were discussed, and she opted for p-tau testing. In late July 2024, the patient returned for a follow-up, accompanied by her daughter. Her MRI showed elevated p-tau levels, and she was tolerating rivastigmine well. The patient and her daughter noted some functional difficulties, particularly in conversation details and word-finding. Despite this, she remained independent in her activities of daily living. Further neuropsychological testing and potential treatment options were discussed. In mid-August 2024, the patient, now 73, underwent a neuropsychological evaluation. Her family expressed concerns about her  cognitive decline, which had been ongoing for the past decade. Despite mild impairments in certain cognitive areas, she remained functionally independent. The assessment suggested a possible Alzheimer's disease-related neurocognitive disorder, and she was recommended for further testing and referral to a cerebral amyloid angiopathy clinic. In mid-September 2025, the patient's neurological exam was unremarkable, and her diagnostic studies, including an MRI and lab results, were within normal limits. Further neuropsychological testing was scheduled for late October 2015, with follow-up plans dependent on those results.      Clinical Timeline:    (Event Date: 2015-08) The patient was evaluated for memory issues and potential Alzheimer's disease, with diagnostic plans including MRI, lab tests, and neuropsychological testing.  (Event Date: 2017-01) The patient had a MOCA score of 24/30, with noted rapid task abandonment, and no significant progression in cognitive deficits.  (Event Date: 2019-02) The patient reported cognitive improvement post-USP, with MOCA scores of 15/18 and 24/30, and discussed neuroprotective activities.  (Event Date: 2024-01) The patient presented with an 8-year history of subjective cognitive decline, had a MOCA score of 21/30, and reported mild expressive aphasia and occasional visual hallucinations.  (Event Date: 2024-07) Diagnosed with amnestic MCI and expressive aphasia, the patient had a MOCA score of 21/30 and elevated P tau 181, and discussed potential anti-amyloid therapy.  (Event Date: 2024-08) Neuropsychological testing revealed mild cognitive impairments; an e-consult suggested Alzheimer's disease with cerebral amyloid angiopathy, recommending further clinical follow-up and amyloid PET scan.  (Event Date: 2025-09) The patient was scheduled for neuropsychological testing and had normal lab results and MRI showing minimal ischemic changes, with plans to follow up based on testing  outcomes.    Neurologically Relevant Imaging:    MRI brain/head without contrast performed on 08/26/2024  Radiologist Interpretation: o acute intracranial process. No advanced volume loss. Chronic ischemic changes as detailed above with potentially mild amyloid angiopathy with few scattered areas of chronic microhemorrhage.  Provider Interpretation:  T1-weighted (T1W) Image Summary: Mild generalized cortical atrophy is observed, with a greater predominance in the posterior parietal region at later stages. Widening of the regional sulci is most notable in the parietal lobule. The corpus callosum appears intact. The midbrain and brainstem are also intact, displaying a normal volume ratio.  FLAIR/T2-weighted (T2W) Image Summary: The radiographic interpretation reveals minor scattered subcortical white matter changes, which are most prominent in the bilateral corona radiata, with a greater extent on the left side compared to the right. Juxtacortical hyperintensities are primarily observed in the right motor cortex, with a lesser degree on the left. These changes extend to the centrum semiovale. There are no large vessel infarcts noted, and no small lacunar infarcts are present in the basal ganglia.  Diffusion Weighted Imaging with ADC Mapping Summary: There are no significant hyperintensities or hypointensities observed on Diffusion-Weighted Imaging (DWI). Additionally, there is no apparent correlation with the Apparent Diffusion Coefficient (ADC).  Susceptibility-weighted imaging (SWI) and/or GRE Summary: The radiographic interpretation reveals the presence of multifocal cortical and subcortical microbleeds. Specifically, microbleeds have been identified in the left deep subcortical parietal lobe, the occipital lobe ventral to the dorsal occipital lobe, and the high dorsal parietal lobe. These findings collectively suggest the presence of microbleeds.  Interpretation Summary: The patient exhibits mild generalized  cortical atrophy and multiple microbleeds, predominantly located in the left posterior hemisphere. These findings are collectively suggestive of cerebral amyloid angiopathy.  Brain Amyloid-positron emission tomography performed on 08/22/2024  Radiologist Interpretation: Multifocal radiotracer uptake throughout cerebral cortical gray matter with reduced gray-white contrast involving bilateral frontal, parietal, occipital, and temporal lobes.  Provider Interpretation:  Interpretation Summary: The scan indicates the presence of moderate to frequent amyloid neuritic plaque deposition.            Differential Diganosis:    LOAD/CAA

## 2024-08-29 ENCOUNTER — OFFICE VISIT (OUTPATIENT)
Dept: NEUROLOGY | Facility: CLINIC | Age: 74
End: 2024-08-29
Payer: MEDICARE

## 2024-08-29 VITALS
WEIGHT: 151 LBS | HEART RATE: 66 BPM | SYSTOLIC BLOOD PRESSURE: 112 MMHG | DIASTOLIC BLOOD PRESSURE: 73 MMHG | HEIGHT: 70 IN | BODY MASS INDEX: 21.62 KG/M2

## 2024-08-29 DIAGNOSIS — G31.9 NEURODEGENERATIVE COGNITIVE IMPAIRMENT: ICD-10-CM

## 2024-08-29 DIAGNOSIS — G47.52 RBD (REM BEHAVIORAL DISORDER): ICD-10-CM

## 2024-08-29 DIAGNOSIS — G31.84 MCI (MILD COGNITIVE IMPAIRMENT): Primary | ICD-10-CM

## 2024-08-29 DIAGNOSIS — I61.9 CEREBRAL HEMORRHAGE: ICD-10-CM

## 2024-08-29 DIAGNOSIS — G30.9 ALZHEIMER DISEASE: ICD-10-CM

## 2024-08-29 DIAGNOSIS — G47.01 INSOMNIA DUE TO MEDICAL CONDITION: ICD-10-CM

## 2024-08-29 DIAGNOSIS — R47.01 MIXED APHASIA: ICD-10-CM

## 2024-08-29 DIAGNOSIS — R44.1 VISUAL HALLUCINATIONS: ICD-10-CM

## 2024-08-29 DIAGNOSIS — R06.83 SNORING: ICD-10-CM

## 2024-08-29 DIAGNOSIS — F02.80 ALZHEIMER DISEASE: ICD-10-CM

## 2024-08-29 DIAGNOSIS — G20.C PARKINSONISM, UNSPECIFIED PARKINSONISM TYPE: ICD-10-CM

## 2024-08-29 PROCEDURE — 99215 OFFICE O/P EST HI 40 MIN: CPT | Mod: S$GLB,,, | Performed by: PSYCHIATRY & NEUROLOGY

## 2024-08-29 PROCEDURE — 99999 PR PBB SHADOW E&M-EST. PATIENT-LVL V: CPT | Mod: PBBFAC,,, | Performed by: PSYCHIATRY & NEUROLOGY

## 2024-08-29 PROCEDURE — 99417 PROLNG OP E/M EACH 15 MIN: CPT | Mod: S$GLB,,, | Performed by: PSYCHIATRY & NEUROLOGY

## 2024-08-29 PROCEDURE — 96116 NUBHVL XM PHYS/QHP 1ST HR: CPT | Mod: 59,S$GLB,, | Performed by: PSYCHIATRY & NEUROLOGY

## 2024-08-29 PROCEDURE — 96132 NRPSYC TST EVAL PHYS/QHP 1ST: CPT | Mod: 59,S$GLB,, | Performed by: PSYCHIATRY & NEUROLOGY

## 2024-08-29 NOTE — PROGRESS NOTES
Cerebral Amyloid Angiopathy  Clinic Note    ___________________________  ASSESSMENT & PLAN  73 y.o. female with no significant vascular event history (no sICH or AIS).  Retired 6 years ago from [employer] ; since [employer]  has noticed a decilne, jassi. Within last year, both have noticed difficulty with language output    Probable CAA with A-T-N Support for Alzheimer's Disease  Prognosis:  Even without a prior symptomatic lobar intracerebral hemorrhage (sICH), the presence of lobar cerebral microbleeds in CAA patients is linked to an estimated 5% annual risk of sICH. Although isolated lobar cerebral microbleeds (CMBs) in CAA are considered a mild to moderate risk marker compared to others, this should still be taken into account when evaluating the risks and benefits of antithrombotic regimens and other management strategies.  Diagnostic Orders:  none  Treatment Changes & Management  Antiplatelet   Indications/Secondary Prevention: none  Current Antiplatelet: none  Plan: No change  Anticoagulant  Indications for Anticoagulation: None  Current Anticoagulant: None  Plan: No change  Anti-Hyperlipidemic  Indications:  Current Statin/Anti-Hyperlipidemic: None  Plan: No change  Hypertension  Status: well controllled < 130/80 mmHg, no meds  Monitoring: office  Failed or Previously Trialed Hypertensive Medications: N/A  Current Medical Regimen:  None  Plan/Recommendation: No change  Research Status:  Candidate for cAApricorn-1 / ALN-JEFFRY-002 Trial based on inclusion criteria  Follow-Up: Depends upon trial enrollment, otherwise 12 months      Visit Diagnoses:  1. MCI (mild cognitive impairment)    2. Neurodegenerative cognitive impairment    3. Cerebral hemorrhage    4. Mixed aphasia    5. Alzheimer disease    6. Insomnia due to medical condition    7. Snoring    8. RBD (REM behavioral disorder)    9. Visual hallucinations    10. Parkinsonism, unspecified Parkinsonism type       Orders Placed This  Encounter    Ambulatory referral/consult to Sleep Disorders    Ambulatory referral/consult to Speech Therapy    suvorexant (BELSOMRA) 10 mg Tab        I have spent a total of 104 minutes in chart review, face-to-face encounter, laboratory and/or imaging review and interpretation and documentation for this patient, including counseling and education the patient on conditions discussed. This also includes interdisciplinary discussion of diagnostic and management planning.  I have independently reviewed and interpreted all available imaging and reviewed the reports.  ____________________________  HPI:    [place] Criteria 2.0 (Lancet Neurol 2022; 21: 714-25)  [x] Probable CAA   Age >= 50     Clinically presenting with   [] spontaneous lobar intracerebral hemorrhage  [] transient focal neurological episodes  [x] cognitive impairment or dementia     >= 2 of the following strictly lobar hemorrhagic lesions on T2*-weighted MRI, in any combination:  [] intracerebral hemorrhage  [x] cerebral microbleeds  [] foci of cortical superficial siderosis  [] foci of convexal subarachnoid hemorrhage      Absence of any deep hemorrhagic lesions (ie, intracerebral hemorrhage or cerebral microbleeds) on T2*-weighted MRI  Absence of other cause of hemorrhagic lesions  Hemorrhagic lesion in cerebellum not counted as either lobar or deep hemorrhagic lesion       MRI BRAIN 2-9-24  GRE/T2* [x]   SWI/SWAN []  Cerebral Microbleeds  Intraparenchymal Hemorrhage  cSS  cSAH  EPVS  WMH    R L  R L  R L  R L  [x]     [x]  MSP    Lobar Frontal - -  - -  - -  - -        Parietal - 1  - -  - -  - -        Temporal - -  - -  - -  - -        Occipital - 5  - -  - -  - -        Insular - -  - -  - -  - -       Infratentorial Cerebellum - -  - -  - -  - -        Brainstem - -  -- -  - -  - -       Deep Basal Ganglia - -  - -  - -  - -  []  DEEP  []  PVWM    Thalamus - -  - -  - -  - -        Int. Capsule - -  - -  - -  - -        Ext. Capsule - -  - -  - -  " - -        Amanda. Callos. - -  - -  - -  - -        DPVWM - -  - -  - -  - -             Jhzlmux-Mlk-Kxmglcopqpohcsrnc (A-T-N) Profile   Amyloid Tau Neurodegeneration   Serum    1.87 High        CSF      PET 8/24  Impression:     Positive scan indicating moderate to frequent amyloid neuritic plaque deposition.   APOE Allele Status:    Intracranial Hemorrhage History:  None  Coagulopathy:  None  ASCVD Event History:  None   PREVENT: (AHA ASCVD 10-year %): Low risk  Tobacco Use: Medium Risk (8/29/2024)    Patient History     Smoking Tobacco Use: Former     Smokeless Tobacco Use: Never     Passive Exposure: Past     Recent Labs   Lab 08/19/24  0713   Hemoglobin A1C 5.3   LDL Cholesterol 115.6   HDL 47   Triglycerides 77   Cholesterol 178       History Relevant to CAA Imaging Biomarkers  None    Sleep Evaluation:      Brain Imaging:  MRI brain  Impression:     No acute intracranial process.  No advanced volume loss.  Chronic ischemic changes as detailed above with potentially mild amyloid angiopathy with few scattered areas of chronic microhemorrhage.  Vessel Imaging:    Cardiac Evaluation:      Other Relevant Testing:    Vital Signs:      5/1/2024     1:59 PM 5/18/2024     2:32 PM 6/11/2024     3:06 PM 7/30/2024    11:26 AM 8/11/2024    10:30 AM 8/21/2024    10:40 AM 8/29/2024     1:07 PM   Vitals - 1 value per visit   SYSTOLIC 118 125 95 118 118 110 112   DIASTOLIC 80 86 75 78 82 72 73   Pulse 55 65 68 69 92 60 66   Temp 97.5 °F (36.4 °C) 97.9 °F (36.6 °C)   98.8 °F (37.1 °C) 97.7 °F (36.5 °C)    Resp  17   17     SPO2 99 % 100 % 98 %  97 % 99 %    Weight (lb) 155.87 151.68 155.76 154.32 154.54 150.97 151.02   Weight (kg) 70.7 68.8 70.65 70 70.1 68.48 68.5   Height 5' 11" (1.803 m) 5' 11" (1.803 m) 5' 11" (1.803 m) 5' 10.98" (1.803 m) 5' 10" (1.778 m) 5' 10" (1.778 m) 5' 10" (1.778 m)   BMI (Calculated) 21.7 21.2 21.7 21.5 22.2 21.7 21.7   Pain Score Zero  Zero Zero Three Zero Zero       Past Medical History  Past " Medical History:   Diagnosis Date    Allergic rhinitis     Asthma     Fibrocystic breast     Osteopenia     Varicose veins     sees Dr. Brigido Quinones     Current Outpatient Medications   Medication Instructions    calcium carbonate (OS-ISABELLE) 1,200 mg, Oral, Daily    cholecalciferol (vitamin D3) (VITAMIN D3) 10,000 Units, Oral, Daily    collagen, bovine, 100 % Powd 1 Scoop, Oral, Daily, Puts 1 scoop in coffee in am daily     CYANOCOBALAMIN, VITAMIN B-12, (VITAMIN B-12 ORAL) Oral    ELDERBERRY FRUIT ORAL 1 tablet, Oral, Every other day    esomeprazole (NEXIUM) 20 mg, Oral, Before breakfast    fish oil-omega-3 fatty acids 300-1,000 mg capsule 600 mg, Oral, Daily    fluticasone-salmeterol diskus inhaler 250-50 mcg 1 puff, Inhalation, Daily, Controller    psyllium husk 0.4 gram Cap 1 capsule, Oral, Daily    rivastigmine (EXELON PATCH) 9.5 mg/24 hour PT24 1 patch, Transdermal, Daily    suvorexant (BELSOMRA) 10 mg Tab 1 tablet, Oral, Nightly        Social History  Social History     Socioeconomic History    Marital status:     Number of children: 2   Tobacco Use    Smoking status: Former     Current packs/day: 0.00     Average packs/day: 1 pack/day for 10.0 years (10.0 ttl pk-yrs)     Types: Cigarettes     Start date:      Quit date:      Years since quittin.7     Passive exposure: Past    Smokeless tobacco: Never   Substance and Sexual Activity    Alcohol use: Yes     Alcohol/week: 7.0 standard drinks of alcohol     Types: 7 Glasses of wine per week     Comment: one glass of wine daily    Drug use: Never    Sexual activity: Not Currently     Partners: Male     Comment:      Social Determinants of Health     Financial Resource Strain: Low Risk  (2/15/2024)    Overall Financial Resource Strain (CARDIA)     Difficulty of Paying Living Expenses: Not hard at all   Food Insecurity: No Food Insecurity (2/15/2024)    Hunger Vital Sign     Worried About Running Out of Food in the Last Year: Never true      Ran Out of Food in the Last Year: Never true   Transportation Needs: No Transportation Needs (2/15/2024)    PRAPARE - Transportation     Lack of Transportation (Medical): No     Lack of Transportation (Non-Medical): No   Physical Activity: Sufficiently Active (2/15/2024)    Exercise Vital Sign     Days of Exercise per Week: 4 days     Minutes of Exercise per Session: 70 min   Stress: Stress Concern Present (2/15/2024)    [employer] - Occupational Stress Questionnaire     Feeling of Stress : To some extent   Housing Stability: Low Risk  (2/15/2024)    Housing Stability Vital Sign     Unable to Pay for Housing in the Last Year: No     Number of Places Lived in the Last Year: 1     Unstable Housing in the Last Year: No        Moody Rosas MD  Vascular Neurology  Office [phone]

## 2024-08-29 NOTE — PROGRESS NOTES
[facility]  [facility]     PATIENT: Sunni Lowe  VISIT DATE: 2024  MRN: [id number]  PRIMARY PROVIDER: Alison Fox DO  : 1950    Recent Clinical History:    Chief Complaint: Progressive Cognitive Impairment.    Clinical Interim: The patient presented with her . She is the primary historian, with additional history obtained from a review of previous medical records. The patient reports signs suggestive of cognitive impairment dating back to as early as , although the symptoms may have preceded this. According to the patient's , he has also noticed these symptoms, although he has not described them in any meaningful order. In late 2015, a 64-year-old female patient was referred to a neurologist at [facility] for cognitive issues, including forgetfulness, which she and her daughter had noticed over the past year. With a family history of Alzheimer's disease, her initial evaluation suggested possible mild cognitive impairment (MCI) or Alzheimer's disease. An MRI, blood tests, potential use of Aricept, formal neuropsychological testing, and a follow-up in one month were recommended. Her medical history includes asthma, allergic rhinitis, Cortes's esophagus, osteopenia, varicose veins, and a history of wrist and thumb fractures. She reported no known allergies, uses tobacco, consumes alcohol regularly, and drinks three cups of coffee daily. By late 2017, at 66 years old, the patient returned for a follow-up. Routine screenings and neuropsychological tests were within normal limits, and her cognitive deficits had not substantially progressed. A baseline Momence Cognitive Assessment (MoCA) score was 24 out of 30, and continued clinical follow-up was planned. The patient retired from the airline industry in . Around this time, her  reported that the patient was having more difficulty articulating her thoughts,  though he denied any overt aphasia. The symptoms described included issues with train of thought, working memory deficits, and difficulty thinking through tasks. In late February 2019, at age 68, the patient reported substantial cognitive improvement after retiring and reducing stress. Neuroprotective activities were discussed, and follow-up was recommended as needed. Her MoCA score was 15 out of 18, consistent with previous evaluations. Over the next two years, the family had difficulty reporting any significant progression of symptoms. Beginning in 2021, retrospectively, the family first began reporting concerns about increasing language deficits. These deficits were described as difficulty finding words and articulating thoughts. These deficits increased over the next three years, leading the family to seek reestablishment with a neurologist due to expressive aphasia. The patient, at her baseline, has a hearing deficit and a speech impediment, which she reports began sometime in her 50s to 60s. By late January 2024, at 73 years old, the patient presented with a two-year history of cognitive decline, including word-finding difficulties and forgetfulness, though she remained independent in daily activities. Her MoCA score had declined to 21 out of 30, and she reported non-threatening visual hallucinations. Potential treatment options, including lecanemab, were discussed, and she opted for p-tau testing. In late July 2024, the patient returned for a follow-up with her daughter. Her MRI showed elevated p-tau levels, and she was tolerating rivastigmine well. Despite some functional difficulties, particularly with conversation details and word-finding, she remained independent in her activities of daily living. Further neuropsychological testing and potential treatment options were discussed. In mid-August 2024, the patient underwent a neuropsychological evaluation. Her family expressed concerns about her cognitive  "decline over the past decade. Despite mild impairments in certain cognitive areas, she remained functionally independent. The assessment suggested a possible Alzheimer's disease-related neurocognitive disorder, and further testing and referral to a cerebral amyloid angiopathy clinic were recommended. By mid-September 2025, the patient's neurological exam was unremarkable, and diagnostic studies, including an MRI and lab results, were within normal limits. Further neuropsychological testing was scheduled, with follow-up plans dependent on those results. On presentation today, the patient reports a nine-year history of gradual progressive executive deficits consistent with train of thought and mild language issues. Over the last year, the patient reports new onset hallucinations. Initially, hallucinations were described as having been provoked by the use of Exelon pills; however, in retrospect, the patient begins to report visual hallucinations that occur often when her eyes are closed, regardless of medication. This occurred prior to starting Exelon but acutely worsened on Exelon tablets, resulting in an ER admission. Since switching to a patch, hallucinations have largely subsided, though they still occur "while my eyes are closed" in the evening. The hallucinations are described as watching a movie reel or a light white light that moves by her eyes, similar and reminiscent of akinetopsia. The patient reports understanding her diagnosis of cerebral amyloid angiopathy and Alzheimer's disease based on serum biomarkers and an amyloid PET scan. We discussed the diagnosis in context. The patient does not have a very strong relevant family history but does have a family history on her mother's side of late-life cognitive impairment and on her father's side of early-life hearing loss. On physical examination, there is evidence of mild parkinsonism, which, in the setting of hallucinations, raises concerns for a comorbid " alpha-synuclein process. We discussed available treatment opportunities and management of insomnia, for which the patient reports increasingly bothersome movement disorders and cognitive impairment.    Clinical Concerns:   Diagnostics: Evidence of parkinsonism on examination we will pursue skin biopsy  Education: None/Resolved  Cognitive Management: Optimized  Sleep/Insomnia: recommend referral to sleep medicine specialists since starting [facility]  Weight/Nutritional Concerns: None/Resolved  Behavioral Concerns: None/Resolved  Safety Concerns: None/Resolved      Relevant History of Baseline Neurocognitive Phenotype:    Developmental Milestones: The patient/family report no known birth complications or early life problems. The patient met all developmental milestones.  Learning Neurodivergence: The patient/family report no signs or symptoms suggestive of developmental learning disorder.  Educational History: HS. + 1 years +Business College certification.  Estimated Formal Educational Experience: 13  Career/Skill Reserve: He began his career as a  and later joined [employer], where he worked for 31 years. During his tenure, he achieved the esteemed position of . He retired at the age of 67.  Retired: 2018      Relevant Neurotrauma History:    History of Traumatic Brain Injury: 2020 - minor head trauma  History of Brain/Spine Surgery: No History of Iatrogenic Brain Injury or CNS surgery  History of Toxin Exposure/Substance Abuse: No History of Toxin Exposure or Clinically Relevant Substance Abuse  History of Malnutrition/Vitamin Deficiency: No History of Malnutrition or Clinically Relevant Vitamin Deficiency  History of Chronic Mood Disorder/Stress: No History of Clnically Relevant Chronic Mood Disorder or Stress  History of Chronic Inflammatory State: No History of CNS inflammation or Clinically Relevant Chronic Inflammatory State      Relevant Family History:    Relevant General  History:  Mother -  at age 58 ovarian cancer  Father -  at age 89, born with hearing issues and DM  Paternal Uncle born with hearing issues  Sister - hearing issues started 60s really bad DM  Paternal Grandfather - hearing NOS and DM  Son - hearing aides onset 30s alive 42  Relevant Neurocognitive Disorder History:  Mother - EOAD onset early 50s  58  Maternal Aunt - LOAD onset 70 alive 80s  Maternal Aunt - LOAD onset 70s  80s  Maternal Grandfather - LOAD possible?  Relevant Movement Disorder History:  The patient/family denies a history of PD, PDD, tremor.  Relevant Motor Neuron Disease History:  The patient/family denies a history of ALS, MND, PLS.  Relevant Developmental/Neurodivergent History:  Son - developmental learning disorder  Relevant Psychiatric History:  The patient/family denies a history of MDD, BD, SUREKHA, Schizophrenia.  Known Genetic Profile: There is no relevant genetic testing available on record.            Clinical History Per Electronic Medical Record:    Past Medical History  No History of Prior Medical Conditions on Chart  Past Surgical History  No History of Surgeries on Chart  Current Medication(s) Prior to Appointment  No Medications on Chart  Allergies  No Alergies on Chart            Review of cognitive, visuospatial, motor, sensory, and behavioral systems:     Memory  The patient's memory has worsened in the past few years.  The patient does not repeat statements or asks the same question repeatedly.  The patient does have difficulty remembering recent important conversations.  The patient does not have difficulty remembering recent events.  The patient does forget information within minutes.  The patient's recent retrograde memory is intact.  The patient's remote memory is intact.  Attention  The patient's attention and concentration are impaired.  The patient does not have attentional fluctuations.  The patient does have difficulty with selective attention.  The patient  does become easily distracted.  The patient does have difficulty with divided attention.  Executive  The patient's cognitive processing speed is slower.  The patient does have difficulty with working memory.  The patient does misplace personal items (e.g., keys, cell phone, wallet) more frequently.  The patient does have difficulty keeping track of @HIS@ medications.  The patient does have difficulty with planning/organizing/completing multistep tasks.  The patient does have not difficulty with executive attention.  The patient does have difficulty with flexible thinking.  The patient does not have difficulty with response inhibition.  The patient denies new impulsivity or rash/careless actions.  The patient's judgment is intact.  Language  The patient's speech is affected.  The patient does not forget people's names more frequently.  The patient does have word-finding difficulties.  The patient's speech is fluent and non-effortful.  The patient's speech is grammatically intact.  The patient does not make word substitutions.  The patient does not have difficulty reading.  The patient appears to have impaired comprehension.  Visuospatial  The patient does not have new visuospatial difficulty.  The patient does not become confused or disoriented in *new*, unfamiliar places.  The patient does not have trouble with navigation.  The patient does not get lost in familiar places.  The patient does not have visuospatial disorientation.  The patient does not have difficulty recognizing objects or faces.  The patient denies problems with driving or parking.  Motor/Coordination  The patient does not have difficulty with walking.  The patient does not feel imbalanced.  The patient denies having fallen.  The patient does not appear to have new muscle weakness.  The patient does not have difficulty buttoning shirts, operating zippers, or manipulating tools/utensils.  The patient's handwriting has not become micrographic.  The  patient does not have a resting tremor.  The patient denies having any new involuntary movements and/or muscle jerking.  The patient does not have swallowing difficulty.  The patient reports new muscle cramps and/or twitching.  Sensory  The patient denies new numbness, tingling, paresthesias, or pain.  The patient denies a loss of vision, blurry vision, or double vision.  The patient reports a recent loss of hearing and/or worsening tinnitus.  The patient denies anosmia.  Sleep  The patient reports difficulty sleeping.  The patient does not have difficulty going to sleep.  The patient reports difficulty staying asleep and/or frequently awakening at night.  The patient does snore and/or have witnessed apneas while sleeping.  When the patient wakes up in the morning, the patient does feel well-rested.  The patient denies dream-enactment behavior.  The patient denies symptoms suggestive of restless leg syndrome.  Behavior  The patient's personality has changed.  The patient does not have symptoms of disinhibition and social inappropriateness.  The patient does not have symptoms to suggest a loss of manners or decorum.  The patient does not appear apathetic or has decreased motivation.  The patient does not appear to have a change in inertia.  There is no report that the patient has had a change in their emotional expression.  The patient does not have emotional blunting or lability.  The patient does not have symptoms of irritability and mood lability.  The patient does not have symptoms of agitation, aggression, or violent outbursts.  The patient's insight into their health and situation is intact.  The patient's personal hygiene is intact.  The patient is not exhibiting a diminished response to other people's needs and feelings.  The patient is not exhibiting a diminished social interest, interrelatedness, or personal warmth.  The patient denies restlessness.  The patient denies new and/or worsening simple repetitive  behaviors.  The patient's speech has not become simplified or become repetitive/stereotyped.  The patient denies new/worsening complex repetitive/ritualistic compulsions and behaviors.  The patient does not have symptoms of hyper-religiosity or dogmatism.  The patient's interests/pleasures have not become restrictive, simplified, interrupting, or repetitive.  The patient denies a change of self-stimulating behavior.  The patient denies any changes in eating behavior.  The patient denies increased consumption of food or substances.  The patient denies oral exploration or consumption of inedible objects.  Psychiatric  The patient does not feel depressed.  The patient is not exhibiting symptoms of social withdrawal/indifference.  The patient denies anxiety.  The patient does not exhibit cycling behavior.  The patient does not exhibit hyperactive behavior.  The patient is not exhibited symptoms of paranoia.  The patient does not have delusions.  The patient does not have hallucinations.  The patient does not have a history of sensitivity to neuroleptic/psychotropic medications.  Medical Review of Systems  The patient does have constipation.  The patient does not have urinary incontinence.  The patient reports symptoms that are suggestive of orthostatic lightheadedness.  The patient's weight is stable.            Functional Capacity Assessment: Neurological Wellbeing, Intelligence, and Social Evaluation:     Instrumental Activities of Daily Living:   Communal Operations: Mild level of inability to perform independantly.  Finances: Normal level of functional independance.  Housework: Normal level of functional independance.  Personal Care: Borderline level of inability to perform independantly.  Personal Health: Normal level of functional independance.  Transportation: Mild level of inability to perform independantly.  Basic Activities of Daily Living:   Axial Motor: Normal level of functional independance.  Grooming:  Normal level of functional independance.  Hygeine: Normal level of functional independance.  Oropharngeal Motor: Normal level of functional independance.  Personal Health: Normal level of functional independance.  Toiletry: Normal level of functional independance.  Functional Capacity Scales:   [place] Instrumental Activities of Daily Living Scale: Score: 0/8 suggestive of Normal.  Functional Assessment Staging Tool (FAST Scale): Score: 0/15 suggestive of Normal (Stage 1).  [facility] Functional Assessment Scale (FAS): Score: 3/30 suggestive of Normal.  Clinical Dementia Rating Sum of Boxes: Score: 1.5/18 suggestive of Normal.            Neurological Examination:     Mental Status  The patient's appearance is normal (hygiene is appropriate; attire is proper and clean).  Throughout the interview, the patient is cooperative, the patient's eye contact is appropriate.  The patient behavior is appropriate to the clinical context without impropriety or improper language/conduct.  The patient is awake; The patient's energy level appeared normal.  The patient's attention/concentration is impaired.  The patient can complete three-step commands.  The patient's thought process is not logical or goal-oriented.  The patient demonstrated appropriate insight based on actions, awareness of the patient's illness, plans for the future.  The patient demonstrated good judgment based on actions and plans for the future.  Cranial Nerves  The patient showed no evidence of anosmia 3/3 (coffee/vanilla/cinnamon).  The patient's pupils were normal.  The patient's visual fields were full to confrontation in all quadrants.  The patient's ocular pursuit in the horizontal and vertical plane was complete.  The patient's saccadic initiation, velocity, and amplitude are normal.  The patient demonstrated no square-wave jerks.  The patient's eyelid assessment showed no apraxia. There was no eyelid dysfunction, retraction, or ann sign.  The patient blink  "rate was normal.  The patient's facial strength was normal.  The patient's facial expression was symmetric and appropriate to the context.  The patient's facial expression was normal without evidence of hypomimia.  The patient's facial sensation was intact to light touch bilaterally.  The patient's hearing was normal bilaterally.  The patient's oropharynx was non-obstructed with a Mallapati score 1/4. The soft palate elevates symmetrically.  The patient's uvula is mid-line.  The patient's tongue showed no evidence of scalloping.  The patient can protrude their tongue beyond The patient's lips for >10 sec.  The patient can move their extended tongue back and forth rapidly.  The patient's tongue showed no evidence of fasciculation or scalloping.  The patient's sternocleidomastoid and trapezius muscle strength was full bilaterally.  The patient had no significant evidence of anterocollis or retrocollis.  Speech/Language  The patient's speech was not completely fluent and non-effortful.  The patient's speech volume is within normal range and appropriate to the context.  The patient's speech rate is normal.  The patient's respirations are within normal range and appropriate to context.  The patient's speech timbre is normal.  The patient made articulation (segmental features) errors.  The patient has no speech dysdiadochokinesia with repetition of syllables such as "/PA/, /TA/, /KA/, /OM/".  The patient's pitch assessment showed normal range, intonation (Pitch Pattern), and variability.  The patient's tone was not emotionally limited, and tempo was rhythmic (e.g., "Once upon a midnight dreary, while I pondered, weak and weary, Over many a quaint and curious volume of forgotten elis" and "That government of the people, by the people, for the people, shall not perish from the earth").  The patient's speech is not dysarthric.  The patient showed evidence of anomia.  The patient showed evidence of anomia during spontaneous " "speech.  The patient showed evidence of anomia during confrontational naming.  The patient makes phonological loop errors.  The patient makes no errors during the repetition of gibberish words (e.g., "Supercalifragilisticexpialidocious," "Pigglywiggly," "Woospiedoo," "Zowzy," "Bazinga").  The patient makes no errors during the repetition of complex meaningless phrases (e.g., "The horse raced past the barn fell.", "The complex houses  and single soldiers and their families," "Wishes are hopping, and trees are west," and "Brushing liked to nanda aguilera's direction").  The patient has difficulty comprehending commands that cross the midline.  The patient has difficulty comprehending commands that depend on syntax.  The patient has difficulty comprehending syntactically complex sentences.  The patient's speech is not grammatically intact.  Motor  The patient's bilateral upper extremity muscle bulk is appropriate.  The patient's upper extremity muscle tone is increased.  The patient's bilateral upper extremity muscle tone does not suggest spasticity.  There is evidence of rigidity/cogwheeling.  The patient's bilateral upper extremity muscle tone has no evidence of paratonia.  There was no dystonia observed on examination.  Assessment of motor strength was symmetric and at minimal anti-gravity.  Deltoid L +5/5 R +5/5 Biceps L +5/5 R +5/5 Triceps L +5/5 R +5/5 Wrist extension L +5/5 R +5/5 Finger abduction L +5/5 R +5/5 Hip flexion L +5/5 R +5/5 Hip extension L +5/5 R +5/5 Knee flexion L +5/5 R +5/5 Knee extension L +5/5 R +5/5 Ankle flexion L +5/5 R +5/5 Ankle extension L +5/5 R +5/5  There is no pronator or downward drift.  There is no myoclonus observed in the patient bilateral upper and lower extremities.  There are no fasciculations observed in the patient bilateral upper and lower extremities.  Coordination  The patient has no bilateral upper extremity limb dysmetria or past pointing on finger-nose-finger " bilaterally.  The patient has no bilateral lower extremity limb dysmetria during shin rub.  The patient has no limb dysdiadochokinesia of the upper extremity on the pronation/supination test and screwing in a light bulb or lower extremity during tapping ball of each foot bilaterally.  The patient has no cerebellar rebound bilaterally.  The patient has no visible tremor.  The patient has no kinetic tremor bilaterally.  The patient has no postural tremor bilaterally.  The patient has no resting tremor bilaterally.  The patient has no evidence of interhemispheric motor control deficits.  The patient demonstrates evidence of motor overflow.  The patient demonstrates no alien limb phenomena.  The patient has no dyskinetic movements.  The patient has no akathisia.  The patient's upper extremity fine motor coordination was abnormal.  The patient's upper extremity fine motor coordination was not slow.  The patient's upper extremity fine motor coordination was not hypometric.  The patient's upper extremity fine motor coordination was not dysrhythmic.  Higher Cortical Function  The patient had no hemineglect (e.g., line bisection/Halle's test).  The patient showed no evidence of simultanagnosia (Navon hierarchical letters).  The patient showed no evidence of visuospatial constructional dysfunction.  The patient showed no evidence of angular gyrus disconnection (insular-operculum).  The patient has no evidence of dysgraphia.  The patient showed evidence of apraxia.  The patient showed no evidence of ideomotor apraxia performing tool-use pantomimes (e.g., use a hammer, use a screwdriver, use a comb, flip a coin, waving goodbye).  The patient showed no evidence of ideational apraxia (e.g., taking off and putting on shoes, folding paper into an envelope).  The patient showed evidence of limb-motor apraxia during mimicking complex bimanual hand shapes.  The patient showed no evidence of buccofacial apraxia (e.g., blow out a  candle, puff out cheeks, and whistle).  The patient showed no dysexecutive behavior.  Sensory  The patient's cortical sensory assessment demonstrated no neglect bilaterally.  The patient he had no astereognosis (paper clip, ring, dime) bilaterally in the palms.  The patient he had no agraphesthesia (drawing numbers) in the palms.  The patient's sensation was intact to light touch, and vibratory sense in the bilateral upper and lower extremities.  Reflexes  Reflexes were symmetric and 2+ at biceps, 2+ triceps, and 2+ brachioradialis, 2+ at the knees bilaterally, there was no cross-abductor sign, 2+ in the bilateral ankles.  There were no pathological reflexes; No Babinski, bilateral plantar toes go down, no Jaw Jerk Reflex, No Velasco, No Palmomental reflex, and No Palmar Grasp reflex.  There was no spreading/clonus.  Gait  The patient has normal posture sitting unaided.  The patient can arise from a chair and sit back down without using their arms.  The patient's gait was normal.  The patient's stance while walking is normal.  The patient's gait speed (6 meters) was normal (70-80 F 1.13 m/s M 1.26 m/s, >80 F 0.94 m/sec, M 0.97 m/sec).  The patient's arms swing is symmetric and of normal amplitude.  The patient takes turns in <4 steps.  When attempting to walk abnormally (heels, tiptoes, tandem), the patient makes no errors.  The patient has no evidence of posture/balance impairment.            Neurocognitive Assessment:     Question Score Interpretation   Memory   Registration T1 (3) 3/3 Within Normal Limits.   Registration T1 (5) 3 Within Normal Limits.   Registration T1 (9) 3/9 Mild Impairment: -1.6 STDs below the average score based on age and education.   Registration T2 (9) 4/9 Mild Impairment: -1.8 STDs below the average score based on age and education.   Registration T3 (9) 5/9 Mild Impairment: -1.9 STDs below the average score based on age and education.   Registration T4 (9) 5/9 Moderate Impairment: -2.5  STDs below the average score based on age and education.   Delayed Recall 30 sec (9) 5/9 Mild Impairment: -1.5 STDs below the average score based on age and education.   Delayed Recall 10 min (3) 3/3 Within Normal Limits.   Delayed Recall 10 min (5) 4/5 Borderline Impairment based on age and education.   Delayed Recall 10 min (9) 4/9 Mild Impairment: -1.1 STDs below the average score based on age and education.   Delayed Recall Cued (9) 5/9 Mild Impairment: -1.1 STDs below the average score based on age and education.   Executive   Three-step command 3/3 Within Normal Limits.   Serial Sevens 1/3 Mild Impairment based on age and education.   WORLD Backward 5/5 Within Normal Limits.   Digit Span Backwards 2 Severe Impairment: -3.2 STDs below the average score based on age and education.   Digit Span - 2 1/2 Mild Impairment based on age and education.   Lexical Fluency - F 10 Mild Impairment: -1.1 STDs below the average score based on age and education.   Lexical Fluency - A 9 Mild Impairment: -1.3 STDs below the average score based on age and education.   Lexical Fluency - S 8 Mild Impairment: -1.5 STDs below the average score based on age and education.   Semantic Fluency - Animals 12 Mild Impairment: -1.9 STDs below the average score based on age and education.   Trials-A 1/1 Within Normal Limits.   Trials-B 1/1 Within Normal Limits.   Trials-1 1/1 Within Normal Limits.   Theory of Mind Cartoon 1/1 Within Normal Limits.   Visuospatial   Clock Draw 2/3 Borderline Impairment based on age and education.   Complex Figure Copy 14/17 Borderline Impairment: -1 STDs below the average score based on age and education.   Overlapping Images 10/12 Borderline Impairment based on age and education.   Picture Synthesis 3/3 Within Normal Limits.   Noise Pareidolia Test 5/5 Within Normal Limits.   Language   Naming-2 2/2 Within Normal Limits.   Naming-3 3/3 Within Normal Limits.   15-Item BNT 12/15 Moderate Impairment: -2.7 STDs  below the average score based on age and education.   Repetition-1 1/1 Within Normal Limits.   Repetition-2 2/2 Within Normal Limits.   Repetition of Phrases 2/5 Mild Impairment based on age and education.   Verbal Agility 5/6 Borderline Impairment based on age and education.   Following written command 1/1 Within Normal Limits.   Writing a complete sentence 1/1 Within Normal Limits.   SYDBAT - Semantic Association 18/30 Borderline Impairment based on age and education.   Repeat & Point - Nonfluent  9/10 Borderline Impairment based on age and education.   Repeat & Point - Semantics 9/10 Borderline Impairment based on age and education.   Abstraction 2/2 Within Normal Limits.   Attention   Orientation-10 10/10 Within Normal Limits.   Orientation-6 6/6 Within Normal Limits.   Digit Span Forwards 5 Moderate Impairment: -2.1 STDs below the average score based on age and education.     Clinical Impression of Neurocognitive Assessment: Attention predominant multidomain major cognitive impairment.  Moderate Attention Impairment: The patient scored >2 standard deviations below the norm on at least one measure. Impairment appreciated in verbal STM  Mild Executive Impairment: The patient scored >2 standard deviations below the norm on at least one measure. Impairment appreciated in working memory, lexical fluency, semantic fluency  Mild Memory Impairment: The patient scored >2 standard deviations below the norm on at least one measure. Impairment appreciated in attention, encoding, immediate recall, recall, delayed recall, cued recall  Mild Language Impairment: The patient scored >2 standard deviations below the norm on at least one measure. Impairment appreciated in naming, phonological loop, agility, semantic association, speech apraxia, semantic  Mild Visuospatial Impairment: The patient scored >0.5 standard deviation below the norm on at least one measure. Impairment appreciated in visuospatial apraxia,  [facility]            Laboratories:     Metabolic Screening  Name Reference Previous Values   Cholesterol Total 120 - 199 mg/dL 191   2023-08-09   178   2024-01-30      Triglycerides 30 - 150 mg/dL 70   2023-08-09   77   2024-01-30      HDL 40 - 75 mg/dL 65   2023-08-09   47   2024-01-30      LDL Cholesterol 63.0 - 159.0 mg/dL 115.6   2023-08-09      HDL/Cholesterol Ratio 20.0 - 50.0 % 26.4   2023-08-09      Total Cholesterol/HDL Ratio 2.0 - 5.0 3.8   2023-08-09      Non-HDL Cholesterol mg/dL 131   2023-08-09      Hemoglobin A1C External 4.0 - 5.6 % 5.3   2023-08-09      Estimated Avg Glucose 68 - 131 mg/dL 105   2023-08-09      Hematology Screening - Serum  Name Reference Previous Values   Platelet Count 150 - 450 K/uL 166   2023-08-09      MPV 9.2 - 12.9 fL 10.7   2023-08-09      Neuroendocrine/Electrolyte Screening  Name Reference Previous Values   TSH 0.400 - 4.000 uIU/mL 0.921   2023-08-09   1.477   2024-01-30      Neuro-Nutritional Screening  Name Reference Previous Values   Vitamin B12 180 - 914 ng/L 619   2023-08-09      Folate 4.0 - 24.0 ng/mL 38.6 (H)   2023-08-09      Neurodegenerative Biomarkers - CSF  Name Reference Previous Values   Phospho-Tau (181P) <=21.6 pg/mL 1.87 (H)   2023-08-09                Neurological Relevant Procedures:    Electrocardiogram performed on 11/29/2019  Formal Interpretation: Vent. Rate : 065 BPM Atrial Rate : 065 BPM P-R Int : 150 ms QRS Dur : 074 ms QT Int : 412 ms P-R-T Axes : 065 018 040 degrees QTc Int : 428 ms  Provider Interpretation: The electrocardiogram (ECG) shows a normal sinus rhythm and is otherwise unremarkable.            Neurologically Relevant Imaging:    MRI brain/head without contrast performed on 08/26/2024  Radiologist Interpretation: o acute intracranial process. No advanced volume loss. Chronic ischemic changes as detailed above with potentially mild amyloid angiopathy with few scattered areas of chronic  microhemorrhage.  Provider Interpretation:  T1-weighted (T1W) Image Summary: Mild generalized cortical atrophy is observed, with a greater predominance in the posterior parietal region at later stages. Widening of the regional sulci is most notable in the parietal lobule. The corpus callosum appears intact. The midbrain and brainstem are also intact, displaying a normal volume ratio.  FLAIR/T2-weighted (T2W) Image Summary: The radiographic interpretation reveals minor scattered subcortical white matter changes, which are most prominent in the bilateral corona radiata, with a greater extent on the left side compared to the right. Juxtacortical hyperintensities are primarily observed in the right motor cortex, with a lesser degree on the left. These changes extend to the centrum semiovale. There are no large vessel infarcts noted, and no small lacunar infarcts are present in the basal ganglia.  Diffusion Weighted Imaging with ADC Mapping Summary: There are no significant hyperintensities or hypointensities observed on Diffusion-Weighted Imaging (DWI). Additionally, there is no apparent correlation with the Apparent Diffusion Coefficient (ADC).  Susceptibility-weighted imaging (SWI) and/or GRE Summary: The radiographic interpretation reveals the presence of multifocal cortical and subcortical microbleeds. Specifically, microbleeds have been identified in the left deep subcortical parietal lobe, the occipital lobe ventral to the dorsal occipital lobe, and the high dorsal parietal lobe. These findings collectively suggest the presence of microbleeds.  Interpretation Summary: The patient exhibits mild generalized cortical atrophy and multiple microbleeds, predominantly located in the left posterior hemisphere. These findings are collectively suggestive of cerebral amyloid angiopathy.  Brain Fluorodeoxyglucose-positron emission tomography performed on 08/22/2024  Radiologist Interpretation: Multifocal radiotracer uptake  throughout cerebral cortical gray matter with reduced gray-white contrast involving bilateral frontal, parietal, occipital, and temporal lobes.  Provider Interpretation:  Interpretation Summary: The scan indicates the presence of moderate to frequent amyloid neuritic plaque deposition.            Clinical Summary:     The patient is a 73-year-old with a relevant past medical history of hearing issues since childhood but using hearing aides since 50s who presents reporting a 10-year history of step-wise progressive neurocognitive impairment.  Neurobehavioral Review of Systems Interpretation: The review of systems reveals neurological and neurocognitive deficits in several domains, suggesting dysfunctions in specific cortical and subcortical regions. General memory, attention, and processing speed issues indicate potential impairments in the prefrontal cortex and its associated networks. Difficulties with conversations, word-finding, and comprehension point to possible disruptions in the language networks involving the temporal and frontal lobes. Additionally, sleep disturbances, personality changes, and autonomic symptoms such as hypotension and constipation may correlate with broader network pathologies affecting both cortical and subcortical structures, including the brainstem and hypothalamus.  Neurological Examination Interpretation: The neurological examination revealed deficits in attention, thought processing, language fluency, articulation, and motor functions, indicating both cortical and subcortical dysfunctions. Impaired comprehension of complex commands and syntax, along with apraxia and motor abnormalities like bradykinesia, hypometria, and dysrhythmia, suggest involvement of the frontal and parietal lobes, as well as the basal ganglia and associated motor networks. Motor overflow and tone abnormalities further implicate disruptions in motor planning and execution pathways. These findings collectively  point towards network pathologies affecting both higher-order cognitive processes and coordinated motor control.  Neurocognitive Evaluation Interpretation: Attention predominant multidomain major cognitive impairment.  Moderate Attention Impairment: The patient scored >2 standard deviations below the norm on at least one measure. Impairment appreciated in verbal STM  Mild Executive Impairment: The patient scored >2 standard deviations below the norm on at least one measure. Impairment appreciated in working memory, lexical fluency, semantic fluency  Mild Memory Impairment: The patient scored >2 standard deviations below the norm on at least one measure. Impairment appreciated in attention, encoding, immediate recall, recall, delayed recall, cued recall  Mild Language Impairment: The patient scored >2 standard deviations below the norm on at least one measure. Impairment appreciated in naming, phonological loop, agility, semantic association, speech apraxia, semantic  Mild Visuospatial Impairment: The patient scored >0.5 standard deviation below the norm on at least one measure. Impairment appreciated in visuospatial apraxia, simultanagnosia  MMSE 29/30: Score suggestive of normal cognitive function to borderline cognitive impairment.  MOCA 22/30: Score suggestive of mild cognitive impairment.  Neurological Imaging Summary:  MRI brain/head without contrast performed on 08/26/2024  Radiologist Interpretation: o acute intracranial process. No advanced volume loss. Chronic ischemic changes as detailed above with potentially mild amyloid angiopathy with few scattered areas of chronic microhemorrhage.  Provider Interpretation:  Interpretation Summary: The patient exhibits mild generalized cortical atrophy and multiple microbleeds, predominantly located in the left posterior hemisphere. These findings are collectively suggestive of cerebral amyloid angiopathy.  Brain Fluorodeoxyglucose-positron emission tomography  performed on 08/22/2024  Radiologist Interpretation: Multifocal radiotracer uptake throughout cerebral cortical gray matter with reduced gray-white contrast involving bilateral frontal, parietal, occipital, and temporal lobes.  Provider Interpretation:  Interpretation Summary: The scan indicates the presence of moderate to frequent amyloid neuritic plaque deposition.            Assessment:     The patient's clinical presentation is best described as Non-amnestic (Executive / Judgement) predominant multidomain Mild Cognitive Impairment.    The stage of the patient's clinical presentation meets the criteria for Mild Cognitive Impairment (MCI) as defined by the [employer] [place] [employer]-Alzheimer's [employer](Zachariah et al., 2011, Alzheimer's & Dementia). This diagnosis is characterized by concerns regarding an intraindividual change in cognition, impairment in one or more cognitive domains, and preservation of independence in functional abilities. Notably, the patient is not demented.   [place] Instrumental Activities of Daily Living Scale: 0/8 suggestive of Normal.  Functional Assessment Staging Tool (FAST Scale): 0/15 suggestive of Normal (Stage 1).  Clinical Dementia Rating Sum of Boxes (CDR-SOB): 1.5/18 suggestive of Normal.  [facility] Functional Assessment Scale (FAS): 3/30 suggestive of Normal.     The patient's clinical syndromic presentation is consistent with early signs of Alzheimer's Dementia (Faith et al., 2011).   Insidious onset over months to years.   Clear-cut history of worsening cognition by report or observation.   Executive dysfunction: impaired reasoning, judgment, and problem-solving, deficits in other domains should be present.    Currently, neurodegenerative diseases can only be diagnosed with 100% certainty through a brain autopsy. The suspected neuropathology underlying the patient's neurocognitive impairment is indicative of Alzheimer's Disease-Related Pathology (ADRP) and Vascular  Contributions to Cognitive Impairment and Dementia (VCID).    Elevated neurofilament light chain and elevated phospho related tau 181  There are no dermatological protein biomarkers available on record.  There is no relevant genetic biomarkers available on record.  Amyloid PET scan positive for Alzheimer's disease related plaque              Impression:     The patient presents with a 9-year history of gradual, progressive, multi-domain mild cognitive impairment. Early neurocognitive deficits were suggestive of amnesia, with a MoCA score of 24/30, which gradually declined over the next two years but has shown mild improvement over the past year. Serial neurocognitive assessments indicate an interval worsening of the neurocognitive disorder. Physical examination reveals asymmetric parkinsonism. Over the past year, there has been the new onset of mild hallucinations, best described as akinetopsia. Brain imaging shows multifocal microbleeds consistent with cerebral amyloid angiopathy. Serum and brain scan biomarkers are consistent with Alzheimer's disease and cerebral amyloid angiopathy. We discussed the diagnosis of mild cognitive impairment due to Alzheimer's disease with comorbid cerebral amyloid angiopathy. 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 We also discussed the value of additional biomarkers in the form of a skin biopsy. We recommend initially focusing on improving sleep hygiene, starting Belsomra, and confirming the absence of obstructive sleep apnea. The patient is a candidate for clinical trials, and we provided them with reading materials for further information.    The above observations were discussed with the patient and their supporting historian(s). We have discussed the additional diagnostic(s) and/or management below.            Care Management Plan:       Optimize Neurocognitive  Impairment and Quality  We have discussed the MIND Diet and other lifestyle behaviors that may help maintain brain health.  We have provided written/digital reading material.  Continue rivastigmine 9.5 mg patch for now may consider increasing higher following stabilization of sleep  Optimize Behavioral Management and Quality  No indication for the use of memantine at this time  Optimize Sleep Hygiene and Quality  We discussed and recommended additional diagnostic/management of sleep disorder to optimize brain health and longevity.  We have placed referrals to sleep medicine due to signs and symptoms suggestive of sleep apnea.  Start Belsomra 10 mg at night.  Optimize Cerebrovascular Health.  The patient has a documented history of hyperlipidemia and/or hypercholesteremia with long-term complications such as cerebrovascular disease, peripheral vascular disease, and/or aortic atherosclerosis. Collectively these risk factors may contribute to cerebral atherosclerosis, and cerebral hypoperfusion compounded neurocognitive disorder. We discussed maximizing cerebrovascular-related medical therapy, including but not limited to cholesterol medications and antiplatelet agents. We have discussed the value of aggressively controlling vascular risk factors like hypertension, hyperlipidemia, and Diabetes SBP<130, LDL<100, and A1C<7.0. We discussed the need to optimize lifestyle choices, including a heart-healthy diet (e.g., Mediterranean or DASH), increased cardiovascular exercise (goal 150 minutes of moderate-intensity per week), and staying cognitively and socially active.      Thank you for entrusting us with the care of your patient. Should you have any questions or concerns, please feel free to contact us at your convenience.     It was a pleasure to see the patient, and we look forward to their follow-up visit.     This note was dictated using the M*Modal Fluency Direct voice recognition program. Please be aware that word  recognition errors may occasionally occur and might be overlooked during review.                    Billing Statement:       I spent a total of 120 minutes (from 02:30 PM to 04:30 PM) on 08/26/2024, engaging in person in a face-to-face consultation with the patient. More than 50% of this time was devoted to counseling on symptoms, treatment plans, risks, therapeutic options, lifestyle modifications, and safety concerns related to the above diagnoses.     A Review of Systems was completed, encompassing 10 of the 14 systems. All findings were negative, except those noted in the History of Present Illness (HPI) and Review of Systems (ROS) Sections. The systems reviewed were Constitutional (Const), Eyes, Ear/Nose/Throat (ENT), Respiratory (Resp), Cardiovascular (CV), Gastrointestinal (GI), Genitourinary (), Musculoskeletal (MSK), Skin, and Neurological (Neuro).     I spent a total of 5 minutes to reviewing previous laboratory results on the day of the clinical evaluation. This review was an integral part of the face-to-face encounter. The laboratory findings were predominantly negative, with exceptions as noted in the History of Present Illness (HPI) and Assessment.     I spent a total of 5 minutes to reviewing previous diagnostic tests on the day of the clinical evaluation. This activity was directly associated with the face-to-face encounter. The findings from these diagnostic tests were generally within normal limits, with any exceptions as noted in the History of Present Illness (HPI) and Assessment.     I performed a neurobehavioral status examination on the day of clinical evaluation that included a clinical assessment of thinking, reasoning, and judgment to ensure a comprehensive approach in managing the complex and evolving needs of the patient's neurocognitive condition. Please see above HPI and ROS for full details. This exam was performed on the day of clinical evaluation and included 36 minutes spent on  direct face-to-face clinical observation and interview with the patient and 14 minutes spent interpreting test results and preparing the report. The total time of 36 minutes spent on the neurobehavioral status examination is not included in the time spent on evaluation and management coding.     Total Billing time spent on encounter/documentation for this patient's evaluation and management, not including the neurobehavioral status examination: 116 minutes.

## 2024-08-30 RX ORDER — SUVOREXANT 10 MG/1
1 TABLET, FILM COATED ORAL NIGHTLY
Qty: 30 TABLET | Refills: 3 | Status: SHIPPED | OUTPATIENT
Start: 2024-08-30

## 2024-08-31 ENCOUNTER — TELEPHONE (OUTPATIENT)
Dept: NEUROLOGY | Facility: CLINIC | Age: 74
End: 2024-08-31
Payer: MEDICARE

## 2024-08-31 NOTE — TELEPHONE ENCOUNTER
----- Message from Wu Aldrich MD sent at 8/30/2024  4:27 PM CDT -----  Please assist the patient with scheduling of skin biopsy

## 2024-08-31 NOTE — TELEPHONE ENCOUNTER
Called & spoke with patient. Assisted with scheduling skin biopsy. Patient is scheduled on 9/30/2024 at 2:30pm.    Patient is informed about PA processing.

## 2024-09-09 ENCOUNTER — PATIENT MESSAGE (OUTPATIENT)
Dept: NEUROLOGY | Facility: CLINIC | Age: 74
End: 2024-09-09
Payer: MEDICARE

## 2024-09-10 ENCOUNTER — TELEPHONE (OUTPATIENT)
Dept: NEUROLOGY | Facility: CLINIC | Age: 74
End: 2024-09-10
Payer: MEDICARE

## 2024-09-10 NOTE — TELEPHONE ENCOUNTER
"Called pt and let her know that her procedure is taking a little longer to get PA approved, she said "thank you for keeping me in the loop" and all is well at the moment. She is aware that we may need to postpone the 9/30 appt depending on approval   "

## 2024-09-17 ENCOUNTER — OFFICE VISIT (OUTPATIENT)
Dept: NEUROLOGY | Facility: CLINIC | Age: 74
End: 2024-09-17
Payer: MEDICARE

## 2024-09-17 DIAGNOSIS — R41.89 SUBJECTIVE MEMORY COMPLAINTS: ICD-10-CM

## 2024-09-17 DIAGNOSIS — G31.84 MILD COGNITIVE IMPAIRMENT: Primary | ICD-10-CM

## 2024-09-17 PROCEDURE — 99999 PR PBB SHADOW E&M-EST. PATIENT-LVL I: CPT | Mod: PBBFAC,,,

## 2024-09-17 PROCEDURE — 96139 PSYCL/NRPSYC TST TECH EA: CPT | Mod: S$GLB,,, | Performed by: PSYCHIATRY & NEUROLOGY

## 2024-09-17 PROCEDURE — 96138 PSYCL/NRPSYC TECH 1ST: CPT | Mod: S$GLB,,, | Performed by: PSYCHIATRY & NEUROLOGY

## 2024-09-17 PROCEDURE — 96132 NRPSYC TST EVAL PHYS/QHP 1ST: CPT | Mod: S$GLB,,, | Performed by: PSYCHIATRY & NEUROLOGY

## 2024-09-17 PROCEDURE — 99499 UNLISTED E&M SERVICE: CPT | Mod: S$GLB,,, | Performed by: PSYCHIATRY & NEUROLOGY

## 2024-09-17 PROCEDURE — 96133 NRPSYC TST EVAL PHYS/QHP EA: CPT | Mod: S$GLB,,, | Performed by: PSYCHIATRY & NEUROLOGY

## 2024-09-17 NOTE — PROGRESS NOTES
NEUROPSYCHOLOGY CONSULT   Referral Information  Name: Sunni Lowe  MRN: [id number]  : 1950  Age: 73 y.o.  Race: White  Gender: female  REFERRAL SOURCE: Emma Flynn MD   DATE CONDUCTED: 2024  SOURCES OF INFORMATION:  The following was gathered from a clinical interview with Ms. Sunni Lowe, a separate interview with her daughter Trudy, and review of the available medical records. Ms. Lowe expressed an understanding of the purpose of the evaluation and consented to all procedures. Total licensed billing psychologists professional time including clinical interview, test administration and interpretation of tests administered by the billing psychologist, integration of test results and other clinical data, preparing the final report, and personally reporting results to the patient   [place] - 60 minutes (2024), [id number] - 120 minutes (2024), [id number] - 247 minutes (2024)    NEUROPSYCHOLOGICAL EVALUATION - CONFIDENTIAL    SUMMARY/TREATMENT PLAN   Ms. Lowe is a 73 year old female with self and family reported progressive cognitive decline over the past decade. She underwent neuropsychological testing in , with mild weaknesses noted at that time, but not at the level to warrant a cognitive diagnosis. Currently, Ms. Lowe remains functionally independent, but is more reliant on compensatory mechanisms. Her daughter feels that her symptom progression has been very mild over the past decade, noting that she still seems very functional. Memory loss and word finding difficulty are her primary symptoms. Recent evaluations by behavioral and vascular neurology indicated probable cerebral amyloid angiopathy (CAA), Alzheimer's disease, and possible comorbid alpha-synucleinopathy given asymmetric parkinsonism on exam with recent visual hallucinations.     Current functioning and neuropsychological test results are at the level of mild cognitive impairment (MCI).  Executive dysfunction was her predominant cognitive phenotype. Fine motor abilities were reduced bilaterally, but especially in her right hand. Naming was WNL. Spelling was in the average range, but at the low end. Will continue to monitor for cognitive and functional progression.     Diagnoses  Problem List Items Addressed This Visit          Neuro    Mild cognitive impairment - Primary    Overview     Follows with neurology          Current Assessment & Plan     Daily Functioning: May benefit from family oversight in complex ADLs to ensure accuracy. Continue to use compensatory mechanisms.    Neuropsychiatric Symptoms: Visual hallucinations, sleep disruption, and anxiety may be points of intervention.     Neuropsychology Follow-up: Interval evaluation may be indicated if/when she experiences progressive decline that impacts her daily functioning/independence.           Other Visit Diagnoses       Subjective memory complaints               Ms. Lowe will be provided the results of the evaluation.     Thank you for allowing me to participate in Ms. Hook care.  If you have any questions, please contact me at [phone].    Darrell Marie Psy.D., ABPP  Board Certified in Clinical Neuropsychology  Department of Neurology    HISTORY OF PRESENT ILLNESS: Ms. Sunni Lowe is a 73 y.o., right-handed, female with 12 years of education who was referred for a neuropsychological evaluation in the setting of self and family reported cognitive dysfunction.     10/2015 HPI (DR. BLACKMAN): referred for Neuropsychological Evaluation on an outpatient basis due to possible memory decline for the past year. Her daughter and  are more concerned than she is. Family history is significant for with mother and maternal aunt with Alzheimers disease, which is part of why her pharmacist daughter wanted her to be evaluated. Mrs. Lowe reported she thinks she just does not concentrate well, and her  says she does not  concentrate enough. She also thinks stress may be playing a role. She noted several current stressors including moving to a new home, difficulty selling old home, father with lung and chest cancer and a history of tongue cancer, and daughter with a new baby. She did report that she notices that when asked a question point blank or when put on the spot to come up with an answer, she has to think a few minutes before the answer comes to her. There was one instance when she agreed to babysit when she was driving but did not recall the specific date she had agreed to. At work, she has to think a few minutes to recall why a flight was delayed a minute or two. One time she could not recall the work dustpan. She reported that she uses a day-timer calendar to remind herself of things; forgets what she reads when reading a complex piece of fiction; is electronically challenged and has some difficulty operating her s computer or the TV remote at times; and loses her train of thought in conversation. She reported she drives, works full-time, and manages her medications, fiancés, and household without difficulty. She reported her memory does not seem to be getting worse over time. No precipitant could be identified. Mrs. Lowe saw a psychologist and a psychiatrist many years ago in the context of marital problems. She has not sought or needed psychiatric care since that divorce any years ago. She denied current adjustment problems. She was very pleasant and cooperative in interview.     10/2015 NEUROPSYCH RESULTS (DR. BLACKMAN): Neuropsychological testing revealed mild impairment in attention, visuospatial perception, immediate visual memory, and abstract reasoning; and variability in verbal fluency (performances in the average and mildly impaired ranges). Immediate and delayed auditory/verbal memory and delayed visual memory were intact. The deficits identified are mild, and it is not clear whether they reflect a  "decline from a prior level of ability or whether they reflect long standing premorbid weaknesses. The pattern of deficits is not consistent with any specific disorder. The impairment present does not rise to the level of mild cognitive impairment. Mrs. Lowe should be monitored for future decline considering her significant family history and the current presence of some cognitive deficits, with future testing if indicated. The current data may serve as a baseline of her cognitive functioning.     8/2024 BEHAVIORAL NEUROLOGY E-CONSULT (DR. QUINN): Clinical presentation is consistent with Alzheimer's disease related neurocognitive disorder. Elevated P tau 181 suggests Alzheimer's disease however does not confirm presence of. Recent MRI brain does show evidence of micro bleed pattern suggestive of comorbid cerebral amyloid angiopathy. Given evidence of multiple microbleeds patient would not be considered a candidate for anti amyloid therapy. Patient would however be a candidate for cerebral amyloid angiopathy clinic given mixed micro bleed with Alzheimer's disease pattern. Recommend amyloid PET scan and referral to CAA clinic.     8/2024 INTERVAL HISTORY:  -Ms. Lowe indicated that both she and her family are worried about her cognition.   -She described her  as "negative," noting that he will make comments such as "I know you won't remember."  -When asked about onset, she noted first seeing a neurologist about 10 years ago in the setting of a family history of Alzheimer's disease.   -She is assuming that she's had some progression over the past 10 years.   -She reported short-term memory difficulty, suspecting she will have trouble on memory tests. She indicated that she is familiar with the testing process, having undergone brief testing with neurology and at annual wellness visits.   -She notices losing her train her thought, in conversation or when she walks into a room.   -She denied loss of episodic " "memory for recent events. For instance, she indicated that she had blood work yesterday, this testing today, PCP appointment tomorrow, and neuroimaging on Thursday (all of which was accurate).   -She reported word finding difficulty, feeling as though she can "see it in my head, but can't say it."  -She endorsed changes in spelling.     -She provided consent to speak with her daughter, Trudy.   -Overall, Trudy doesn't feel there is a significant concern about her mother's cognition or functioning, but she and the family want to get ahead of any issues that can be addressed.   -Trudy reported very slow progression since the 2015 evaluation.   -Memory is her primary weakness. She is reliant on notetaking/keeping a calendar. For instance, she can remember that one of her grandchildren has an upcoming event, but she might not remember which grandchild and at what time.   -She is not sure if this difficulty is related to inattention. For instance, she might cut off someone in the middle of them answering a question for her. Times when she is wrapping up the conversation even when someone is still talking.   -She notices word finding difficulty, but does not feel that it is significant. For instance, she is typically able to find an alternative word and still make her point.     8/2024 CAA CLINIC:  -Per Dr. Aldrich: "The patient presents with a 9-year history of gradual, progressive, multi-domain mild cognitive impairment. Early neurocognitive deficits were suggestive of amnesia, with a MoCA score of 24/30, which gradually declined over the next two years but has shown mild improvement over the past year. Serial neurocognitive assessments indicate an interval worsening of the neurocognitive disorder. Physical examination reveals asymmetric parkinsonism. Over the past year, there has been the new onset of mild hallucinations, best described as akinetopsia. Brain imaging shows multifocal microbleeds consistent with cerebral amyloid " "angiopathy. Serum and brain scan biomarkers are consistent with Alzheimer's disease and cerebral amyloid angiopathy. We discussed the diagnosis of mild cognitive impairment due to Alzheimer's disease with comorbid cerebral amyloid angiopathy. 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 We also discussed the value of additional biomarkers in the form of a skin biopsy. We recommend initially focusing on improving sleep hygiene, starting Belsomra, and confirming the absence of obstructive sleep apnea. The patient is a candidate for clinical trials, and we provided them with reading materials for further information."  -Probable CAA per Dr. Rosas: "Even without a prior symptomatic lobar intracerebral hemorrhage (sICH), the presence of lobar cerebral microbleeds in CAA patients is linked to an estimated 5% annual risk of sICH. Although isolated lobar cerebral microbleeds (CMBs) in CAA are considered a mild to moderate risk marker compared to others, this should still be taken into account when evaluating the risks and benefits of antithrombotic regimens and other management strategies."     Neuropsychiatric Symptoms:  Hallucinations: Endorsed, at night, estimated over the past several years. She has seen people at night and other odd items. For instance, she saw boxes that looked liked wire cages moving towards her. She had to leave the room since she was so scared. It has looked like the wall of her bedroom turned red. She feels that hallucinations have improved since starting Exelon patch. When they do happen, she feels that they more quickly resolve when she closes/open her eyes.   Depression/Labile Mood: Denied, she tries to maintain a positive outlook, although indicated that she is going through this process for diagnostic clarity. She denied active SI, plan, or intent.   Anxiety: Endorsed    DAILY " "FUNCTIONING:  BASIC ADLS:  Feeding: independent, she denied anosmia.   Dressing: independent, no issues putting her clothes on correctly.   Bathing: independent    IADLS:  Support System: Resides with . Daughter, Trudy, is a clinical pharmacist and a good source of support.   Appointment Management: independent, she manages with no reported problems.   Medication Compliance: independent, she fills 2 weekly pillboxes and reported strong adherence.   Financial Management: independent, she manages with no reported problems.   Cooking:  is the primary cook. No issues when she does cook.   Driving: She continues to drive with no reported problems. She denied recent MVCs. She has no trouble navigating to very familiar places and will use GPS to less familiar locations.     BRAIN HEALTH RISK FACTORS:  Hearing Loss: Endorsed, hearing aids.   Falls: Denied. Nothing recent, but she fell and hit her head about 3-5 years ago.   Sleep: Sleep maintenance is an issue. She can wake up every 2-3 hours, sometimes due to nocturia. She typically takes a nap after going to the gym. She is not aware of her  commenting on dream enactment behavior.      MEDICAL HISTORY: Ms. Lowe  has a past medical history of Allergic rhinitis, Asthma, Fibrocystic breast, Osteopenia, and Varicose veins.    NEUROIMAGIN2024 Brain MRI: "No acute intracranial process. No advanced volume loss. Chronic ischemic changes as detailed above with potentially mild amyloid angiopathy with few scattered areas of chronic microhemorrhage."    2024 NM PET CT Florbetapir F18(Brain Beta-amyloid Plaque): "Positive scan indicating moderate to frequent amyloid neuritic plaque deposition."    SUBSTANCE USE: Ms. Lowe  reports that she quit smoking about 43 years ago. Her smoking use included cigarettes. She started smoking about 53 years ago. She has a 10 pack-year smoking history. She has been exposed to tobacco smoke. She has never used " smokeless tobacco. She used to drink 1-2 glasses of red wine per night. She now drinks a smaller glass each night per the recommendation of her neurologist.     CURRENT MEDICATIONS:    Current Outpatient Medications:     calcium carbonate (OS-ISABELLE) 600 mg (1,500 mg) Tab, Take 1,200 mg by mouth once daily., Disp: , Rfl:     cholecalciferol, vitamin D3, (VITAMIN D3) 10,000 unit Cap, Take 10,000 Units by mouth once daily. , Disp: , Rfl:     collagen, bovine, 100 % Powd, Take 1 Scoop by mouth Daily. Puts 1 scoop in coffee in am daily, Disp: , Rfl:     CYANOCOBALAMIN, VITAMIN B-12, (VITAMIN B-12 ORAL), Take by mouth., Disp: , Rfl:     ELDERBERRY FRUIT ORAL, Take 1 tablet by mouth every other day., Disp: , Rfl:     esomeprazole (NEXIUM) 20 MG capsule, Take 1 capsule (20 mg total) by mouth before breakfast., Disp: 90 capsule, Rfl: 3    fish oil-omega-3 fatty acids 300-1,000 mg capsule, Take 600 mg by mouth once daily., Disp: , Rfl:     fluticasone-salmeterol diskus inhaler 250-50 mcg, Inhale 1 puff into the lungs once daily. Controller, Disp: 120 each, Rfl: 1    influenza, adjuvanted, (FLUAD TRIV 2024-25,65Y UP,,PF,) 45 mcg (15 mcg x 3)/0.5 mL IM vaccine (> or = 64 yo), Inject 0.5 mLs into the muscle once. for 1 dose, Disp: 0.5 mL, Rfl: 0    psyllium husk 0.4 gram Cap, Take 1 capsule by mouth once daily., Disp: , Rfl:     rivastigmine (EXELON PATCH) 9.5 mg/24 hour PT24, Place 1 patch onto the skin once daily., Disp: 30 patch, Rfl: 5    suvorexant (BELSOMRA) 10 mg Tab, Take 1 tablet by mouth every evening., Disp: 30 tablet, Rfl: 3     FAMILY HISTORY: family history includes Alzheimer's disease in her maternal aunt and mother; Cancer (age of onset: 85) in her father; Diabetes in her father; No Known Problems in her brother, daughter, sister, sister, and son; Ovarian cancer (age of onset: 63) in her mother.    PSYCHOSOCIAL HISTORY:   Education:   Level Attained: High school graduate. Business certification.    Learning  Difficulties: Felt that she began struggling academically around 5th grade which persisted into high school.    Repeated Grade: Denied     Vocation:   Highest Attained: She worked for [employer] for 31 years, highest position was .     Retired: 67    Relationship Status:   : Yes, 18 years   : Yes, 1x   Children: 2    MENTAL STATUS AND OBSERVATIONS:  APPEARANCE: Appropriately dressed/groomed.   ALERTNESS/ORIENTATION: Attentive and alert. Elaborative. Seemed to demonstrate intact episodic memory for recent events. She recalled her recent and upcoming medical appointments. She remembered Dr. Flynn and Dr. Middleton' names from memory.   GAIT/MOTOR: Ambulated independently.   SENSORY: Corrective lenses. Hearing aids.   SPEECH/LANGUAGE: Receptive language was grossly intact. Word finding difficulty was noted in conversation, which seemed to make her overly elaborative/inefficient. Mildly halting.   STATED MOOD/AFFECT: Mood was euthymic.   INTERPERSONAL BEHAVIOR: Rapport was quickly and easily established   THOUGHT PROCESSES: Thoughts seemed logical and goal-directed.   BEHAVIORAL OBSERVATIONS: Scores on standalone and embedded PVTs were WNL. Amplifier used to supplement hearing aids due to difficulty hearing the psychometrist. She worked at a slow pace. She required frequent repetition and occasional clarification of test instructions. She would often have a negative reaction to the instructions before the test began, frequently commenting that the test would be something that she wasn't good at. She would interrupt her performance/thought process to comment that she wasn't good at a task due to her level of education. Scores appear to be a valid/reliable measure of her current cognitive abilities.     APPENDIX/TEST RESULTS:  TESTS ADMINISTERED:  Clinical Interview and Review of Records, MSVT, Erik Cognitive Assessment (MoCA), Spelling subtest from the WRAT-5, selected subtests from  the Wechsler Adult Intelligence Scale - 4th edition (WAIS-IV), Magen Auditory Verbal Learning Test (RAVLT, [place] Norms), Logical Memory subtest from the WMS-IV, Naming subtest from the NAB, Controlled Oral Word Association Test ([facility]), Animal Fluency ([facility]), Trailmaking Test ([facility]), Magen Complex Figure (Copy Trial), Grooved Pegboard Test ([facility]), [place] Card Sorting Test - 64 card version (WCST-64), Generalized Anxiety Disorder - 7 (SUREKHA-7), and the Alex Depression Scale - 2nd edition (BDI-2).     Score Label T-Score Standard Score Z-Score Scaled Score %ile Rank   Exceptionally High > 70 > 130 > 2.0  > 16 > 98   Above Average 64-69 120-129 1.4-1.9 15 91-97   High Average 57-63 110-119 0.7-1.3 12-14 75-90   Average 44-56  0.6 to -0.6 8-11 25-74   Low Average 37-43 80-89 -1.3 to -0.7 6-7 9-24   Below Average 30-36 70-79 -2.0 to -1.4 4-5 2-8   Exceptionally Low < 30 < 70  < -2.0 < 4 < 2      Mental Status: Fully oriented to time and place.   9/2024 MoCA = 19/30     Pre-morbid/Baseline: Estimated to be in the low average/average range.     Language: Single word spelling was average.  Single word reading was low average.  Average letter verbal fluency.  Below average semantic verbal fluency.  Average confrontation naming.     Visuospatial: She did not draw the outside contour of a clock face. All the numbers were included and in the correct location.  The clock hands were placed at the designated time, but were not the correct length or correctly oriented.  For instance, she justine an arrow for the minute hand that was pointed towards the middle of the clock rather than towards the outside. She correctly oriented the arrow for the hour hand.  Inefficient approach to her copy of a complex figure as she seemed to work from right to left and in a piecemeal fashion.  She used over 8 minutes to complete the drawing.  She seemed to attempt to correct several errors.  Overall her copy  demonstrated a largely intact appreciation for the gestalt of the figure, although with possible minimal to mild visual-spatial dysfunction.     Learning/Memory: Overall encoding of a supraspan word list was average as she recalled 7, 8, 8, 8, and 9 of 15 words across the learning trials.  She then encoded 3 words from a 2nd, distractor list.  She freely recalled 7 words following exposure to the distractor list.  She freely recalled 6 words following a long delay.  Recognition was exceptionally low as she correctly identified 10/15 target words, but had 22 false-positive errors  She encoded 5/5 words after 2 trials on the [place], freely recalling 3 words following a brief delay.  She recalled the remaining 2 words with categorical cuing.  Overall encoding of two short stories was high average.  Retention was strongly intact following a long delay and her overall recall was high average.  Responses to yes/no questions pertaining to the stories was high average.     Executive Functioning: One trial learning/encoding was WNL.  She provided 4/5 correct responses on a serial 7 subtraction task.  Low-average performance on tasks of working memory.  Low-average performance on tasks of processing speed.  Average performance on a task of conceptual/abstract reasoning.  Below average performance on a task requiring her to maintain a complex set.     Motor: Fine motor abilities were exceptionally low for her dominant right hand and low average for her non dominant left hand.     Mood: She endorsed a mild degree of clinical anxiety.  She did not endorsed clinical depression.      Raw Score Type of Standardized Score Standardized Score Percentile/CP   MSVT  - - -   MSVT  - - -   MSVT Cons 100 - - -   MSVT PA 90 - - -   MSVT FR 60 - - -   ACS LM II Rec 21 - - -   ACS RDS 10 - - -   PREMORBID FUNCTIONING Raw Score Type of Standardized Score Standardized Score Percentile/CP   TOPF simple dem. eFSIQ -  50   TOPF  pred. eFSIQ - SS 90 25   TOPF simple + pred. eFSIQ - SS 94 34   INTELLECTUAL FUNCTIONING Raw Score Type of Standardized Score Standardized Score Percentile/CP   WAIS-IV       WMI - SS 89 23   PSI - SS 86 18   Similarities 22 ss 9 37   Digit Span 25 ss 10 50         DS Forward 9 ss 9 37         DS Backward 10 ss 13 84         DS Sequence 6 ss 9 37         Longest Digit Forward 6 - - -         Longest Digit Backward 5 - - -         Longest Digit Sequence 4 - - -   Arithmetic 8 ss 6 9   Symbol Search 18 ss 8 25   Coding 36 ss 7 16   ACADEMIC ACHIEVEMENT Raw Score Type of Standardized Score Standardized Score Percentile/CP   WRAT-4       Spelling 36 SS 90 25   Spelling Grade Equivalent 7 -  -   COGNITIVE SCREENING Raw Score Type of Standardized Score Standardized Score Percentile/CP   MoCA 19 - - -   Orientation - Place 2/2 - - -   Orientation - Date 4/4 - - -   LANGUAGE FUNCTIONING Raw Score Type of Standardized Score Standardized Score Percentile/CP   WAIS-IV Similarities 22 ss 9 37   TOPF Word Reading 27 SS 87 19   NAB Naming 29 Tscore 48 42   NAB Naming Percent Correct After Semantic Cuing 100 - - 100   FAS 39 Tscore 50 50   Letter F  14 zscore FALSE 50   Animal Naming 11 Tscore 32 4   VISUOSPATIAL FUNCTIONING Raw Score Type of Standardized Score Standardized Score Percentile/CP   RCFT Copy 25 - - <1   RCFT Time to Copy 516 - - 2-5   LEARNING & MEMORY Raw Score Type of Standardized Score Standardized Score Percentile/CP   RAVLT       Trial 1 7 Tscore 64 92   Trial 2 8 Tscore 53 62   Trial 3 8 Tscore 45 31   Trial 4 8 Tscore 40 16   Trial 5 9 Tscore 41 18   Trials 1-5 Total 40 Tscore 44 27   List B 3 Tscore 39 14   Trial 6 (short delay) 7 Tscore 45 31   30-min Recall (long delay) 6 Tscore 40 16   Recognition Hits 10 Tscore - -   Recognition FP Errors 22 Tscore - -   Recognition Percentage Correct - Tscore 14 <0.1   WMS-IV Subtests       LM I 37 ss 12 75   LM II 25 ss 13 84   LM Recognition 21 - - >75    ATTENTION/WORKING MEMORY Raw Score Type of Standardized Score Standardized Score Percentile/CP   WAIS-IV WMI - SS 89 23   WAIS-IV Digit Span 25 ss 10 50         DS Forward 9 ss 9 37         DS Backward 10 ss 13 84         DS Sequence 6 ss 9 37         Longest Digit Forward 6 - - -         Longest Digit Backward 5 - - -         Longest Digit Sequence 4 - - -   WAIS-IV Arithmetic 8 ss 6 9   MENTAL PROCESSING SPEED Raw Score Type of Standardized Score Standardized Score Percentile/CP   WAIS-IV PSI - SS 86 18   WAIS-IV Symbol Search 18 ss 8 25   WAIS-IV Coding 36 ss 7 16   TMT A  46 Tscore 41 18   TMT A errors 0 - - -   EXECUTIVE FUNCTIONING Raw Score Type of Standardized Score Standardized Score Percentile/CP   TMT B 261 Tscore 32 4   TMT B errors 1 - - -   WAIS-IV Similarities 22 ss 9 37   FRONTOMOTOR  Raw Score Type of Standardized Score Standardized Score Percentile/CP   GPT  Tscore 24 0.5   GPD  Tscore 37 9   MOOD & PERSONALITY Raw Score Type of Standardized Score Standardized Score Percentile/CP   BDI-2 7 - - -   SUREKHA-7 6 - - -

## 2024-09-19 ENCOUNTER — TELEPHONE (OUTPATIENT)
Dept: RESEARCH | Facility: HOSPITAL | Age: 74
End: 2024-09-19
Payer: MEDICARE

## 2024-09-19 NOTE — TELEPHONE ENCOUNTER
Called and spoke with Pt to get her r/s from 9/30-10/21 for her skin biopsy with Akilah. Pt confirmed new date and time

## 2024-09-25 PROBLEM — G31.84 MILD COGNITIVE IMPAIRMENT: Status: ACTIVE | Noted: 2024-02-19

## 2024-09-25 NOTE — ASSESSMENT & PLAN NOTE
Daily Functioning: May benefit from family oversight in complex ADLs to ensure accuracy. Continue to use compensatory mechanisms.    Neuropsychiatric Symptoms: Visual hallucinations, sleep disruption, and anxiety may be points of intervention.     Neuropsychology Follow-up: Interval evaluation may be indicated if/when she experiences progressive decline that impacts her daily functioning/independence.

## 2024-10-09 ENCOUNTER — PATIENT MESSAGE (OUTPATIENT)
Dept: ORTHOPEDICS | Facility: CLINIC | Age: 74
End: 2024-10-09
Payer: MEDICARE

## 2024-10-09 ENCOUNTER — PATIENT MESSAGE (OUTPATIENT)
Dept: CARDIOLOGY | Facility: CLINIC | Age: 74
End: 2024-10-09
Payer: MEDICARE

## 2024-10-14 ENCOUNTER — OFFICE VISIT (OUTPATIENT)
Dept: NEUROLOGY | Facility: CLINIC | Age: 74
End: 2024-10-14
Payer: MEDICARE

## 2024-10-14 DIAGNOSIS — G31.84 MILD COGNITIVE IMPAIRMENT: Primary | ICD-10-CM

## 2024-10-14 PROCEDURE — 99499 UNLISTED E&M SERVICE: CPT | Mod: 95,,, | Performed by: PSYCHIATRY & NEUROLOGY

## 2024-10-14 NOTE — PROGRESS NOTES
NEUROPSYCHOLOGICAL EVALUATION - CONFIDENTIAL  FEEDBACK NOTE    On 10/14/2024, I provided Ms. Sunni Lowe neuropsychological evaluation results. Please see the full report for a comprehensive overview of the findings. Ms. Lowe was provided a copy of the report and invited to call with additional questions.      Darrell Marie Psy.D., SARKISP  Board Certified in Clinical Neuropsychology  [facility] - Department of Neurology

## 2024-10-18 DIAGNOSIS — G89.29 CHRONIC PAIN OF BOTH KNEES: Primary | ICD-10-CM

## 2024-10-18 DIAGNOSIS — M25.561 CHRONIC PAIN OF BOTH KNEES: Primary | ICD-10-CM

## 2024-10-18 DIAGNOSIS — M25.562 CHRONIC PAIN OF BOTH KNEES: Primary | ICD-10-CM

## 2024-10-21 ENCOUNTER — PROCEDURE VISIT (OUTPATIENT)
Dept: NEUROLOGY | Facility: CLINIC | Age: 74
End: 2024-10-21
Payer: MEDICARE

## 2024-10-21 VITALS
DIASTOLIC BLOOD PRESSURE: 82 MMHG | WEIGHT: 155.31 LBS | HEIGHT: 70 IN | BODY MASS INDEX: 22.24 KG/M2 | SYSTOLIC BLOOD PRESSURE: 138 MMHG | HEART RATE: 66 BPM

## 2024-10-21 DIAGNOSIS — G20.C PARKINSONISM, UNSPECIFIED PARKINSONISM TYPE: Primary | ICD-10-CM

## 2024-10-21 PROCEDURE — 11104 PUNCH BX SKIN SINGLE LESION: CPT | Mod: S$GLB,,, | Performed by: NURSE PRACTITIONER

## 2024-10-21 PROCEDURE — 11105 PUNCH BX SKIN EA SEP/ADDL: CPT | Mod: S$GLB,,, | Performed by: NURSE PRACTITIONER

## 2024-10-21 NOTE — PROCEDURES
PRE-OP DIAGNOSIS:  POST-OP DIAGNOSIS: Same   PROCEDURE: skin punch biopsy     Performing Physician: Akilah Shankar NP  Supervising Physician (if applicable): Wu Aldrich MD.     PROCEDURE:   _  Shave Biopsy  X  Punch Biopsy left side   _  Incisional Biopsy     DESCRIPTION:  - Punch Size: 0.5 cm  - Number of Punch Biopsies: 3  + CPT 73571 (punch biopsy, neck) 1st procedure  + CPT 00617 (punch biopsy, each additional lesion, thigh) 2nd procedure  + CPT 86158 (punch biopsy, each additional lesion, ankle) 3rd procedure     The area surrounding the skin lesion was prepared and draped in the  usual sterile manner. The lesion was removed in the usual manner by punch biopsy method noted above. Three full thickness cylindrical samples of the skin were removed from the upper back, lower thigh, and lower leg. The intent of the biopsy is to remove a sample of a cutaneous lesion for [facility] diagnostic pathologic examination. Hemostasis was assured. The patient tolerated  the procedure well.     Closure:       _  Monsels for hemostasis                _  Suture   X Simple closure  _ None     Followup: The patient tolerated the procedure well without  complications.  Standard post-procedure care is explained and return  precautions are given.     Pathology/biopsy specimens were sent directly to [facility] for processing. Pathology was not sent via in-house [facility] laboratory. Pathology results will be returned within 4-6 weeks and scanned into [facility] Electronic Medical Record.

## 2024-10-22 ENCOUNTER — OFFICE VISIT (OUTPATIENT)
Dept: ORTHOPEDICS | Facility: CLINIC | Age: 74
End: 2024-10-22
Payer: MEDICARE

## 2024-10-22 VITALS — BODY MASS INDEX: 22.29 KG/M2 | HEIGHT: 70 IN

## 2024-10-22 DIAGNOSIS — M17.12 PRIMARY OSTEOARTHRITIS OF LEFT KNEE: Primary | ICD-10-CM

## 2024-10-22 PROCEDURE — 99213 OFFICE O/P EST LOW 20 MIN: CPT | Mod: S$GLB,,, | Performed by: ORTHOPAEDIC SURGERY

## 2024-10-22 PROCEDURE — 1160F RVW MEDS BY RX/DR IN RCRD: CPT | Mod: CPTII,S$GLB,, | Performed by: ORTHOPAEDIC SURGERY

## 2024-10-22 PROCEDURE — 3008F BODY MASS INDEX DOCD: CPT | Mod: CPTII,S$GLB,, | Performed by: ORTHOPAEDIC SURGERY

## 2024-10-22 PROCEDURE — 3288F FALL RISK ASSESSMENT DOCD: CPT | Mod: CPTII,S$GLB,, | Performed by: ORTHOPAEDIC SURGERY

## 2024-10-22 PROCEDURE — 1125F AMNT PAIN NOTED PAIN PRSNT: CPT | Mod: CPTII,S$GLB,, | Performed by: ORTHOPAEDIC SURGERY

## 2024-10-22 PROCEDURE — 1157F ADVNC CARE PLAN IN RCRD: CPT | Mod: CPTII,S$GLB,, | Performed by: ORTHOPAEDIC SURGERY

## 2024-10-22 PROCEDURE — 99999 PR PBB SHADOW E&M-EST. PATIENT-LVL III: CPT | Mod: PBBFAC,,, | Performed by: ORTHOPAEDIC SURGERY

## 2024-10-22 PROCEDURE — 1159F MED LIST DOCD IN RCRD: CPT | Mod: CPTII,S$GLB,, | Performed by: ORTHOPAEDIC SURGERY

## 2024-10-22 PROCEDURE — 1101F PT FALLS ASSESS-DOCD LE1/YR: CPT | Mod: CPTII,S$GLB,, | Performed by: ORTHOPAEDIC SURGERY

## 2024-10-22 PROCEDURE — 3044F HG A1C LEVEL LT 7.0%: CPT | Mod: CPTII,S$GLB,, | Performed by: ORTHOPAEDIC SURGERY

## 2024-10-22 NOTE — PROGRESS NOTES
Subjective:      Patient ID: Sunni Lowe is a 73 y.o. female.    Chief Complaint: Follow-up (BILAT KNEE )    HPI    Follow-up for osteoarthritis, principally left knee.  The patient reports he has occasional gelling and medial discomfort.  She denies severe pain.  Reports that she can still exercise without limitation.  She uses occasional over-the-counter medication for this          Review of Systems   Constitutional: Negative for fever and weight loss.   HENT:  Negative for congestion.    Eyes:  Negative for visual disturbance.   Cardiovascular:  Negative for chest pain.   Respiratory:  Negative for shortness of breath.    Hematologic/Lymphatic: Negative for bleeding problem. Does not bruise/bleed easily.   Skin:  Negative for poor wound healing.   Gastrointestinal:  Negative for abdominal pain.   Genitourinary:  Negative for dysuria.   Neurological:  Negative for seizures.   Psychiatric/Behavioral:  Negative for altered mental status.    Allergic/Immunologic: Negative for persistent infections.         Objective:      Ortho/SPM Exam      Left knee    The patient is not in acute distress.   Sclerae normal  Body habitus is normal.  Respiratory distress:  none   The patient walks without a limp.  Hip irritability  negative.   The skin over the knee is intact.  Knee effusion Trace  Palpation- no focal tenderness  Range of motion- Flexion 140 deg, Extension 0 deg,   Ligament laxity exam:   MCL 2+   Lachman 0   Post sag  0    LCL 0  Patellar apprehension negative.  Popliteal cyst negative  Patellar crepitation present.  Meniscal irritability not applicable  Pulses DP present, PT present.  Motor normal 5/5 strength in all tested muscle groups.   Sensory normal.    Left knee radiographs show medial narrowing with sclerosis and osteophytes, Kellgren stage III          Assessment:       Encounter Diagnosis   Name Primary?    Primary osteoarthritis of left knee Yes         although the condition is radiographically  significant, the patient is not have severe pain or functional limitation.  Progression may occur over time          Plan:       Sunni was seen today for follow-up.    Diagnoses and all orders for this visit:    Primary osteoarthritis of left knee          I explained my diagnostic impression and the reasoning behind it in detail, using layman's terms.  Models and/or pictures were used to help in the explanation.    Considering the mild nature of symptoms, we agree to observation for the present time    X-ray 1 year

## 2024-11-26 ENCOUNTER — OFFICE VISIT (OUTPATIENT)
Dept: NEUROLOGY | Facility: CLINIC | Age: 74
End: 2024-11-26
Payer: MEDICARE

## 2024-11-26 DIAGNOSIS — R47.01 MIXED APHASIA: ICD-10-CM

## 2024-11-26 DIAGNOSIS — G31.83 DIFFUSE LEWY BODY DISEASE: ICD-10-CM

## 2024-11-26 DIAGNOSIS — F02.80 ALZHEIMER DISEASE: ICD-10-CM

## 2024-11-26 DIAGNOSIS — E85.4 CEREBRAL AMYLOID ANGIOPATHY: ICD-10-CM

## 2024-11-26 DIAGNOSIS — G20.C PARKINSONISM, UNSPECIFIED PARKINSONISM TYPE: ICD-10-CM

## 2024-11-26 DIAGNOSIS — G47.33 OSA (OBSTRUCTIVE SLEEP APNEA): ICD-10-CM

## 2024-11-26 DIAGNOSIS — I61.9 CEREBRAL HEMORRHAGE: ICD-10-CM

## 2024-11-26 DIAGNOSIS — F02.80 DIFFUSE LEWY BODY DISEASE: ICD-10-CM

## 2024-11-26 DIAGNOSIS — G30.9 ALZHEIMER DISEASE: ICD-10-CM

## 2024-11-26 DIAGNOSIS — G31.9 NEURODEGENERATIVE COGNITIVE IMPAIRMENT: ICD-10-CM

## 2024-11-26 DIAGNOSIS — I68.0 CEREBRAL AMYLOID ANGIOPATHY: ICD-10-CM

## 2024-11-26 DIAGNOSIS — G31.84 MCI (MILD COGNITIVE IMPAIRMENT): Primary | ICD-10-CM

## 2024-11-26 RX ORDER — SUVOREXANT 20 MG/1
1 TABLET, FILM COATED ORAL NIGHTLY
Qty: 30 TABLET | Refills: 3 | Status: SHIPPED | OUTPATIENT
Start: 2024-11-26

## 2024-11-26 RX ORDER — RIVASTIGMINE 13.3 MG/24H
1 PATCH, EXTENDED RELEASE TRANSDERMAL DAILY
Qty: 30 PATCH | Refills: 5 | Status: SHIPPED | OUTPATIENT
Start: 2024-11-26

## 2024-11-26 NOTE — PROGRESS NOTES
[facility]  [facility]     PATIENT: Sunni Lowe  VISIT DATE: 2024  MRN: [id number]  PRIMARY PROVIDER: Alison Fox DO  : 1950    Recent Clinical History:    Chief Complaint: Progressive Cognitive Impairment.    Clinical Interim: The patient, last evaluated in mid-, has a 9-year history of gradual and progressive mild cognitive impairment affecting multiple domains. Initially, the symptoms were indicative of amnesia, with a [place] Cognitive Assessment (MoCA) score of 24 out of 30. This score progressively decreased over the next two years, though there has been a slight improvement in the past year. Ongoing neurocognitive assessments have demonstrated an interval decline in the condition. A physical examination reveals asymmetric parkinsonism, and the patient has experienced a recent onset of mild hallucinations over the past year, specifically akinetopsia. Brain imaging shows multifocal microbleeds that align with cerebral amyloid angiopathy, and serum and brain scan biomarkers suggest the presence of Alzheimer's disease and cerebral amyloid angiopathy. 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 Neurobehavioral assessments suggest a progressive neurodegenerative disorder, likely Alzheimer's disease, with a potential comorbid alpha-synucleinopathy. We considered the benefits of additional biomarker assessment through a skin biopsy. Our recommendations include improving sleep hygiene, initiating treatment with Belsomra, and confirming the absence of obstructive sleep apnea. The patient is a candidate for clinical trials, and we provided them with reading materials for additional information. Since the previous consultation, the patient has undergone a skin biopsy that confirmed the presence of alpha-synuclein disease. We discussed a diagnosis of mixed pathology  neurodegeneration, which includes Alzheimer's disease, cerebral amyloid angiopathy, and Lewy body disease, along with prognostication and clinical trials. The patient has expressed interest in participating in clinical trials. We recommend maximizing the benefits of current medication. The patient has reported mild improvement with rivastigmine 5 mg and Belsomra 10 mg. We recommend increasing both to their maximum doses, rivastigmine 13.3 mg and Belsomra 20 mg. It is advisable to consult with sleep medicine specialists, as the patient likely has underlying sleep apnea. Following this, consideration may be given to dopamine supplementation or modafinil.    Clinical Concerns:   Diagnostics: None  Education: None/Resolved  Cognitive Management: increase rivastigmine to 13.3 - may consider modafinil later date  Sleep/Insomnia: recommend referral to sleep medicine specialists - increase Belsomra 20 mg  Weight/Nutritional Concerns: None/Resolved  Behavioral Concerns: None/Resolved  Safety Concerns: None/Resolved          Relevant History of Baseline Neurocognitive Phenotype:    Developmental Milestones: The patient/family report no known birth complications or early life problems. The patient met all developmental milestones.  Learning Neurodivergence: The patient/family report no signs or symptoms suggestive of developmental learning disorder.  Educational History: HS. + 1 years +Business College certification.  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 He subsequently joined [employer], where he dedicated 31 years of service. 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 He concluded his career by retiring at the age of 67.  Retired: 2018      Relevant Neurotrauma History:    History of Traumatic Brain Injury: 2020 - minor head trauma  History of Brain/Spine  Surgery: No History of Iatrogenic Brain Injury or CNS surgery  History of Toxin Exposure/Substance Abuse: No History of Toxin Exposure or Clinically Relevant Substance Abuse  History of Malnutrition/Vitamin Deficiency: No History of Malnutrition or Clinically Relevant Vitamin Deficiency  History of Chronic Mood Disorder/Stress: No History of Clnically Relevant Chronic Mood Disorder or Stress  History of Chronic Inflammatory State: No History of CNS inflammation or Clinically Relevant Chronic Inflammatory State      Relevant Family History:    Relevant General History:  Mother -  at age 58 ovarian cancer  Father -  at age 89, born with hearing issues and DM  Paternal Uncle born with hearing issues  Sister - hearing issues started 60s really bad DM  Paternal Grandfather - hearing NOS and DM  Son - hearing aides onset 30s alive 42  Relevant Neurocognitive Disorder History:  Mother - EOAD onset early 50s  58  Maternal Aunt - LOAD onset 70 alive 80s  Maternal Aunt - LOAD onset 70s  80s  Maternal Grandfather - LOAD possible?  Relevant Movement Disorder History:  The patient/family denies a history of PD, PDD, tremor.  Relevant Motor Neuron Disease History:  The patient/family denies a history of ALS, MND, PLS.  Relevant Developmental/Neurodivergent History:  Son - developmental learning disorder  Relevant Psychiatric History:  The patient/family denies a history of MDD, BD, SUREKHA, Schizophrenia.  Known Genetic Profile: There is no relevant genetic testing available on record.            Clinical History Per Electronic Medical Record:    Past Medical History  No History of Prior Medical Conditions on Chart  Past Surgical History  No History of Surgeries on Chart  Current Medication(s) Prior to Appointment  No Medications on Chart            Review of cognitive, visuospatial, motor, sensory, and behavioral systems:     Memory  The patient's memory has worsened in the past few years.  The patient does not  repeat statements or asks the same question repeatedly.  The patient does have difficulty remembering recent important conversations.  The patient does not have difficulty remembering recent events.  The patient does forget information within minutes.  The patient's recent retrograde memory is intact.  The patient's remote memory is intact.  Attention  The patient's attention and concentration are impaired.  The patient does not have attentional fluctuations.  The patient does have difficulty with selective attention.  The patient does become easily distracted.  The patient does have difficulty with divided attention.  Executive  The patient's cognitive processing speed is slower.  The patient does have difficulty with working memory.  The patient does misplace personal items (e.g., keys, cell phone, wallet) more frequently.  The patient does have difficulty keeping track of @HIS@ medications.  The patient does have difficulty with planning/organizing/completing multistep tasks.  The patient does have not difficulty with executive attention.  The patient does have difficulty with flexible thinking.  The patient does not have difficulty with response inhibition.  The patient denies new impulsivity or rash/careless actions.  The patient's judgment is intact.  Language  The patient's speech is affected.  The patient does not forget people's names more frequently.  The patient does have word-finding difficulties.  The patient's speech is fluent and non-effortful.  The patient's speech is grammatically intact.  The patient does not make word substitutions.  The patient does not have difficulty reading.  The patient appears to have impaired comprehension.  Visuospatial  The patient does not have new visuospatial difficulty.  The patient does not become confused or disoriented in *new*, unfamiliar places.  The patient does not have trouble with navigation.  The patient does not get lost in familiar places.  The patient  does not have visuospatial disorientation.  The patient does not have difficulty recognizing objects or faces.  The patient denies problems with driving or parking.  Motor/Coordination  The patient does not have difficulty with walking.  The patient does not feel imbalanced.  The patient denies having fallen.  The patient does not appear to have new muscle weakness.  The patient does not have difficulty buttoning shirts, operating zippers, or manipulating tools/utensils.  The patient's handwriting has not become micrographic.  The patient does not have a resting tremor.  The patient denies having any new involuntary movements and/or muscle jerking.  The patient does not have swallowing difficulty.  The patient reports new muscle cramps and/or twitching.  Sensory  The patient denies new numbness, tingling, paresthesias, or pain.  The patient denies a loss of vision, blurry vision, or double vision.  The patient reports a recent loss of hearing and/or worsening tinnitus.  The patient denies anosmia.  Sleep  The patient reports difficulty sleeping.  The patient does not have difficulty going to sleep.  The patient reports difficulty staying asleep and/or frequently awakening at night.  The patient does snore and/or have witnessed apneas while sleeping.  When the patient wakes up in the morning, the patient does feel well-rested.  The patient denies dream-enactment behavior.  The patient denies symptoms suggestive of restless leg syndrome.  Behavior  The patient's personality has changed.  The patient does not have symptoms of disinhibition and social inappropriateness.  The patient does not have symptoms to suggest a loss of manners or decorum.  The patient does not appear apathetic or has decreased motivation.  The patient does not appear to have a change in inertia.  There is no report that the patient has had a change in their emotional expression.  The patient does not have emotional blunting or lability.  The  patient does not have symptoms of irritability and mood lability.  The patient does not have symptoms of agitation, aggression, or violent outbursts.  The patient's insight into their health and situation is intact.  The patient's personal hygiene is intact.  The patient is not exhibiting a diminished response to other people's needs and feelings.  The patient is not exhibiting a diminished social interest, interrelatedness, or personal warmth.  The patient denies restlessness.  The patient denies new and/or worsening simple repetitive behaviors.  The patient's speech has not become simplified or become repetitive/stereotyped.  The patient denies new/worsening complex repetitive/ritualistic compulsions and behaviors.  The patient does not have symptoms of hyper-religiosity or dogmatism.  The patient's interests/pleasures have not become restrictive, simplified, interrupting, or repetitive.  The patient denies a change of self-stimulating behavior.  The patient denies any changes in eating behavior.  The patient denies increased consumption of food or substances.  The patient denies oral exploration or consumption of inedible objects.  Psychiatric  The patient does not feel depressed.  The patient is not exhibiting symptoms of social withdrawal/indifference.  The patient denies anxiety.  The patient does not exhibit cycling behavior.  The patient does not exhibit hyperactive behavior.  The patient is not exhibited symptoms of paranoia.  The patient does not have delusions.  The patient does not have hallucinations.  The patient does not have a history of sensitivity to neuroleptic/psychotropic medications.  Medical Review of Systems  The patient does have constipation.  The patient does not have urinary incontinence.  The patient reports symptoms that are suggestive of orthostatic lightheadedness.  The patient's weight is stable.            Neurological Examination:     Mental Status  The patient's appearance is  normal (hygiene is appropriate; attire is proper and clean).  Throughout the interview, the patient is cooperative, the patient's eye contact is appropriate.  The patient behavior is appropriate to the clinical context without impropriety or improper language/conduct.  The patient is awake; The patient's energy level appeared normal.  The patient's attention/concentration is impaired.  The patient can complete three-step commands.  The patient's thought process is not logical or goal-oriented.  The patient demonstrated appropriate insight based on actions, awareness of the patient's illness, plans for the future.  The patient demonstrated good judgment based on actions and plans for the future.  Cranial Nerves  The patient showed no evidence of anosmia 3/3 (coffee/vanilla/cinnamon).  The patient's pupils were normal.  The patient's visual fields were full to confrontation in all quadrants.  The patient's ocular pursuit in the horizontal and vertical plane was complete.  The patient's saccadic initiation, velocity, and amplitude are normal.  The patient demonstrated no square-wave jerks.  The patient's eyelid assessment showed no apraxia. There was no eyelid dysfunction, retraction, or ann sign.  The patient blink rate was normal.  The patient's facial strength was normal.  The patient's facial expression was symmetric and appropriate to the context.  The patient's facial expression was normal without evidence of hypomimia.  The patient's facial sensation was intact to light touch bilaterally.  The patient's hearing was normal bilaterally.  The patient's oropharynx was non-obstructed with a Mallapati score 1/4. The soft palate elevates symmetrically.  The patient's uvula is mid-line.  The patient's tongue showed no evidence of scalloping.  The patient can protrude their tongue beyond The patient's lips for >10 sec.  The patient can move their extended tongue back and forth rapidly.  The patient's tongue showed no  "evidence of fasciculation or scalloping.  The patient's sternocleidomastoid and trapezius muscle strength was full bilaterally.  The patient had no significant evidence of anterocollis or retrocollis.  Speech/Language  The patient's speech was not completely fluent and non-effortful.  The patient's speech volume is within normal range and appropriate to the context.  The patient's speech rate is normal.  The patient's respirations are within normal range and appropriate to context.  The patient's speech timbre is normal.  The patient made articulation (segmental features) errors.  The patient has no speech dysdiadochokinesia with repetition of syllables such as "/PA/, /TA/, /KA/, /OM/".  The patient's pitch assessment showed normal range, intonation (Pitch Pattern), and variability.  The patient's tone was not emotionally limited, and tempo was rhythmic (e.g., "Once upon a midnight dreary, while I pondered, weak and weary, Over many a quaint and curious volume of forgotten elis" and "That government of the people, by the people, for the people, shall not perish from the earth").  The patient's speech is not dysarthric.  The patient showed evidence of anomia.  The patient showed evidence of anomia during spontaneous speech.  The patient showed evidence of anomia during confrontational naming.  The patient makes phonological loop errors.  The patient makes no errors during the repetition of gibberish words (e.g., "Supercalifragilisticexpialidocious," "Pigglywiggly," "Woospiedoo," "Zowzy," "Bazinga").  The patient makes no errors during the repetition of complex meaningless phrases (e.g., "The horse raced past the barn fell.", "The complex houses  and single soldiers and their families," "Wishes are hopping, and trees are west," and "Brushing liked to aaron willy's direction").  The patient has difficulty comprehending commands that cross the midline.  The patient has difficulty comprehending commands that depend " on syntax.  The patient has difficulty comprehending syntactically complex sentences.  The patient's speech is not grammatically intact.  Motor  The patient's bilateral upper extremity muscle bulk is appropriate.  The patient's upper extremity muscle tone is increased.  The patient's bilateral upper extremity muscle tone does not suggest spasticity.  There is evidence of rigidity/cogwheeling.  The patient's bilateral upper extremity muscle tone has no evidence of paratonia.  There was no dystonia observed on examination.  Assessment of motor strength was symmetric and at minimal anti-gravity.  Deltoid L +5/5 R +5/5 Biceps L +5/5 R +5/5 Triceps L +5/5 R +5/5 Wrist extension L +5/5 R +5/5 Finger abduction L +5/5 R +5/5 Hip flexion L +5/5 R +5/5 Hip extension L +5/5 R +5/5 Knee flexion L +5/5 R +5/5 Knee extension L +5/5 R +5/5 Ankle flexion L +5/5 R +5/5 Ankle extension L +5/5 R +5/5  There is no pronator or downward drift.  There is no myoclonus observed in the patient bilateral upper and lower extremities.  There are no fasciculations observed in the patient bilateral upper and lower extremities.  Coordination  The patient has no bilateral upper extremity limb dysmetria or past pointing on finger-nose-finger bilaterally.  The patient has no bilateral lower extremity limb dysmetria during shin rub.  The patient has no limb dysdiadochokinesia of the upper extremity on the pronation/supination test and screwing in a light bulb or lower extremity during tapping ball of each foot bilaterally.  The patient has no cerebellar rebound bilaterally.  The patient has no visible tremor.  The patient has no kinetic tremor bilaterally.  The patient has no postural tremor bilaterally.  The patient has no resting tremor bilaterally.  The patient has no evidence of interhemispheric motor control deficits.  The patient demonstrates evidence of motor overflow.  The patient demonstrates no alien limb phenomena.  The patient has no  dyskinetic movements.  The patient has no akathisia.  The patient's upper extremity fine motor coordination was abnormal.  The patient's upper extremity fine motor coordination was not slow.  The patient's upper extremity fine motor coordination was not hypometric.  The patient's upper extremity fine motor coordination was not dysrhythmic.  Higher Cortical Function  The patient had no hemineglect (e.g., line bisection/Halle's test).  The patient showed no evidence of simultanagnosia (Navon hierarchical letters).  The patient showed no evidence of visuospatial constructional dysfunction.  The patient showed no evidence of angular gyrus disconnection (insular-operculum).  The patient has no evidence of dysgraphia.  The patient showed evidence of apraxia.  The patient showed no evidence of ideomotor apraxia performing tool-use pantomimes (e.g., use a hammer, use a screwdriver, use a comb, flip a coin, waving goodbye).  The patient showed no evidence of ideational apraxia (e.g., taking off and putting on shoes, folding paper into an envelope).  The patient showed evidence of limb-motor apraxia during mimicking complex bimanual hand shapes.  The patient showed no evidence of buccofacial apraxia (e.g., blow out a candle, puff out cheeks, and whistle).  The patient showed no dysexecutive behavior.  Sensory  The patient's cortical sensory assessment demonstrated no neglect bilaterally.  The patient he had no astereognosis (paper clip, ring, dime) bilaterally in the palms.  The patient he had no agraphesthesia (drawing numbers) in the palms.  The patient's sensation was intact to light touch, and vibratory sense in the bilateral upper and lower extremities.  Reflexes  Reflexes were symmetric and 2+ at biceps, 2+ triceps, and 2+ brachioradialis, 2+ at the knees bilaterally, there was no cross-abductor sign, 2+ in the bilateral ankles.  There were no pathological reflexes; No Babinski, bilateral plantar toes go down, no Jaw  Jerk Reflex, No Velasco, No Palmomental reflex, and No Palmar Grasp reflex.  There was no spreading/clonus.  Gait  The patient has normal posture sitting unaided.  The patient can arise from a chair and sit back down without using their arms.  The patient's gait was normal.  The patient's stance while walking is normal.  The patient's gait speed (6 meters) was normal (70-80 F 1.13 m/s M 1.26 m/s, >80 F 0.94 m/sec, M 0.97 m/sec).  The patient's arms swing is symmetric and of normal amplitude.  The patient takes turns in <4 steps.  When attempting to walk abnormally (heels, tiptoes, tandem), the patient makes no errors.  The patient has no evidence of posture/balance impairment.            Functional Capacity Assessment: Neurological Wellbeing, Intelligence, and Social Evaluation:     Instrumental Activities of Daily Living:   Communal Operations: Mild level of inability to perform independantly.  Finances: Normal level of functional independance.  Housework: Normal level of functional independance.  Personal Care: Borderline level of inability to perform independantly.  Personal Health: Normal level of functional independance.  Transportation: Mild level of inability to perform independantly.  Basic Activities of Daily Living:   Axial Motor: Normal level of functional independance.  Grooming: Normal level of functional independance.  Hygeine: Normal level of functional independance.  Oropharngeal Motor: Normal level of functional independance.  Personal Health: Normal level of functional independance.  Toiletry: Normal level of functional independance.  Functional Capacity Scales:   [place] Instrumental Activities of Daily Living Scale: Score: 0/8 suggestive of Normal.  Functional Assessment Staging Tool (FAST Scale): Score: 0/15 suggestive of Normal (Stage 1).  [facility] Functional Assessment Scale (FAS): Score: 3/30 suggestive of Normal.  Clinical Dementia Rating Sum of Boxes: Score: 1.5/18 suggestive of  Normal.            Neurocognitive Assessment:     Question Score Interpretation   Memory   Registration T1 (3) 3/3 Within Normal Limits.   Registration T1 (5) 3 Within Normal Limits.   Registration T1 (9) 3/9 Mild Impairment: -1.6 STDs below the average score based on age and education.   Registration T2 (9) 4/9 Mild Impairment: -1.8 STDs below the average score based on age and education.   Registration T3 (9) 5/9 Mild Impairment: -1.9 STDs below the average score based on age and education.   Registration T4 (9) 5/9 Moderate Impairment: -2.5 STDs below the average score based on age and education.   Delayed Recall 30 sec (9) 5/9 Mild Impairment: -1.5 STDs below the average score based on age and education.   Delayed Recall 10 min (3) 3/3 Within Normal Limits.   Delayed Recall 10 min (5) 4/5 Borderline Impairment based on age and education.   Delayed Recall 10 min (9) 4/9 Mild Impairment: -1.1 STDs below the average score based on age and education.   Delayed Recall Cued (9) 5/9 Mild Impairment: -1.1 STDs below the average score based on age and education.   Executive   Three-step command 3/3 Within Normal Limits.   Serial Sevens 1/3 Mild Impairment based on age and education.   WORLD Backward 5/5 Within Normal Limits.   Digit Span Backwards 2 Severe Impairment: -3.2 STDs below the average score based on age and education.   Digit Span - 2 1/2 Mild Impairment based on age and education.   Lexical Fluency - F 10 Mild Impairment: -1.1 STDs below the average score based on age and education.   Lexical Fluency - A 9 Mild Impairment: -1.3 STDs below the average score based on age and education.   Lexical Fluency - S 8 Mild Impairment: -1.5 STDs below the average score based on age and education.   Semantic Fluency - Animals 12 Mild Impairment: -1.9 STDs below the average score based on age and education.   Trials-A 1/1 Within Normal Limits.   Trials-B 1/1 Within Normal Limits.   Trials-1 1/1 Within Normal Limits.    Visuospatial   Clock Draw 2/3 Borderline Impairment based on age and education.   Complex Figure Copy 14/17 Borderline Impairment: -1 STDs below the average score based on age and education.   Overlapping Images 10/12 Borderline Impairment based on age and education.   Picture Synthesis 3/3 Within Normal Limits.   Noise Pareidolia Test 5/5 Within Normal Limits.   Language   Naming-2 2/2 Within Normal Limits.   Naming-3 3/3 Within Normal Limits.   15-Item BNT 12/15 Moderate Impairment: -2.7 STDs below the average score based on age and education.   Repetition-1 1/1 Within Normal Limits.   Repetition-2 2/2 Within Normal Limits.   Repetition of Phrases 2/5 Mild Impairment based on age and education.   Verbal Agility 5/6 Borderline Impairment based on age and education.   Following written command 1/1 Within Normal Limits.   Writing a complete sentence 1/1 Within Normal Limits.   SYDBAT - Semantic Association 18/30 Borderline Impairment based on age and education.   Repeat & Point - Nonfluent  9/10 Borderline Impairment based on age and education.   Repeat & Point - Semantics 9/10 Borderline Impairment based on age and education.   Abstraction 2/2 Within Normal Limits.   Attention   Orientation-10 10/10 Within Normal Limits.   Orientation-6 6/6 Within Normal Limits.   Digit Span Forwards 5 Moderate Impairment: -2.1 STDs below the average score based on age and education.   Social   Theory of Mind Cartoon 1/1 Within Normal Limits.     Clinical Impression of Neurocognitive Assessment: Attention predominant multidomain mild cognitive impairment.          Laboratories:     Neurodegenerative Biomarkers - Skin  Name Reference Previous Values   Phosphorylation Alpha-Synuclein - Posterior Cervical (Left) normal Abnormal (H)   2024-10-21      Phosphorylation Alpha-Synuclein - Distal Thigh (Left) normal Normal   2024-10-21      Phosphorylation Alpha-Synuclein - Distal Leg (Left) normal Normal   2024-10-21      Metabolic  Screening  Name Reference Previous Values   Cholesterol Total 120 - 199 mg/dL 191   2023-08-09   178   2024-01-30      Triglycerides 30 - 150 mg/dL 70   2023-08-09   77   2024-01-30      HDL 40 - 75 mg/dL 65   2023-08-09   47   2024-01-30      LDL Cholesterol 63.0 - 159.0 mg/dL 115.6   2023-08-09      HDL/Cholesterol Ratio 20.0 - 50.0 % 26.4   2023-08-09      Total Cholesterol/HDL Ratio 2.0 - 5.0 3.8   2023-08-09      Non-HDL Cholesterol mg/dL 131   2023-08-09      LDL Calculated 0 - 160 MG/   2023-08-09      Hemoglobin A1C External 4.0 - 5.6 % 5.3   2023-08-09      Estimated Avg Glucose 68 - 131 mg/dL 105   2023-08-09      Glucose 70 - 110 mg/dL 99   2023-08-09   129 (H)   2024-01-30      BUN 8 - 23 mg/dL 23   2023-08-09   13   2024-01-30      Albumin 3.5 - 5.2 g/dL 4.4   2023-08-09      Hematology Screening - Serum  Name Reference Previous Values   Platelet Count 150 - 450 K/uL 166   2023-08-09   186   2024-01-30      MPV 9.2 - 12.9 fL 10.7   2023-08-09   10.1   2024-01-30      WBC 3.90 - 12.70 K/uL 4.9   2023-08-09   7.32   2024-01-30      RBC 4.00 - 5.40 M/uL 4.30   2023-08-09   5.06   2024-01-30      Hemoglobin 12.0 - 16.0 g/dL 12.7   2023-08-09   14.7   2024-01-30      Hematocrit 37.0 - 48.5 % 37.7   2023-08-09   44.1   2024-01-30      MCV 82 - 98 fL 87.7   2023-08-09   87   2024-01-30      MCH 27.0 - 31.0 pg 29.5   2023-08-09   29.1   2024-01-30      MCHC 32.0 - 36.0 g/dL 33.7   2023-08-09   33.3   2024-01-30      RDW 11.5 - 14.5 % 12.5   2023-08-09   12.5   2024-01-30      Neuro-Infectious Screening  Name Reference Previous Values   SARS Coronavirus 2 Antigen Negative Positive (P)   2023-08-09      Neuroendocrine/Electrolyte Screening  Name Reference Previous Values   TSH 0.400 - 4.000 uIU/mL 0.921   2023-08-09   1.477   2024-01-30      Sodium 136 - 145 mmol/L 139   2023-08-09   139   2024-01-30      Potassium 3.5 - 5.1 mmol/L 4.0   2023-08-09   3.9    2024-01-30      Chloride 95 - 110 mmol/L 101   2023-08-09   101   2024-01-30      CO2 23 - 29 mmol/L 24   2023-08-09   27   2024-01-30      Creatinine 0.5 - 1.4 mg/dL 1.0   2023-08-09   1.1   2024-01-30      Calcium 8.7 - 10.5 mg/dL 9.5   2023-08-09   9.9   2024-01-30      Anion Gap 8 - 16 mmol/L 14   2023-08-09   11   2024-01-30      Neuro-Nutritional Screening  Name Reference Previous Values   Vitamin B12 180 - 914 ng/L 619   2023-08-09      Folate 4.0 - 24.0 ng/mL 38.6 (H)   2023-08-09      PROTEIN TOTAL 6.0 - 8.4 g/dL 7.2   2023-08-09      Neurodegenerative Biomarkers - CSF  Name Reference Previous Values   Phospho-Tau (181P) <=21.6 pg/mL 1.87 (H)   2023-08-09      Liver Function Screening  Name Reference Previous Values   BILIRUBIN TOTAL 0.1 - 1.0 mg/dL 1.1 (H)   2023-08-09      ALP 55 - 135 U/L 53 (L)   2023-08-09      AST 10 - 40 U/L 24   2023-08-09      ALT 10 - 44 U/L 19   2023-08-09                Neurological Relevant Procedures:    Electrocardiogram performed on 11/29/2019  Formal Interpretation: Vent. Rate : 065 BPM Atrial Rate : 065 BPM P-R Int : 150 ms QRS Dur : 074 ms QT Int : 412 ms P-R-T Axes : 065 018 040 degrees QTc Int : 428 ms  Provider Interpretation: The electrocardiogram (ECG) shows a normal sinus rhythm and is otherwise unremarkable.            Neurologically Relevant Imaging:    MRI brain/head without contrast performed on 08/26/2024  Radiologist Interpretation: o acute intracranial process. No advanced volume loss. Chronic ischemic changes as detailed above with potentially mild amyloid angiopathy with few scattered areas of chronic microhemorrhage.  Provider Interpretation: The patient exhibits mild generalized cortical atrophy and multiple microbleeds, predominantly located in the left posterior hemisphere. These findings are collectively suggestive of cerebral amyloid angiopathy.  T1-weighted (T1W) Image Summary: Mild generalized cortical atrophy is observed, with a  greater predominance in the posterior parietal region at later stages. Widening of the regional sulci is most notable in the parietal lobule. The corpus callosum appears intact. The midbrain and brainstem are also intact, displaying a normal volume ratio.  FLAIR/T2-weighted (T2W) Image Summary: The radiographic interpretation reveals minor scattered subcortical white matter changes, which are most prominent in the bilateral corona radiata, with a greater extent on the left side compared to the right. Juxtacortical hyperintensities are primarily observed in the right motor cortex, with a lesser degree on the left. These changes extend to the centrum semiovale. There are no large vessel infarcts noted, and no small lacunar infarcts are present in the basal ganglia.  Diffusion Weighted Imaging with ADC Mapping Summary: There are no significant hyperintensities or hypointensities observed on Diffusion-Weighted Imaging (DWI). Additionally, there is no apparent correlation with the Apparent Diffusion Coefficient (ADC).  Susceptibility-weighted imaging (SWI) and/or GRE Summary: The radiographic interpretation reveals the presence of multifocal cortical and subcortical microbleeds. Specifically, microbleeds have been identified in the left deep subcortical parietal lobe, the occipital lobe ventral to the dorsal occipital lobe, and the high dorsal parietal lobe. These findings collectively suggest the presence of microbleeds.  Brain Fluorodeoxyglucose-positron emission tomography performed on 08/22/2024  Radiologist Interpretation: Multifocal radiotracer uptake throughout cerebral cortical gray matter with reduced gray-white contrast involving bilateral frontal, parietal, occipital, and temporal lobes.  Provider Interpretation: The scan indicates the presence of moderate to frequent amyloid neuritic plaque deposition.            Clinical Summary:     The patient is a 73-year-old with a relevant past medical history of  hearing issues since childhood but using hearing aides since 50s who presents reporting a 10-year history of step-wise progressive neurocognitive impairment.  Neurobehavioral Review of Systems Interpretation: The review of systems reveals neurological and neurocognitive deficits in several domains, suggesting dysfunctions in specific cortical and subcortical regions. General memory, attention, and processing speed issues indicate potential impairments in the prefrontal cortex and its associated networks. Difficulties with conversations, word-finding, and comprehension point to possible disruptions in the language networks involving the temporal and frontal lobes. Additionally, sleep disturbances, personality changes, and autonomic symptoms such as hypotension and constipation may correlate with broader network pathologies affecting both cortical and subcortical structures, including the brainstem and hypothalamus.  Neurological Examination Interpretation: The neurological examination revealed deficits in attention, thought processing, language fluency, articulation, and motor functions, indicating both cortical and subcortical dysfunctions. Impaired comprehension of complex commands and syntax, along with apraxia and motor abnormalities like bradykinesia, hypometria, and dysrhythmia, suggest involvement of the frontal and parietal lobes, as well as the basal ganglia and associated motor networks. Motor overflow and tone abnormalities further implicate disruptions in motor planning and execution pathways. These findings collectively point towards network pathologies affecting both higher-order cognitive processes and coordinated motor control.  Neurocognitive Evaluation Interpretation: Attention predominant multidomain mild cognitive impairment.  Mild Attention Impairment: The patient scored >2 standard deviations below the norm on at least one measure. Impairment appreciated in attention, verbal STM  Mild Executive  Impairment: The patient scored >2 standard deviations below the norm on at least one measure. Impairment appreciated in encoding, working memory, lexical fluency, semantic fluency  Mild Language Impairment: The patient scored >2 standard deviations below the norm on at least one measure. Impairment appreciated in naming, phonological loop, agility, semantic association, speech apraxia, semantic  Mild Memory Impairment: The patient scored >1 standard deviation below the norm on at least one measure. Impairment appreciated in immediate recall, recall, delayed recall, cued recall  Mild Visuospatial Impairment: The patient scored >0.5 standard deviation below the norm on at least one measure. Impairment appreciated in visuospatial apraxia, simultanagnosia  Normal Social Function  Normal Motor/Sensory Function  MMSE 29/30: Score suggestive of normal cognitive function to borderline cognitive impairment.  MOCA 22/30: Score suggestive of mild cognitive impairment.  Neurological Imaging Summary:  MRI brain/head without contrast performed on 08/26/2024  Radiologist Interpretation: o acute intracranial process. No advanced volume loss. Chronic ischemic changes as detailed above with potentially mild amyloid angiopathy with few scattered areas of chronic microhemorrhage.  Provider Interpretation:  Interpretation Summary: The patient exhibits mild generalized cortical atrophy and multiple microbleeds, predominantly located in the left posterior hemisphere. These findings are collectively suggestive of cerebral amyloid angiopathy.  Brain Fluorodeoxyglucose-positron emission tomography performed on 08/22/2024  Radiologist Interpretation: Multifocal radiotracer uptake throughout cerebral cortical gray matter with reduced gray-white contrast involving bilateral frontal, parietal, occipital, and temporal lobes.  Provider Interpretation:  Interpretation Summary: The scan indicates the presence of moderate to frequent amyloid neuritic  plaque deposition.            Assessment:     The patient's clinical presentation is best described as Non-amnestic (Executive / Judgement) predominant multidomain Mild Cognitive Impairment.    The stage of the patient's clinical presentation meets the criteria for Mild Cognitive Impairment (MCI) as defined by the [place] [employer]zheimer's [employer](Zachariah et al., 2011, Alzheimer's & Dementia). This diagnosis is characterized by concerns regarding an intraindividual change in cognition, impairment in one or more cognitive domains, and preservation of independence in functional abilities. Notably, the patient is not demented.   Rolan-Ryan Instrumental Activities of Daily Living Scale: 0/8 suggestive of Normal.  Functional Assessment Staging Tool (FAST Scale): 0/15 suggestive of Normal (Stage 1).  Clinical Dementia Rating Sum of Boxes (CDR-SOB): 1.5/18 suggestive of Normal.  [facility] Functional Assessment Scale (FAS): 3/30 suggestive of Normal.     The patient's clinical syndromic presentation is consistent with early signs of Alzheimer's Dementia (Faith et al., 2011).     Insidious onset over months to years.  Clear-cut history of worsening cognition by report or observation.  Executive dysfunction: impaired reasoning, judgment, and problem-solving, deficits in other domains should be present.    Currently, neurodegenerative diseases can only be diagnosed with 100% certainty through a brain autopsy. The suspected neuropathology underlying the patient's neurocognitive impairment is indicative of Alzheimer's Disease-Related Pathology (ADRP) and Vascular Contributions to Cognitive Impairment and Dementia (VCID).    Serum fluid protein biomarkers include Phospho-Tau (181P) CSF: 1.87 (H) on 08/09/2023  There are no cerebrospinal fluid protein biomarkers available on record.  Dermatological protein biomarkers include Phosphorylation Alpha-Synuclein: Abnormal (H) on 10/21/2024 Phosphorylation  Alpha-Synuclein: Normal () on 10/21/2024 Phosphorylation Alpha-Synuclein: Normal () on 10/21/2024  There is no relevant genetic biomarkers available on record.  There are no radiographic biomarkers available on record.              Impression:     The patient presents with a nine-year history of progressive cognitive impairment, initially characterized by amnesic symptoms. Over time, the patient's condition has deteriorated, with recent developments including asymmetric parkinsonism and mild hallucinations, specifically akinetopsia. Neuroimaging has revealed multifocal microbleeds consistent with cerebral amyloid angiopathy, and biomarkers have indicated the presence of both Alzheimer's disease and cerebral amyloid angiopathy. 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 The patient's family history is notable for early-onset Alzheimer's disease in the mother and late-onset Alzheimer's disease in maternal relatives. Neurobehavioral assessments have highlighted significant deficits in memory, attention, processing speed, planning, and working memory, with severe issues in immediate memory and selective attention. Cognitive and functional assessments suggest a progressive neurodegenerative disorder, with the clinical picture supporting a diagnosis of mild cognitive impairment due to Alzheimer's disease, complicated by cerebral amyloid angiopathy and Lewy body disease. It is recommended to optimize the patient's medication regimen. Specifically, it is advised to increase the dose of Rivastigmine to 13.3 mg and Belsomra to 20 mg, as well as to follow up with a sleep medicine specialist to test for sleep apnea. The patient has expressed interest in participating in clinical trials. We will follow up with the cerebral amyloid angiopathy clinic regarding potential  clinical trials. L-dopa therapy may be considered at a later date; however, at this time, the primary concern is the patient's movement disorders.    The above observations were discussed with the patient and their supporting historian(s). We have discussed the additional diagnostic(s) and/or management below.            Care Management Plan:       Optimize Neurocognitive Impairment and Quality  We have discussed the MIND Diet and other lifestyle behaviors that may help maintain brain health.  We have provided written/digital reading material.  Increase rivastigmine to 13.3 mg daily  Next appointment consider additional modafinil  Optimize Behavioral Management and Quality  No indication for the use of memantine at this time  Optimize Sleep Hygiene and Quality  We discussed and recommended additional diagnostic/management of sleep disorder to optimize brain health and longevity.  We have placed referrals to sleep medicine due to signs and symptoms suggestive of sleep apnea.  Increase Belsomra 20 mg q.h.s.  Optimize Cerebrovascular Health.  The patient has a documented history of hyperlipidemia and/or hypercholesteremia with long-term complications such as cerebrovascular disease, peripheral vascular disease, and/or aortic atherosclerosis. Collectively these risk factors may contribute to cerebral atherosclerosis, and cerebral hypoperfusion compounded neurocognitive disorder. We discussed maximizing cerebrovascular-related medical therapy, including but not limited to cholesterol medications and antiplatelet agents. We have discussed the value of aggressively controlling vascular risk factors like hypertension, hyperlipidemia, and Diabetes SBP<130, LDL<100, and A1C<7.0. We discussed the need to optimize lifestyle choices, including a heart-healthy diet (e.g., Mediterranean or DASH), increased cardiovascular exercise (goal 150 minutes of moderate-intensity per week), and staying cognitively and socially  active.  Optimize Movement Disorder and Quality.  Movement disorder not currently primary concern of patient  May consider the addition of L-dopa therapy at later date      Thank you for entrusting us with the care of your patient. Should you have any questions or concerns, please feel free to contact us at your convenience.     It was a pleasure to see the patient, and we look forward to their follow-up visit.     This note was dictated using the [employer] Fluency Direct voice recognition program. Please be aware that word recognition errors may occasionally occur and might be overlooked during review. [phone]                    Billing Statement:       I performed this consultation using real-time Telehealth tools, including a live video connection between my location and the patient's location (their home within [facility]). Prior to initiating the consultation, I obtained informed verbal consent to perform this consultation using Telehealth tools and answered all the questions about the Telehealth interaction. The participants understood that only a limited neurological exam and limited neuropsychological testing could be performed using Telehealth tools.     I spent a total of 45 minutes (from 01:45 PM to 02:30 PM) on 11/24/2024, engaging in person in a face-to-face consultation with the patient. More than 50% of this time was devoted to counseling on symptoms, treatment plans, risks, therapeutic options, lifestyle modifications, and safety concerns related to the above diagnoses.     A Review of Systems was completed, encompassing 10 of the 14 systems. All findings were negative, except those noted in the History of Present Illness (HPI) and Review of Systems (ROS) Sections. The systems reviewed were Constitutional (Const), Eyes, Ear/Nose/Throat (ENT), Respiratory (Resp), Cardiovascular (CV), Gastrointestinal (GI), Genitourinary (), Musculoskeletal (MSK), Skin, and Neurological (Neuro).     I  spent a total of 10 minutes to reviewing previous laboratory results on the day of the clinical evaluation. This review was an integral part of the face-to-face encounter. The laboratory findings were predominantly negative, with exceptions as noted in the History of Present Illness (HPI) and Assessment.     I performed a neurobehavioral status examination on the day of clinical evaluation that included a clinical assessment of thinking, reasoning, and judgment to ensure a comprehensive approach in managing the complex and evolving needs of the patient's neurocognitive condition. Please see above HPI and ROS for full details. This exam was performed on the day of clinical evaluation and included 37 minutes spent on direct face-to-face clinical observation and interview with the patient and 13 minutes spent interpreting test results and preparing the report. The total time of 37 minutes spent on the neurobehavioral status examination is not included in the time spent on evaluation and management coding.     Total Billing time spent on encounter/documentation for this patient's evaluation and management, not including the neurobehavioral status examination: 42 minutes.        Signing Physician:  Wu Aldrich MD

## 2024-12-10 ENCOUNTER — OFFICE VISIT (OUTPATIENT)
Dept: SLEEP MEDICINE | Facility: CLINIC | Age: 74
End: 2024-12-10
Payer: MEDICARE

## 2024-12-10 VITALS
WEIGHT: 154.31 LBS | BODY MASS INDEX: 22.09 KG/M2 | SYSTOLIC BLOOD PRESSURE: 114 MMHG | HEIGHT: 70 IN | HEART RATE: 69 BPM | DIASTOLIC BLOOD PRESSURE: 66 MMHG

## 2024-12-10 DIAGNOSIS — R06.83 SNORING: Primary | ICD-10-CM

## 2024-12-10 DIAGNOSIS — F51.09 OTHER INSOMNIA NOT DUE TO A SUBSTANCE OR KNOWN PHYSIOLOGICAL CONDITION: ICD-10-CM

## 2024-12-10 DIAGNOSIS — G47.19 EXCESSIVE DAYTIME SLEEPINESS: ICD-10-CM

## 2024-12-10 DIAGNOSIS — R35.1 NOCTURIA: ICD-10-CM

## 2024-12-10 DIAGNOSIS — G25.81 RESTLESS LEG SYNDROME: ICD-10-CM

## 2024-12-10 DIAGNOSIS — R79.0 LOW FERRITIN: ICD-10-CM

## 2024-12-10 PROCEDURE — 3074F SYST BP LT 130 MM HG: CPT | Mod: CPTII,S$GLB,, | Performed by: PHYSICIAN ASSISTANT

## 2024-12-10 PROCEDURE — 3288F FALL RISK ASSESSMENT DOCD: CPT | Mod: CPTII,S$GLB,, | Performed by: PHYSICIAN ASSISTANT

## 2024-12-10 PROCEDURE — 99204 OFFICE O/P NEW MOD 45 MIN: CPT | Mod: S$GLB,,, | Performed by: PHYSICIAN ASSISTANT

## 2024-12-10 PROCEDURE — 1160F RVW MEDS BY RX/DR IN RCRD: CPT | Mod: CPTII,S$GLB,, | Performed by: PHYSICIAN ASSISTANT

## 2024-12-10 PROCEDURE — 99999 PR PBB SHADOW E&M-EST. PATIENT-LVL III: CPT | Mod: PBBFAC,,, | Performed by: PHYSICIAN ASSISTANT

## 2024-12-10 PROCEDURE — 1126F AMNT PAIN NOTED NONE PRSNT: CPT | Mod: CPTII,S$GLB,, | Performed by: PHYSICIAN ASSISTANT

## 2024-12-10 PROCEDURE — 3078F DIAST BP <80 MM HG: CPT | Mod: CPTII,S$GLB,, | Performed by: PHYSICIAN ASSISTANT

## 2024-12-10 PROCEDURE — 1159F MED LIST DOCD IN RCRD: CPT | Mod: CPTII,S$GLB,, | Performed by: PHYSICIAN ASSISTANT

## 2024-12-10 PROCEDURE — 3008F BODY MASS INDEX DOCD: CPT | Mod: CPTII,S$GLB,, | Performed by: PHYSICIAN ASSISTANT

## 2024-12-10 PROCEDURE — 3044F HG A1C LEVEL LT 7.0%: CPT | Mod: CPTII,S$GLB,, | Performed by: PHYSICIAN ASSISTANT

## 2024-12-10 PROCEDURE — 1157F ADVNC CARE PLAN IN RCRD: CPT | Mod: CPTII,S$GLB,, | Performed by: PHYSICIAN ASSISTANT

## 2024-12-10 PROCEDURE — 1101F PT FALLS ASSESS-DOCD LE1/YR: CPT | Mod: CPTII,S$GLB,, | Performed by: PHYSICIAN ASSISTANT

## 2024-12-10 NOTE — PROGRESS NOTES
Referred by Wu Aldrich MD     NEW PATIENT VISIT    Sunni Lowe  is a pleasant 73 y.o. female  with PMH significant for asthma, GERD, MCI who presents for sleep evaluation following referral from Neurology      C/o snoring, occasionally tachycardia at night, poor disrupted and un-refreshing sleep, and daytime sleepiness and fatigue. Denies drowsiness when driving. Denies sleep walking/talking. Does endorse one episode of screaming in bed per , but denies dream enactment behaviors like kicking, falls from bed. Does endorse legs bother her a good bit at night, urge to move legs. Rubbing cream on legs helps. Occurring roughly 2 x weekly. States her Neurologist recommended evaluation for suspected VELASQUEZ, which is why she presents today    SLEEP SCHEDULE   Environment    Bed Time 9:30-11PM   Sleep Latency 20mins   Arousals 3-4   Nocturia 3-5   Back to sleep 10mins-3hrs   Wake time 5-7AM   Naps Daily nap   Work            12/3/2024    12:46 PM   Sleep Clinic ROS    Difficulty breathing through the nose?  Sometimes   Sore throat or dry mouth in the morning? Yes   Irregular or very fast heart beat?  Yes   Shortness of breath?  No   Acid reflux? Sometimes   Body aches and pains?  Sometimes   Morning headaches? Sometimes   Dizziness? Sometimes   Mood changes?  No   Do you exercise?  Yes   Do you feel like moving your legs a lot?  Yes       Past Medical History:   Diagnosis Date    Allergic rhinitis     Asthma     Fibrocystic breast     Osteopenia     Varicose veins     sees Dr. Brigido Quinones     Patient Active Problem List   Diagnosis    Asthma    Osteopenia    Gastroesophageal reflux disease    Dysfunction of both eustachian tubes    Mild cognitive impairment    Dysphagia       Current Outpatient Medications:     calcium carbonate (OS-ISABELLE) 600 mg (1,500 mg) Tab, Take 1,200 mg by mouth once daily., Disp: , Rfl:     cholecalciferol, vitamin D3, (VITAMIN D3) 10,000 unit Cap, Take 10,000 Units by mouth once daily.  ", Disp: , Rfl:     collagen, bovine, 100 % Powd, Take 1 Scoop by mouth Daily. Puts 1 scoop in coffee in am daily, Disp: , Rfl:     CYANOCOBALAMIN, VITAMIN B-12, (VITAMIN B-12 ORAL), Take by mouth., Disp: , Rfl:     ELDERBERRY FRUIT ORAL, Take 1 tablet by mouth every other day., Disp: , Rfl:     esomeprazole (NEXIUM) 20 MG capsule, Take 1 capsule (20 mg total) by mouth before breakfast., Disp: 90 capsule, Rfl: 3    fish oil-omega-3 fatty acids 300-1,000 mg capsule, Take 600 mg by mouth once daily., Disp: , Rfl:     fluticasone-salmeterol diskus inhaler 250-50 mcg, Inhale 1 puff into the lungs once daily. Controller, Disp: 120 each, Rfl: 1    psyllium husk 0.4 gram Cap, Take 1 capsule by mouth once daily., Disp: , Rfl:     rivastigmine 13.3 mg/24 hour PT24, Place 1 patch onto the skin once daily., Disp: 30 patch, Rfl: 5    suvorexant (BELSOMRA) 20 mg Tab, Take 1 tablet by mouth every evening., Disp: 30 tablet, Rfl: 3       Vitals:    12/10/24 1118   BP: 114/66   BP Location: Right arm   Patient Position: Sitting   Pulse: 69   Weight: 70 kg (154 lb 5.2 oz)   Height: 5' 10" (1.778 m)     Physical Exam:    GEN:   Well-appearing  Psych:  Appropriate affect, demonstrates insight  SKIN:  No rash on the face or bridge of the nose      LABS:   Lab Results   Component Value Date    HGB 13.6 08/19/2024    CO2 28 08/19/2024         RECORDS REVIEWED:        ASSESSMENT        12/3/2024    12:33 PM   EPWORTH SLEEPINESS SCALE   Sitting and reading 1    Watching TV 1    Sitting, inactive in a public place (e.g. a theatre or a meeting) 1    As a passenger in a car for an hour without a break 1    Lying down to rest in the afternoon when circumstances permit 3    Sitting and talking to someone 0    Sitting quietly after a lunch without alcohol 2    In a car, while stopped for a few minutes in traffic 0    Total score 9        Patient-reported       PROBLEM DESCRIPTION/ Sx on Presentation  STATUS   Sx VELASQUEZ   + snoring, + wakes with " tachycardia, denies witnessed apneas  + wakes with dry mouth  + wakes with headaches occasionally   New   Daytime Sx   + sleepiness when inactive   Denies drowsiness when driving  ESS 9/24 on intake  New   Insomnia   Trouble falling asleep: 20mins  Arousals:         3-4  Hard to get back to sleep?: 10mins-3hrs    Prior pertinent medications:  Current pertinent medications:   New   Nocturia   x 3-5 per sleep period  New   RLS legs bother her a good bit at night  urge to move legs/cramping  Occurring roughly 2 x weekly  rubbing cream on legs helps her return to sleep  No recent ferritin  New   Other issues:       PLAN      -recommend sleep testing  -PSG ordered  -discussed trial therapy if VELASQUEZ present and the patient is open to a trial of CPAP therapy  -discussed VELASQUEZ and PAP with patient in detail, including possible complications of untreated VELASQUEZ like heart attack/stroke  -advised on strict driving precautions; advised never to drive drowsy  -discussed RLS with patient  -will order ferritin to assess    Advised on plan of care. Answered all patient questions. Patient verbalized understanding and voiced agreement with plan of care.     RTC if dx of VELASQUEZ made and CPAP ordered, will need follow up 31-90 days after receiving machine for compliance         The patient was given open opportunity to ask questions and/or express concerns about treatment plan. All questions/concerns were discussed.     Two patient identifiers used prior to evaluation.

## 2024-12-11 ENCOUNTER — PATIENT MESSAGE (OUTPATIENT)
Dept: SLEEP MEDICINE | Facility: CLINIC | Age: 74
End: 2024-12-11
Payer: MEDICARE

## 2024-12-17 ENCOUNTER — TELEPHONE (OUTPATIENT)
Dept: SLEEP MEDICINE | Facility: OTHER | Age: 74
End: 2024-12-17
Payer: MEDICARE

## 2024-12-20 ENCOUNTER — HOSPITAL ENCOUNTER (OUTPATIENT)
Dept: SLEEP MEDICINE | Facility: OTHER | Age: 74
Discharge: HOME | End: 2024-12-20
Attending: PHYSICIAN ASSISTANT
Payer: MEDICARE

## 2024-12-20 ENCOUNTER — PATIENT MESSAGE (OUTPATIENT)
Dept: NEUROLOGY | Facility: CLINIC | Age: 74
End: 2024-12-20
Payer: MEDICARE

## 2024-12-20 ENCOUNTER — TELEPHONE (OUTPATIENT)
Dept: SLEEP MEDICINE | Facility: OTHER | Age: 74
End: 2024-12-20
Payer: MEDICARE

## 2024-12-20 DIAGNOSIS — R35.1 NOCTURIA: ICD-10-CM

## 2024-12-20 DIAGNOSIS — F02.80 ALZHEIMER DISEASE: ICD-10-CM

## 2024-12-20 DIAGNOSIS — G25.81 RESTLESS LEG SYNDROME: ICD-10-CM

## 2024-12-20 DIAGNOSIS — F51.09 OTHER INSOMNIA NOT DUE TO A SUBSTANCE OR KNOWN PHYSIOLOGICAL CONDITION: ICD-10-CM

## 2024-12-20 DIAGNOSIS — R79.0 LOW FERRITIN: ICD-10-CM

## 2024-12-20 DIAGNOSIS — R06.83 SNORING: ICD-10-CM

## 2024-12-20 DIAGNOSIS — G47.19 EXCESSIVE DAYTIME SLEEPINESS: ICD-10-CM

## 2024-12-20 DIAGNOSIS — G30.9 ALZHEIMER DISEASE: ICD-10-CM

## 2024-12-20 DIAGNOSIS — G31.84 MCI (MILD COGNITIVE IMPAIRMENT): ICD-10-CM

## 2024-12-20 PROCEDURE — 95810 POLYSOM 6/> YRS 4/> PARAM: CPT

## 2024-12-21 NOTE — PROGRESS NOTES
An overnight sleep study was performed on 74 year old female, Sunni Lowe. The following was explained to the pt prior to the study: the time to bed and wake time, the set-up process timeframe and the purpose of each sensor, the reason (if sensors fall off) the technician will need to enter the room during the night, the possibility of the tech fitting a PAP mask on pt for treatment in the middle of the night, and how to call out for assistance during the night. A post-study letter was handed to the pt in the morning.

## 2024-12-24 PROBLEM — R06.83 SNORING: Status: ACTIVE | Noted: 2024-12-24

## 2024-12-24 NOTE — PROCEDURES
"[facility]    Polysomnography Interpretation Report    Patient Name:  Sunni Lowe  MRN#:  [id number]  :  1950  Study Date:  2024  Referring Provider:  PINA [place]    Indications for Polysomnography:  The patient is a 74 year old Female who is 5' 10" and weighs 154.0 lbs.  Her BMI equals 22.3.  [place] was - and Neck Circumference was -.  A full night polysomnogram was performed to evaluate for -.    Polysomnogram Data  A full night polysomnogram recorded the standard physiologic parameters including EEG, EOG, EMG, EKG, nasal and oral airflow.  Respiratory parameters of chest and abdominal movements were recorded with (RIP) Respiratory Inductance Plethsmography.  Oxygen saturation was recorded by pulse oximetry.    Sleep Architecture  The total recording time of the polysomnogram was 437.0 minutes.  The total sleep time was 394.0 minutes.  The patient spent 7.9% of total sleep time in Stage N1, 43.8% in Stage N2, 17.4% in Stages N3, and 31.0% in REM.  Sleep latency was 4.4 minutes.  REM latency was 46.5 minutes.  Sleep Efficiency was 90.2%.  Wake after sleep onset was 38.5 minutes.    Respiratory Events  The polysomnogram revealed a presence of 10 obstructive, - central, and - mixed apneas resulting in a Total Apnea index of 1.5 events per hour.  There were 25 hypopneas resulting in a Total Hypopnea index of 3.8 events per hour.  The combined Apnea/Hypopnea index was 5.3 events per hour.  There were a total of 14 RERA events resulting in a Respiratory Disturbance Index (RDI) of 7.5 events per hour.     Mean oxygen saturation was 94.4%.  The lowest oxygen saturation during sleep was 68.0%.  Time spent <=88% oxygen saturation was 5.5 minutes (1.3%).    End Tidal CO2 during sleep ranged from - to - mmHg. End Tidal CO2 was greater than 50 mmHg for - minutes and greater than 55 mmHg for - minutes.  Transcutaneous CO2 during sleep ranged from - to - mmHg. Transcutaneous CO2 was " "greater than 50 mmHg for - minutes and greater than 55 mmHg for - minutes.    Limb Activity  There were - limb movements recorded.  Of this total, - were classified as PLMs.  Of the PLMs, - were associated with arousals.  The Limb Movement index was - per hour while the PLM index was - per hour and PLM with arousals index was - per hour.    Cardiac:  single lead EKG revealed normal sinus rhythm     Oxygenation:  Hypoxemia was only observed in relation to obstructive events.      Impression:  -obstructive sleep apnea significantly worse SUPINE(supine AHI=17 vs non-supine AHI=2)    Recommendations:  -treatment for the VELASQUEZ seen in this study should be considered  -the patient has follow up with Sleep Medicine          Rom Akbar MD    (This Sleep Study was interpreted by a Board Certified Sleep Specialist who conducted an epoch-by-epoch review of the entire raw data recording.)  (The indication for this sleep study was reviewed and deemed appropriate by AASM Practice Parameters or other reasons by a Board Certified Sleep Specialist.)        [facility]    Diagnostic PSG Report    Patient Name: Sunni Lowe Study Date: 12/20/2024   YOB: 1950 MRN #: [id number]   Age: 74 year ROSSY #: [id number]   Sex: Female Referring Provider: PINA [place]   Height: 5' 10" Recording Tech: Linsey Bledsoe RRT SDS   Weight: 154.0 lbs Scoring Tech: Joey Loera RRT RPSGT   BMI: 22.3 Interpreting Physician: -   ESS: - Neck Circumference: -     Study Overview    Lights Off: 10:14:57 PM  Count Index   Lights On: 05:31:58 AM Awakenings: 26 4.0   Time in Bed: 437.0 min. Arousals: 71 10.8   Total Sleep Time: 394.0 min. Apneas & Hypopneas: 35 5.3    Sleep Efficiency: 90.2% Limb Movements: - -   Sleep Latency: 4.4 min. Snores: - -   Wake After Sleep Onset: 38.5 min. Desaturations: 25 3.8    REM Latency from Sleep Onset: 46.5 min. Minimum SpO2 TST: 68.0%        Sleep Architecture   % of Time in Bed  Stages " Time (mins) % Sleep Time   Wake 43.5    Stage N1 31.0 7.9%   Stage N2 172.5 43.8%   Stage N3 68.5 17.4%   .0 31.0%         Arousal Summary     NREM REM Sleep Index   Respiratory Arousals 15 6 21 3.2   PLM Arousals - - - -   Isolated Limb Movement Arousals - - - -   Spontaneous Arousals 40 10 50 7.6   Total 55 16 71 10.8       Limb Movement Summary     Count Index   Isolated Limb Movements - -   Periodic Limb Movements (PLMs) - -   Total Limb Movements - -         Respiratory Summary     By Sleep Stage By Body Position Total    NREM REM Supine Non-Supine    Time (min) 272.0 122.0 95.0 299.0 394.0           Obstructive Apnea 4 6 10 - 10   Mixed Apnea - - - - -   Central Apnea - - - - -   Central Apnea Index - - - - -   Total Apneas 4 6 10 - 10   Total Apnea Index 0.9 3.0 6.3 - 1.5           Hypopnea 14 11 17 8 25   Hypopnea Index 3.1 5.4 10.7 1.6 3.8           Apnea & Hypopnea 18 17 27 8 35   Apnea & Hypopnea Index 4.0 8.4 17.1 1.6 5.3           RERAs 13 1 8 6 14   RERA Index 2.9 0.5 5.1 1.2 2.1           RDI 6.8 8.9 22.1 2.8 7.5     Scoring Criteria: Hypopneas scored at 4% desaturation criteria.    Respiratory Event Durations     Apnea Hypopnea    NREM REM NREM REM   Average (seconds) 25.1 27.7 23.7 31.0   Maximum (seconds) 31.5 39.9 44.3 59.3       Oxygen Saturation Summary     Wake NREM REM TST TIB   Average SpO2 94.6% 94.4% 94.3% 94.3% 94.4%   Minimum SpO2 68.0% 68.0% 79.0% 68.0% 68.0%   Maximum SpO2 98.0% 99.0% 98.0% 99.0% 99.0%       Oxygen Saturation Distribution    Range (%) Time in range (min) Time in range (%)   90.0 - 100.0 420.3 97.9%   80.0 - 90.0 7.0 1.6%   70.0 - 80.0 1.5 0.3%   60.0 - 70.0 0.3 0.1%   50.0 - 60.0 - -   0.0 - 50.0 - -   Time Spent <=88% SpO2    Range (%) Time in range (min) Time in range (%)   0.0 - 88.0 5.5 1.3%          Count Index   Desaturations 25 3.8      Cardiac Summary     Wake NREM REM Sleep Total   Average Pulse Rate (BPM) 64.0 60.5 61.3 60.7 61.0   Minimum Pulse Rate  (BPM) 51.0 47.0 48.0 47.0 47.0   Maximum Pulse Rate (BPM) 100.0 86.0 87.0 87.0 100.0     Pulse Rate Distribution    Range (bpm) Time in range (min) Time in range (%)   0.0 - 40.0 - -   40.0 - 60.0 201.5 46.9%   60.0 - 80.0 226.5 52.7%   80.0 - 100.0 1.6 0.4%   100.0 - 120.0 - -   120.0 - 140.0 - -   140.0 - 200.0 - -     EtCO2 Summary    Stage Min (mmHg) Average (mmHg) Max (mmHg)   Wake - - -   NREM(1+2+3) - - -   REM - - -     Range (mmHg) Time in range (min) Time in range (%)   20.0 - 40.0 - -   40.0 - 50.0 - -   50.0 - 55.0 - -   55.0 - 100.0 - -   Excluded data <20.0 & >65.0 437.5 100.0%     TcCO2 Summary    Stage Min (mmHg) Average (mmHg) Max (mmHg)   Wake - - -   NREM(1+2+3) - - -   REM - - -     Range (mmHg) Time in range (min) Time in range (%)   20.0 - 40.0 - -   40.0 - 50.0 - -   50.0 - 55.0 - -   55.0 - 100.0 - -   Excluded data <20.0 & >65.0 437.5 100.0%     Comments    -

## 2024-12-26 ENCOUNTER — PATIENT MESSAGE (OUTPATIENT)
Dept: SLEEP MEDICINE | Facility: CLINIC | Age: 74
End: 2024-12-26
Payer: MEDICARE

## 2024-12-26 ENCOUNTER — TELEPHONE (OUTPATIENT)
Dept: SLEEP MEDICINE | Facility: CLINIC | Age: 74
End: 2024-12-26
Payer: MEDICARE

## 2024-12-26 DIAGNOSIS — G47.33 OSA (OBSTRUCTIVE SLEEP APNEA): Primary | ICD-10-CM

## 2024-12-27 ENCOUNTER — PATIENT MESSAGE (OUTPATIENT)
Dept: FAMILY MEDICINE | Facility: CLINIC | Age: 74
End: 2024-12-27
Payer: MEDICARE

## 2024-12-27 DIAGNOSIS — K21.9 GASTROESOPHAGEAL REFLUX DISEASE WITHOUT ESOPHAGITIS: Primary | ICD-10-CM

## 2024-12-27 RX ORDER — RIVASTIGMINE 9.5 MG/24H
1 PATCH, EXTENDED RELEASE TRANSDERMAL DAILY
Qty: 30 PATCH | Refills: 5 | Status: SHIPPED | OUTPATIENT
Start: 2024-12-27 | End: 2025-06-25

## 2024-12-27 RX ORDER — ONDANSETRON 4 MG/1
4 TABLET, ORALLY DISINTEGRATING ORAL EVERY 6 HOURS PRN
Qty: 30 TABLET | Refills: 0 | Status: SHIPPED | OUTPATIENT
Start: 2024-12-27

## 2024-12-27 NOTE — TELEPHONE ENCOUNTER
Patient is going out of town and is needing a med refill on Ondansetron Oct 4mg tablets . Pt states that she likes this medication because it dissolves and it is easy for her to take as well. I currently don't see medication on pt's med list. Can you please advise patient message.

## 2024-12-27 NOTE — TELEPHONE ENCOUNTER
Hello,    I'm covering for Kaitlynn;     I have ordered CPAP machine for Sunni Lowe      Please schedule CPAP follow up -[facility] f        Thank you!

## 2025-01-07 ENCOUNTER — CLINICAL SUPPORT (OUTPATIENT)
Dept: OTOLARYNGOLOGY | Facility: CLINIC | Age: 75
End: 2025-01-07
Payer: MEDICARE

## 2025-01-07 ENCOUNTER — OFFICE VISIT (OUTPATIENT)
Dept: OTOLARYNGOLOGY | Facility: CLINIC | Age: 75
End: 2025-01-07
Payer: MEDICARE

## 2025-01-07 VITALS
WEIGHT: 154 LBS | SYSTOLIC BLOOD PRESSURE: 131 MMHG | DIASTOLIC BLOOD PRESSURE: 77 MMHG | HEART RATE: 59 BPM | BODY MASS INDEX: 22.1 KG/M2

## 2025-01-07 DIAGNOSIS — H90.3 SENSORINEURAL HEARING LOSS (SNHL) OF BOTH EARS: Primary | ICD-10-CM

## 2025-01-07 DIAGNOSIS — J30.89 NON-SEASONAL ALLERGIC RHINITIS, UNSPECIFIED TRIGGER: Chronic | ICD-10-CM

## 2025-01-07 DIAGNOSIS — H90.3 SENSORINEURAL HEARING LOSS (SNHL) OF BOTH EARS: Primary | Chronic | ICD-10-CM

## 2025-01-07 DIAGNOSIS — H81.10 BENIGN PAROXYSMAL POSITIONAL VERTIGO, UNSPECIFIED LATERALITY: ICD-10-CM

## 2025-01-07 DIAGNOSIS — H92.01 RIGHT EAR PAIN: ICD-10-CM

## 2025-01-07 DIAGNOSIS — H93.13 TINNITUS, BILATERAL: Chronic | ICD-10-CM

## 2025-01-07 PROCEDURE — 92557 COMPREHENSIVE HEARING TEST: CPT | Mod: S$GLB,,, | Performed by: PHYSICIAN ASSISTANT

## 2025-01-07 PROCEDURE — 92567 TYMPANOMETRY: CPT | Mod: S$GLB,,, | Performed by: PHYSICIAN ASSISTANT

## 2025-01-07 PROCEDURE — 1157F ADVNC CARE PLAN IN RCRD: CPT | Mod: CPTII,S$GLB,, | Performed by: OTOLARYNGOLOGY

## 2025-01-07 PROCEDURE — 3078F DIAST BP <80 MM HG: CPT | Mod: CPTII,S$GLB,, | Performed by: OTOLARYNGOLOGY

## 2025-01-07 PROCEDURE — 1101F PT FALLS ASSESS-DOCD LE1/YR: CPT | Mod: CPTII,S$GLB,, | Performed by: OTOLARYNGOLOGY

## 2025-01-07 PROCEDURE — 3008F BODY MASS INDEX DOCD: CPT | Mod: CPTII,S$GLB,, | Performed by: OTOLARYNGOLOGY

## 2025-01-07 PROCEDURE — 3288F FALL RISK ASSESSMENT DOCD: CPT | Mod: CPTII,S$GLB,, | Performed by: OTOLARYNGOLOGY

## 2025-01-07 PROCEDURE — 1159F MED LIST DOCD IN RCRD: CPT | Mod: CPTII,S$GLB,, | Performed by: OTOLARYNGOLOGY

## 2025-01-07 PROCEDURE — 1126F AMNT PAIN NOTED NONE PRSNT: CPT | Mod: CPTII,S$GLB,, | Performed by: OTOLARYNGOLOGY

## 2025-01-07 PROCEDURE — 99999 PR PBB SHADOW E&M-EST. PATIENT-LVL I: CPT | Mod: PBBFAC,,, | Performed by: PHYSICIAN ASSISTANT

## 2025-01-07 PROCEDURE — 99999 PR PBB SHADOW E&M-EST. PATIENT-LVL IV: CPT | Mod: PBBFAC,,, | Performed by: OTOLARYNGOLOGY

## 2025-01-07 PROCEDURE — 99214 OFFICE O/P EST MOD 30 MIN: CPT | Mod: S$GLB,,, | Performed by: OTOLARYNGOLOGY

## 2025-01-07 PROCEDURE — 3075F SYST BP GE 130 - 139MM HG: CPT | Mod: CPTII,S$GLB,, | Performed by: OTOLARYNGOLOGY

## 2025-01-07 RX ORDER — IPRATROPIUM BROMIDE 21 UG/1
2 SPRAY, METERED NASAL 2 TIMES DAILY
Qty: 30 ML | Refills: 3 | Status: SHIPPED | OUTPATIENT
Start: 2025-01-07

## 2025-01-07 RX ORDER — FLUTICASONE PROPIONATE 50 MCG
2 SPRAY, SUSPENSION (ML) NASAL DAILY
Qty: 16 G | Refills: 11 | Status: SHIPPED | OUTPATIENT
Start: 2025-01-07

## 2025-01-07 NOTE — PATIENT INSTRUCTIONS
HOME TREATMENT OF BPPV:    The Hernandez-Daroff Exercises are a method of treating BPPV, usually used when the office treatment fails. They succeed in 95% of cases but are more arduous than the office treatments. These exercises are performed in three sets per day for two weeks. In each set, one performs the maneuver as shown five times.     1 repetition = maneuver done to each side in turn (takes 2 minutes)     Suggested Schedule for Hernandez-Daroff exercises   Time Exercise Duration   Morning 5 repetitions 10 minutes   Noon 5 repetitions 10 minutes   Evening 5 repetitions 10 minutes     Start sitting upright (position 1). Then move into the side-lying position (position 2), with the head angled upward about retirement. An easy way to remember this is to imagine someone standing about 6 feet in front of you, and just keep looking at their head at all times. Stay in the side-lying position for 30 seconds, or until the dizziness subsides if this is longer, then go back to the sitting position (position 3). Stay there for 30 seconds, and then go to the opposite side (position 4) and follow the same routine..    These exercises should be performed for two weeks, three times per day, or for three weeks, twice per day. This adds up to 52 sets in total. In most persons, complete relief from symptoms is obtained after 30 sets, or about 10 days. In approximately 30 percent of patients, BPPV will recur within one year. If BPPV recurs, you may wish to add one 10-minute exercise to your daily routine (Dawn et al, 1999). The Hernandez-Daroff exercises as well as the Semont and Epley maneuvers are compared in an article by David (1994), listed in the reference section.

## 2025-01-07 NOTE — PROGRESS NOTES
Chief Complaint   Patient presents with    Hearing Loss     bilateral    Tinnitus     bilateral   .     HPI:  Sunni Lowe is a very pleasant 74 y.o. female here to see me today for the first time for evaluation of hearing loss.  She reports hearing loss that has been gradually progressing over the last several  years. She does wear bilateral hearing aids.   She has not noted any difference in hearing between the ears, with both ears being the worse hearing ear.  She has noted any tinnitus in the right ear.  She notes that she will hear a high pitched ding sound when she yawns or when she swallows intermittently.  She has not had any recent issues with ear infection or ear drainage.  She  has not had any previous otologic surgery.  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 Reports episode of dizziness today which describes as head spinning when getting up from lying during yoga which lasted for several minutes then subsided spontaneously.  This has not happed previously. Did not note any hearing changes during the episode.       Past Medical History:   Diagnosis Date    Allergic rhinitis     Asthma     Fibrocystic breast     Osteopenia     Varicose veins     sees Dr. Brigido Quinones     Social History     Socioeconomic History    Marital status:     Number of children: 2   Tobacco Use    Smoking status: Former     Current packs/day: 0.00     Average packs/day: 1 pack/day for 10.0 years (10.0 ttl pk-yrs)     Types: Cigarettes     Start date:      Quit date:      Years since quittin.0     Passive exposure: Past    Smokeless tobacco: Never   Substance and Sexual Activity    Alcohol use: Yes     Alcohol/week: 7.0 standard drinks of alcohol     Types: 7 Glasses of wine per week     Comment: one glass of wine daily    Drug use: Never    Sexual activity: Not Currently     Partners: Male     Comment:      Social Drivers of Health     Financial Resource  Strain: Low Risk  (2/15/2024)    Overall Financial Resource Strain (CARDIA)     Difficulty of Paying Living Expenses: Not hard at all   Food Insecurity: No Food Insecurity (2/15/2024)    Hunger Vital Sign     Worried About Running Out of Food in the Last Year: Never true     Ran Out of Food in the Last Year: Never true   Transportation Needs: No Transportation Needs (2/15/2024)    PRAPARE - Transportation     Lack of Transportation (Medical): No     Lack of Transportation (Non-Medical): No   Physical Activity: Sufficiently Active (2/15/2024)    Exercise Vital Sign     Days of Exercise per Week: 4 days     Minutes of Exercise per Session: 70 min   Stress: Stress Concern Present (2/15/2024)    [employer] - Occupational Stress Questionnaire     Feeling of Stress : To some extent   Housing Stability: Low Risk  (2/15/2024)    Housing Stability Vital Sign     Unable to Pay for Housing in the Last Year: No     Number of Places Lived in the Last Year: 1     Unstable Housing in the Last Year: No     Past Surgical History:   Procedure Laterality Date    BREAST BIOPSY Left     COLONOSCOPY  08/01/2014    Dr. Canas - repeat in 5 to 10 years    COLONOSCOPY N/A 4/5/2024    Procedure: COLONOSCOPY;  Surgeon: Cassie Huber MD;  Location: [facility];  Service: Endoscopy;  Laterality: N/A;    ESOPHAGOGASTRODUODENOSCOPY N/A 10/19/2018    Procedure: EGD (ESOPHAGOGASTRODUODENOSCOPY);  Surgeon: Cassie Huber MD;  Location: [facility];  Service: Endoscopy;  Laterality: N/A;    ESOPHAGOGASTRODUODENOSCOPY N/A 4/5/2024    Procedure: EGD (ESOPHAGOGASTRODUODENOSCOPY);  Surgeon: Cassie Huber MD;  Location: [facility];  Service: Endoscopy;  Laterality: N/A;    KNEE SURGERY Bilateral     meniscus repair    peniculitis surgery      x 3     Family History   Problem Relation Name Age of Onset    Ovarian cancer Mother Aby 63    Alzheimer's disease Mother Aby         @ 62    Cancer Father  85         tongue, and Lung    Diabetes Father      No Known Problems Sister      No Known Problems Sister      No Known Problems Brother      No Known Problems Daughter      No Known Problems Son      Alzheimer's disease Maternal Aunt          @ age 80         Review of Systems  General: negative for chills, fever or weight loss  Psychological: negative for mood changes or depression  Ophthalmic: negative for blurry vision, photophobia or eye pain  ENT: see HPI  Respiratory: no cough, shortness of breath, or wheezing  Cardiovascular: no chest pain or dyspnea on exertion  Gastrointestinal: no abdominal pain, change in bowel habits, or black/ bloody stools  Musculoskeletal: negative for gait disturbance or muscular weakness  Neurological: no syncope or seizures; no ataxia  Dermatological: negative for puritis,  rash and jaundice  Hematologic/lymphatic: no easy bruising, no new lumps or bumps      Physical Exam:    Vitals:    01/07/25 1439   BP: 131/77   Pulse: (!) 59       Constitutional: Well appearing / communicating without difficutly.  NAD.  Eyes: EOM I Bilaterally  Head/Face: Normocephalic.  Negative paranasal sinus pressure/tenderness.  Salivary glands WNL.  House Brackmann I Bilaterally.    Right Ear: Auricle normal appearance. External Auditory Canal within normal limits no lesions or masses,TM w/o masses/lesions/perforations. TM mobility noted.   Left Ear: Auricle normal appearance. External Auditory Canal within normal limits no lesions or masses,TM w/o masses/lesions/perforations. TM mobility noted.  Vestibular exam: no nystagmus, negative Romberg, negative Fakuda step test, negative head thrust, negative Dixx-Hallpike bilaterally     Nose: No gross nasal septal deviation. Inferior Turbinates 3+ bilaterally. No septal perforation. No masses/lesions. External nasal skin appears normal without masses/lesions.  Oral Cavity: Gingiva/lips within normal limits.  Dentition/gingiva healthy appearing. Mucus membranes moist.  Floor of mouth soft, no masses palpated. Oral Tongue mobile. Hard Palate appears normal.    Oropharynx: Base of tongue appears normal. No masses/lesions noted. Tonsillar fossa/pharyngeal wall without lesions. Posterior oropharynx WNL.  Soft palate without masses. Midline uvula.   Neck/Lymphatic: No LAD I-VI bilaterally.  No thyromegaly.  No masses noted on exam.      Diagnostic studies:  Audiogram interpreted personally by me and discussed in detail with the patient today. Audiogram results revealed a mild to profound sloping sensorineural hearing loss bilaterally.  Speech reception thresholds were noted at 50 dB in the right ear and 45 dB in the left ear.  Speech discrimination scores were 56% in the right ear and 76% in the left ear.  Tympanometry revealed Type A in the right ear and Type A in the left ear.         Assessment:    ICD-10-CM ICD-9-CM    1. Sensorineural hearing loss (SNHL) of both ears  H90.3 389.18       2. Tinnitus, bilateral  H93.13 388.30       3. Non-seasonal allergic rhinitis, unspecified trigger  J30.89 477.8       4. Benign paroxysmal positional vertigo, unspecified laterality  H81.10 386.11         The primary encounter diagnosis was Sensorineural hearing loss (SNHL) of both ears. Diagnoses of Tinnitus, bilateral, Non-seasonal allergic rhinitis, unspecified trigger, and Benign paroxysmal positional vertigo, unspecified laterality were also pertinent to this visit.      Plan:  No orders of the defined types were placed in this encounter.    We reviewed the patient's recent audiogram and hearing loss in detail. Continue binaural hearing aids.   We also discussed the use hearing protection when exposed to loud noise, including lawn equipment. Recommend audiogram in 1 year.     We also discussed that tinnitus is most often caused by a hearing loss, and that as the hair cells are damaged, either genetic or as a result of loud noise exposure, they then cause tinnitus.  Some patients find that  restricting the salt or caffeine in their diet helps.  Tinnitus tends to be louder in times of stress and fatigue, and may decrease with time.  Sound machines may also be an effective masking technique if needed at night.    We had a long discussion regarding the relevant anatomy and pathology relevant to BPPV which is what I suspect is cause of her symptoms today. Hernandez-Daroff exercises given.     Start Flonase 2 sprays per nostril twice daily  Start ipratropium 2 sprays per nostril BID      Jeanie Palomo MD

## 2025-01-07 NOTE — PROGRESS NOTES
Sunni Lowe, a 74 y.o. female, was seen today in the clinic for an audiologic evaluation.  Patients main complaint was bilateral hearing loss that has been progressing over time.  She reports Right ear pain, a blocked feeling in the Right ear and tinnitus in the Right ear.  She denies ear drainage.  She has been wearing hearing aids for several years.  She currently has Plated hearing aids purchased from '[employer]  She does report a family history of hearing loss and a history of noise exposure.      Audiogram results revealed a mild to profound sloping sensorineural hearing loss bilaterally.  Speech reception thresholds were noted at 50 dB in the right ear and 45 dB in the left ear.  Speech discrimination scores were 56% in the right ear and 76% in the left ear.  Tympanometry revealed Type A in the right ear and Type A in the left ear. The results of the audiogram were reviewed with the patient and the benefits of amplification were discussed.    Recommendations:  Otologic evaluation  Annual audiogram  Hearing protection when in noise  Continued and consistent use of bilateral amplification

## 2025-01-20 ENCOUNTER — PATIENT MESSAGE (OUTPATIENT)
Dept: OBSTETRICS AND GYNECOLOGY | Facility: CLINIC | Age: 75
End: 2025-01-20
Payer: MEDICARE

## 2025-01-29 ENCOUNTER — OFFICE VISIT (OUTPATIENT)
Dept: ORTHOPEDICS | Facility: CLINIC | Age: 75
End: 2025-01-29
Payer: MEDICARE

## 2025-01-29 VITALS — HEIGHT: 70 IN | BODY MASS INDEX: 22.1 KG/M2

## 2025-01-29 DIAGNOSIS — M18.12 PRIMARY OSTEOARTHRITIS OF FIRST CARPOMETACARPAL JOINT OF LEFT HAND: Primary | ICD-10-CM

## 2025-01-29 PROCEDURE — 3288F FALL RISK ASSESSMENT DOCD: CPT | Mod: CPTII,S$GLB,, | Performed by: ORTHOPAEDIC SURGERY

## 2025-01-29 PROCEDURE — 99999 PR PBB SHADOW E&M-EST. PATIENT-LVL III: CPT | Mod: PBBFAC,,, | Performed by: ORTHOPAEDIC SURGERY

## 2025-01-29 PROCEDURE — 20600 DRAIN/INJ JOINT/BURSA W/O US: CPT | Mod: LT,S$GLB,, | Performed by: ORTHOPAEDIC SURGERY

## 2025-01-29 PROCEDURE — 1125F AMNT PAIN NOTED PAIN PRSNT: CPT | Mod: CPTII,S$GLB,, | Performed by: ORTHOPAEDIC SURGERY

## 2025-01-29 PROCEDURE — 1157F ADVNC CARE PLAN IN RCRD: CPT | Mod: CPTII,S$GLB,, | Performed by: ORTHOPAEDIC SURGERY

## 2025-01-29 PROCEDURE — 1101F PT FALLS ASSESS-DOCD LE1/YR: CPT | Mod: CPTII,S$GLB,, | Performed by: ORTHOPAEDIC SURGERY

## 2025-01-29 PROCEDURE — 99213 OFFICE O/P EST LOW 20 MIN: CPT | Mod: 25,S$GLB,, | Performed by: ORTHOPAEDIC SURGERY

## 2025-01-29 PROCEDURE — 1159F MED LIST DOCD IN RCRD: CPT | Mod: CPTII,S$GLB,, | Performed by: ORTHOPAEDIC SURGERY

## 2025-01-29 PROCEDURE — 3008F BODY MASS INDEX DOCD: CPT | Mod: CPTII,S$GLB,, | Performed by: ORTHOPAEDIC SURGERY

## 2025-01-29 RX ORDER — TRIAMCINOLONE ACETONIDE 40 MG/ML
20 INJECTION, SUSPENSION INTRA-ARTICULAR; INTRAMUSCULAR
Status: COMPLETED | OUTPATIENT
Start: 2025-01-29 | End: 2025-01-29

## 2025-01-29 RX ADMIN — TRIAMCINOLONE ACETONIDE 20 MG: 40 INJECTION, SUSPENSION INTRA-ARTICULAR; INTRAMUSCULAR at 01:01

## 2025-01-29 NOTE — PROGRESS NOTES
Subjective:      Patient ID: Sunni Lowe is a 74 y.o. female.  Chief Complaint: Follow-up (L thumb )      HPI  Sunni Lowe is a  74 y.o. female presenting today for follow up of left thumb arthritis CMC joint.  She reports that she is having a flare-up again   She had an injection about a year and a half ago with good results she would like to repeat this today   No numbness or tingling reported.    Review of patient's allergies indicates:  No Known Allergies      Current Outpatient Medications   Medication Sig Dispense Refill    calcium carbonate (OS-ISABELLE) 600 mg (1,500 mg) Tab Take 1,200 mg by mouth once daily.      cholecalciferol, vitamin D3, (VITAMIN D3) 10,000 unit Cap Take 10,000 Units by mouth once daily.       collagen, bovine, 100 % Powd Take 1 Scoop by mouth Daily. Puts 1 scoop in coffee in am daily      CYANOCOBALAMIN, VITAMIN B-12, (VITAMIN B-12 ORAL) Take by mouth.      ELDERBERRY FRUIT ORAL Take 1 tablet by mouth every other day.      esomeprazole (NEXIUM) 20 MG capsule Take 1 capsule (20 mg total) by mouth before breakfast. 90 capsule 3    fish oil-omega-3 fatty acids 300-1,000 mg capsule Take 600 mg by mouth once daily.      fluticasone propionate (FLONASE) 50 mcg/actuation nasal spray 2 sprays (100 mcg total) by Each Nostril route once daily. 16 g 11    fluticasone-salmeterol diskus inhaler 250-50 mcg Inhale 1 puff into the lungs once daily. Controller 120 each 1    ipratropium (ATROVENT) 21 mcg (0.03 %) nasal spray 2 sprays by Each Nostril route 2 (two) times daily. 30 mL 3    ondansetron (ZOFRAN-ODT) 4 MG TbDL Dissolve 1 tablet (4 mg total) by mouth every 6 (six) hours as needed. 30 tablet 0    psyllium husk 0.4 gram Cap Take 1 capsule by mouth once daily.      rivastigmine (EXELON PATCH) 9.5 mg/24 hour PT24 Place 1 patch onto the skin once daily. 30 patch 5    suvorexant (BELSOMRA) 20 mg Tab Take 1 tablet by mouth every evening. 30 tablet 3     No current facility-administered  "medications for this visit.       Past Medical History:   Diagnosis Date    Allergic rhinitis     Asthma     Fibrocystic breast     Osteopenia     Varicose veins     sees Dr. Brigido Quinones       Past Surgical History:   Procedure Laterality Date    BREAST BIOPSY Left     COLONOSCOPY  08/01/2014    Dr. Canas - repeat in 5 to 10 years    COLONOSCOPY N/A 4/5/2024    Procedure: COLONOSCOPY;  Surgeon: Cassie Huber MD;  Location: [facility];  Service: Endoscopy;  Laterality: N/A;    ESOPHAGOGASTRODUODENOSCOPY N/A 10/19/2018    Procedure: EGD (ESOPHAGOGASTRODUODENOSCOPY);  Surgeon: Cassie Huber MD;  Location: [facility];  Service: Endoscopy;  Laterality: N/A;    ESOPHAGOGASTRODUODENOSCOPY N/A 4/5/2024    Procedure: EGD (ESOPHAGOGASTRODUODENOSCOPY);  Surgeon: Cassie Hubre MD;  Location: [facility];  Service: Endoscopy;  Laterality: N/A;    KNEE SURGERY Bilateral     meniscus repair    peniculitis surgery      x 3       OBJECTIVE:   PHYSICAL EXAM:  Height: 5' 10" (177.8 cm)    Vitals:    01/29/25 1333   Height: 5' 10" (1.778 m)   PainSc:   3   PainLoc: Finger     Ortho/SPM Exam  Examination left hand there is tenderness at the base of left thumb CMC joint positive grind test mild crepitation    strength decreased   Tinel sign negative       RADIOGRAPHS:  None  Comments: I have personally reviewed the imaging and I agree with the above radiologist's report.    ASSESSMENT/PLAN:     IMPRESSION:  CMC arthritis left thumb    PLAN:  Patient would like injection today   After pause for time-out identified the base of the left thumb injected with Kenalog 20 mg 0.5 cc xylocaine sterile technique   Tolerated the procedure well without complication   I have also given her a thumb brace for part-time use   Consider surgery if symptoms persist    FOLLOW UP:  3-4 months    Disclaimer: This note has been generated using voice-recognition software. There may be typographical errors that have been missed during " proof-reading.

## 2025-01-30 DIAGNOSIS — Z00.00 ENCOUNTER FOR MEDICARE ANNUAL WELLNESS EXAM: ICD-10-CM

## 2025-02-03 RX ORDER — FLUTICASONE PROPIONATE AND SALMETEROL 250; 50 UG/1; UG/1
1 POWDER RESPIRATORY (INHALATION) DAILY
Qty: 120 EACH | Refills: 1 | Status: SHIPPED | OUTPATIENT
Start: 2025-02-03

## 2025-02-03 NOTE — TELEPHONE ENCOUNTER
No care due was identified.  [facility] Embedded Care Due Messages. Reference number: [id number].   2/03/2025 1:37:09 PM CST

## 2025-02-04 NOTE — TELEPHONE ENCOUNTER
Refill Decision Note   Sunni Lowe  is requesting a refill authorization.  Brief Assessment and Rationale for Refill:  Approve     Medication Therapy Plan:         Comments:     Note composed:9:43 PM 02/03/2025

## 2025-02-12 ENCOUNTER — OFFICE VISIT (OUTPATIENT)
Dept: FAMILY MEDICINE | Facility: CLINIC | Age: 75
End: 2025-02-12
Payer: MEDICARE

## 2025-02-12 VITALS
SYSTOLIC BLOOD PRESSURE: 120 MMHG | OXYGEN SATURATION: 97 % | WEIGHT: 154.88 LBS | HEIGHT: 70 IN | BODY MASS INDEX: 22.17 KG/M2 | DIASTOLIC BLOOD PRESSURE: 70 MMHG | HEART RATE: 67 BPM

## 2025-02-12 DIAGNOSIS — Z00.00 ENCOUNTER FOR PREVENTIVE HEALTH EXAMINATION: Primary | ICD-10-CM

## 2025-02-12 DIAGNOSIS — J45.40 MODERATE PERSISTENT ASTHMA WITHOUT COMPLICATION: ICD-10-CM

## 2025-02-12 DIAGNOSIS — I68.0 CEREBRAL AMYLOID ANGIOPATHY: ICD-10-CM

## 2025-02-12 DIAGNOSIS — F02.80 ALZHEIMER DISEASE: ICD-10-CM

## 2025-02-12 DIAGNOSIS — G30.9 ALZHEIMER DISEASE: ICD-10-CM

## 2025-02-12 DIAGNOSIS — G31.84 MILD COGNITIVE IMPAIRMENT: ICD-10-CM

## 2025-02-12 DIAGNOSIS — M18.12 PRIMARY OSTEOARTHRITIS OF FIRST CARPOMETACARPAL JOINT OF LEFT HAND: ICD-10-CM

## 2025-02-12 DIAGNOSIS — G47.33 OSA (OBSTRUCTIVE SLEEP APNEA): ICD-10-CM

## 2025-02-12 DIAGNOSIS — Z00.00 ENCOUNTER FOR MEDICARE ANNUAL WELLNESS EXAM: ICD-10-CM

## 2025-02-12 DIAGNOSIS — E85.4 CEREBRAL AMYLOID ANGIOPATHY: ICD-10-CM

## 2025-02-12 DIAGNOSIS — G20.C PARKINSONISM, UNSPECIFIED PARKINSONISM TYPE: ICD-10-CM

## 2025-02-12 DIAGNOSIS — M85.852 OSTEOPENIA OF NECK OF LEFT FEMUR: ICD-10-CM

## 2025-02-12 DIAGNOSIS — K21.9 GASTROESOPHAGEAL REFLUX DISEASE WITHOUT ESOPHAGITIS: ICD-10-CM

## 2025-02-12 PROCEDURE — 99999 PR PBB SHADOW E&M-EST. PATIENT-LVL V: CPT | Mod: PBBFAC,,,

## 2025-02-12 NOTE — PATIENT INSTRUCTIONS
Counseling and Referral of Other Preventative  (Italic type indicates deductible and co-insurance are waived)    Patient Name: Sunni Lowe  Today's Date: 2/12/2025    Health Maintenance       Date Due Completion Date    Pap Smear 08/01/2025 8/1/2022    DEXA Scan 08/09/2025 8/9/2022    Mammogram 09/18/2025 9/18/2024    TETANUS VACCINE 03/02/2026 3/2/2016    Colorectal Cancer Screening 04/05/2027 4/5/2024    Lipid Panel 08/19/2029 8/19/2024        No orders of the defined types were placed in this encounter.    The following information is provided to all patients.  This information is to help you find resources for any of the problems found today that may be affecting your health:                  Living healthy guide: www.[facility]placed [facility].[place].gov      Understanding Diabetes: www.diabetes.org      Eating healthy: www.cdc.gov/healthyweight      [facility] home safety checklist: www.cdc.gov/steadi/patient.html      Agency on Aging: www.goea.[place].gov      Alcoholics anonymous (AA): www.aa.org      Physical Activity: www.kimber.nih.gov/gc1avzc      Tobacco use: www.quitwithusla.org

## 2025-02-12 NOTE — PROGRESS NOTES
"      Sunni Lowe presented for a  Medicare AWV and comprehensive Health Risk Assessment today. The following components were reviewed and updated:    Medical history  Family History  Social history  Allergies and Current Medications  Health Risk Assessment  Health Maintenance  Care Team         ** See Completed Assessments for Annual Wellness Visit within the encounter summary.**         The following assessments were completed:  Living Situation  CAGE  Depression Screening  Timed Get Up and Go  Whisper Test  Cognitive Function Screening    Nutrition Screening  ADL Screening  PAQ Screening      Opioid documentation:      Patient does not have a current opioid prescription.        Vitals:    02/12/25 1409   BP: 120/70   BP Location: Left arm   Patient Position: Sitting   Pulse: 67   SpO2: 97%   Weight: 70.2 kg (154 lb 14 oz)   Height: 5' 10" (1.778 m)     Body mass index is 22.22 kg/m².  Physical Exam  Vitals reviewed.   Constitutional:       Appearance: Normal appearance. She is well-developed and well-groomed.   HENT:      Right Ear: Decreased hearing noted.      Left Ear: Decreased hearing noted.      Ears:      Comments: Wearing bilateral hearing aids   Eyes:      Comments: Wearing glasses    Cardiovascular:      Rate and Rhythm: Normal rate and regular rhythm.   Pulmonary:      Effort: Pulmonary effort is normal. No respiratory distress.      Breath sounds: No wheezing, rhonchi or rales.   Skin:     Coloration: Skin is not pale.   Neurological:      General: No focal deficit present.      Mental Status: She is alert and oriented to person, place, and time.   Psychiatric:         Mood and Affect: Mood normal.         Speech: Speech normal.         Behavior: Behavior is cooperative.               Diagnoses and health risks identified today and associated recommendations/orders:    1. Encounter for Medicare annual wellness exam  - Referral to Enhanced Annual Wellness Visit (eAWV) W+1    2. Encounter for " preventive health examination  Screenings performed, as noted above. Personal preventative testing needs reviewed.     3. Cerebral amyloid angiopathy  Chronic; stable. Followed by Neurology.     4. Parkinsonism, unspecified Parkinsonism type  5. Alzheimer disease  6. Mild cognitive impairment  Chronic; stable with rivastigmine patch. Followed by Neurology.     7. Moderate persistent asthma without complication  Chronic; stable on advair. Followed by PCP.    8. Gastroesophageal reflux disease without esophagitis  Chronic; stable on esomeprazole. Followed by PCP.    9. Primary osteoarthritis of first carpometacarpal joint of left hand  Chronic; stable. Followed by PCP.    10. Osteopenia of neck of left femur  Chronic, seen on DEXA 08/2022; stable on calcium. Currently does not take vit D. Followed by PCP.    11. VELASQUEZ (obstructive sleep apnea)  Chronic. Stable with CPAP therapy. Followed by Sleep Medicine.       Provided Sunni with a 5-10 year written screening schedule and personal prevention plan. Recommendations were developed using the USPSTF age appropriate recommendations. Education, counseling, and referrals were provided as needed. After Visit Summary printed and given to patient which includes a list of additional screenings\tests needed.    Follow up in about 1 year (around 2/12/2026) for your next annual wellness visit.    Danette Courtney NP      Advance Care Planning     I offered to discuss advanced care planning, including how to pick a person who would make decisions for you if you were unable to make them for yourself, called a health care power of , and what kind of decisions you might make such as use of life sustaining treatments such as ventilators and tube feeding when faced with a life limiting illness recorded on a living will that they will need to know. (How you want to be cared for as you near the end of your natural life)     X  Patient has advanced directives on file, they would  like to make changes. I provided information on how to revoke their previous directives and make new ones.

## 2025-03-03 ENCOUNTER — RESULTS FOLLOW-UP (OUTPATIENT)
Dept: FAMILY MEDICINE | Facility: CLINIC | Age: 75
End: 2025-03-03

## 2025-03-06 ENCOUNTER — OFFICE VISIT (OUTPATIENT)
Dept: FAMILY MEDICINE | Facility: CLINIC | Age: 75
End: 2025-03-06
Payer: MEDICARE

## 2025-03-06 VITALS
BODY MASS INDEX: 22.55 KG/M2 | HEART RATE: 64 BPM | DIASTOLIC BLOOD PRESSURE: 76 MMHG | HEIGHT: 70 IN | SYSTOLIC BLOOD PRESSURE: 116 MMHG | WEIGHT: 157.5 LBS | OXYGEN SATURATION: 98 % | TEMPERATURE: 98 F

## 2025-03-06 DIAGNOSIS — K21.9 GASTROESOPHAGEAL REFLUX DISEASE WITHOUT ESOPHAGITIS: ICD-10-CM

## 2025-03-06 DIAGNOSIS — G31.84 MILD COGNITIVE IMPAIRMENT: ICD-10-CM

## 2025-03-06 DIAGNOSIS — G47.33 OSA (OBSTRUCTIVE SLEEP APNEA): ICD-10-CM

## 2025-03-06 DIAGNOSIS — M85.852 OSTEOPENIA OF NECK OF LEFT FEMUR: ICD-10-CM

## 2025-03-06 DIAGNOSIS — F02.80 ALZHEIMER DISEASE: ICD-10-CM

## 2025-03-06 DIAGNOSIS — G20.C PARKINSONISM, UNSPECIFIED PARKINSONISM TYPE: Primary | ICD-10-CM

## 2025-03-06 DIAGNOSIS — J45.40 MODERATE PERSISTENT ASTHMA WITHOUT COMPLICATION: ICD-10-CM

## 2025-03-06 DIAGNOSIS — H69.93 DYSFUNCTION OF BOTH EUSTACHIAN TUBES: ICD-10-CM

## 2025-03-06 DIAGNOSIS — G30.9 ALZHEIMER DISEASE: ICD-10-CM

## 2025-03-06 PROBLEM — R13.10 DYSPHAGIA: Status: RESOLVED | Noted: 2024-03-28 | Resolved: 2025-03-06

## 2025-03-06 PROBLEM — R06.83 SNORING: Status: RESOLVED | Noted: 2024-12-24 | Resolved: 2025-03-06

## 2025-03-06 PROCEDURE — 99999 PR PBB SHADOW E&M-EST. PATIENT-LVL IV: CPT | Mod: PBBFAC,,, | Performed by: STUDENT IN AN ORGANIZED HEALTH CARE EDUCATION/TRAINING PROGRAM

## 2025-03-06 NOTE — PROGRESS NOTES
Subjective:       Patient ID: Sunni Lowe is a 74 y.o. female.    Chief Complaint: Follow-up (Pt here for a 6 month follow up )    History of Present Illness    CHIEF COMPLAINT:  Patient presents today for follow up.    COGNITIVE FUNCTION:  She reports significant short term memory impairment as her most concerning cognitive symptom. She experiences difficulties with her nightly CPAP routine, often struggling to complete necessary steps in the correct sequence and frequently forgetting required items.    MEDICATIONS:  She is unable to tolerate higher doses of Alzheimer's medication due to nausea. Her Alzheimer's medication patch was increased from 9 to 13, which she initially tolerated well but experienced one episode of vomiting subsequently. She uses an inhaler daily. Sleep medication was increased to 20 mg with improvement in sleep maintenance.    SLEEP:  She uses a CPAP machine nightly. Sleep medication has been helpful in maintaining sleep throughout the night, though she notes no difficulty with sleep initiation. She reports reduced frequency of bathroom-related nighttime awakenings.    NEUROLOGICAL SYMPTOMS:  She experienced an episode of vertigo approximately 2 months ago while at the gym, characterized by a persistent fuzzy sensation that took a prolonged time to resolve. She denies any previous episodes of vertigo.    SOCIAL HISTORY:  She maintains an active lifestyle, regularly attending the gym including the morning of the appointment. 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 She belongs to several clubs. Her alcohol consumption has decreased from daily to occasional use on weekends or special occasions.      ROS:  General: -fever, -chills, -fatigue, -weight gain, -weight loss  Eyes: -vision changes, -redness, -discharge  ENT: -ear pain, -nasal congestion, -sore throat  Cardiovascular: -chest pain, -palpitations, -lower extremity edema  Respiratory:  -cough, -shortness of breath  Gastrointestinal: -abdominal pain, +nausea, +vomiting, -diarrhea, -constipation, -blood in stool  Genitourinary: -dysuria, -hematuria, -frequency  Musculoskeletal: -joint pain, -muscle pain  Skin: -rash, -lesion  Neurological: -headache, +dizziness, -numbness, -tingling  Psychiatric: -anxiety, -depression, -sleep difficulty, +memory problems             A1C:  Recent Labs   Lab 08/19/24  0713 03/03/25  0946   Hemoglobin A1C 5.3 5.3     CBC:  Recent Labs   Lab 05/18/24  1537 08/19/24  0713 03/03/25  0946   WBC 7.32 4.73 3.69 L   RBC 5.06 4.74 4.30   Hemoglobin 14.7 13.6 12.6   Hematocrit 44.1 40.9 38.3   Platelets 186 199 151   MCV 87 86 89   MCH 29.1 28.7 29.3   MCHC 33.3 33.3 32.9     CMP:  Recent Labs   Lab 01/30/24  1207 05/18/24  1537 08/19/24  0713 03/03/25  0946   Glucose 99   < > 103 89   Calcium 9.5   < > 9.7 9.3   Albumin 4.4  --  4.0 3.8   Total Protein 7.2  --  7.0 6.8   Sodium 139   < > 141 143   Potassium 4.0   < > 4.4 4.0   CO2 24   < > 28 26   Chloride 101   < > 105 105   BUN 23   < > 20 18   Creatinine 1.0   < > 1.0 0.9   Alkaline Phosphatase 53 L  --  48 L 50   ALT 19  --  13 13   AST 24  --  16 17   Total Bilirubin 1.1 H  --  0.8 0.8    < > = values in this interval not displayed.     LIPIDS:  Recent Labs   Lab 08/19/24  0713 03/03/25  0946   TSH 1.477 1.004   HDL 47 56   Cholesterol 178 154   Triglycerides 77 55   LDL Cholesterol 115.6 87.0   HDL/Cholesterol Ratio 26.4 36.4   Non-HDL Cholesterol 131 98   Total Cholesterol/HDL Ratio 3.8 2.8     TSH:  Recent Labs   Lab 01/30/24  1207 08/19/24  0713 03/03/25  0946   TSH 0.921 1.477 1.004        Objective:      Vitals:    03/06/25 1108   BP: 116/76   Pulse: 64   Temp: 97.9 °F (36.6 °C)      Physical Exam  Vitals reviewed.   Constitutional:       Appearance: Normal appearance. She is normal weight.   HENT:      Head: Normocephalic and atraumatic.   Eyes:      Conjunctiva/sclera: Conjunctivae normal.   Cardiovascular:       Rate and Rhythm: Normal rate and regular rhythm.      Heart sounds: Normal heart sounds.   Pulmonary:      Effort: Pulmonary effort is normal.      Breath sounds: Normal breath sounds.   Abdominal:      Palpations: Abdomen is soft.      Tenderness: There is no abdominal tenderness.   Musculoskeletal:         General: Normal range of motion.      Cervical back: Normal range of motion.      Right lower leg: No edema.      Left lower leg: No edema.   Neurological:      Mental Status: She is alert. Mental status is at baseline.   Psychiatric:         Mood and Affect: Mood normal.         Behavior: Behavior normal.          Assessment:       1. Parkinsonism, unspecified Parkinsonism type    2. Mild cognitive impairment    3. Alzheimer disease    4. Dysfunction of both eustachian tubes    5. Moderate persistent asthma without complication    6. Gastroesophageal reflux disease without esophagitis    7. Osteopenia of neck of left femur    8. VELASQUEZ (obstructive sleep apnea)        Plan:     Problem List Items Addressed This Visit          Neuro    Parkinsonism, unspecified Parkinsonism type - Primary    Overview   Rivastigmine 9.5  Stable         Mild cognitive impairment    Overview   Follows with neurology          Alzheimer disease    Overview   Follows with neuropsych   exelon            ENT    Dysfunction of both eustachian tubes    Overview   astelin   Flonase   Daily antihistamine   Headaches and sinus symptoms improved             Pulmonary    Asthma    Overview   Advair 1 puff once a day   Denies symptoms   Stable             GI    Gastroesophageal reflux disease    Overview   PPI daily   Stable   20mg omeprazole   Vit D/Ca and Mag for bone health due to long term hx taking PPI advised             Orthopedic    Osteopenia    Overview   Managed by GYN   Reclast annually             Other    VELASQUEZ (obstructive sleep apnea)    Overview   Follows with sleep doc  Uses CPAP QHS          Labs reviewed in detail  Problem list  reviewed in detail   Continue healthy lifestyle efforts  Continue current meds as prescribed otherwise; refills per request  Keep routine specialist f/u   RTC in 6 months  with labs prior and/or PRN          [facility]   3/6/25     I spent a total of 41 minutes on the day of the visit.This includes face to face time and non-face to face time preparing to see the patient (eg, review of tests), obtaining and/or reviewing separately obtained history, documenting clinical information in the electronic or other health record, independently interpreting results and communicating results to the patient/family/caregiver, or care coordinator.

## 2025-03-12 DIAGNOSIS — K22.70 BARRETT'S ESOPHAGUS WITHOUT DYSPLASIA: ICD-10-CM

## 2025-03-12 RX ORDER — HYDROGEN PEROXIDE 3 %
20 SOLUTION, NON-ORAL MISCELLANEOUS
Qty: 90 CAPSULE | Refills: 3 | Status: SHIPPED | OUTPATIENT
Start: 2025-03-12 | End: 2026-03-12

## 2025-03-12 NOTE — TELEPHONE ENCOUNTER
No care due was identified.  [facility] Embedded Care Due Messages. Reference number: [id number].   3/12/2025 11:26:42 AM CDT

## 2025-03-25 ENCOUNTER — DOCUMENTATION ONLY (OUTPATIENT)
Dept: NEUROLOGY | Facility: HOSPITAL | Age: 75
End: 2025-03-25
Payer: MEDICARE

## 2025-03-25 NOTE — PROGRESS NOTES
Cerebral Amyloid Angiopathy Probable (BC 2.0)  A+T+N-  Amyloid + tau pathology without neurodegeneration (early AD)    MRI History     2/9/2024  GRE/T2*       1.5T   CMB  IPH  cSS  cSAH  EPVS  WMH  Vaso Edema Sulcal Effusion Lepto T1 CE    R L  R L  R L  R L      R L R L R L   Lobar Frontal                          Parietal   1                        Temporal   1           Yes  Yes           Occipital   4                        Insular                         Infratentorial Cerebellum                                         Brainstem                    Deep Basal Ganglia                     Thalamus                     Int. Capsule               F1        Ext. Capsule                     Amanda Callos                     DVPWM                              [place] CRITERIA:    Pathological Tissue    Age Age >= 50.   Clinically Presenting With Cognitive impairment or dementia.     Lobar Hemorrhagic Lesion Cerebral Microbleeds     White Matter Features Severe perivascular spaces in the centrum semiovale  White matter hyperintensities in multispot pattern     CAA Definition Probable CAA             Moody Rosas MD

## 2025-04-02 RX ORDER — RIVASTIGMINE 9.5 MG/24H
1 PATCH, EXTENDED RELEASE TRANSDERMAL DAILY
Qty: 30 PATCH | Refills: 5 | Status: SHIPPED | OUTPATIENT
Start: 2025-04-02 | End: 2025-09-29

## 2025-04-09 ENCOUNTER — OFFICE VISIT (OUTPATIENT)
Dept: SLEEP MEDICINE | Facility: CLINIC | Age: 75
End: 2025-04-09
Attending: PSYCHIATRY & NEUROLOGY
Payer: MEDICARE

## 2025-04-09 VITALS
SYSTOLIC BLOOD PRESSURE: 115 MMHG | HEIGHT: 70 IN | DIASTOLIC BLOOD PRESSURE: 71 MMHG | BODY MASS INDEX: 22.65 KG/M2 | WEIGHT: 158.19 LBS | HEART RATE: 71 BPM

## 2025-04-09 DIAGNOSIS — G47.33 OSA (OBSTRUCTIVE SLEEP APNEA): Primary | ICD-10-CM

## 2025-04-09 PROCEDURE — 99999 PR PBB SHADOW E&M-EST. PATIENT-LVL III: CPT | Mod: PBBFAC,,, | Performed by: PSYCHIATRY & NEUROLOGY

## 2025-04-09 NOTE — PROGRESS NOTES
INTERVAL HISTORY:    Visit date not found:Last seen in [facility] on 12/10/24    Since her last visit with PINA Raines - Had HST   Using APAP since 25 - met compliance already    Interrogation: APAP 11     Sunni Lowe 03/10/2025 - 2025 Patient ID: [id number] : 1950 Age: 74 years Gender: Female [facility] [address] [place], [address] Compliance Report Compliance Payor Standard Usage 03/10/2025 - 2025 Usage days 30/30 days (100%) >= 4 hours 29 days (97%) < 4 hours 1 days (3%) Usage hours 228 hours 52 minutes Average usage (total days) 7 hours 38 minutes Average usage (days used) 7 hours 38 minutes Median usage (days used) 7 hours 48 minutes Total used hours (value since last reset - 2025) 636 hours AirSense 11 AutoSet Serial number [id number] Mode AutoSet Min Pressure 5 cmH2O Max Pressure 20 cmH2O EPR Fulltime EPR level 2 Response Standard Therapy Pressure - cmH2O Median: 9.6 95th percentile: 12.8 Maximum: 14.7 Leaks - L/min Median: 3.5 95th percentile: 24.7 Maximum: 54.2 Events per hour AI: 0.4 HI: 0.4 AHI: 0.8 Apnea Index Central: 0.0 Obstructive: 0.3 Unknown: 0.1 RERA Index 0.4 Cheyne-Gomez respiration (average duration per night) 0 minutes (0%    PS2024 showed mild VELASQUEZ    ESS is 7/24 today (was 9 at the time of the previous visit)      Reports improved sleep continuity since stating APAP.  Her nocturnal tachycardia has resolved. No logner needs a nap int he afternoon most of the time - occasional short naps now.    Not aware of break through snoring, aerophagia.    Occasional oronasal dryness.   Denied air intolerance of air hunger.  Not aware of parasomnia or breakthrough snoring ( sleeps in another room).        2025   EPWORTH SLEEPINESS SCALE   Sitting and reading 1   Watching TV 2   Sitting, inactive in a public place (e.g. a theatre or a meeting) 1   As a passenger in a car for an hour without a break 0    Lying down to rest in the afternoon when circumstances permit 1   Sitting and talking to someone 1   Sitting quietly after a lunch without alcohol 1   In a car, while stopped for a few minutes in traffic 0   Total score 7        Patient-reported         SLEEP STUDIES    PSG 12/20/2024:Respiratory Events  The polysomnogram revealed a presence of 10 obstructive, - central, and - mixed apneas resulting in a Total Apnea index of 1.5 events per hour.  There were 25 hypopneas resulting in a Total Hypopnea index of 3.8 events per hour.  The combined Apnea/Hypopnea index was 5.3 events per hour.  There were a total of 14 RERA events resulting in a Respiratory Disturbance Index (RDI) of 7.5 events per hour.      Mean oxygen saturation was 94.4%.  The lowest oxygen saturation during sleep was 68.0%.  Time spent <=88% oxygen saturation was 5.5 minutes (1.3%).    NEW PATIENT VISIT    Sunni Lowe  is a pleasant 74 y.o. female  with PMH significant for asthma, GERD, MCI who presents for sleep evaluation following referral from Neurology      C/o snoring, occasionally tachycardia at night, poor disrupted and un-refreshing sleep, and daytime sleepiness and fatigue. Denies drowsiness when driving. Denies sleep walking/talking. Does endorse one episode of screaming in bed per , but denies dream enactment behaviors like kicking, falls from bed. Does endorse legs bother her a good bit at night, urge to move legs. Rubbing cream on legs helps. Occurring roughly 2 x weekly. States her Neurologist recommended evaluation for suspected VELASQUEZ, which is why she presents today    SLEEP SCHEDULE   Environment    Bed Time 9:30-11PM   Sleep Latency 20mins   Arousals 3-4   Nocturia 3-5   Back to sleep 10mins-3hrs   Wake time 5-7AM   Naps Daily nap   Work            12/3/2024    12:46 PM   Sleep Clinic ROS    Difficulty breathing through the nose?  Sometimes   Sore throat or dry mouth in the morning? Yes   Irregular or very fast heart  beat?  Yes   Shortness of breath?  No   Acid reflux? Sometimes   Body aches and pains?  Sometimes   Morning headaches? Sometimes   Dizziness? Sometimes   Mood changes?  No   Do you exercise?  Yes   Do you feel like moving your legs a lot?  Yes       Past Medical History:   Diagnosis Date    Allergic rhinitis     Asthma     Fibrocystic breast     Osteopenia     Varicose veins     sees Dr. Brigido Quinones     Patient Active Problem List   Diagnosis    Asthma    Osteopenia    Gastroesophageal reflux disease    Dysfunction of both eustachian tubes    Mild cognitive impairment    Primary osteoarthritis of first carpometacarpal joint of left hand    Cerebral amyloid angiopathy    Alzheimer disease    Parkinsonism, unspecified Parkinsonism type    VELASQUEZ (obstructive sleep apnea)       Current Outpatient Medications:     calcium carbonate (OS-ISABELLE) 600 mg (1,500 mg) Tab, Take 1,200 mg by mouth once daily., Disp: , Rfl:     cholecalciferol, vitamin D3, (VITAMIN D3) 10,000 unit Cap, Take 10,000 Units by mouth once daily., Disp: , Rfl:     collagen, bovine, 100 % Powd, Take 1 Scoop by mouth Daily. Puts 1 scoop in coffee in am daily, Disp: , Rfl:     CYANOCOBALAMIN, VITAMIN B-12, (VITAMIN B-12 ORAL), Take by mouth., Disp: , Rfl:     ELDERBERRY FRUIT ORAL, Take 1 tablet by mouth every other day., Disp: , Rfl:     esomeprazole (NEXIUM) 20 MG capsule, Take 1 capsule (20 mg total) by mouth before breakfast., Disp: 90 capsule, Rfl: 3    fish oil-omega-3 fatty acids 300-1,000 mg capsule, Take 600 mg by mouth once daily., Disp: , Rfl:     fluticasone propionate (FLONASE) 50 mcg/actuation nasal spray, 2 sprays (100 mcg total) by Each Nostril route once daily., Disp: 16 g, Rfl: 11    fluticasone-salmeterol diskus inhaler 250-50 mcg, Inhale 1 puff into the lungs once daily. Controller, Disp: 120 each, Rfl: 1    ipratropium (ATROVENT) 21 mcg (0.03 %) nasal spray, 2 sprays by Each Nostril route 2 (two) times daily., Disp: 30 mL, Rfl: 3     "ondansetron (ZOFRAN-ODT) 4 MG TbDL, Dissolve 1 tablet (4 mg total) by mouth every 6 (six) hours as needed., Disp: 30 tablet, Rfl: 0    psyllium husk 0.4 gram Cap, Take 1 capsule by mouth once daily., Disp: , Rfl:     rivastigmine (EXELON PATCH) 9.5 mg/24 hour PT24, Place 1 patch onto the skin once daily., Disp: 30 patch, Rfl: 5    suvorexant (BELSOMRA) 20 mg Tab, Take 1 tablet by mouth every evening., Disp: 30 tablet, Rfl: 3       Vitals:    04/09/25 1059   BP: 115/71   BP Location: Left arm   Patient Position: Sitting   Pulse: 71   Weight: 71.7 kg (158 lb 2.9 oz)   Height: 5' 10" (1.778 m)     Physical Exam:    GEN:   Well-appearing  Psych:  Appropriate affect, demonstrates insight  SKIN:  No rash on the face or bridge of the nose      LABS:   Lab Results   Component Value Date    HGB 12.6 03/03/2025    CO2 26 03/03/2025         RECORDS REVIEWED:        ASSESSMENT        12/3/2024    12:33 PM   EPWORTH SLEEPINESS SCALE   Sitting and reading 1    Watching TV 1    Sitting, inactive in a public place (e.g. a theatre or a meeting) 1    As a passenger in a car for an hour without a break 1    Lying down to rest in the afternoon when circumstances permit 3    Sitting and talking to someone 0    Sitting quietly after a lunch without alcohol 2    In a car, while stopped for a few minutes in traffic 0    Total score 9        Patient-reported         Assessment:    VELASQUEZ.. Benefiting from CPAP use in terms of sleep continuity and daytime sleepiness.  More nocturnal tachycardia. Improved sleep continuity and excessive daytime sleepiness. No lionger needs nasp    . Improved nocturia (3-4 -  now but was more often before) .      Insomnia - easider to return to sleep now that she is using CPAP.    PLAN    Continue APAP  Will reorder supplies  Explained hpw top read feedback from the machine  Showed how to adjust humidity - increased from 3 to 4 due to occasional nasal dryness and nose bleeds.          The patient was given open " opportunity to ask questions and/or express concerns about treatment plan. All questions/concerns were discussed.     Two patient identifiers used prior to evaluation.

## 2025-04-23 ENCOUNTER — PATIENT MESSAGE (OUTPATIENT)
Dept: RESEARCH | Facility: HOSPITAL | Age: 75
End: 2025-04-23
Payer: MEDICARE

## 2025-04-28 DIAGNOSIS — Z00.6 RESEARCH STUDY PATIENT: ICD-10-CM

## 2025-04-28 DIAGNOSIS — I68.0 CEREBRAL AMYLOID ANGIOPATHY (CODE): Primary | ICD-10-CM

## 2025-05-06 ENCOUNTER — OFFICE VISIT (OUTPATIENT)
Dept: NEUROLOGY | Facility: CLINIC | Age: 75
End: 2025-05-06
Payer: MEDICARE

## 2025-05-06 ENCOUNTER — RESEARCH ENCOUNTER (OUTPATIENT)
Dept: RESEARCH | Facility: HOSPITAL | Age: 75
End: 2025-05-06
Payer: MEDICARE

## 2025-05-06 VITALS
HEART RATE: 54 BPM | WEIGHT: 158.06 LBS | TEMPERATURE: 98 F | DIASTOLIC BLOOD PRESSURE: 81 MMHG | SYSTOLIC BLOOD PRESSURE: 131 MMHG | BODY MASS INDEX: 22.63 KG/M2 | RESPIRATION RATE: 17 BRPM | HEIGHT: 70 IN

## 2025-05-06 DIAGNOSIS — E85.4 CEREBRAL AMYLOID ANGIOPATHY: Primary | ICD-10-CM

## 2025-05-06 DIAGNOSIS — Z00.6 RESEARCH EXAM: ICD-10-CM

## 2025-05-06 DIAGNOSIS — I68.0 CEREBRAL AMYLOID ANGIOPATHY: Primary | ICD-10-CM

## 2025-05-06 NOTE — PROGRESS NOTES
Screening Visit (Day -60 - Day - 1)    Study title: A Phase 2, Randomized, Double-blind, Placebo-controlled Study to Evaluate the Efficacy, Safety, Tolerability, and Pharmacodynamics of Intrathecally Administered ALN-JEFFRY in Patients with Cerebral Amyloid Angiopathy (CAA) (cAPPricorn-1)  Sponsor: [employer]  NCT# [id number]     : Moody Rosas MD    IRB #: 2024.129    Date of Visit: 05/06/2025   Time of Exams: 11:20    Sunni Lowe has given their written consent and is eligible for screening for the cAPPricorn-1 study.     Physical Exam    If Abnormal, document findings:   General Appearance Normal    Skin Normal    HEENT Normal      Respiratory Normal    Cardiovascular Normal    Gastrointestinal Normal    Musculoskeletal Normal    Dermatological Normal    Thyroid Normal    Lymph nodes Normal      Neurological Exam    Category: Mental Status   If Abnormal, document findings:   Level of consciousness (alertness) Normal    Orientation Normal    Attention and concentration Normal      Language Normal    Memory Abnormal - Not Clinically Significant  1/3 at 5 min     Category: Meningeal signs   If Abnormal, document findings:   Nuchal rigidity, Kernigs sign, Brudzinskis sign, meningismus Normal      Category: Cranial nerves*  *2 optic, 3 oculomotor, 4 trochlear, 5 trigeminal, 6 abducens, 7 facial, 8 vestibulocochlear, 9 glossopharyngeal, 10 vagus, 11 spinal accessory, 12 hypoglossal.   If Abnormal, document findings:   Fundoscopic optic nerve examination Normal    Visual acuity Abnormal - Not Clinically Significant  20/400   Visual fields Normal      Pupillary light reflex Normal    Extraocular eye movements, including observation for nystagmus Normal    Facial sensation Normal    Facial muscle strength/symmetry Normal    Hearing Normal    Palatal movement/ uvula position Normal    Dysarthria Normal    Head rotation/shoulder elevation (sternocleinomastoid and trapezius)  Normal    Tongue movement Normal      Category: Motor examination   If Abnormal, document findings:   Muscle bulk Normal    Tone, including examination for rigidity, spasticity, paratonia, with characterization Normal    Abnormal/involuntary movements, including tremor, myoclonus, dystonia, and other abnormal/involuntary motor activity, with characterization Normal    Strength testing Normal      Category: Reflex examination  If Abnormal, document findings:   Deep tendon reflexes Normal    Plantar (Babinski) reflex Normal    Primitive reflexes (frontal release signs) when appropriate Normal      Category: Sensory examination  If Abnormal, document findings:   Multimodality sensory examination Normal      Category: Coordination examination  If Abnormal, document findings:   Dexterity (e.g., finger tapping) Normal    Rapid alternating movements Normal    Finger-to-nose and heel-to-shin testing or equivalent Normal        Category: Gait and balance examination   If Abnormal, document findings:   Stance Normal    Romberg test Normal    Casual gait evaluation Normal      Heel, toe, and tandem gait evaluation Normal        Clinical Dementia Rating (CDR) (must be less than 2.0 to be eligible)  0.5    Mini Mental State Examination (must be greater than 22 to be eligible)  26    Medication List with Changes/Refills   Current Medications    CALCIUM CARBONATE (OS-ISABELLE) 600 MG (1,500 MG) TAB    Take 1,200 mg by mouth once daily.    CHOLECALCIFEROL, VITAMIN D3, (VITAMIN D3) 10,000 UNIT CAP    Take 10,000 Units by mouth once daily.    COLLAGEN, BOVINE, 100 % POWD    Take 1 Scoop by mouth Daily. Puts 1 scoop in coffee in am daily    CYANOCOBALAMIN, VITAMIN B-12, (VITAMIN B-12 ORAL)    Take by mouth.    ELDERBERRY FRUIT ORAL    Take 1 tablet by mouth every other day.    ESOMEPRAZOLE (NEXIUM) 20 MG CAPSULE    Take 1 capsule (20 mg total) by mouth before breakfast.    FISH OIL-OMEGA-3 FATTY ACIDS 300-1,000 MG CAPSULE    Take 600 mg by  mouth once daily.    FLUTICASONE PROPIONATE (FLONASE) 50 MCG/ACTUATION NASAL SPRAY    2 sprays (100 mcg total) by Each Nostril route once daily.    FLUTICASONE-SALMETEROL DISKUS INHALER 250-50 MCG    Inhale 1 puff into the lungs once daily. Controller    IPRATROPIUM (ATROVENT) 21 MCG (0.03 %) NASAL SPRAY    2 sprays by Each Nostril route 2 (two) times daily.    ONDANSETRON (ZOFRAN-ODT) 4 MG TBDL    Dissolve 1 tablet (4 mg total) by mouth every 6 (six) hours as needed.    PSYLLIUM HUSK 0.4 GRAM CAP    Take 1 capsule by mouth once daily.    RIVASTIGMINE (EXELON PATCH) 9.5 MG/24 HOUR PT24    Place 1 patch onto the skin once daily.    SUVOREXANT (BELSOMRA) 20 MG TAB    Take 1 tablet by mouth every evening.        Eligibility for randomization will be determined once the subject has completed all screening procedures.      I have spent a total of 66 minutes in face-to-face encounter, which includes review of informed consent, explanation of risks and benefits, in depth discussion and educational review of Cerebral Amyloid Angiopathy and specifics of the case, performance of the neurological and general examination(s), and performance of the MMSE

## 2025-05-06 NOTE — PROGRESS NOTES
INFORMED CONSENT     Study title: A Phase 2, Randomized, Double-blind, Placebo-controlled Study to Evaluate the Efficacy, Safety, Tolerability, and Pharmacodynamics of Intrathecally Administered ALN-JEFFRY in Patients with Cerebral Amyloid Angiopathy (CAA) (cAPPricorn-1)  Sponsor: [employer]  NCT# [id number]     : Moody Rosas MD    IRB #: 2024.129      Date consent obtained: 05/06/2025       Present for discussion:  Patient, Patient's children, , and [facility]       Study Personnel Obtaining Consent Mikayla Middleton       Prior to the Informed Consent (IC) being signed, or any study protocol required data collection, testing, procedure, or intervention being performed, the following was done and/or discussed:  Patient was given a copy of the IC for review   Purpose of the study and qualifications to participate   Study design, Follow up schedule, and tests or procedures done at each visit  Confidentiality and HIPAA Authorization for Release of Medical Records for the research trial/ subject's rights/research related injury  Risk, Benefits, Alternative Treatments, Compensation and Costs  Participation in the research trial is voluntary and patient may withdraw at anytime  Contact information for study related questions    Patient verbalizes understanding of the above: Yes  Contact information for CRC and PI given to patient: Yes  Patient able to adequately summarize: the purpose of the study, the risks associated with the study, and all procedures, testing, and follow-ups associated with the study: Yes    The subject was provided with ample time to review the consent for consideration and ask questions related to this study. All questions were answered appropriately and to their satisfaction. The subject was able to verbalize understanding of the study and agreed to participate. The patient signed the IRB-approved version of the consent form and was provided with a  copy of the signed consent form, which includes the PI's contact information. Subject was also provided with the CRC's contact information. The original consent was filed into the subject's research study binder and a copy was scanned into electronic medical records (Epic).    Additional Informed Consent  Study Partner Consent Form Yes  Study Partner Name: Trudy Mckeon  Relationship to Patient: Daughter       Patient was advised to contact CRC or PI with any study-related questions or concerns.   Patient expressed understanding of information discussed, and she agreed to continue with the trial.       Mikayla Middleton Sr. Clinical Research Coordinator   [facility]   Phone: [phone]

## 2025-05-07 NOTE — RESEARCH
Screening Visit (Day -60 - Day - 1)    Study title: A Phase 2, Randomized, Double-blind, Placebo-controlled Study to Evaluate the Efficacy, Safety, Tolerability, and Pharmacodynamics of Intrathecally Administered ALN-JEFFRY in Patients with Cerebral Amyloid Angiopathy (CAA) (cAPPricorn-1)  Sponsor: [employer]  NCT# [id number]     : Moody Rosas MD    IRB #: 2024.129    Subject ID: [id number]  Date of visit: 05/07/2025     Study Timeline  Double-Blind Treatment Period  5/6/2025 - ICF/Screening    Screening Period Procedures     Informed consent - Patient   Informed consent - Partner   Demographics and medical history   Vital Signs (Weight, Height, BP, Heart Rate, Respiratory Rate, Temperature)   Mini Mental State Examination (MMSE)    Clinical Dementia Rating: Global Score (CDR)    Full Physical Examination - (Must be completed by Investigator)   Neurological assessment (including nondilated fundi) - (Must be completed by Investigator)   Single 12-lead ECG - Patients should be supine for at least 10 minutes before each ECG is obtained; ECGs should be performed before blood samples are drawn, when possible   Central Lab - Follicle-stimulating hormone (FSH) screening (Postmenopausal women only)   Cental Lab - CBC with differential, CMP, LFTs, Urinalysis, Coagulation       Demographics:    Year of Birth 1950   What is the subject's age? (Years) 74   Sex  [x] Female  [] Male     What is the subject's childbearing potential? [] Of Childbearing Potential  [] Permanently Sterilized  [x] Postmenopausal  [] Premenarchal  [] Sterilized  [] Not Applicable   What is the ethnicity of the subject? []   or            [] [place]ican, [place]ican American, Chicano/a           [] Bangladeshi            [] Regan             [] Another , /a or Fijian origin  [x]  Not  or   []  Not Reported  []  Unknown   What is the race of the subject?(Check all that apply) [x]   "White   []  Black or    []   or    []   Faroese   []  Chinese   []  Northern Irish   []  Brazilian          [] 1st generation         [] 2nd generation   [] 3rd generation  [] Syriac   [] Botswanan   [] Other   []    [] Scottish or Shaquille   [] Chilean   [] Other    [] Other, specify -        CAA Medical History:    Cerebral Amyloid Angiopathy - Date of Diagnosis 08/29/2024   Age (years) at symptom onset UNK-AUG-2015     Has the participant experienced a spontaneous,  primary, non-traumatic intracerebral  hemorrhage? No   Has the participant experienced multiple prior  lobar ICH? No   Has the participant experienced transient  focal neurological episodes (TFNE/"amyloid  spell(s)")? No   Has the participant experienced cognitive  impairment or dementia? Yes    Date of symptom onset UNK-AUG-2015   Nature of the symptoms Amnestic   Current clinical staging diagnosis Mild cognitive impairment (MCI)   Has the participant been diagnosed with  cerebral amyloid angiopathy with related  Inflammation (CAA-ri)? No   Has the participant undergone genetic testing  for the [id number] mutation on the JEFFRY gene, which is associated with Citizen of [place]-type CAA? No   Has the participant undergone apolipoprotein E  genetic testing? No   Has the participant had a brain pathological  evaluation (for example, brain biopsy or  hematoma evacuation)?   If Yes, please record procedure  on Medical History log. No       Vital Signs:    Time collected 12:07 (24-hour clock)       Weight (kg) 71.7   Height (cm) 177.8   BMI (kg/m2) 22.68       Blood pressure should be taken using the same arm at each visit.   Blood pressure measuring position    [x]Seated  []Supine   Blood pressure measuring arm [x]Right  []Left   Systolic blood pressure 131 mmHg   Diastolic blood pressure 81 mmHg   Heart rate  54 beats/min   Respiratory Rate  17 breaths/min   Temperature location [x] Oral   [] " Temporal  [] Tympanic  [] Axillary   Temperature (C) 36.5      Central Labs:  Screening Lab Collection (Blood and Urine)  Were all protocol required labs obtained? Yes  Time collected: 12:43 (24-hour clock)  Performed by: Hospital Lab    Single 12-Lead ECG:    Time ECG performed  12:26 (24-hour clock)   Overall interpretation of ECG  If clinically significant, document as an adverse event Abnormal, not clinically significant     Neuroimaging:  MRI Brain and MRI Whole Spine scheduled for 05/15/2025      Clinical Dementia Rating: Global Score (CDR)     Was the CDR performed? Yes   Date of assessment: 5/6/2025       Who performed the CDR? () []PI/Sub-I  [x]CRC   Method of Completion []Tablet   [x]Paper     First and Last Name Mikayla Middleton     Mini Mental State Examination (MMSE)     Was the MMSE performed? Yes   Date of assessment: 5/6/2025       Who performed the MMSE? () [x]PI/Sub-I  []CRC   Method of Completion [x]Tablet   []Paper     First and Last Name Moody Rosas MD        See Dr. Rosas's Note from 5/6/2025  for Investigator-specific tasks     The patient was notified that they would be contacted once all screening procedures have been completed to determine if they are eligible to enroll in the study.   The patient was also advised to contact CRC or PI with any study-related questions or concerns and she expressed understanding of information discussed.

## 2025-05-09 DIAGNOSIS — F41.9 ANXIETY: Primary | ICD-10-CM

## 2025-05-09 RX ORDER — LORAZEPAM 1 MG/1
1 TABLET ORAL ONCE AS NEEDED
Status: DISCONTINUED | OUTPATIENT
Start: 2025-05-09 | End: 2025-05-09

## 2025-05-09 RX ORDER — LORAZEPAM 1 MG/1
1 TABLET ORAL
Qty: 1 TABLET | Refills: 0 | Status: SHIPPED | OUTPATIENT
Start: 2025-05-09 | End: 2025-06-08

## 2025-05-12 ENCOUNTER — TELEPHONE (OUTPATIENT)
Dept: RESEARCH | Facility: HOSPITAL | Age: 75
End: 2025-05-12
Payer: MEDICARE

## 2025-05-15 ENCOUNTER — HOSPITAL ENCOUNTER (OUTPATIENT)
Dept: RADIOLOGY | Facility: HOSPITAL | Age: 75
Discharge: HOME | End: 2025-05-15
Attending: PSYCHIATRY & NEUROLOGY
Payer: MEDICARE

## 2025-05-15 DIAGNOSIS — Z00.6 RESEARCH STUDY PATIENT: ICD-10-CM

## 2025-05-15 DIAGNOSIS — I68.0 CEREBRAL AMYLOID ANGIOPATHY (CODE): ICD-10-CM

## 2025-05-15 PROCEDURE — 72148 MRI LUMBAR SPINE W/O DYE: CPT | Mod: TC

## 2025-05-15 PROCEDURE — 70551 MRI BRAIN STEM W/O DYE: CPT | Mod: TC

## 2025-05-22 DIAGNOSIS — Z00.6 RESEARCH STUDY PATIENT: ICD-10-CM

## 2025-05-22 DIAGNOSIS — I68.0 CEREBRAL AMYLOID ANGIOPATHY (CODE): Primary | ICD-10-CM

## 2025-05-23 ENCOUNTER — OFFICE VISIT (OUTPATIENT)
Dept: URGENT CARE | Facility: CLINIC | Age: 75
End: 2025-05-23
Payer: MEDICARE

## 2025-05-23 VITALS
HEART RATE: 87 BPM | TEMPERATURE: 98 F | SYSTOLIC BLOOD PRESSURE: 105 MMHG | OXYGEN SATURATION: 96 % | HEIGHT: 70 IN | RESPIRATION RATE: 16 BRPM | DIASTOLIC BLOOD PRESSURE: 69 MMHG | WEIGHT: 158.06 LBS | BODY MASS INDEX: 22.63 KG/M2

## 2025-05-23 DIAGNOSIS — J06.9 URI WITH COUGH AND CONGESTION: Primary | ICD-10-CM

## 2025-05-23 DIAGNOSIS — R09.81 NASAL CONGESTION: ICD-10-CM

## 2025-05-23 LAB
CTP QC/QA: YES
SARS-COV+SARS-COV-2 AG RESP QL IA.RAPID: NEGATIVE

## 2025-05-23 RX ORDER — PREDNISONE 20 MG/1
40 TABLET ORAL DAILY
Qty: 8 TABLET | Refills: 0 | Status: SHIPPED | OUTPATIENT
Start: 2025-05-23 | End: 2025-05-27

## 2025-05-23 RX ORDER — AZELASTINE 1 MG/ML
1 SPRAY, METERED NASAL 2 TIMES DAILY
Qty: 30 ML | Refills: 0 | Status: SHIPPED | OUTPATIENT
Start: 2025-05-23 | End: 2026-05-23

## 2025-05-23 RX ORDER — BROMPHENIRAMINE MALEATE, PSEUDOEPHEDRINE HYDROCHLORIDE, AND DEXTROMETHORPHAN HYDROBROMIDE 2; 30; 10 MG/5ML; MG/5ML; MG/5ML
10 SYRUP ORAL EVERY 6 HOURS PRN
Qty: 118 ML | Refills: 0 | Status: SHIPPED | OUTPATIENT
Start: 2025-05-23 | End: 2025-06-02

## 2025-05-23 NOTE — PATIENT INSTRUCTIONS
You have been prescribed a steroid today. Take the prescription as directed. Steroids can increase blood sugar. You can also have the following when taking steroids: flushing, jitteriness, weight gain, fluid retention, bone weakening. If you develop any adverse symptoms, stop taking the medication immediately.    Thank you for choosing [facility]!     Our goal in the Urgent Care is to always provide outstanding medical care. You may receive a survey by mail or e-mail in the next week regarding your experience today. We would greatly appreciate you completing and returning the survey. Your feedback provides us with a way to recognize our staff who provide very good care, and it helps us learn how to improve when your experience was below our aspiration of excellence.       We appreciate you trusting us with your medical care. We hope you feel better soon. We will be happy to take care of you for all of your future medical needs.    You must understand that you've received an Urgent Care treatment only and that you may be released before all your medical problems are known or treated. You, the patient, will arrange for follow up care as instructed.      Follow up with your PCP or specialty clinic as instructed in the next 2-3 days if not improved or as needed. You can call [phone] to schedule an appointment with appropriate provider.      If you condition worsens, we recommend that you receive another evaluation at the emergency room immediately or contact your primary medical clinic's after hours call service to discuss your concerns.      Please return here or go to the Emergency Department for any concerns or worsening condition.

## 2025-05-23 NOTE — PROGRESS NOTES
"Subjective:      Patient ID: Sunni Lowe is a 74 y.o. female.    Vitals:  height is 5' 10" (1.778 m) and weight is 71.7 kg (158 lb 1.1 oz). Her oral temperature is 98.4 °F (36.9 °C). Her blood pressure is 105/69 and her pulse is 87. Her respiration is 16 and oxygen saturation is 96%.     Chief Complaint: Sinus Problem    Pt complains of nasal congestion, cough, sneezing, and fatigue that started 4 days ago.     Patient provider note starts here:  Patient presents with complaints of nasal congestion, cough, sneezing, and fatigue for the past 4 days now. Reports that she has been taking Sudafed, Mucinex DM and Claritin. Denies associated body aches or fevers.     Sinus Problem  This is a new problem. Episode onset: 4 days ago. The problem has been gradually worsening since onset. There has been no fever. Her pain is at a severity of 4/10. The pain is moderate. Associated symptoms include congestion, coughing, headaches, sinus pressure and sneezing. Pertinent negatives include no neck pain or sore throat (scratchy). Treatments tried: sudafed D, mucinex, claritin.       Constitution: Positive for fatigue. Negative for fever.   HENT:  Positive for congestion, sinus pain and sinus pressure. Negative for sore throat (scratchy).    Neck: Negative for neck pain.   Cardiovascular:  Negative for chest pain, palpitations and sob on exertion.   Respiratory:  Positive for cough and sputum production. Negative for chest tightness and wheezing.    Gastrointestinal:  Positive for nausea. Negative for abdominal pain, vomiting and diarrhea.   Skin:  Negative for color change and wound.   Allergic/Immunologic: Positive for sneezing.   Neurological:  Positive for headaches. Negative for numbness and tingling.      Objective:     Physical Exam   Constitutional: She is oriented to person, place, and time. She appears well-developed. She is cooperative.  Non-toxic appearance. She does not appear ill. No distress.   HENT:   Head: " Normocephalic and atraumatic.   Ears:   Right Ear: Hearing, tympanic membrane, external ear and ear canal normal.   Left Ear: Hearing, tympanic membrane, external ear and ear canal normal.   Nose: Congestion present. No mucosal edema, rhinorrhea or nasal deformity. No epistaxis. Right sinus exhibits no maxillary sinus tenderness and no frontal sinus tenderness. Left sinus exhibits no maxillary sinus tenderness and no frontal sinus tenderness.   Mouth/Throat: Uvula is midline and mucous membranes are normal. No trismus in the jaw. Normal dentition. No uvula swelling. Posterior oropharyngeal erythema present. No oropharyngeal exudate or posterior oropharyngeal edema.   Eyes: Conjunctivae and lids are normal. No scleral icterus.   Neck: Trachea normal and phonation normal. Neck supple. No edema present. No erythema present. No neck rigidity present.   Cardiovascular: Normal rate, regular rhythm, normal heart sounds and normal pulses.   Pulmonary/Chest: Effort normal and breath sounds normal. No respiratory distress. She has no decreased breath sounds. She has no wheezes. She has no rhonchi.   Abdominal: Normal appearance.   Musculoskeletal: Normal range of motion.         General: No deformity. Normal range of motion.   Neurological: She is alert and oriented to person, place, and time. She exhibits normal muscle tone. Coordination normal.   Skin: Skin is warm, dry, intact, not diaphoretic and not pale.   Psychiatric: Her speech is normal and behavior is normal. Judgment and thought content normal.   Nursing note and vitals reviewed.      Assessment:     1. URI with cough and congestion    2. Nasal congestion      Results for orders placed or performed in visit on 05/23/25   SARS Coronavirus 2 Antigen, POCT Manual Read    Collection Time: 05/23/25  2:59 PM   Result Value Ref Range    SARS Coronavirus 2 Antigen Negative Negative, Presumptive Negative     Acceptable Yes        Plan:       URI with cough  and congestion  -     predniSONE (DELTASONE) 20 MG tablet; Take 2 tablets (40 mg total) by mouth once daily. for 4 days  Dispense: 8 tablet; Refill: 0  -     azelastine (ASTELIN) 137 mcg (0.1 %) nasal spray; 1 spray (137 mcg total) by Nasal route 2 (two) times daily.  Dispense: 30 mL; Refill: 0  -     brompheniramine-pseudoeph-DM (BROMFED DM) 2-30-10 mg/5 mL Syrp; Take 10 mLs by mouth every 6 (six) hours as needed (Cough and congestion).  Dispense: 118 mL; Refill: 0    Nasal congestion  -     SARS Coronavirus 2 Antigen, POCT Manual Read          Medical Decision Making:   History:   Old Medical Records: I decided to obtain old medical records.  Old Records Summarized: records from clinic visits.  Clinical Tests:   Lab Tests: Ordered and Reviewed  Urgent Care Management:  A. Problem List:   -Acute: URI with cough and congestion    -Chronic: asthma, Alzheimer's disease, GERD  B. Differential diagnosis: viral vs bacterial URI, pharyngitis, otitis, COVID 19, influenza, pneumonia  C. Diagnostic Testing Ordered: COVID   D. Diagnostic Testing Considered: None   E. Independent Historians: None  F. Urgent Care Midlevel Independent Results Interpretation: COVID negative   G. Radiology:  H. Review of Previous Medical Records:  I. Home Medications Reviewed  J. Social Determinants of Health considered  K. Medical Decision Making and Disposition: Patient presents with complaints of URI like symptoms for the past 4 days now. On exam, she is afebrile and nontoxic appearing. Lungs CTAB, vitals stable. COVID is negative but I do suspect allergic vs other viral etiology. She was advised close follow-up with PCP and ED precautions discussed. She verbalized understanding and agreed with plan.            Patient Instructions   You have been prescribed a steroid today. Take the prescription as directed. Steroids can increase blood sugar. You can also have the following when taking steroids: flushing, jitteriness, weight gain, fluid  retention, bone weakening. If you develop any adverse symptoms, stop taking the medication immediately.    Thank you for choosing [facility]!     Our goal in the Urgent Care is to always provide outstanding medical care. You may receive a survey by mail or e-mail in the next week regarding your experience today. We would greatly appreciate you completing and returning the survey. Your feedback provides us with a way to recognize our staff who provide very good care, and it helps us learn how to improve when your experience was below our aspiration of excellence.       We appreciate you trusting us with your medical care. We hope you feel better soon. We will be happy to take care of you for all of your future medical needs.    You must understand that you've received an Urgent Care treatment only and that you may be released before all your medical problems are known or treated. You, the patient, will arrange for follow up care as instructed.      Follow up with your PCP or specialty clinic as instructed in the next 2-3 days if not improved or as needed. You can call [phone] to schedule an appointment with appropriate provider.      If you condition worsens, we recommend that you receive another evaluation at the emergency room immediately or contact your primary medical clinic's after hours call service to discuss your concerns.      Please return here or go to the Emergency Department for any concerns or worsening condition.

## 2025-06-23 DIAGNOSIS — Z00.6 RESEARCH STUDY PATIENT: ICD-10-CM

## 2025-06-23 DIAGNOSIS — I68.0 CEREBRAL AMYLOID ANGIOPATHY (CODE): Primary | ICD-10-CM

## 2025-06-24 ENCOUNTER — RESEARCH ENCOUNTER (OUTPATIENT)
Dept: RESEARCH | Facility: HOSPITAL | Age: 75
End: 2025-06-24
Payer: MEDICARE

## 2025-06-24 ENCOUNTER — HOSPITAL ENCOUNTER (OUTPATIENT)
Dept: RADIOLOGY | Facility: HOSPITAL | Age: 75
Discharge: HOME | End: 2025-06-24
Attending: PSYCHIATRY & NEUROLOGY
Payer: MEDICARE

## 2025-06-24 ENCOUNTER — OFFICE VISIT (OUTPATIENT)
Dept: NEUROLOGY | Facility: CLINIC | Age: 75
End: 2025-06-24
Payer: MEDICARE

## 2025-06-24 VITALS
DIASTOLIC BLOOD PRESSURE: 84 MMHG | SYSTOLIC BLOOD PRESSURE: 131 MMHG | WEIGHT: 159.31 LBS | HEIGHT: 70 IN | HEART RATE: 61 BPM | RESPIRATION RATE: 18 BRPM | BODY MASS INDEX: 22.81 KG/M2

## 2025-06-24 DIAGNOSIS — I68.0 CEREBRAL AMYLOID ANGIOPATHY (CODE): ICD-10-CM

## 2025-06-24 DIAGNOSIS — I68.0 CEREBRAL AMYLOID ANGIOPATHY: Primary | ICD-10-CM

## 2025-06-24 DIAGNOSIS — Z00.6 RESEARCH STUDY PATIENT: ICD-10-CM

## 2025-06-24 DIAGNOSIS — E85.4 CEREBRAL AMYLOID ANGIOPATHY: Primary | ICD-10-CM

## 2025-06-24 DIAGNOSIS — Z00.6 RESEARCH EXAM: ICD-10-CM

## 2025-06-24 PROCEDURE — 70551 MRI BRAIN STEM W/O DYE: CPT | Mod: TC

## 2025-06-24 RX ORDER — SODIUM CHLORIDE 0.9 % (FLUSH) 0.9 %
3 SYRINGE (ML) INJECTION
Status: SHIPPED | OUTPATIENT
Start: 2025-06-25

## 2025-06-24 NOTE — PROGRESS NOTES
Pre-Dose/Day -1 Visit    Study title: A Phase 2, Randomized, Double-blind, Placebo-controlled Study to Evaluate the Efficacy, Safety, Tolerability, and Pharmacodynamics of Intrathecally Administered ALN-JEFFRY in Patients with Cerebral Amyloid Angiopathy (CAA) (cAPPricorn-1)  Sponsor: [employer]  NCT# [id number]     : Moody Rosas MD    IRB #: 2024.129    Subject ID: [id number]  Date of visit: 06/24/2025     Study Timeline  Double-Blind Treatment Period  5/6/2025 - ICF/Screening  06/24/2025 - Day -1 Visit       Pre-Dose/Day -1 Procedures    BEFORE STUDY DRUG ADMINISTRATION      Vital Signs (Weight, Height, BP, Heart Rate, Respiratory Rate, Temperature)   Clinical Dementia Rating: Global Score (CDR)    Modified Kathya Scale (mRS) - (Must be completed by Investigator)   Full Physical Examination - (Must be completed by Investigator)   Neurological assessment (including nondilated fundi) - (Must be completed by Investigator)   Stroke-Specific Quality of Life (SS-QOL) - COMPLETED ON TABLET (Back-up paper form)   EuroQol-5 Domains-5L (EQ-5D-5L) - COMPLETED ON TABLET (Back-up paper form)   Neuropsychiatric Inventory Questionnaire (NPI-Q) - COMPLETED ON TABLET (Back-up paper form)   Brain MRI - If screening Brain MRI was performed within 4 weeks of Day 1, the screening MRI may be used as baseline imaging assessment for the patient   Local Lab - Urine pregnancy test (Women of childbearing potential only)  Local Lab - CBC with differential, Coagulation   Pre-dose ECG, Plasma PK  Central Labs - Immunogenicity (ADA), Blood plasma PD and exploratory biomarkers ,LFTs, Urinalysis,       VITAL SIGNS     Time collected 08:59       Weight (kg) 72.3   Height (cm) 177.8   BMI (kg/m2) 22.86       Blood pressure should be taken using the same arm at each visit.   Blood pressure measuring position    [x]Seated  []Supine   Blood pressure measuring arm [x]Right  []Left   Systolic blood pressure 131 mmHg    Diastolic blood pressure 84 mmHg   Heart rate  61 beats/min   Respiratory Rate  18 breaths/min   Temperature location [x] Oral   [] Temporal  [] Tympanic  [] Axillary   Temperature (C) 36.6     Pre-Dose Central Labs:  Lab Collection (Blood and Urine)  Were all protocol required labs obtained? Yes  Time collected: 10:30 (24-hour clock)      Pre-dose ECGs and Plasma PK Actual Times:  Patients should be supine for at least 10 minutes before each ECG is obtained; ECGs should be performed before blood samples are drawn, when possible  Sampling Time 12-lead ECG Blood PK Samples   Pre-dose 09:10  Abnormal - Not Clinically Significant     09:12  Abnormal - Not Clinically Significant     09:12  Abnormal - Not Clinically Significant  10:30         PREGNANCY TEST          Is the patient a women of childbearing potential?  No - Post-menopausal     Assessments:    Modified [place] Scale (mRS) (Clinician-reported)    Was the Modified [place] Scale performed? Yes   Date of assessment: 06/24/2025        Who performed the Modified Kathya Scale? () [x]PI/Sub-I   Method of Completion [x]Tablet (Preferred)  []Paper (Back-up)    First and Last Name Amanda Encinas PA-C       SS-QOL (Patient-reported)    Was the SS-QOL performed? Yes   Date of assessment: 06/24/2025         Responses provided by [x]Patient   []Caregiver/Study Partner    Method of Completion [x]Tablet (Preferred)  []Paper (Back-up)     EQ-5D-5L (Patient-reported)    Was the [id number]? Yes   Date of assessment: 06/24/2025         Responses provided by []Patient   []Caregiver/Study Partner    Method of Completion []Tablet (Preferred)  []Paper (Back-up)     NPI-Q (Patient-reported)    Was the NPI-Q performed? Yes   Date of assessment: 06/24/2025       Responses provided by []Patient   [x]Caregiver/Study Partner    Method of Completion [x]Tablet (Preferred)  []Paper (Back-up)       ADVERSE EVENTS  EMR and patient were surveyed for recent admissions and  other adverse events. There are no new adverse events to report at this time.    PRIOR/CONCOMITANT MEDICATIONS  NOTE: all prior/concomitant medication will be documented and maintained in patient-specific Concomitant Log in subject files  Patient took prednisone, Azelastine nasal spray, and Bromfed-DM for URI in May 2025.  URI will be added to Medical History log.      Visit Summary/Follow-up Items:  Patient became tired during the cognitive questionnaires so will complete RBANS, TMT, and C-SSRS tomorrow before dosing/LP.  Day 1/Randomization Visit scheduled for 06/25/25.

## 2025-06-24 NOTE — PROGRESS NOTES
Day -1 Pre-Dose Visit     Study title: A Phase 2, Randomized, Double-blind, Placebo-controlled Study to Evaluate the Efficacy, Safety, Tolerability, and Pharmacodynamics of Intrathecally Administered ALN-JEFFRY in Patients with Cerebral Amyloid Angiopathy (CAA) (cAPPricorn-1)  Sponsor: [employer]  NCT# [id number]     : Moody Rosas MD    IRB #: 2024.129    Time of Exams: 9:25am    Sunni Lowe is eligible for randomization and can proceed with study drug administration for the cAPPricorn-1 study.     Physical Exam    If Abnormal, document findings:   General Appearance Normal    Skin Normal    HEENT Normal      Respiratory Normal    Cardiovascular Normal    Gastrointestinal Normal    Musculoskeletal Normal    Dermatological Normal    Thyroid Normal    Lymph nodes Normal      Neurological Exam    Category: Mental Status   If Abnormal, document findings:   Level of consciousness (alertness) Normal    Orientation Normal    Attention and concentration Normal      Language Normal    Memory Normal Delayed recall at 5 minutes:  Apple   Talisha   Red     Category: Meningeal signs   If Abnormal, document findings:   Nuchal rigidity, Kernigs sign, Brudzinskis sign, meningismus Normal      Category: Cranial nerves*  *2 optic, 3 oculomotor, 4 trochlear, 5 trigeminal, 6 abducens, 7 facial, 8 vestibulocochlear, 9 glossopharyngeal, 10 vagus, 11 spinal accessory, 12 hypoglossal.   If Abnormal, document findings:   Fundoscopic optic nerve examination Normal    Visual acuity Abnormal - Not Clinically Significant  20/400 without corrective lenses   Visual fields Normal      Pupillary light reflex Normal    Extraocular eye movements, including observation for nystagmus Normal    Facial sensation Normal    Facial muscle strength/symmetry Normal    Hearing Abnormal - Not Clinically Significant  Chronic hearing loss   Palatal movement/ uvula position Normal    Dysarthria Normal    Head  rotation/shoulder elevation (sternocleinomastoid and trapezius) Normal    Tongue movement Normal      Category: Motor examination   If Abnormal, document findings:   Muscle bulk Normal    Tone, including examination for rigidity, spasticity, paratonia, with characterization Normal    Abnormal/involuntary movements, including tremor, myoclonus, dystonia, and other abnormal/involuntary motor activity, with characterization Normal    Strength testing Normal      Category: Reflex examination  If Abnormal, document findings:   Deep tendon reflexes Normal    Plantar (Babinski) reflex Normal    Primitive reflexes (frontal release signs) when appropriate Normal      Category: Sensory examination  If Abnormal, document findings:   Multimodality sensory examination Abnormal - Not Clinically Significant  Decreased vibratory sensation in BL feet (chronic)     Category: Coordination examination  If Abnormal, document findings:   Dexterity (e.g., finger tapping) Normal    Rapid alternating movements Normal    Finger-to-nose and heel-to-shin testing or equivalent Normal        Category: Gait and balance examination   If Abnormal, document findings:   Stance Normal    Romberg test Normal    Casual gait evaluation Normal      Heel, toe, and tandem gait evaluation Abnormal - Not Clinically Significant  Mild impairment in tandem gait       Outpatient Medications Marked as Taking for the 6/24/25 encounter (Office Visit) with Amanda Encinas, DAY   Medication Sig Dispense Refill    azelastine (ASTELIN) 137 mcg (0.1 %) nasal spray 1 spray (137 mcg total) by Nasal route 2 (two) times daily. 30 mL 0    calcium carbonate (OS-ISAEBLLE) 600 mg (1,500 mg) Tab Take 1,200 mg by mouth once daily.      cholecalciferol, vitamin D3, (VITAMIN D3) 10,000 unit Cap Take 10,000 Units by mouth once daily.      collagen, bovine, 100 % Powd Take 1 Scoop by mouth Daily. Puts 1 scoop in coffee in am daily      ELDERBERRY FRUIT ORAL Take 1 tablet by mouth every  other day.      esomeprazole (NEXIUM) 20 MG capsule Take 1 capsule (20 mg total) by mouth before breakfast. 90 capsule 3    rivastigmine (EXELON PATCH) 9.5 mg/24 hour PT24 Place 1 patch onto the skin once daily. 30 patch 5     38 minutes of total time spent on the encounter, which includes face to face time and non-face to face time preparing to see the patient (eg, review of tests), Obtaining and/or reviewing separately obtained history, Documenting clinical information in the electronic or other health record, Independently interpreting results (not separately reported) and communicating results to the patient/family/caregiver, patient/family education and Care coordination (not separately reported).

## 2025-06-25 ENCOUNTER — HOSPITAL ENCOUNTER (OUTPATIENT)
Dept: RADIOLOGY | Facility: HOSPITAL | Age: 75
Discharge: HOME | End: 2025-06-25
Attending: PSYCHIATRY & NEUROLOGY
Payer: MEDICARE

## 2025-06-25 ENCOUNTER — OFFICE VISIT (OUTPATIENT)
Dept: NEUROLOGY | Facility: CLINIC | Age: 75
End: 2025-06-25
Payer: MEDICARE

## 2025-06-25 ENCOUNTER — RESEARCH ENCOUNTER (OUTPATIENT)
Dept: RESEARCH | Facility: HOSPITAL | Age: 75
End: 2025-06-25
Payer: MEDICARE

## 2025-06-25 VITALS — TEMPERATURE: 98 F

## 2025-06-25 DIAGNOSIS — E85.4 CEREBRAL AMYLOID ANGIOPATHY: ICD-10-CM

## 2025-06-25 DIAGNOSIS — I68.0 CEREBRAL AMYLOID ANGIOPATHY: ICD-10-CM

## 2025-06-25 DIAGNOSIS — Z00.6 RESEARCH STUDY PATIENT: ICD-10-CM

## 2025-06-25 DIAGNOSIS — Z78.0 MENOPAUSE: ICD-10-CM

## 2025-06-25 DIAGNOSIS — Z00.6 RESEARCH EXAM: Primary | ICD-10-CM

## 2025-06-25 DIAGNOSIS — I68.0 CEREBRAL AMYLOID ANGIOPATHY (CODE): ICD-10-CM

## 2025-06-25 LAB
CLARITY CSF: CLEAR
COLOR CSF: COLORLESS
CSF TUBE NUMBER (OHS): 2
CSF TUBE NUMBER (OHS): 2
GLUCOSE CSF-MCNC: 66 MG/DL (ref 40–70)
LYMPHOCYTES NFR CSF MANUAL: 100 % (ref 40–80)
PROT CSF-MCNC: 64 MG/DL (ref 15–40)
RBC # CSF: 4 /CU MM
SPECIMEN VOL CSF: 2 ML
WBC # CSF: 1 /CU MM

## 2025-06-25 PROCEDURE — 84157 ASSAY OF PROTEIN OTHER: CPT | Performed by: PSYCHIATRY & NEUROLOGY

## 2025-06-25 PROCEDURE — 1157F ADVNC CARE PLAN IN RCRD: CPT | Mod: CPTII,S$GLB,, | Performed by: PSYCHIATRY & NEUROLOGY

## 2025-06-25 PROCEDURE — 3044F HG A1C LEVEL LT 7.0%: CPT | Mod: CPTII,S$GLB,, | Performed by: PSYCHIATRY & NEUROLOGY

## 2025-06-25 PROCEDURE — 99215 OFFICE O/P EST HI 40 MIN: CPT | Mod: S$GLB,,, | Performed by: PSYCHIATRY & NEUROLOGY

## 2025-06-25 PROCEDURE — 63600175 PHARM REV CODE 636 W HCPCS: Performed by: PSYCHIATRY & NEUROLOGY

## 2025-06-25 PROCEDURE — 82945 GLUCOSE OTHER FLUID: CPT | Performed by: PSYCHIATRY & NEUROLOGY

## 2025-06-25 PROCEDURE — 89051 BODY FLUID CELL COUNT: CPT | Performed by: PSYCHIATRY & NEUROLOGY

## 2025-06-25 RX ORDER — LIDOCAINE HYDROCHLORIDE 10 MG/ML
5 INJECTION, SOLUTION INFILTRATION; PERINEURAL ONCE
Status: COMPLETED | OUTPATIENT
Start: 2025-06-25 | End: 2025-06-25

## 2025-06-25 RX ADMIN — LIDOCAINE HYDROCHLORIDE 5 ML: 10 INJECTION, SOLUTION INFILTRATION; PERINEURAL at 10:06

## 2025-06-25 NOTE — PROGRESS NOTES
Day 1 (Randomization/Dosing Visit) - Prior to Discharge    Study title: A Phase 2, Randomized, Double-blind, Placebo-controlled Study to Evaluate the Efficacy, Safety, Tolerability, and Pharmacodynamics of Intrathecally Administered ALN-JEFFRY in Patients with Cerebral Amyloid Angiopathy (CAA) (cAPPricorn-1)  Sponsor: [employer]  NCT# [id number]     : Moody Rosas MD    IRB #: 2024.129    Time of Exam: 15:35    Sunni Lowe is stable for discharge to home and will return to clinic tomorrow for 24 hour post-dose PK blood draw and EKG.    Physical Exam      If Abnormal, document findings:   General Appearance Normal     Skin Normal     HEENT Normal        Respiratory Normal     Cardiovascular Normal     Gastrointestinal Normal     Musculoskeletal Normal     Dermatological Normal     Thyroid Normal     Lymph nodes Normal        Neurological Exam     Category: Mental Status    If Abnormal, document findings:   Level of consciousness (alertness) Normal     Orientation Normal     Attention and concentration Normal        Language Normal     Memory Abnormal - Not Clinically Significant  2/3 at 5 min      Category: Meningeal signs    If Abnormal, document findings:   Nuchal rigidity, Kernigs sign, Brudzinskis sign, meningismus Normal        Category: Cranial nerves*  *2 optic, 3 oculomotor, 4 trochlear, 5 trigeminal, 6 abducens, 7 facial, 8 vestibulocochlear, 9 glossopharyngeal, 10 vagus, 11 spinal accessory, 12 hypoglossal.    If Abnormal, document findings:   Fundoscopic optic nerve examination Normal     Visual acuity Abnormal - Not Clinically Significant  20/400   Visual fields Normal        Pupillary light reflex Normal     Extraocular eye movements, including observation for nystagmus Normal     Facial sensation Normal     Facial muscle strength/symmetry Normal     Hearing Normal  sensorineural loss; not wearing hearing aids   Palatal movement/ uvula position Normal      Dysarthria Normal     Head rotation/shoulder elevation (sternocleinomastoid and trapezius) Normal     Tongue movement Normal        Category: Motor examination    If Abnormal, document findings:   Muscle bulk Normal     Tone, including examination for rigidity, spasticity, paratonia, with characterization Normal     Abnormal/involuntary movements, including tremor, myoclonus, dystonia, and other abnormal/involuntary motor activity, with characterization Normal     Strength testing Normal        Category: Reflex examination   If Abnormal, document findings:   Deep tendon reflexes Normal     Plantar (Babinski) reflex Normal     Primitive reflexes (frontal release signs) when appropriate Normal        Category: Sensory examination   If Abnormal, document findings:   Multimodality sensory examination Normal        Category: Coordination examination   If Abnormal, document findings:   Dexterity (e.g., finger tapping) Normal     Rapid alternating movements Normal     Finger-to-nose and heel-to-shin testing or equivalent Normal           Category: Gait and balance examination    If Abnormal, document findings:   Stance Normal     Romberg test Normal     Casual gait evaluation Normal        Heel, toe, and tandem gait evaluation Normal       I have spent a total of 55 minutes in face-to-face encounter, which includes performance of the neurological and general examination(s) and post LP monitoring and check-in

## 2025-06-25 NOTE — PROCEDURES
Radiology Post-Procedure Note    Pre Op Diagnosis: CAA    Post Op Diagnosis: Same    Procedure: lumbar puncture    Procedure performed by: Stoney Bledsoe PA-C    Written Informed Consent Obtained: Yes    Specimen Removed: 20 mL CSF    Estimated Blood Loss: Minimal    Findings: Following written informed consent and sterile prep and drape, a 22 gauge spinal needle was inserted at L3 - L4 intralaminar space under fluoroscopic surveillance.  20 mL clear CSF removed and sent to the lab for further analysis per cAPPricorn-1 clinical trial guidelines.  There were no complications.    Patient tolerated procedure well.  See report for further details.    Stoney Bledsoe PA-C  Interventional Radiology

## 2025-06-25 NOTE — PROGRESS NOTES
Day 1 (Randomization/Dosing Visit) - 3 Hours Post-Dose    Study title: A Phase 2, Randomized, Double-blind, Placebo-controlled Study to Evaluate the Efficacy, Safety, Tolerability, and Pharmacodynamics of Intrathecally Administered ALN-JEFFRY in Patients with Cerebral Amyloid Angiopathy (CAA) (cAPPricorn-1)  Sponsor: [employer]  NCT# [id number]     : Moody Rosas MD    IRB #: 2024.129    Time of Exam: 14:12  Physical Exam      If Abnormal, document findings:   General Appearance Normal     Skin Normal     HEENT Normal        Respiratory Normal     Cardiovascular Normal     Gastrointestinal Normal     Musculoskeletal Normal     Dermatological Normal     Thyroid Normal     Lymph nodes Normal        Neurological Exam     Category: Mental Status    If Abnormal, document findings:   Level of consciousness (alertness) Normal     Orientation Normal     Attention and concentration Normal        Language Normal     Memory Abnormal - Not Clinically Significant  2/3 at 5 min      Category: Meningeal signs    If Abnormal, document findings:   Nuchal rigidity, Kernigs sign, Brudzinskis sign, meningismus Normal        Category: Cranial nerves*  *2 optic, 3 oculomotor, 4 trochlear, 5 trigeminal, 6 abducens, 7 facial, 8 vestibulocochlear, 9 glossopharyngeal, 10 vagus, 11 spinal accessory, 12 hypoglossal.    If Abnormal, document findings:   Fundoscopic optic nerve examination Normal     Visual acuity Abnormal - Not Clinically Significant  20/400   Visual fields Normal        Pupillary light reflex Normal     Extraocular eye movements, including observation for nystagmus Normal     Facial sensation Normal     Facial muscle strength/symmetry Normal     Hearing Normal  sensorineural loss; not wearing hearing aids   Palatal movement/ uvula position Normal     Dysarthria Normal     Head rotation/shoulder elevation (sternocleinomastoid and trapezius) Normal     Tongue movement Normal         Category: Motor examination    If Abnormal, document findings:   Muscle bulk Normal     Tone, including examination for rigidity, spasticity, paratonia, with characterization Normal     Abnormal/involuntary movements, including tremor, myoclonus, dystonia, and other abnormal/involuntary motor activity, with characterization Normal     Strength testing Normal        Category: Reflex examination   If Abnormal, document findings:   Deep tendon reflexes Normal     Plantar (Babinski) reflex Normal     Primitive reflexes (frontal release signs) when appropriate Normal        Category: Sensory examination   If Abnormal, document findings:   Multimodality sensory examination Normal        Category: Coordination examination   If Abnormal, document findings:   Dexterity (e.g., finger tapping) Normal     Rapid alternating movements Normal     Finger-to-nose and heel-to-shin testing or equivalent Normal           Category: Gait and balance examination    If Abnormal, document findings:   Stance Normal     Romberg test Normal     Casual gait evaluation Normal        Heel, toe, and tandem gait evaluation Normal

## 2025-06-25 NOTE — PROGRESS NOTES
Study Eligibility Checklist     Study title: A Phase 2, Randomized, Double-blind, Placebo-controlled Study to Evaluate the Efficacy, Safety, Tolerability, and Pharmacodynamics of Intrathecally Administered ALN-JEFFRY in Patients with Cerebral Amyloid Angiopathy (CAA) (cAPPricorn-1)  Sponsor: [employer]  NCT# [id number]     : Moody Rosas MD    IRB #: 2024.129    Subject ID: [id number]    INCLUSION CRITERIA    Inclusion Criteria for sCAA Patients   Age and Sex   1 Yes Male or female aged >=50 years at the time of informed consent   Patient and Disease Characteristics   2 Yes Individuals with probable CAA per the [place] Criteria Version 2.0, characterized by the  following history of clinical presentation and MRI findings at screening:  a. Presentation with spontaneous intracerebral hemorrhage, transient focal neurological  episodes or cognitive impairment or dementia  b. At least 2 strictly lobar hemorrhagic lesions on MRI, in any combination (ICH, CMB,  or foci of cSS or cSAH), or  c. At least 1 lobar hemorrhagic lesion plus 1 white matter feature (severe PVS in the  -PVS or WMH in a multispot pattern), and  d. Absence of any deep hemorrhagic lesions (ie, intracerebral hemorrhage or  microbleeds in basal ganglia, thalamus, or brainstem) on MRI, and  e. Absence of other cause of the hemorrhagic lesions. Other causes of hemorrhagic  lesion: antecedent head trauma, hemorrhagic transformation of an ischaemic stroke,  arteriovenous malformation, haemorrhagic tumour, CNS vasculitis. In addition, other  causes of cSS and acute cSAH should also be excluded.   3 Yes A minimum of 4 lobar CMBs confirmed by MRI; or multifocal cSS (as defined in  [Jean-Pierre 2017] with 2 or more lobar CMBs confirmed by MRI; or multiple lobar ICH  with 2 or more lobar CMBs confirmed by MRI.   Inclusion Criteria for D-CAA Patients   Age and Sex   4 N/A Male or female aged >=30 years at the time of informed  consent   Patient and Disease Characteristics   5 N/A Individuals with a known [id number] JEFFRY gene mutation for [place]-type CAA   Inclusion Criteria for Both sCAA and D-CAA Patients   Patient and Disease Characteristics   6 Yes Corrected vision at 20/50 or better as measured per local procedures or sourced from  documented medical history, for the subset of patients who will have BOLD-fMRI  assessments.   7 Yes Able and willing to meet all study requirements in the opinion of the Investigator,  including travel to study center, procedures, measurements, and visits, including:  a. Adequately supportive psychosocial circumstances. Must have a study partner who  in the Investigators judgement is able to provide accurate information regarding the  patients cognitive and functional abilities, who agrees to provide information at  applicable study visits which require informant input for scale completion  b. Able to undergo MRI scans and able to tolerate them (eg, no metal implants including  MRI incompatible intrauterine devices, myoclonus of a severity that precludes MRI  scans or any condition that renders testing intolerable for the patient)  c. Body Mass Index (BMI) >=18 and <=34 kg/m2 at Screening visit  d. Able to tolerate LP   8 Yes Evaluable brain MRI imaging at screening   Informed Consent   9 Yes Patient is able to understand, is willing and able to comply with the study requirements  and to provide written informed consent.     EXCLUSION CRITERIA    Exclusion Criteria for Both sCAA and D-CAA Patients   Disease-specific Conditions   1 No Moderate or Severe Stage AD (defined as global clinical dementia rating [CDR] 2.0 or  3.0, respectively) or significant CI (Mini Mental State Examination [MMSE] <22) at  screening   2 No History of previous clinical ICH with onset less than 90 days prior to anticipated  randomization in the study   Laboratory Assessments   3 No Has any of the following laboratory parameter  assessments at screening:  a. Alanine aminotransferase or aspartate aminotransferase >= 3.0 x upper limit of normal  (ULN)  b. Total bilirubin >= 2.0 x ULN.  c. International normalized ratio >1.4  d. Platelet count <100,000/microliter (?L)  e. Estimated glomerular filtration (eGFR) of <30mL/min/1.73m2 (calculation will be  based on the Chronic Kidney Disease Epidemiology Collaboration [CKD-EPI]  creatinine formula (2021) (refer to Section 10.1).   Prior/Concomitant Therapy   4 No Treatment with another investigational drug, biological agent, or device within 6 months  of Screening, or 5 half-lives of investigational agent, whichever is longer. Any agent that  has received health agency authorization (including for emergency use) by local or  regional regulatory authorities is not considered investigational.   5 No Use of the following medications is prohibited unless the dose has been stable (prescribed  dosing regimen is unchanged) for at least 12 weeks prior to Screening and the dosing  regimen is not anticipated to change during the study: antidepressants, antipsychotics,  anxiolytics, benzodiazepines, acetylcholinesterase inhibitors, anticonvulsants, mood  stabilizers, and memantine.   6 No Antiplatelet or anticoagulant therapy within the 10 days prior to Screening or anticipated  use during the study. This includes but is not limited to: clopidogrel, dipyridamole,  warfarin, dabigatran, rivaroxaban and apixaban. Aspirin is allowed.   7 No Treatment with amyloid-targeting antibody prior to Screening   8 No Treatment with another IT administered medication within the last 1 year prior to  Screening   9 No Prior treatment with a CNS-targeted siRNA or antisense oligonucleotide (ASO)   10 No Any history of gene therapy or cell transplantation or experimental brain surgery   11 No Presence of an implanted shunt for the drainage of CSF or an implanted CNS catheter   Medical Conditions   12 No Clinically significant  electrocardiogram (ECG) abnormalities at Screening, in the opinion  of the Investigator, or a Fridericia-corrected QT interval (QTcF) >450 msec for males or  >470 msec for females at Screening.   13 No Has systolic blood pressure >150 mmHg and/or a diastolic blood pressure >90 mmHg  after 10 minutes of rest at screening.   14 No Active fungal or bacterial systemic infection that will not be completely treated at least 7  days prior to the study drug dosing on Day 1   15 No Attempted suicide, suicidal ideation with a plan that required hospital admission and/or  change in level of care within 12 months prior to Screening. For patients with suicidal  behaviors within the last 12 months, a risk assessment should be done by an  appropriately-qualified health professional to assess whether it is safe for the patient to  participate in the study. In addition, patients deemed by the Investigator to be at  significant risk of suicide, major depressive episode, psychosis, confusional state, or  violent behavior should be excluded.   16 No History of bleeding diathesis.   17 No A medical history of brain or spinal disease that would interfere with the LP process, CSF  circulation or safety assessment, including but not limited to tumors or abnormalities  visualized by MRI or computed tomography, suggestion of raised intracranial pressure on  MRI or ophthalmic examination, spinal stenosis, or curvature, Chiari malformation,  hydrocephalus, syringomyelia, tethered spinal cord syndrome and connective tissue  disorders such as Mery-Danlos syndrome and Marfan syndrome.   18 No History of uncontrolled seizures within the last 6 months prior to screening.   19 No Hospitalization for any major medical or surgical procedure involving general anesthesia  within 12 weeks of Screening.   20 No Has other medical conditions or comorbidities which, in the opinion of the Investigator,  would interfere with study compliance or data  interpretation; or, in the opinion of the  Investigator, taking part in the study would jeopardize the safety of the patient.   Contraception, Pregnancy, and Breastfeeding   21 No Is not willing to comply with the contraceptive requirements during the study period, as  described in Section 5.9.1.   22 No Female patient is pregnant, planning a pregnancy, or breast-feeding.   Alcohol Use   23 No History of alcohol abuse, within the last 12 months before Screening, in the opinion of  the Investigator.       Confirmation of Eligibility  YES NO   This subject meets all of the inclusion and none of the exclusion criteria for the cAPPricorn-1 study per study protocol version Amendment 1 - verified by CRC, Mikayla Middleton , and confirmed by PI, Moody Rosas MD, per co-signature of this note. [x] []

## 2025-06-25 NOTE — RESEARCH
Day 1/Randomization Visit     Study title: A Phase 2, Randomized, Double-blind, Placebo-controlled Study to Evaluate the Efficacy, Safety, Tolerability, and Pharmacodynamics of Intrathecally Administered ALN-JEFFRY in Patients with Cerebral Amyloid Angiopathy (CAA) (cAPPricorn-1)  Sponsor: [employer]  NCT# [id number]     : Moody Rosas MD    IRB #: 2024.129    Subject ID: [id number]  Date of visit: 06/25/2025     Study Timeline  Double-Blind Treatment Period  5/6/2025 - ICF/Screening  06/24/2025 - Day -1 Visit   06/25/2025 - Day 1 Visit      Day 1/Randomization Procedures    ON DAY OF STUDY DRUG ADMINISTRATION    Randomization - Randomization can be performed up to 5 business days before study drug dosing      Lumbar Puncture and CSF Collection -   Local Lab - CSF for number of RBC and WBC (and differential), protein and glucose  Levels  Central Lab- Pre-dose CSF PK, CSF PD and exploratory biomarkers   Study Drug Administration  - ALN-JEFFRY or placebo will be administered as a single IT bolus injection pushed over 2 to 3 minutes.       AFTER STUDY DRUG ADMINISTRATION - Post dose observations for at least 3 hours     Post-dose ECGs, Plasma PK - see collection schedule below    3-hour post dose Neurological assessment (including nondilated fundi) - (Must be completed by Investigator)   Discharge Neurological assessment (including nondilated fundi) - (Must be completed by Investigator)       RBANS     Was the RBANS performed? Yes   Date of assessment: 06/25/2025         Who performed the RBANS? () []PI/Sub-I  [x]CRC   Method of Completion [x]Tablet (Preferred)  []Paper (Back-up)    First and Last Name Mikayla Middleton      [place] Making Test      Was the [place] Making Test performed? Yes   Date of assessment: 06/25/2025              Who performed the Trail Making Test ? () []PI/Sub-I  [x]CRC   Method of Completion [x]Tablet (Preferred)  []Paper (Back-up)     First and Last Name Mikayla Kane      [place] - Suicide Severity Rating Scale (C-SSRS)      Was the C-SSRS performed? Yes   Date of assessment: 06/25/2025            Who performed the Trail Making Test ? () []PI/Sub-I  [x]CRC   Method of Completion [x]Tablet (Preferred)  []Paper (Back-up)    First and Last Name Mikayla Kane       CSF PK Sample   10:43 Time Collected    ONLY Pre-dose CSF PK       Post-dose ECGs and Plasma PK Actual Times:  Patients should be supine for at least 10 minutes before each ECG is obtained; ECGs should be performed before blood samples are drawn, when possible  Sampling Time 12-lead ECG Blood PK Samples   00:15 (±5 min) 11:11  Abnormal - Not Clinically Significant     11:12  Abnormal - Not Clinically Significant     11:13  Normal Unable to collect 15 min PK sample due to PIV not working to allow for blood collection in the time window.   00:30 (±5 min) 11:23  Normal    11:24  Normal    11:24  Normal Unable to collect 15 min PK sample due to PIV not working to allow for blood collection in the time window.    María Sue RN, removed the patient's PIV that was not working in the left arm.   01:00 (±5 min) 11:56  Normal    11:56  Normal    11:57  Normal 11:59    María Sue RN, placed a new PIV in the patient's right arm that had good blood return.    02:00 (±5 min) 12:56  Abnormal - Not Clinically Significant     12:57  Abnormal - Not Clinically Significant     12:57  Abnormal - Not Clinically Significant  13:05   04:00 (±10 min) 14:52  Abnormal - Not Clinically Significant     14:53  Abnormal - Not Clinically Significant     14:54  Abnormal - Not Clinically Significant  14:57   06:00 (±15 min) 16:58  Normal    16:58  Normal    16:59  Abnormal - Not Clinically Significant  17:01         TIME OF STUDY DRUG ADMINISTRATION - 10:55 - 10:58      Has the patient experienced any new adverse events since the last visit?  No   Has the patient had any changes to their concomitant  medications since last visit?  No       POST-DOSE OBSERVATION (Post dose observations for at least 3 hours)  TIME SUBJECT DISCHARGED TO HOME: 17:12      Visit Summary/Follow-up Items:  Day 2 Visit scheduled with [facility] RN on 06/26/2025

## 2025-06-25 NOTE — H&P
Radiology History & Physical      SUBJECTIVE:     Chief Complaint: CAA    History of Present Illness:  Sunni Lowe is a 74 y.o. female who presents for lumbar puncture per cAPPricron-1 clinical trial guidelines.  Past Medical History:   Diagnosis Date    Allergic rhinitis     Asthma     Fibrocystic breast     Osteopenia     Varicose veins     sees Dr. Brigido Quinones     Past Surgical History:   Procedure Laterality Date    BREAST BIOPSY Left     COLONOSCOPY  08/01/2014    Dr. Canas - repeat in 5 to 10 years    COLONOSCOPY N/A 4/5/2024    Procedure: COLONOSCOPY;  Surgeon: Cassie Huber MD;  Location: [facility];  Service: Endoscopy;  Laterality: N/A;    ESOPHAGOGASTRODUODENOSCOPY N/A 10/19/2018    Procedure: EGD (ESOPHAGOGASTRODUODENOSCOPY);  Surgeon: Cassie Huber MD;  Location: [facility];  Service: Endoscopy;  Laterality: N/A;    ESOPHAGOGASTRODUODENOSCOPY N/A 4/5/2024    Procedure: EGD (ESOPHAGOGASTRODUODENOSCOPY);  Surgeon: Cassie Huber MD;  Location: [facility];  Service: Endoscopy;  Laterality: N/A;    KNEE SURGERY Bilateral     meniscus repair    peniculitis surgery      x 3       Home Meds:   Prior to Admission medications    Medication Sig Start Date End Date Taking? Authorizing Provider   azelastine (ASTELIN) 137 mcg (0.1 %) nasal spray 1 spray (137 mcg total) by Nasal route 2 (two) times daily. 5/23/25 5/23/26  Amanda Beach PA-C   calcium carbonate (OS-ISABELLE) 600 mg (1,500 mg) Tab Take 1,200 mg by mouth once daily.    Provider, Historical   cholecalciferol, vitamin D3, (VITAMIN D3) 10,000 unit Cap Take 10,000 Units by mouth once daily.    Provider, Historical   collagen, bovine, 100 % Powd Take 1 Scoop by mouth Daily. Puts 1 scoop in coffee in am daily 3/12/20   Provider, Historical   CYANOCOBALAMIN, VITAMIN B-12, (VITAMIN B-12 ORAL) Take by mouth.  Patient not taking: Reported on 5/23/2025    Provider, Historical   ELDERBERRY FRUIT ORAL Take 1 tablet by mouth every other  day.    Provider, Historical   esomeprazole (NEXIUM) 20 MG capsule Take 1 capsule (20 mg total) by mouth before breakfast. 3/12/25 3/12/26  Alison Fox,    fish oil-omega-3 fatty acids 300-1,000 mg capsule Take 600 mg by mouth once daily.    Provider, Historical   fluticasone propionate (FLONASE) 50 mcg/actuation nasal spray 2 sprays (100 mcg total) by Each Nostril route once daily.  Patient not taking: Reported on 5/23/2025 1/7/25   Jeanie Ramos MD   fluticasone-salmeterol diskus inhaler 250-50 mcg Inhale 1 puff into the lungs once daily. Controller  Patient not taking: Reported on 5/23/2025 2/3/25   Alison Fox DO   ipratropium (ATROVENT) 21 mcg (0.03 %) nasal spray 2 sprays by Each Nostril route 2 (two) times daily.  Patient not taking: Reported on 5/23/2025 1/7/25   Jeanie Ramos MD   LORazepam (ATIVAN) 1 MG tablet Take 1 tablet (1 mg total) by mouth On call Procedure for Anxiety (take 30 minutes prior to MRI).  Patient not taking: Reported on 5/23/2025 5/9/25 6/8/25  Moody Rosas MD   ondansetron (ZOFRAN-ODT) 4 MG TbDL Dissolve 1 tablet (4 mg total) by mouth every 6 (six) hours as needed.  Patient not taking: Reported on 5/23/2025 12/27/24   Caro Sullivan FNP   psyllium husk 0.4 gram Cap Take 1 capsule by mouth once daily.  Patient not taking: Reported on 5/23/2025    Provider, Historical   rivastigmine (EXELON PATCH) 9.5 mg/24 hour PT24 Place 1 patch onto the skin once daily. 4/2/25 10/4/25  Wu Aldrich MD   suvorexant (BELSOMRA) 20 mg Tab Take 1 tablet by mouth every evening. 11/26/24   Wu Aldrich MD     Anticoagulants/Antiplatelets: no anticoagulation    Allergies: Review of patient's allergies indicates:  No Known Allergies  Sedation History:  no adverse reactions    Review of Systems:   Hematological: no known coagulopathies  Respiratory: no shortness of breath  Cardiovascular: no chest pain  Gastrointestinal: no abdominal pain  Genito-Urinary: no  dysuria  Musculoskeletal: negative  Neurological: no TIA or stroke symptoms         OBJECTIVE:     Vital Signs (Most Recent)       Physical Exam:    General: no acute distress  Mental Status: alert and oriented to person, place and time  HEENT: normocephalic, atraumatic  Chest: unlabored breathing  Heart: regular heart rate  Abdomen: nondistended  Extremity: moves all extremities    ASSESSMENT/PLAN:     Sedation Plan: local  Patient will undergo:  lumbar puncture per cAPPricron-1 clinical trial guidelines.    Stoney Bledsoe PA-C  Interventional Radiology

## 2025-06-25 NOTE — PROGRESS NOTES
"        Diagnoses   {CAA Diagnosis:50988}      Disease Status   {Disease Status:14053}     Assessment   Imaging Summary IMAGING    Historical Confounders {History Relevant to CAA Imaging Biomarkers:73430}     ATN Status {A-T-N Support:70370}     APOE Status No results found for: "APOE"     Prognosis/Risk Assessment {Prognosis:58138}     Plan   Orders & Mgmt {Diagnostic Orders:29019}     Vascular MDM Summary   Antithrombotic {Antithrombotic Plan:29332}     Lipid Lowering Therapy {Anti-hyperlipidemic Plan:93058}     Blood Pressure Mgmt {Hypertensive Plan:11874}        Research Status {Research Status:41341}     Follow-Up: {[facility]AA Clinic Followup:}       ***  ***     ***  ***     Visit Diagnoses:  No diagnosis found.          I have spent a total of *** minutes in chart review, face-to-face encounter, laboratory and/or imaging review and interpretation and documentation for this patient. A substantial portion of this face-to-face visit was spent on counseling and education the patient on conditions discussed. This also includes interdisciplinary discussion of diagnostic and management planning.  I have independently reviewed and interpreted all available imaging and reviewed the reports.     ____________________________  IMAGING      [facility]     [facility]-ri     MICON     HPI:  74 y.o. female with ***, {New patient/follow-up:} [facility] clinic with a history of {HPI Presenting Symptom:60129}.        Hemorrhagic History & Risk Factors   {Intracranial Hemorrhage History:29672}  {Coagulopathy:79183::"No coagulopathy identified"}   Ischemic/Thrombotic History and Risk Factors   {ASCVD History:30146}  {Indications for Anticoagulation:47718::"No indications for anticoagulation"}   Current Anti-Thrombotic Agents   {Current OP Antiplatelet:32157}  {Current OP Anticoagulant:46703::"    "}       Hypertension      Status {Hypertensive Management Status:31511}   Monitoring {Hypertensive Management Monitorin}    Offending " "Agents {Offending Agents:96681}   Current   Anti-Hypertensive Medications {Hypertensive Management Current Medical Regimen:14418}      BP Readings from Last 10 Encounters:   06/24/25 131/84   05/23/25 105/69   05/06/25 131/81   04/09/25 115/71   03/06/25 116/76   02/12/25 120/70   01/07/25 131/77   12/10/24 114/66   10/21/24 138/82   08/29/24 112/73      No care team member to display   Last 5 Patient Entered Readings                Current 30 Day Average:   Recent Readings        SBP (mmHg)        DBP (mmHg)        Pulse                       Lipid Management  Circulation. 2019;139(25):q1994-k444 ; PMID: 46964041  Circulation. 2019;140(11):v622-o796 ;  PMID: 79457248   Indications for Lipid Lowering Therapy {Anti-hyperlipidemic Indications:67760}   Current Therapy {Current OP Antihyperlipidemic Agents:67430}      A1c & Lipid Profile:   Recent Labs   Lab 03/03/25  0946   Hemoglobin A1C 5.3   LDL Cholesterol 87.0   HDL 56   Triglycerides 55   Cholesterol 154       Additional Relevant Labs:         Diabetes Management     Current Therapy This patient does not have an active medication from one of the medication groupers.   Plan {Diabetes Plan:95245}     Obefkpd-Gyu-Pjxcdywstzkjsfsdo Profile       Amyloid Tau Neurodegeneration   Serum No results found for: "MOEZM7974DLE", "ZFRDDCCDY52", "BGXVQJMWV59"  {CAA Labs:31283} Lab Results   Component Value Date    PTAU 1.87 (H) 01/30/2024      {CAA Labs:23906} No results found for: "NEUROLGTCHN"    {CAA Labs:22759}   CSF No results found for: "BETAMYRATCSF", "ADEVLRATIO", "JMYMGLHYX40"  {CAA Labs:55254} No results found for: "ADEVLINTERP", "ADEVLTOTAL", "ADEVLPHOSTAU"  {CAA Labs:19446}           AmyloidPET Radiology Impression: Results for orders placed during the hospital encounter of 08/22/24    NM PET CT Florbetapir F18(Brain Beta-amyloid Plaque)    Impression  Positive scan indicating moderate to frequent amyloid neuritic plaque deposition.      Electronically signed " "by: Margoth Stephen  Date:    2024  Time:    14:25     {Florbetapir Aß PET Scan:22905}      APOE Allele Status  No results found for: "APOE"  {APOE Allele Status:63500}    No results found for: "GFAP"     CSF Testing   Lab Results   Component Value Date    CSFWBC 1 2025    LYMPHS 100 (H) 2025    CSFRBC 4 (H) 2025    PROTEINCSF 64 (H) 2025    GLUCCSF 66 2025       {CSF Testin}     Brain Imaging:  MRI brain:   Results for orders placed during the hospital encounter of 25    MRI Brain Without Contrast    Impression  Stable appearance of the brain without acute intracranial process.    Stable size and distribution of foci of susceptibility as described above compatible with cerebral amyloid angiopathy.    No new hemorrhage parenchymal edema or sulcal effusion.    Electronically signed by resident: Dany Walker  Date:    2025  Time:    14:06    Electronically signed by: William Tejada  Date:    2025  Time:    14:30      Results for orders placed during the hospital encounter of 05/15/25    MRI Brain Without Contrast    Impression  No significant change from prior specifically without evidence for acute infarction or new parenchymal signal abnormality    Continued punctate foci of susceptibility suggestive for remote microhemorrhages mild amyloid angiopathy remains in the differential    No evidence for interval hemorrhage.      Electronically signed by: Nakul Wesley DO  Date:    05/15/2025  Time:    13:03      Results for orders placed during the hospital encounter of 24    MRI Brain Without Contrast    Impression  No acute intracranial process.  No advanced volume loss.  Chronic ischemic changes as detailed above with potentially mild amyloid angiopathy with few scattered areas of chronic microhemorrhage.      Electronically signed by: William Tejada  Date:    2024  Time:    14:16      Results for orders placed in visit on 08/21/15    MRI " "Brain W WO Contrast     Vessel Imaging:  CTA head and neck:   No results found for this or any previous visit.    Cardiac Evaluation:  TTE: No echocardiogram results found for the past 12 months     {ACC/AHA Heart Failure Staging (Class, EF%):98208}     Vital Signs:      1/29/2025     1:33 PM 2/12/2025     2:09 PM 3/6/2025    11:08 AM 4/9/2025    10:59 AM 5/6/2025    12:07 PM 5/23/2025     2:35 PM 6/24/2025     8:59 AM   Vitals - 1 value per visit   SYSTOLIC  120 116 115 131 105 131   DIASTOLIC  70 76 71 81 69 84   Pulse  67 64 71 54 87 61   Temp   97.9 °F (36.6 °C)  97.7 °F (36.5 °C) 98.4 °F (36.9 °C) 97.8 °F (36.6 °C)   Resp     17 16 18   SPO2  97 % 98 %   96 %    Weight (lb)  154.87 157.52 158.18 158.07 158.07 159.3   Weight (kg)  70.25 71.45 71.75 71.7 71.7 72.26   Height 5' 10" (1.778 m) 5' 10" (1.778 m) 5' 10" (1.778 m) 5' 10" (1.778 m) 5' 10" (1.778 m) 5' 10" (1.778 m) 5' 10" (1.778 m)   BMI (Calculated)  22.2 22.6 22.7 22.7 22.7 22.9   Pain Score Three Zero Zero Zero  Four      Past Medical History  Past Medical History:   Diagnosis Date    Allergic rhinitis     Asthma     Fibrocystic breast     Osteopenia     Varicose veins     sees Dr. Brigido Quinones     Current Outpatient Medications   Medication Instructions    azelastine (ASTELIN) 137 mcg, Nasal, 2 times daily    calcium carbonate (OS-ISABELLE) 1,200 mg, Daily    cholecalciferol (vitamin D3) (VITAMIN D3) 10,000 Units, Daily    collagen, bovine, 100 % Powd 1 Scoop, Daily    CYANOCOBALAMIN, VITAMIN B-12, (VITAMIN B-12 ORAL) Take by mouth.    ELDERBERRY FRUIT ORAL 1 tablet, Every other day    esomeprazole (NEXIUM) 20 mg, Oral, Before breakfast    fish oil-omega-3 fatty acids 300-1,000 mg capsule 600 mg, Daily    fluticasone propionate (FLONASE) 100 mcg, Each Nostril, Daily    fluticasone-salmeterol diskus inhaler 250-50 mcg 1 puff, Inhalation, Daily, Controller    ipratropium (ATROVENT) 21 mcg (0.03 %) nasal spray 2 sprays, Each Nostril, 2 times " daily    LORazepam (ATIVAN) 1 mg, Oral, On Call Procedure    ondansetron (ZOFRAN-ODT) 4 MG TbDL Dissolve 1 tablet (4 mg total) by mouth every 6 (six) hours as needed.    psyllium husk 0.4 gram Cap 1 capsule, Daily    rivastigmine (EXELON PATCH) 9.5 mg/24 hour PT24 1 patch, Transdermal, Daily    suvorexant (BELSOMRA) 20 mg Tab 1 tablet, Oral, Nightly      Past Surgical History:   Procedure Laterality Date    BREAST BIOPSY Left     COLONOSCOPY  2014    Dr. Canas - repeat in 5 to 10 years    COLONOSCOPY N/A 2024    Procedure: COLONOSCOPY;  Surgeon: Cassie Huber MD;  Location: [facility];  Service: Endoscopy;  Laterality: N/A;    ESOPHAGOGASTRODUODENOSCOPY N/A 10/19/2018    Procedure: EGD (ESOPHAGOGASTRODUODENOSCOPY);  Surgeon: Cassie Huber MD;  Location: [facility];  Service: Endoscopy;  Laterality: N/A;    ESOPHAGOGASTRODUODENOSCOPY N/A 2024    Procedure: EGD (ESOPHAGOGASTRODUODENOSCOPY);  Surgeon: Cassie Huber MD;  Location: [facility];  Service: Endoscopy;  Laterality: N/A;    KNEE SURGERY Bilateral     meniscus repair    peniculitis surgery      x 3     Social History  Social History     Socioeconomic History    Marital status:     Number of children: 2   Tobacco Use    Smoking status: Former     Current packs/day: 0.00     Average packs/day: 1.8 packs/day for 16.7 years (30.1 ttl pk-yrs)     Types: Cigarettes     Start date: 1971     Quit date:      Years since quittin.5     Passive exposure: Past    Smokeless tobacco: Never    Tobacco comments:     Smoked cigarettes non menthol from sr year in high school, quit cold turkey when found out i was pre   Substance and Sexual Activity    Alcohol use: Yes     Alcohol/week: 5.0 standard drinks of alcohol     Types: 5 Glasses of wine per week     Comment: Truley and/or wine    Drug use: No    Sexual activity: Not Currently     Partners: Male     Birth control/protection: None     Comment:       Social Drivers of Health     Financial Resource Strain: Low Risk  (2/12/2025)    Overall Financial Resource Strain (CARDIA)     Difficulty of Paying Living Expenses: Not hard at all   Food Insecurity: No Food Insecurity (2/12/2025)    Hunger Vital Sign     Worried About Running Out of Food in the Last Year: Never true     Ran Out of Food in the Last Year: Never true   Transportation Needs: No Transportation Needs (2/12/2025)    PRAPARE - Transportation     Lack of Transportation (Medical): No     Lack of Transportation (Non-Medical): No   Physical Activity: Sufficiently Active (2/12/2025)    Exercise Vital Sign     Days of Exercise per Week: 4 days     Minutes of Exercise per Session: 70 min   Stress: No Stress Concern Present (2/12/2025)    [employer] - Occupational Stress Questionnaire     Feeling of Stress : Not at all   Housing Stability: Low Risk  (2/12/2025)    Housing Stability Vital Sign     Unable to Pay for Housing in the Last Year: No     Number of Times Moved in the Last Year: 0     Homeless in the Last Year: No         Eufemia Rosas MD

## 2025-06-26 ENCOUNTER — RESEARCH ENCOUNTER (OUTPATIENT)
Dept: RESEARCH | Facility: HOSPITAL | Age: 75
End: 2025-06-26
Payer: MEDICARE

## 2025-06-26 NOTE — PROGRESS NOTES
Day 2 - Phone Call    Study title: A Phase 2, Randomized, Double-blind, Placebo-controlled Study to Evaluate the Efficacy, Safety, Tolerability, and Pharmacodynamics of Intrathecally Administered ALN-JEFFRY in Patients with Cerebral Amyloid Angiopathy (CAA) (cAPPricorn-1)  Sponsor: [employer]  NCT# [id number]     : Moody Rosas MD    IRB #: 2024.129    Subject ID: [id number]  Date of visit: 06/26/2025     Person contacted: Subject      Has the patient experienced any new adverse events since the last visit?  No   Has the patient had any changes to their concomitant medications since last visit?  No       Summary/Follow-up Items:  Patient is scheduled for [facility] RN to come to her home to perform the Day 2 EKG and PK blood draw.  The patient was notified that the next visit would be in July 2025 for Month 1 Visit. She was advised to contact CRC or PI with any study-related questions or concerns.  Patient expressed understanding of information discussed.

## 2025-07-02 ENCOUNTER — RESULTS FOLLOW-UP (OUTPATIENT)
Dept: NEUROLOGY | Facility: CLINIC | Age: 75
End: 2025-07-02

## 2025-07-28 ENCOUNTER — RESEARCH ENCOUNTER (OUTPATIENT)
Dept: RESEARCH | Facility: HOSPITAL | Age: 75
End: 2025-07-28
Payer: MEDICARE

## 2025-07-28 ENCOUNTER — OFFICE VISIT (OUTPATIENT)
Dept: NEUROLOGY | Facility: CLINIC | Age: 75
End: 2025-07-28
Payer: MEDICARE

## 2025-07-28 VITALS
DIASTOLIC BLOOD PRESSURE: 67 MMHG | HEART RATE: 70 BPM | BODY MASS INDEX: 23.08 KG/M2 | WEIGHT: 161.25 LBS | SYSTOLIC BLOOD PRESSURE: 103 MMHG | HEIGHT: 70 IN | RESPIRATION RATE: 20 BRPM | TEMPERATURE: 98 F

## 2025-07-28 DIAGNOSIS — E85.4 CEREBRAL AMYLOID ANGIOPATHY: Primary | ICD-10-CM

## 2025-07-28 DIAGNOSIS — Z00.6 RESEARCH EXAM: ICD-10-CM

## 2025-07-28 DIAGNOSIS — I68.0 CEREBRAL AMYLOID ANGIOPATHY: Primary | ICD-10-CM

## 2025-07-28 NOTE — PROGRESS NOTES
Month 1 Visit (Non-Dosing Day)    Study title: A Phase 2, Randomized, Double-blind, Placebo-controlled Study to Evaluate the Efficacy, Safety, Tolerability, and Pharmacodynamics of Intrathecally Administered ALN-JEFFRY in Patients with Cerebral Amyloid Angiopathy (CAA) (cAPPricorn-1)  Sponsor: [employer]  NCT# [id number]     : Moody Rosas MD    IRB #: 2024.129    Subject ID: [id number]    Vital Signs (Weight, Height, BP, Heart Rate, Respiratory Rate, Temperature)   Full Physical Examination - (Must be completed by Investigator)   Neurological assessment (including nondilated fundi) - (Must be completed by Investigator)   Schuyler - Suicide Severity Rating Scale (C-SSRS)   Central Labs - Clinical laboratory assessments; PK, Blood plasma PD and exploratory biomarkers   Review for Adverse Advents    Review Concomitant Medications        VITAL SIGNS     Time collected 13:22       Weight (kg) 73.15kg   Height (cm) 177.8cm   BMI (kg/m2) 23.14       Blood pressure should be taken using the same arm at each visit.   Blood pressure measuring position    [x]Seated  []Supine   Blood pressure measuring arm []Right  [x]Left   Systolic blood pressure 103 mmHg   Diastolic blood pressure 67 mmHg   Heart rate  70 beats/min   Respiratory Rate  20 breaths/min   Temperature location [x] Oral   [] Temporal  [] Tympanic  [] Axillary   Temperature (C) 36.7      Central Labs:   Lab Collection (Blood and Urine)  Were all protocol required labs obtained? Yes  Time collected: 14:25 (24-hour clock)  Performed by: Phlebotomy lab     Assessments:      Schuyler - Suicide Severity Rating Scale (C-SSRS)     Was the C-SSRS performed? Yes   Date of assessment: 7/28/2025       Who performed the Trail Making Test ? () []PI/Sub-I  [x]CRC   Method of Completion [x]Tablet (Preferred)  []Paper (Back-up)    First and Last Name Mikayla Middleton        ADVERSE EVENTS  EMR and patient were surveyed for recent  admissions and other adverse events. There are no new adverse events to report at this time.    PRIOR/CONCOMITANT MEDICATIONS  NOTE: all prior/concomitant medication will be documented and maintained in patient-specific Concomitant Log in subject files    Visit Summary/Follow-up Items:         The subject was notified that the next visit would be in TBD for Month 3 Visit. Subject was advised to contact CRC or PI with any study-related questions or concerns. Subject expressed understanding of information discussed.       RE-CONSENT     Study title: A Phase 2, Randomized, Double-blind, Placebo-controlled Study to Evaluate the Efficacy, Safety, Tolerability, and Pharmacodynamics of Intrathecally Administered ALN-JEFFRY in Patients with Cerebral Amyloid Angiopathy (CAA) (cAPPricorn-1)  Sponsor: [employer]  NCT# [id number]     : Moody Rosas MD    IRB #: 2024.129      Date re-consent obtained: July 28, 2025      Present for discussion:  Patient       Study Personnel Obtaining Re-Consent Ruslan Mendoza        Prior to the Informed Consent (IC) being signed, or any study protocol required data collection, testing, procedure, or intervention being performed, the following was done and/or discussed:  Patient was given a copy of the IC for review   Purpose of the study and qualifications to participate   Study design, Follow up schedule, and tests or procedures done at each visit  Confidentiality and HIPAA Authorization for Release of Medical Records for the research trial/ subject's rights/research related injury  Risk, Benefits, Alternative Treatments, Compensation and Costs  Participation in the research trial is voluntary and patient may withdraw at anytime  Contact information for study related questions    Patient verbalizes understanding of the above: Yes:   Contact information for CRC and PI given to patient: Yes:   Patient able to adequately summarize: the purpose of the study, the  risks associated with the study, and all procedures, testing, and follow-ups associated with the study: Yes:     The subject was provided with ample time to review the consent for consideration and ask questions related to this study. All questions were answered appropriately and to their satisfaction. The subject was able to verbalize understanding of the study and agreed to participate. The patient signed the IRB-approved version of the consent form and was provided with a copy of the signed consent form, which includes the PI's contact information. Subject was also provided with the CRC's contact information. The original consent was filed into the subject's research study binder and a copy was scanned into electronic medical records (Epic).    Additional Informed Re-Consent  Study Partner Consent Form No\  Study partner was not available to sign re-consent. Will have study partner sign via docusign or next required visit.

## 2025-07-28 NOTE — PROGRESS NOTES
Month 1    Study title: A Phase 2, Randomized, Double-blind, Placebo-controlled Study to Evaluate the Efficacy, Safety, Tolerability, and Pharmacodynamics of Intrathecally Administered ALN-JEFFRY in Patients with Cerebral Amyloid Angiopathy (CAA) (cAPPricorn-1)  Sponsor: [employer]  NCT# [id number]     : Moody Rosas MD    IRB #: 2024.129    Date of Visit: 07/28/2025   Time of Exams: 13:50    Physical Exam      If Abnormal, document findings:   General Appearance Normal     Skin Normal     HEENT Normal        Respiratory Normal     Cardiovascular Normal     Gastrointestinal Normal     Musculoskeletal Normal     Dermatological Normal     Thyroid Normal     Lymph nodes Normal        Neurological Exam     Category: Mental Status    If Abnormal, document findings:   Level of consciousness (alertness) Normal     Orientation Normal     Attention and concentration Normal        Language Normal     Memory Abnormal - Not Clinically Significant  3/3 at 5 min      Category: Meningeal signs    If Abnormal, document findings:   Nuchal rigidity, Kernigs sign, Brudzinskis sign, meningismus Normal        Category: Cranial nerves*  *2 optic, 3 oculomotor, 4 trochlear, 5 trigeminal, 6 abducens, 7 facial, 8 vestibulocochlear, 9 glossopharyngeal, 10 vagus, 11 spinal accessory, 12 hypoglossal.    If Abnormal, document findings:   Fundoscopic optic nerve examination Normal     Visual acuity Abnormal - Not Clinically Significant  20/400   Visual fields Normal        Pupillary light reflex Normal     Extraocular eye movements, including observation for nystagmus Normal     Facial sensation Normal     Facial muscle strength/symmetry Normal     Hearing Normal  sensorineural loss; wearing aids today   Palatal movement/ uvula position Normal     Dysarthria Normal     Head rotation/shoulder elevation (sternocleinomastoid and trapezius) Normal     Tongue movement Normal        Category: Motor examination     If Abnormal, document findings:   Muscle bulk Normal     Tone, including examination for rigidity, spasticity, paratonia, with characterization Normal     Abnormal/involuntary movements, including tremor, myoclonus, dystonia, and other abnormal/involuntary motor activity, with characterization Normal     Strength testing Normal        Category: Reflex examination   If Abnormal, document findings:   Deep tendon reflexes Normal     Plantar (Babinski) reflex Normal     Primitive reflexes (frontal release signs) when appropriate Normal        Category: Sensory examination   If Abnormal, document findings:   Multimodality sensory examination Normal        Category: Coordination examination   If Abnormal, document findings:   Dexterity (e.g., finger tapping) Normal     Rapid alternating movements Normal     Finger-to-nose and heel-to-shin testing or equivalent Normal           Category: Gait and balance examination    If Abnormal, document findings:   Stance Normal     Romberg test Normal     Casual gait evaluation Normal        Heel, toe, and tandem gait evaluation Normal  mild difficulty with tandem          IMPRESSION:   Reviewed abnormal lab results with patient, explaining that protein elevation in CSF may possibly be marker of CAA and general disruption of the blood brain barrier but at this time given absence of symptoms is not clinically concerning.   Compared MRI scans from May 15 and June 24, counting approximately 10 microbleeds on both scans, indicating no significant progression over 7-week period.   Performed neurological exam, including gait assessment, reflexes, vibration sensation, visual acuity, and cognitive testing.   Explained the differences in MRI scan quality between earlier and more recent imaging, emphasizing improved clarity and detection capabilities.          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 She currently uses hearing aids but notes significant hearing compromise despite assistive devices. She experiences challenges with consistent hearing aid use, particularly during water-related activities such as Aqua fit and bathing, where she becomes concerned about potential device damage. She attempts to wear hearing aids most of the time but occasionally forgets to replace them after water activities.      ROS:  ENT: +difficulty hearing, +hearing loss  Neurological: +balance issues       I have spent a total of 29 minutes in face-to-face encounter, which includes performance of the neurological and general examination(s)

## 2025-07-30 ENCOUNTER — TELEPHONE (OUTPATIENT)
Dept: FAMILY MEDICINE | Facility: CLINIC | Age: 75
End: 2025-07-30
Payer: MEDICARE

## 2025-07-30 NOTE — TELEPHONE ENCOUNTER
Copied from CRM #[id number]. Topic: General Inquiry - Return Call  >> Jul 30, 2025 10:51 AM Med Assistant Shari wrote:  Type:  Patient Returning Call    Who Called:Pt   Who Left Message for Patient:Corrine Morillo MA  Does the patient know what this is regarding?:moving tomorrows ov to earlier or later   Would the patient rather a call back or a response via [facility]? Callback   Best Call Back Number:Telephone Information:  Mobile          [phone]     Additional Information: has to get her grandkids

## 2025-07-30 NOTE — TELEPHONE ENCOUNTER
Called and spoke with pt in regards of her message. Pt called to get her apt rescheduled for another time, since she has an apt on tomorrow and won't make it in time. Pt rescheduled her apt for tomorrow for 1:00 pm with Dr. Fox.

## 2025-07-30 NOTE — TELEPHONE ENCOUNTER
Copied from CRM #[id number]. Topic: Appointments - Appointment Access  >> Jul 30, 2025  9:41 AM Leslee wrote:  Type:  Sooner Apoointment Request    Caller is requesting a sooner appointment.  Caller declined first available appointment listed below.  Caller will not accept being placed on the waitlist and is requesting a message be sent to doctor.  Name of Caller:pt   When is the first available appointment?07/31  Symptoms:diarrhea   Would the patient rather a call back or a response via [facility]? Call back   Best Call Back Number:[phone] (M)    Additional Information: pt requesting a call back in regards to earlier time frame for appt tomorrow currently schedule for 2:20

## 2025-08-01 ENCOUNTER — OFFICE VISIT (OUTPATIENT)
Dept: FAMILY MEDICINE | Facility: CLINIC | Age: 75
End: 2025-08-01
Payer: MEDICARE

## 2025-08-01 VITALS
HEIGHT: 70 IN | SYSTOLIC BLOOD PRESSURE: 122 MMHG | HEART RATE: 62 BPM | DIASTOLIC BLOOD PRESSURE: 84 MMHG | BODY MASS INDEX: 22.9 KG/M2 | TEMPERATURE: 98 F | OXYGEN SATURATION: 97 % | WEIGHT: 159.94 LBS

## 2025-08-01 DIAGNOSIS — K59.1 FUNCTIONAL DIARRHEA: Primary | ICD-10-CM

## 2025-08-01 DIAGNOSIS — M85.852 OSTEOPENIA OF NECK OF LEFT FEMUR: ICD-10-CM

## 2025-08-01 PROCEDURE — 99999 PR PBB SHADOW E&M-EST. PATIENT-LVL III: CPT | Mod: PBBFAC,,, | Performed by: STUDENT IN AN ORGANIZED HEALTH CARE EDUCATION/TRAINING PROGRAM

## 2025-08-01 RX ORDER — HEPARIN 100 UNIT/ML
500 SYRINGE INTRAVENOUS
OUTPATIENT
Start: 2025-08-01

## 2025-08-01 RX ORDER — SODIUM CHLORIDE 0.9 % (FLUSH) 0.9 %
10 SYRINGE (ML) INJECTION
OUTPATIENT
Start: 2025-08-01

## 2025-08-01 RX ORDER — ZOLEDRONIC ACID 5 MG/100ML
5 INJECTION, SOLUTION INTRAVENOUS
OUTPATIENT
Start: 2025-08-01

## 2025-08-01 RX ORDER — HEPARIN 100 UNIT/ML
500 SYRINGE INTRAVENOUS
Status: CANCELLED | OUTPATIENT
Start: 2025-08-01

## 2025-08-01 NOTE — PROGRESS NOTES
Subjective:      Patient ID: Sunni Lowe is a 74 y.o. female.    Chief Complaint: Follow-up (Pt here to go over her bone density results and follow up for diarrhea )    History of Present Illness    CHIEF COMPLAINT:  Patient presents today for diarrhea that has now resolved.    GASTROINTESTINAL:  She experienced 8 consecutive days of diarrhea that began after taking Miralax for two consecutive days to address constipation. The first dose of Miralax provided initial relief, but the second dose triggered persistent diarrhea that she was unable to resolve. She has chronic anal muscle weakness diagnosed years ago at [facility], characterized by no muscle tone in her anus resulting in persistent leakage and smudging when wiping. Stool does not completely fall out but leaves residual smudging despite thorough cleaning. She experiences ongoing challenges with bowel control and hygiene related to this condition. The recent diarrhea episode exacerbated her existing muscle weakness symptoms and further impaired her ability to control bowel movements.    BONE DENSITY:  She has a long-standing history of osteopenia. She previously received treatment with Reclast but was discontinued several years ago. She expresses concern about her bone density status and uncertainty about whether she should restart Reclast. Her lumbar spine bone mineral density improved by 2%, but her hip bone density has worsened. She is close to, but not yet diagnosed with, osteoporosis.      ROS:  General: -fever, -chills, -fatigue, -weight gain, -weight loss  Eyes: -vision changes, -redness, -discharge  ENT: -ear pain, -nasal congestion, -sore throat  Cardiovascular: -chest pain, -palpitations, -lower extremity edema  Respiratory: -cough, -shortness of breath  Gastrointestinal: -abdominal pain, -nausea, -vomiting, +diarrhea, +constipation, -blood in stool, +bowel incontinence  Genitourinary: -dysuria, -hematuria, -frequency  Musculoskeletal: -joint  pain, -muscle pain  Skin: -rash, -lesion  Neurological: -headache, -dizziness, -numbness, -tingling, +memory loss, +memory problems  Psychiatric: -anxiety, -depression, -sleep difficulty          Objective:     Vitals:    08/01/25 1329   BP: 122/84   Pulse: 62   Temp: 97.7 °F (36.5 °C)      Physical Exam  Vitals reviewed.   Constitutional:       Appearance: Normal appearance. She is normal weight.   HENT:      Head: Normocephalic and atraumatic.   Eyes:      Conjunctiva/sclera: Conjunctivae normal.   Cardiovascular:      Rate and Rhythm: Normal rate and regular rhythm.      Heart sounds: Normal heart sounds.   Pulmonary:      Effort: Pulmonary effort is normal.      Breath sounds: Normal breath sounds.   Abdominal:      Palpations: Abdomen is soft.      Tenderness: There is no abdominal tenderness.   Musculoskeletal:         General: Normal range of motion.      Cervical back: Normal range of motion.      Right lower leg: No edema.      Left lower leg: No edema.   Neurological:      Mental Status: She is alert. Mental status is at baseline.   Psychiatric:         Mood and Affect: Mood normal.         Behavior: Behavior normal.        Assessment:         1. Functional diarrhea    2. Osteopenia of neck of left femur          Plan:   Assessment & Plan      FECAL INCONTINENCE:  - Evaluated recent episode of prolonged diarrhea, likely triggered by consecutive days of Miralax use.  - Patient reports leakage due to lack of anal sphincter muscles and experiences smudging after wiping.  - Previous exam by a nurse practitioner was discussed along with potential treatment options.  - Confirmed that anal muscle weakness and associated symptoms have returned to baseline.    OSTEOPENIA AND BONE DENSITY ISSUES:  - Reviewed bone density results showing 2% increase in lumbar spine but decrease in hip bone density.  - Discussed the patient's osteopenia condition and recommended resuming Reclast treatment.  - Ordered Reclast infusion  to be administered at [facility] after consultation with gynecologist.    FOLLOW-UP:  - Scheduled follow up after next DEXA scan to reassess need for future Reclast doses.        Problem List Items Addressed This Visit          Orthopedic    Osteopenia    Overview   Managed by GYN   Reclast annually           Other Visit Diagnoses         Functional diarrhea    -  Primary                    Alison KEATINGRay Underwoodshyanneruss   [facility]   8/1/25       This note was generated with the assistance of ambient listening technology. Verbal consent was obtained by the patient and accompanying visitor(s) for the recording of patient appointment to facilitate this note. I attest to having reviewed and edited the generated note for accuracy, though some syntax or spelling errors may persist. Please contact the author of this note for any clarification.     I spent a total of 41 minutes on the day of the visit.This includes face to face time and non-face to face time preparing to see the patient (eg, review of tests), obtaining and/or reviewing separately obtained history, documenting clinical information in the electronic or other health record, independently interpreting results and communicating results to the patient/family/caregiver, or care coordinator.

## 2025-08-06 ENCOUNTER — PATIENT MESSAGE (OUTPATIENT)
Dept: FAMILY MEDICINE | Facility: CLINIC | Age: 75
End: 2025-08-06
Payer: MEDICARE

## 2025-08-06 ENCOUNTER — OFFICE VISIT (OUTPATIENT)
Dept: URGENT CARE | Facility: CLINIC | Age: 75
End: 2025-08-06
Payer: MEDICARE

## 2025-08-06 VITALS
HEIGHT: 70 IN | RESPIRATION RATE: 20 BRPM | HEART RATE: 65 BPM | SYSTOLIC BLOOD PRESSURE: 113 MMHG | OXYGEN SATURATION: 96 % | TEMPERATURE: 98 F | DIASTOLIC BLOOD PRESSURE: 59 MMHG | WEIGHT: 160 LBS | BODY MASS INDEX: 22.9 KG/M2

## 2025-08-06 DIAGNOSIS — S92.355A CLOSED NONDISPLACED FRACTURE OF FIFTH METATARSAL BONE OF LEFT FOOT, INITIAL ENCOUNTER: Primary | ICD-10-CM

## 2025-08-06 PROCEDURE — 96372 THER/PROPH/DIAG INJ SC/IM: CPT | Mod: S$GLB,,, | Performed by: NURSE PRACTITIONER

## 2025-08-06 PROCEDURE — 73630 X-RAY EXAM OF FOOT: CPT | Mod: LT,S$GLB,, | Performed by: RADIOLOGY

## 2025-08-06 PROCEDURE — 99213 OFFICE O/P EST LOW 20 MIN: CPT | Mod: 25,S$GLB,, | Performed by: NURSE PRACTITIONER

## 2025-08-06 RX ORDER — KETOROLAC TROMETHAMINE 10 MG/1
10 TABLET, FILM COATED ORAL EVERY 8 HOURS PRN
Qty: 15 TABLET | Refills: 0 | Status: SHIPPED | OUTPATIENT
Start: 2025-08-06

## 2025-08-06 RX ORDER — KETOROLAC TROMETHAMINE 30 MG/ML
30 INJECTION, SOLUTION INTRAMUSCULAR; INTRAVENOUS
Status: COMPLETED | OUTPATIENT
Start: 2025-08-06 | End: 2025-08-06

## 2025-08-06 RX ADMIN — KETOROLAC TROMETHAMINE 30 MG: 30 INJECTION, SOLUTION INTRAMUSCULAR; INTRAVENOUS at 03:08

## 2025-08-06 NOTE — PATIENT INSTRUCTIONS
A referral has been placed for you with Orthopedics. [facility] should call you to set up the appt, but if you do not hear from [facility], please call [phone] to make the appointment.      Please call me-Aruna at [phone]-at the [facility] if the prescribed toradol (ketrolac) is not working well enough for your foot pain.   I will be there Friday, Saturday, and Sunday (8/8,9,10).     Rest the foot  Wear foot boot when walking   Use ace wrap when at rest  Ice packs as needed (place cloth between ice pack and your skin)   Elevate foot when at rest

## 2025-08-06 NOTE — PROGRESS NOTES
"Subjective:      Patient ID: Sunni Lowe is a 74 y.o. female.    Vitals:  height is 5' 10" (1.778 m) and weight is 72.6 kg (160 lb). Her oral temperature is 98.2 °F (36.8 °C). Her blood pressure is 113/59 (abnormal) and her pulse is 65. Her respiration is 20 and oxygen saturation is 96%.     Chief Complaint: Foot Injury (Left foot)    74 y.o. female presents with injury to her left lateral foot. Patient states that while walking with low-wedge-shoe, and she twisted the foot. She did not fall down.  Injury occurred [place] 10am [place]  Hurts to bear weight and she is walking with a limp. Ice pack taped around the site. Denies open wound.     Foot Injury   The incident occurred 1 to 3 hours ago. The incident occurred at work. The injury mechanism was a twisting injury. The pain is present in the left foot. The quality of the pain is described as aching. The pain is at a severity of 5/10. The pain is moderate. The pain has been Constant since onset. Associated symptoms include an inability to bear weight. She reports no foreign bodies present. The symptoms are aggravated by movement, palpation and weight bearing. She has tried ice and elevation for the symptoms. The treatment provided mild relief.       Constitution: Positive for activity change. Negative for appetite change and fever.   Musculoskeletal:  Positive for pain and trauma.   Skin:  Negative for wound, abrasion, erythema and bruising.      Objective:     Physical Exam   Constitutional: She is oriented to person, place, and time.  Non-toxic appearance. She does not appear ill. No distress.   HENT:   Head: Normocephalic.   Nose: Nose normal.   Mouth/Throat: Mucous membranes are moist.   Cardiovascular: Normal rate and normal pulses.   Pulmonary/Chest: Effort normal.   Abdominal: Normal appearance.   Musculoskeletal: Normal range of motion.         General: Swelling, tenderness (to palpation of left lateral foot over " 5th proximal MT) and signs of injury present. No deformity. Normal range of motion.      Right lower leg: No edema.      Left lower leg: No edema.   Neurological: She is alert and oriented to person, place, and time.   Skin: Skin is warm, dry and not diaphoretic. Capillary refill takes less than 2 seconds. No bruising and No erythema   Psychiatric: Her behavior is normal. Mood normal.   Nursing note and vitals reviewed.  XR FOOT COMPLETE 3 VIEW LEFT  Result Date: 8/6/2025  EXAMINATION: XR FOOT COMPLETE 3 VIEW LEFT CLINICAL HISTORY: left foot injury;.  Unspecified injury of left foot, initial encounter TECHNIQUE: AP, lateral and oblique views of the left foot were performed. COMPARISON: None FINDINGS: Generalized osteopenia.  Overall alignment is within normal limits.  There is an acute, minimally displaced transverse type fracture through the base of the 5th metatarsal, with fracture planes extending to the articular surface at the 5th tarsal metatarsal joint.  No significant angulation.  No dislocation or destructive osseous process.  Lisfranc articulation is congruent.  Joint spaces appear relatively maintained without significant degenerative change.  Few scattered small nonspecific soft tissue calcifications noted about the shin/ankle. Nonspecific soft tissue swelling about the 5th MTP joint.  No subcutaneous emphysema or radiopaque foreign body.     Fifth metatarsal base acute fracture, as above. Electronically signed by: Darrell Almanza MD Date:    08/06/2025 Time:    15:37     Assessment:     1. Closed nondisplaced fracture of fifth metatarsal bone of left foot, initial encounter        Plan:       Closed nondisplaced fracture of fifth metatarsal bone of left foot, initial encounter  -     XR FOOT COMPLETE 3 VIEW LEFT  -     Walking Boot For Home Use  -     Ambulatory referral/consult to Orthopedics  -     ketorolac injection 30 mg  -     ketorolac (TORADOL) 10 mg tablet; Take 1 tablet (10 mg total) by mouth  every 8 (eight) hours as needed for Pain.  Dispense: 15 tablet; Refill: 0      Patient Instructions   A referral has been placed for you with Orthopedics. [facility] should call you to set up the appt, but if you do not hear from [facility], please call [phone] to make the appointment.      Please call Serg at [phone]-at the [facility] if the prescribed toradol (ketrolac) is not working well enough for your foot pain.   I will be there Friday, Saturday, and Sunday (8/8,9,10).     Rest the foot  Wear foot boot when walking   Use ace wrap when at rest  Ice packs as needed (place cloth between ice pack and your skin)   Elevate foot when at rest

## 2025-08-08 ENCOUNTER — OFFICE VISIT (OUTPATIENT)
Dept: ORTHOPEDICS | Facility: CLINIC | Age: 75
End: 2025-08-08
Payer: MEDICARE

## 2025-08-08 VITALS — WEIGHT: 163.13 LBS | BODY MASS INDEX: 23.35 KG/M2 | HEIGHT: 70 IN

## 2025-08-08 DIAGNOSIS — S92.355A CLOSED NONDISPLACED FRACTURE OF FIFTH METATARSAL BONE OF LEFT FOOT, INITIAL ENCOUNTER: Primary | ICD-10-CM

## 2025-08-08 PROCEDURE — 1157F ADVNC CARE PLAN IN RCRD: CPT | Mod: CPTII,S$GLB,, | Performed by: ORTHOPAEDIC SURGERY

## 2025-08-08 PROCEDURE — 1160F RVW MEDS BY RX/DR IN RCRD: CPT | Mod: CPTII,S$GLB,, | Performed by: ORTHOPAEDIC SURGERY

## 2025-08-08 PROCEDURE — 99213 OFFICE O/P EST LOW 20 MIN: CPT | Mod: S$GLB,,, | Performed by: ORTHOPAEDIC SURGERY

## 2025-08-08 PROCEDURE — 1101F PT FALLS ASSESS-DOCD LE1/YR: CPT | Mod: CPTII,S$GLB,, | Performed by: ORTHOPAEDIC SURGERY

## 2025-08-08 PROCEDURE — 3008F BODY MASS INDEX DOCD: CPT | Mod: CPTII,S$GLB,, | Performed by: ORTHOPAEDIC SURGERY

## 2025-08-08 PROCEDURE — 1125F AMNT PAIN NOTED PAIN PRSNT: CPT | Mod: CPTII,S$GLB,, | Performed by: ORTHOPAEDIC SURGERY

## 2025-08-08 PROCEDURE — 1159F MED LIST DOCD IN RCRD: CPT | Mod: CPTII,S$GLB,, | Performed by: ORTHOPAEDIC SURGERY

## 2025-08-08 PROCEDURE — 3288F FALL RISK ASSESSMENT DOCD: CPT | Mod: CPTII,S$GLB,, | Performed by: ORTHOPAEDIC SURGERY

## 2025-08-08 PROCEDURE — 3044F HG A1C LEVEL LT 7.0%: CPT | Mod: CPTII,S$GLB,, | Performed by: ORTHOPAEDIC SURGERY

## 2025-08-08 PROCEDURE — 99999 PR PBB SHADOW E&M-EST. PATIENT-LVL III: CPT | Mod: PBBFAC,,, | Performed by: ORTHOPAEDIC SURGERY

## 2025-08-11 ENCOUNTER — RESEARCH ENCOUNTER (OUTPATIENT)
Dept: RESEARCH | Facility: HOSPITAL | Age: 75
End: 2025-08-11
Payer: MEDICARE

## 2025-08-11 DIAGNOSIS — Z00.6 RESEARCH STUDY PATIENT: ICD-10-CM

## 2025-08-11 DIAGNOSIS — I68.0 CEREBRAL AMYLOID ANGIOPATHY (CODE): Primary | ICD-10-CM

## 2025-09-05 ENCOUNTER — TELEPHONE (OUTPATIENT)
Dept: FAMILY MEDICINE | Facility: CLINIC | Age: 75
End: 2025-09-05
Payer: MEDICARE